# Patient Record
Sex: FEMALE | Race: WHITE
[De-identification: names, ages, dates, MRNs, and addresses within clinical notes are randomized per-mention and may not be internally consistent; named-entity substitution may affect disease eponyms.]

---

## 2017-06-07 ENCOUNTER — OTHER (OUTPATIENT)
Age: 67
End: 2017-06-07

## 2017-06-07 DIAGNOSIS — M25.569 PAIN IN UNSPECIFIED KNEE: ICD-10-CM

## 2017-06-15 ENCOUNTER — APPOINTMENT (OUTPATIENT)
Dept: ORTHOPEDIC SURGERY | Facility: CLINIC | Age: 67
End: 2017-06-15

## 2018-09-21 ENCOUNTER — APPOINTMENT (OUTPATIENT)
Dept: ORTHOPEDIC SURGERY | Facility: CLINIC | Age: 68
End: 2018-09-21
Payer: COMMERCIAL

## 2018-09-21 VITALS
HEIGHT: 65 IN | BODY MASS INDEX: 35.99 KG/M2 | HEART RATE: 80 BPM | DIASTOLIC BLOOD PRESSURE: 97 MMHG | SYSTOLIC BLOOD PRESSURE: 237 MMHG | WEIGHT: 216 LBS

## 2018-09-21 DIAGNOSIS — Z82.61 FAMILY HISTORY OF ARTHRITIS: ICD-10-CM

## 2018-09-21 DIAGNOSIS — Z87.39 PERSONAL HISTORY OF OTHER DISEASES OF THE MUSCULOSKELETAL SYSTEM AND CONNECTIVE TISSUE: ICD-10-CM

## 2018-09-21 DIAGNOSIS — Z86.39 PERSONAL HISTORY OF OTHER ENDOCRINE, NUTRITIONAL AND METABOLIC DISEASE: ICD-10-CM

## 2018-09-21 DIAGNOSIS — Z82.62 FAMILY HISTORY OF OSTEOPOROSIS: ICD-10-CM

## 2018-09-21 DIAGNOSIS — Z78.9 OTHER SPECIFIED HEALTH STATUS: ICD-10-CM

## 2018-09-21 DIAGNOSIS — Z85.038 PERSONAL HISTORY OF OTHER MALIGNANT NEOPLASM OF LARGE INTESTINE: ICD-10-CM

## 2018-09-21 DIAGNOSIS — Z80.41 FAMILY HISTORY OF MALIGNANT NEOPLASM OF OVARY: ICD-10-CM

## 2018-09-21 PROCEDURE — 99204 OFFICE O/P NEW MOD 45 MIN: CPT

## 2018-09-21 RX ORDER — MELOXICAM 15 MG/1
15 TABLET ORAL
Qty: 21 | Refills: 0 | Status: ACTIVE | COMMUNITY
Start: 2018-09-21 | End: 1900-01-01

## 2018-09-21 RX ORDER — GLIMEPIRIDE 2 MG/1
2 TABLET ORAL
Refills: 0 | Status: ACTIVE | COMMUNITY

## 2018-09-21 RX ORDER — FUROSEMIDE 40 MG/1
40 TABLET ORAL
Refills: 0 | Status: ACTIVE | COMMUNITY

## 2018-09-21 RX ORDER — ATORVASTATIN CALCIUM 20 MG/1
20 TABLET, FILM COATED ORAL
Refills: 0 | Status: ACTIVE | COMMUNITY

## 2018-09-21 RX ORDER — INSULIN GLARGINE 100 [IU]/ML
100 INJECTION, SOLUTION SUBCUTANEOUS
Refills: 0 | Status: ACTIVE | COMMUNITY

## 2018-10-19 ENCOUNTER — APPOINTMENT (OUTPATIENT)
Dept: ORTHOPEDIC SURGERY | Facility: CLINIC | Age: 68
End: 2018-10-19
Payer: COMMERCIAL

## 2018-10-19 VITALS
HEIGHT: 65 IN | WEIGHT: 216 LBS | DIASTOLIC BLOOD PRESSURE: 87 MMHG | HEART RATE: 78 BPM | SYSTOLIC BLOOD PRESSURE: 191 MMHG | BODY MASS INDEX: 35.99 KG/M2

## 2018-10-19 DIAGNOSIS — M54.32 SCIATICA, LEFT SIDE: ICD-10-CM

## 2018-10-19 DIAGNOSIS — M48.061 SPINAL STENOSIS, LUMBAR REGION WITHOUT NEUROGENIC CLAUDICATION: ICD-10-CM

## 2018-10-19 DIAGNOSIS — M70.62 TROCHANTERIC BURSITIS, LEFT HIP: ICD-10-CM

## 2018-10-19 PROCEDURE — 99213 OFFICE O/P EST LOW 20 MIN: CPT

## 2019-04-04 ENCOUNTER — EMERGENCY (EMERGENCY)
Facility: HOSPITAL | Age: 69
LOS: 1 days | Discharge: HOME | End: 2019-04-04
Attending: STUDENT IN AN ORGANIZED HEALTH CARE EDUCATION/TRAINING PROGRAM
Payer: COMMERCIAL

## 2019-04-04 VITALS
HEIGHT: 65 IN | TEMPERATURE: 98 F | OXYGEN SATURATION: 100 % | HEART RATE: 71 BPM | WEIGHT: 195.11 LBS | RESPIRATION RATE: 18 BRPM

## 2019-04-04 LAB
ALBUMIN SERPL ELPH-MCNC: 4.3 G/DL — SIGNIFICANT CHANGE UP (ref 3.3–5.2)
ALP SERPL-CCNC: 87 U/L — SIGNIFICANT CHANGE UP (ref 40–120)
ALT FLD-CCNC: 20 U/L — SIGNIFICANT CHANGE UP
ANION GAP SERPL CALC-SCNC: 15 MMOL/L — SIGNIFICANT CHANGE UP (ref 5–17)
APTT BLD: 30.3 SEC — SIGNIFICANT CHANGE UP (ref 27.5–36.3)
AST SERPL-CCNC: 23 U/L — SIGNIFICANT CHANGE UP
BASOPHILS # BLD AUTO: 0 K/UL — SIGNIFICANT CHANGE UP (ref 0–0.2)
BASOPHILS NFR BLD AUTO: 0.3 % — SIGNIFICANT CHANGE UP (ref 0–2)
BILIRUB SERPL-MCNC: 0.2 MG/DL — LOW (ref 0.4–2)
BLD GP AB SCN SERPL QL: SIGNIFICANT CHANGE UP
BUN SERPL-MCNC: 60 MG/DL — HIGH (ref 8–20)
CALCIUM SERPL-MCNC: 9.9 MG/DL — SIGNIFICANT CHANGE UP (ref 8.6–10.2)
CHLORIDE SERPL-SCNC: 106 MMOL/L — SIGNIFICANT CHANGE UP (ref 98–107)
CO2 SERPL-SCNC: 21 MMOL/L — LOW (ref 22–29)
CREAT SERPL-MCNC: 1.6 MG/DL — HIGH (ref 0.5–1.3)
EOSINOPHIL # BLD AUTO: 0.2 K/UL — SIGNIFICANT CHANGE UP (ref 0–0.5)
EOSINOPHIL NFR BLD AUTO: 1.4 % — SIGNIFICANT CHANGE UP (ref 0–6)
GLUCOSE SERPL-MCNC: 52 MG/DL — LOW (ref 70–115)
HCT VFR BLD CALC: 34.6 % — LOW (ref 37–47)
HGB BLD-MCNC: 11.3 G/DL — LOW (ref 12–16)
INR BLD: 0.95 RATIO — SIGNIFICANT CHANGE UP (ref 0.88–1.16)
LYMPHOCYTES # BLD AUTO: 3.4 K/UL — SIGNIFICANT CHANGE UP (ref 1–4.8)
LYMPHOCYTES # BLD AUTO: 30.2 % — SIGNIFICANT CHANGE UP (ref 20–55)
MCHC RBC-ENTMCNC: 27 PG — SIGNIFICANT CHANGE UP (ref 27–31)
MCHC RBC-ENTMCNC: 32.7 G/DL — SIGNIFICANT CHANGE UP (ref 32–36)
MCV RBC AUTO: 82.6 FL — SIGNIFICANT CHANGE UP (ref 81–99)
MONOCYTES # BLD AUTO: 0.8 K/UL — SIGNIFICANT CHANGE UP (ref 0–0.8)
MONOCYTES NFR BLD AUTO: 7.6 % — SIGNIFICANT CHANGE UP (ref 3–10)
NEUTROPHILS # BLD AUTO: 6.8 K/UL — SIGNIFICANT CHANGE UP (ref 1.8–8)
NEUTROPHILS NFR BLD AUTO: 60.2 % — SIGNIFICANT CHANGE UP (ref 37–73)
PLATELET # BLD AUTO: 289 K/UL — SIGNIFICANT CHANGE UP (ref 150–400)
POTASSIUM SERPL-MCNC: 4.2 MMOL/L — SIGNIFICANT CHANGE UP (ref 3.5–5.3)
POTASSIUM SERPL-SCNC: 4.2 MMOL/L — SIGNIFICANT CHANGE UP (ref 3.5–5.3)
PROT SERPL-MCNC: 7.5 G/DL — SIGNIFICANT CHANGE UP (ref 6.6–8.7)
PROTHROM AB SERPL-ACNC: 10.9 SEC — SIGNIFICANT CHANGE UP (ref 10–12.9)
RBC # BLD: 4.19 M/UL — LOW (ref 4.4–5.2)
RBC # FLD: 14.8 % — SIGNIFICANT CHANGE UP (ref 11–15.6)
SODIUM SERPL-SCNC: 142 MMOL/L — SIGNIFICANT CHANGE UP (ref 135–145)
TYPE + AB SCN PNL BLD: SIGNIFICANT CHANGE UP
WBC # BLD: 11.2 K/UL — HIGH (ref 4.8–10.8)
WBC # FLD AUTO: 11.2 K/UL — HIGH (ref 4.8–10.8)

## 2019-04-04 PROCEDURE — 93010 ELECTROCARDIOGRAM REPORT: CPT

## 2019-04-04 NOTE — ED ADULT NURSE NOTE - PMH
<<-----Click on this checkbox to enter Past Medical History Diabetes    High cholesterol    HTN (hypertension)

## 2019-04-04 NOTE — ED PROVIDER NOTE - PHYSICAL EXAMINATION
SPINE: no midline tenderness pt vertebral or step offs. No palpable masses or areas of fluctuance. + left paraspinal muscle tenderness. + left hip ttp. no palpable muscular deformity or bony deformity.   DTR: bilateral patellar and plantar reflexes present. + PAIN TO PALPATION of bilateral anterior shins. NO CALF TENDERNESS bilaterally. 4/5 plantar flexion of left foot 5/5 plantar flexion right foot. moving all toes.

## 2019-04-04 NOTE — ED PROVIDER NOTE - PROGRESS NOTE DETAILS
pt ambulatory.   mri not to be completed until AM   agrees to be placed in observation for MRI in morning

## 2019-04-04 NOTE — ED STATDOCS - PROGRESS NOTE DETAILS
Addended by: NIKI BLACKMON on: 6/26/2018 06:24 PM     Modules accepted: Orders     68 y/o F pt with hx of colon CA (2009) presents to ED c/o progressive numbness to LEs b/l s/p epidural injection to L4 and L5 yesterday. Indicates she felt fine s/p injection initially and drove home without difficulty. At 19:00 yesterday developed numbness in toes b/l ,but went to bed. Today woke up with numbness in her ankles b/l and the numbness in her LLE goes up to her hips. Describes the tingling as " if her leg is asleep" Had epidural injections in the past without this numbness. Not on ASA or blood thinners. No urinary incontinence. Has not had a BM today. no SOB.  Pain: Shalmi  Exam: Const: NAD; Card: RRR, no murmurs; Resp: Lungs CTA; Skin: not pale, no diaphoresis. CN II-XII grossly in tact. Patient has slightly reduced sensation to LEs b/l. no weakness in LEs b/l, no saddle anesthesia.  Patient will be sent to the main for further evaluation and work up. Initial orders placed.

## 2019-04-04 NOTE — ED ADULT TRIAGE NOTE - CHIEF COMPLAINT QUOTE
patient [id number] having shots to L4 and L5 yesterday and having numbness to bilat lower extermities was [id number] to come to er for evaluation ob blood clot

## 2019-04-04 NOTE — ED PROVIDER NOTE - CLINICAL SUMMARY MEDICAL DECISION MAKING FREE TEXT BOX
69 year old female with epidural injections. pending MRI lumbar spine   placed in observation for MRI in morning

## 2019-04-04 NOTE — ED ADULT NURSE NOTE - OBJECTIVE STATEMENT
70yo female c/o gradual BLE numbness x 1 day. pt states she had "shots" on the right and left side of L4 and L5 yesterday afternoon, with no side effects. Pt states last night her left toes and ankle began to feel numb, when she awoke her entire left leg to hip felt "pins and needles" and then began to effect her right leg. pt reports she still has pins in needs in BLE. pt ambulates with cane without difficulty. sensation intact and equal. denies numbness. color appropriate, skin warm and dry, + distal pulses, toes warm and mobile, cap refil < 3. no edema. pt denies any headaches, recent falls, n/v, change in vision, incontinence, fevers, chills.

## 2019-04-04 NOTE — ED PROVIDER NOTE - ATTENDING CONTRIBUTION TO CARE
I personally saw the patient with the PA, and completed the key components of the history and physical exam. I then discussed the management plan with the PA.  mildly less strength at the LLE, pt able to ambulate, r/o complication of epidural, MRI

## 2019-04-04 NOTE — ED ADULT NURSE NOTE - CAS EDN DISCHARGE ASSESSMENT
Patient baseline mental status/Dressing clean and dry/Awake/Alert and oriented to person, place and time/Symptoms improved Dressing clean and dry/Patient baseline mental status/Symptoms improved/Alert and oriented to person, place and time/Pt A&Ox4. VSS. Spouse at bedside. Pt ambulates with cane. Pt verbalizes understanding of DC instructions; has no further questions./Awake

## 2019-04-04 NOTE — ED ADULT NURSE NOTE - CHIEF COMPLAINT QUOTE
patient [id number] having shots to L4 and L5 yesterday and having numbness to bilat lower extermities was [id number] to come to er for evaluation ob blood clot

## 2019-04-04 NOTE — ED PROVIDER NOTE - OBJECTIVE STATEMENT
69 year old female with chronic back pain/left hip pain  that recieves epidural injections from Dr Abhijit Dubois. states that she received injections yesterday to the back and started to develop numbness/"like my legs are sleeping" to bilateral lower extremities. denies bladder or bowel incontinence changes to BMs, abdominal pains, fever or chills nausea or vomiting, headache changes in vision, numbness or tingling to the upper extremities. states that this is the first time she has had such a reaction after an injection and called the emergency contact number at the pain management office and voiced concern about potential bleeding or infection and was told to come to ER for further evaluation.  denies accidents or injury. states that she has difficulty when walking like her legs are going to give out. baseline walks with cane

## 2019-04-05 VITALS
TEMPERATURE: 97 F | DIASTOLIC BLOOD PRESSURE: 62 MMHG | RESPIRATION RATE: 18 BRPM | SYSTOLIC BLOOD PRESSURE: 140 MMHG | OXYGEN SATURATION: 97 % | HEART RATE: 71 BPM

## 2019-04-05 LAB — GLUCOSE BLDC GLUCOMTR-MCNC: 135 MG/DL — HIGH (ref 70–99)

## 2019-04-05 PROCEDURE — 93005 ELECTROCARDIOGRAM TRACING: CPT

## 2019-04-05 PROCEDURE — 85027 COMPLETE CBC AUTOMATED: CPT

## 2019-04-05 PROCEDURE — 72148 MRI LUMBAR SPINE W/O DYE: CPT | Mod: 26

## 2019-04-05 PROCEDURE — 86850 RBC ANTIBODY SCREEN: CPT

## 2019-04-05 PROCEDURE — 86901 BLOOD TYPING SEROLOGIC RH(D): CPT

## 2019-04-05 PROCEDURE — 85610 PROTHROMBIN TIME: CPT

## 2019-04-05 PROCEDURE — 99234 HOSP IP/OBS SM DT SF/LOW 45: CPT

## 2019-04-05 PROCEDURE — 85730 THROMBOPLASTIN TIME PARTIAL: CPT

## 2019-04-05 PROCEDURE — 36415 COLL VENOUS BLD VENIPUNCTURE: CPT

## 2019-04-05 PROCEDURE — 80053 COMPREHEN METABOLIC PANEL: CPT

## 2019-04-05 PROCEDURE — 72148 MRI LUMBAR SPINE W/O DYE: CPT

## 2019-04-05 PROCEDURE — 82962 GLUCOSE BLOOD TEST: CPT

## 2019-04-05 PROCEDURE — G0378: CPT

## 2019-04-05 PROCEDURE — 99284 EMERGENCY DEPT VISIT MOD MDM: CPT | Mod: 25

## 2019-04-05 PROCEDURE — 86900 BLOOD TYPING SEROLOGIC ABO: CPT

## 2019-04-05 RX ORDER — ATORVASTATIN CALCIUM 80 MG/1
20 TABLET, FILM COATED ORAL DAILY
Qty: 0 | Refills: 0 | Status: DISCONTINUED | OUTPATIENT
Start: 2019-04-05 | End: 2019-04-09

## 2019-04-05 RX ORDER — ACETAMINOPHEN 500 MG
975 TABLET ORAL ONCE
Qty: 0 | Refills: 0 | Status: COMPLETED | OUTPATIENT
Start: 2019-04-05 | End: 2019-04-05

## 2019-04-05 RX ORDER — GLIMEPIRIDE 1 MG
4 TABLET ORAL DAILY
Qty: 0 | Refills: 0 | Status: DISCONTINUED | OUTPATIENT
Start: 2019-04-05 | End: 2019-04-09

## 2019-04-05 RX ADMIN — Medication 975 MILLIGRAM(S): at 03:59

## 2019-04-05 RX ADMIN — Medication 2.5 MILLIGRAM(S): at 05:46

## 2019-04-05 RX ADMIN — Medication 975 MILLIGRAM(S): at 02:31

## 2019-04-05 NOTE — ED CDU PROVIDER INITIAL DAY NOTE - MEDICAL DECISION MAKING DETAILS
68yo female with recent epidural injections presents with LLE decreased sensation and motor, afebrile, VSS, placed in obs for MRI to evaluate for possible epidural complication

## 2019-04-05 NOTE — ED CDU PROVIDER DISPOSITION NOTE - CARE PROVIDER_API CALL
Jet Quesada (DO)  Orthopaedic Surgery  [address]  [place] [address]  Phone: [phone]  Fax: [phone]  Follow Up Time:

## 2019-04-05 NOTE — ED CDU PROVIDER DISPOSITION NOTE - ATTENDING CONTRIBUTION TO CARE
I performed a face to face history and physical exam of the patient and discussed their management with the resident/ACP. I reviewed the resident/ACP's note and agree with the documented findings and plan of care.    Pt with recent lumbar injection here for numbness in B/L anterior lower legs.  MRI shows no abscess/hematoma.  Pt with chronic lumbar disease. Pt informed of adrenal nodule and states that she knows about this and knows to have this compared to previous studies to see if it has changed.

## 2019-04-05 NOTE — ED ADULT NURSE REASSESSMENT NOTE - NS ED NURSE REASSESS COMMENT FT1
Verbal report received from night shift CDU RN Mar Corral. Pt s/p epidural injection lumbar 4/3. Pt c/o of intermittent lower back pain and paresthesia to LEs since epidural on 4/3. Pt currently not c/o numbness or tingling to LEs. Pt has full ROM in extremities. Pt scheduled for MRI testing. Pt verbalized understanding of plan of care. Will continue to monitor.

## 2019-04-05 NOTE — ED ADULT NURSE REASSESSMENT NOTE - NS ED NURSE REASSESS COMMENT FT1
assumed care of pt @ 1930, report received from alyssa JOHNSON, charting as noted. Pt AOx4 in NAD, Vital Signs Stable. Pt c/o tingling/numbness in bilateral extremities s/p epidural. No difference in sensation or strength in legs bilaterally. no neuro deficits noted. pedal pulses palpable bilaterally. Normal color and warmth to b/l LEs. blood glucose checked upon pt request, 57. Apple juice and cereal given to pt.     HR is regular , lung sounds are clear b/l, abd is soft and nontender with positive bowel sounds in all four quadrants, skin is warm, dry and appropriate for age and race. pt educated on plan of care and observation stay. Plan of care taught back to RN. Proficiency determined from successful pt teach back. Pt oriented to unit, staff, and room. Pt reeducated on call bell use. Bed locked in lowest position, call bell within reach. All questions and concerns addressed.     Awaiting MRI in AM. assumed care of pt @ 1930, report received from alyssa JOHNSON, charting as noted. Pt AOx4 in NAD, Vital Signs Stable. Pt c/o tingling/numbness in bilateral extremities s/p epidural. No difference in sensation or strength in legs bilaterally. no neuro deficits noted. pedal pulses palpable bilaterally. Normal color and warmth to b/l LEs. blood glucose checked upon pt request, fs resulted 57, PA aware. Apple juice and cereal given to pt.     HR is regular , lung sounds are clear b/l, abd is soft and nontender with positive bowel sounds in all four quadrants, skin is warm, dry and appropriate for age and race. pt educated on plan of care and observation stay. Plan of care taught back to RN. Proficiency determined from successful pt teach back. Pt oriented to unit, staff, and room. Pt reeducated on call bell use. Bed locked in lowest position, call bell within reach. All questions and concerns addressed.     Awaiting MRI in AM.

## 2019-04-05 NOTE — ED CDU PROVIDER DISPOSITION NOTE - CLINICAL COURSE
This is a 69 year old female with chronic back pain with numbness to b/l legs, s/p epidural injection to L2, f/u with pain management MD Dubois.  Had MRI showing: Multilevel degenerative changes resulting in multilevel spinal canal  stenosis and neural foraminal narrowing. Abnormal signal at the L3-4 left   posterior spinal canal and right L4-5 posterior spinal canal as described above which may represent synovial cysts resulting in mass effect on the adjacent posterior nerve roots as described above. Severe spinal canal stenosis at L4-5.  Indeterminate 3.2 cm left adrenal nodule.  Patient made aware of results, given copies to f/u with PCP.

## 2021-01-22 ENCOUNTER — EMERGENCY (EMERGENCY)
Facility: HOSPITAL | Age: 71
LOS: 1 days | Discharge: ANOTHER HOSPITAL | End: 2021-01-22
Attending: STUDENT IN AN ORGANIZED HEALTH CARE EDUCATION/TRAINING PROGRAM | Admitting: STUDENT IN AN ORGANIZED HEALTH CARE EDUCATION/TRAINING PROGRAM
Payer: MEDICARE

## 2021-01-22 VITALS
TEMPERATURE: 95 F | WEIGHT: 175.93 LBS | RESPIRATION RATE: 20 BRPM | HEIGHT: 65 IN | DIASTOLIC BLOOD PRESSURE: 62 MMHG | HEART RATE: 64 BPM | SYSTOLIC BLOOD PRESSURE: 146 MMHG | OXYGEN SATURATION: 98 %

## 2021-01-22 PROCEDURE — 93010 ELECTROCARDIOGRAM REPORT: CPT

## 2021-01-22 PROCEDURE — 99285 EMERGENCY DEPT VISIT HI MDM: CPT

## 2021-01-22 PROCEDURE — 99053 MED SERV 10PM-8AM 24 HR FAC: CPT

## 2021-01-22 RX ORDER — SODIUM CHLORIDE 9 MG/ML
1000 INJECTION, SOLUTION INTRAVENOUS
Refills: 0 | Status: DISCONTINUED | OUTPATIENT
Start: 2021-01-22 | End: 2021-01-26

## 2021-01-23 ENCOUNTER — INPATIENT (INPATIENT)
Facility: HOSPITAL | Age: 71
LOS: 4 days | Discharge: NURSING, REHAB OR LONG-TERM CARE | DRG: 177 | End: 2021-01-28
Attending: HOSPITALIST | Admitting: STUDENT IN AN ORGANIZED HEALTH CARE EDUCATION/TRAINING PROGRAM
Payer: MEDICARE

## 2021-01-23 VITALS
HEART RATE: 71 BPM | SYSTOLIC BLOOD PRESSURE: 133 MMHG | TEMPERATURE: 96 F | RESPIRATION RATE: 18 BRPM | DIASTOLIC BLOOD PRESSURE: 61 MMHG | OXYGEN SATURATION: 98 %

## 2021-01-23 VITALS
RESPIRATION RATE: 16 BRPM | HEART RATE: 70 BPM | SYSTOLIC BLOOD PRESSURE: 109 MMHG | DIASTOLIC BLOOD PRESSURE: 59 MMHG | WEIGHT: 175.93 LBS | OXYGEN SATURATION: 97 % | HEIGHT: 65 IN

## 2021-01-23 DIAGNOSIS — Z29.9 ENCOUNTER FOR PROPHYLACTIC MEASURES, UNSPECIFIED: ICD-10-CM

## 2021-01-23 DIAGNOSIS — Z98.890 OTHER SPECIFIED POSTPROCEDURAL STATES: Chronic | ICD-10-CM

## 2021-01-23 DIAGNOSIS — D64.9 ANEMIA, UNSPECIFIED: ICD-10-CM

## 2021-01-23 DIAGNOSIS — E16.2 HYPOGLYCEMIA, UNSPECIFIED: ICD-10-CM

## 2021-01-23 DIAGNOSIS — E87.1 HYPO-OSMOLALITY AND HYPONATREMIA: ICD-10-CM

## 2021-01-23 DIAGNOSIS — I10 ESSENTIAL (PRIMARY) HYPERTENSION: ICD-10-CM

## 2021-01-23 DIAGNOSIS — F32.9 MAJOR DEPRESSIVE DISORDER, SINGLE EPISODE, UNSPECIFIED: ICD-10-CM

## 2021-01-23 DIAGNOSIS — N39.0 URINARY TRACT INFECTION, SITE NOT SPECIFIED: ICD-10-CM

## 2021-01-23 DIAGNOSIS — E78.5 HYPERLIPIDEMIA, UNSPECIFIED: ICD-10-CM

## 2021-01-23 DIAGNOSIS — E03.9 HYPOTHYROIDISM, UNSPECIFIED: ICD-10-CM

## 2021-01-23 DIAGNOSIS — B97.21 SARS-ASSOCIATED CORONAVIRUS AS THE CAUSE OF DISEASES CLASSIFIED ELSEWHERE: ICD-10-CM

## 2021-01-23 DIAGNOSIS — J18.9 PNEUMONIA, UNSPECIFIED ORGANISM: ICD-10-CM

## 2021-01-23 DIAGNOSIS — U07.1 COVID-19: ICD-10-CM

## 2021-01-23 DIAGNOSIS — E11.9 TYPE 2 DIABETES MELLITUS WITHOUT COMPLICATIONS: ICD-10-CM

## 2021-01-23 DIAGNOSIS — R41.82 ALTERED MENTAL STATUS, UNSPECIFIED: ICD-10-CM

## 2021-01-23 DIAGNOSIS — G93.41 METABOLIC ENCEPHALOPATHY: ICD-10-CM

## 2021-01-23 LAB
A1C WITH ESTIMATED AVERAGE GLUCOSE RESULT: 6.9 % — HIGH (ref 4–5.6)
ALBUMIN SERPL ELPH-MCNC: 1.8 G/DL — LOW (ref 3.3–5)
ALP SERPL-CCNC: 156 U/L — HIGH (ref 30–120)
ALT FLD-CCNC: 18 U/L DA — SIGNIFICANT CHANGE UP (ref 10–60)
ANION GAP SERPL CALC-SCNC: 11 MMOL/L — SIGNIFICANT CHANGE UP (ref 5–17)
APPEARANCE UR: ABNORMAL
APTT BLD: 29 SEC — SIGNIFICANT CHANGE UP (ref 27.5–35.5)
APTT BLD: 29.7 SEC — SIGNIFICANT CHANGE UP (ref 27.5–35.5)
AST SERPL-CCNC: 23 U/L — SIGNIFICANT CHANGE UP (ref 10–40)
BACTERIA # UR AUTO: ABNORMAL
BASOPHILS # BLD AUTO: 0.03 K/UL — SIGNIFICANT CHANGE UP (ref 0–0.2)
BASOPHILS # BLD AUTO: 0.09 K/UL — SIGNIFICANT CHANGE UP (ref 0–0.2)
BASOPHILS NFR BLD AUTO: 0.3 % — SIGNIFICANT CHANGE UP (ref 0–2)
BASOPHILS NFR BLD AUTO: 1 % — SIGNIFICANT CHANGE UP (ref 0–2)
BILIRUB SERPL-MCNC: 0.2 MG/DL — SIGNIFICANT CHANGE UP (ref 0.2–1.2)
BILIRUB UR-MCNC: NEGATIVE — SIGNIFICANT CHANGE UP
BUN SERPL-MCNC: 27 MG/DL — HIGH (ref 7–23)
CALCIUM SERPL-MCNC: 8.4 MG/DL — SIGNIFICANT CHANGE UP (ref 8.4–10.5)
CHLORIDE SERPL-SCNC: 99 MMOL/L — SIGNIFICANT CHANGE UP (ref 96–108)
CO2 SERPL-SCNC: 20 MMOL/L — LOW (ref 22–31)
COLOR SPEC: YELLOW — SIGNIFICANT CHANGE UP
CREAT SERPL-MCNC: 1.19 MG/DL — SIGNIFICANT CHANGE UP (ref 0.5–1.3)
DIFF PNL FLD: NEGATIVE — SIGNIFICANT CHANGE UP
EOSINOPHIL # BLD AUTO: 0.03 K/UL — SIGNIFICANT CHANGE UP (ref 0–0.5)
EOSINOPHIL # BLD AUTO: 0.17 K/UL — SIGNIFICANT CHANGE UP (ref 0–0.5)
EOSINOPHIL NFR BLD AUTO: 0.3 % — SIGNIFICANT CHANGE UP (ref 0–6)
EOSINOPHIL NFR BLD AUTO: 2 % — SIGNIFICANT CHANGE UP (ref 0–6)
EPI CELLS # UR: SIGNIFICANT CHANGE UP
ESTIMATED AVERAGE GLUCOSE: 151 MG/DL — HIGH (ref 68–114)
FERRITIN SERPL-MCNC: 231 NG/ML — HIGH (ref 15–150)
GLUCOSE BLDC GLUCOMTR-MCNC: 121 MG/DL — HIGH (ref 70–99)
GLUCOSE BLDC GLUCOMTR-MCNC: 145 MG/DL — HIGH (ref 70–99)
GLUCOSE BLDC GLUCOMTR-MCNC: 174 MG/DL — HIGH (ref 70–99)
GLUCOSE SERPL-MCNC: 126 MG/DL — HIGH (ref 70–99)
GLUCOSE UR QL: NEGATIVE MG/DL — SIGNIFICANT CHANGE UP
HCT VFR BLD CALC: 26 % — LOW (ref 34.5–45)
HCT VFR BLD CALC: 28.8 % — LOW (ref 34.5–45)
HGB BLD-MCNC: 8.5 G/DL — LOW (ref 11.5–15.5)
HGB BLD-MCNC: 9.2 G/DL — LOW (ref 11.5–15.5)
HYALINE CASTS # UR AUTO: ABNORMAL /LPF
IMM GRANULOCYTES NFR BLD AUTO: 2.6 % — HIGH (ref 0–1.5)
INR BLD: 1.05 RATIO — SIGNIFICANT CHANGE UP (ref 0.88–1.16)
INR BLD: 1.1 RATIO — SIGNIFICANT CHANGE UP (ref 0.88–1.16)
KETONES UR-MCNC: NEGATIVE — SIGNIFICANT CHANGE UP
LACTATE SERPL-SCNC: 1.2 MMOL/L — SIGNIFICANT CHANGE UP (ref 0.7–2)
LDH SERPL L TO P-CCNC: 358 U/L — HIGH (ref 50–242)
LEGIONELLA AG UR QL: NEGATIVE — SIGNIFICANT CHANGE UP
LEUKOCYTE ESTERASE UR-ACNC: ABNORMAL
LYMPHOCYTES # BLD AUTO: 1.03 K/UL — SIGNIFICANT CHANGE UP (ref 1–3.3)
LYMPHOCYTES # BLD AUTO: 1.33 K/UL — SIGNIFICANT CHANGE UP (ref 1–3.3)
LYMPHOCYTES # BLD AUTO: 11.3 % — LOW (ref 13–44)
LYMPHOCYTES # BLD AUTO: 12 % — LOW (ref 13–44)
MAGNESIUM SERPL-MCNC: 1.5 MG/DL — LOW (ref 1.6–2.6)
MCHC RBC-ENTMCNC: 25 PG — LOW (ref 27–34)
MCHC RBC-ENTMCNC: 25 PG — LOW (ref 27–34)
MCHC RBC-ENTMCNC: 31.9 GM/DL — LOW (ref 32–36)
MCHC RBC-ENTMCNC: 32.7 GM/DL — SIGNIFICANT CHANGE UP (ref 32–36)
MCV RBC AUTO: 76.5 FL — LOW (ref 80–100)
MCV RBC AUTO: 78.3 FL — LOW (ref 80–100)
MONOCYTES # BLD AUTO: 0.95 K/UL — HIGH (ref 0–0.9)
MONOCYTES # BLD AUTO: 1.02 K/UL — HIGH (ref 0–0.9)
MONOCYTES NFR BLD AUTO: 11 % — SIGNIFICANT CHANGE UP (ref 2–14)
MONOCYTES NFR BLD AUTO: 8.7 % — SIGNIFICANT CHANGE UP (ref 2–14)
NEUTROPHILS # BLD AUTO: 6.11 K/UL — SIGNIFICANT CHANGE UP (ref 1.8–7.4)
NEUTROPHILS # BLD AUTO: 9.07 K/UL — HIGH (ref 1.8–7.4)
NEUTROPHILS NFR BLD AUTO: 69 % — SIGNIFICANT CHANGE UP (ref 43–77)
NEUTROPHILS NFR BLD AUTO: 76.8 % — SIGNIFICANT CHANGE UP (ref 43–77)
NITRITE UR-MCNC: POSITIVE
NRBC # BLD: 0 /100 WBCS — SIGNIFICANT CHANGE UP (ref 0–0)
NRBC # BLD: SIGNIFICANT CHANGE UP /100 WBCS (ref 0–0)
OSMOLALITY SERPL: 276 MOSMOL/KG — LOW (ref 280–301)
OSMOLALITY UR: 385 MOSM/KG — SIGNIFICANT CHANGE UP (ref 50–1200)
PH UR: 6 — SIGNIFICANT CHANGE UP (ref 5–8)
PHOSPHATE SERPL-MCNC: 2.3 MG/DL — LOW (ref 2.5–4.5)
PLATELET # BLD AUTO: 364 K/UL — SIGNIFICANT CHANGE UP (ref 150–400)
PLATELET # BLD AUTO: 380 K/UL — SIGNIFICANT CHANGE UP (ref 150–400)
POTASSIUM SERPL-MCNC: 3.7 MMOL/L — SIGNIFICANT CHANGE UP (ref 3.5–5.3)
POTASSIUM SERPL-SCNC: 3.7 MMOL/L — SIGNIFICANT CHANGE UP (ref 3.5–5.3)
PROT SERPL-MCNC: 6.3 G/DL — SIGNIFICANT CHANGE UP (ref 6–8.3)
PROT UR-MCNC: 30 MG/DL
PROTHROM AB SERPL-ACNC: 12.7 SEC — SIGNIFICANT CHANGE UP (ref 10.6–13.6)
PROTHROM AB SERPL-ACNC: 12.8 SEC — SIGNIFICANT CHANGE UP (ref 10.6–13.6)
RBC # BLD: 3.4 M/UL — LOW (ref 3.8–5.2)
RBC # BLD: 3.68 M/UL — LOW (ref 3.8–5.2)
RBC # FLD: 17.6 % — HIGH (ref 10.3–14.5)
RBC # FLD: 17.7 % — HIGH (ref 10.3–14.5)
RBC CASTS # UR COMP ASSIST: ABNORMAL /HPF (ref 0–4)
SARS-COV-2 RNA SPEC QL NAA+PROBE: DETECTED
SODIUM SERPL-SCNC: 130 MMOL/L — LOW (ref 135–145)
SODIUM UR-SCNC: 65 MMOL/L — SIGNIFICANT CHANGE UP
SP GR SPEC: 1.01 — SIGNIFICANT CHANGE UP (ref 1.01–1.02)
URATE SERPL-MCNC: 4.8 MG/DL — SIGNIFICANT CHANGE UP (ref 2.5–7)
UROBILINOGEN FLD QL: NEGATIVE MG/DL — SIGNIFICANT CHANGE UP
WBC # BLD: 11.79 K/UL — HIGH (ref 3.8–10.5)
WBC # BLD: 8.61 K/UL — SIGNIFICANT CHANGE UP (ref 3.8–10.5)
WBC # FLD AUTO: 11.79 K/UL — HIGH (ref 3.8–10.5)
WBC # FLD AUTO: 8.61 K/UL — SIGNIFICANT CHANGE UP (ref 3.8–10.5)
WBC UR QL: ABNORMAL

## 2021-01-23 PROCEDURE — 87086 URINE CULTURE/COLONY COUNT: CPT

## 2021-01-23 PROCEDURE — 80053 COMPREHEN METABOLIC PANEL: CPT

## 2021-01-23 PROCEDURE — 96365 THER/PROPH/DIAG IV INF INIT: CPT

## 2021-01-23 PROCEDURE — 87186 SC STD MICRODIL/AGAR DIL: CPT

## 2021-01-23 PROCEDURE — 85610 PROTHROMBIN TIME: CPT

## 2021-01-23 PROCEDURE — 36415 COLL VENOUS BLD VENIPUNCTURE: CPT

## 2021-01-23 PROCEDURE — 96375 TX/PRO/DX INJ NEW DRUG ADDON: CPT

## 2021-01-23 PROCEDURE — 70450 CT HEAD/BRAIN W/O DYE: CPT

## 2021-01-23 PROCEDURE — 71045 X-RAY EXAM CHEST 1 VIEW: CPT

## 2021-01-23 PROCEDURE — 82962 GLUCOSE BLOOD TEST: CPT

## 2021-01-23 PROCEDURE — 99285 EMERGENCY DEPT VISIT HI MDM: CPT | Mod: 25

## 2021-01-23 PROCEDURE — 99223 1ST HOSP IP/OBS HIGH 75: CPT | Mod: GC,AI

## 2021-01-23 PROCEDURE — 93005 ELECTROCARDIOGRAM TRACING: CPT

## 2021-01-23 PROCEDURE — U0005: CPT

## 2021-01-23 PROCEDURE — 71045 X-RAY EXAM CHEST 1 VIEW: CPT | Mod: 26

## 2021-01-23 PROCEDURE — 87040 BLOOD CULTURE FOR BACTERIA: CPT

## 2021-01-23 PROCEDURE — 70450 CT HEAD/BRAIN W/O DYE: CPT | Mod: 26

## 2021-01-23 PROCEDURE — 85025 COMPLETE CBC W/AUTO DIFF WBC: CPT

## 2021-01-23 PROCEDURE — 85730 THROMBOPLASTIN TIME PARTIAL: CPT

## 2021-01-23 PROCEDURE — 99053 MED SERV 10PM-8AM 24 HR FAC: CPT

## 2021-01-23 PROCEDURE — 81001 URINALYSIS AUTO W/SCOPE: CPT

## 2021-01-23 PROCEDURE — U0003: CPT

## 2021-01-23 PROCEDURE — 99222 1ST HOSP IP/OBS MODERATE 55: CPT

## 2021-01-23 PROCEDURE — 99283 EMERGENCY DEPT VISIT LOW MDM: CPT

## 2021-01-23 PROCEDURE — 83605 ASSAY OF LACTIC ACID: CPT

## 2021-01-23 RX ORDER — DEXTROSE 50 % IN WATER 50 %
12.5 SYRINGE (ML) INTRAVENOUS ONCE
Refills: 0 | Status: DISCONTINUED | OUTPATIENT
Start: 2021-01-23 | End: 2021-01-28

## 2021-01-23 RX ORDER — INSULIN LISPRO 100/ML
VIAL (ML) SUBCUTANEOUS AT BEDTIME
Refills: 0 | Status: DISCONTINUED | OUTPATIENT
Start: 2021-01-23 | End: 2021-01-24

## 2021-01-23 RX ORDER — ASCORBIC ACID 60 MG
500 TABLET,CHEWABLE ORAL DAILY
Refills: 0 | Status: DISCONTINUED | OUTPATIENT
Start: 2021-01-23 | End: 2021-01-28

## 2021-01-23 RX ORDER — HYDRALAZINE HCL 50 MG
50 TABLET ORAL
Refills: 0 | Status: DISCONTINUED | OUTPATIENT
Start: 2021-01-23 | End: 2021-01-28

## 2021-01-23 RX ORDER — VANCOMYCIN HCL 1 G
1000 VIAL (EA) INTRAVENOUS ONCE
Refills: 0 | Status: COMPLETED | OUTPATIENT
Start: 2021-01-23 | End: 2021-01-23

## 2021-01-23 RX ORDER — TAMSULOSIN HYDROCHLORIDE 0.4 MG/1
0.4 CAPSULE ORAL AT BEDTIME
Refills: 0 | Status: DISCONTINUED | OUTPATIENT
Start: 2021-01-23 | End: 2021-01-28

## 2021-01-23 RX ORDER — FERROUS SULFATE 325(65) MG
325 TABLET ORAL DAILY
Refills: 0 | Status: DISCONTINUED | OUTPATIENT
Start: 2021-01-23 | End: 2021-01-28

## 2021-01-23 RX ORDER — REMDESIVIR 5 MG/ML
INJECTION INTRAVENOUS
Refills: 0 | Status: COMPLETED | OUTPATIENT
Start: 2021-01-23 | End: 2021-01-27

## 2021-01-23 RX ORDER — SODIUM CHLORIDE 9 MG/ML
1000 INJECTION, SOLUTION INTRAVENOUS
Refills: 0 | Status: DISCONTINUED | OUTPATIENT
Start: 2021-01-23 | End: 2021-01-28

## 2021-01-23 RX ORDER — ATORVASTATIN CALCIUM 80 MG/1
20 TABLET, FILM COATED ORAL AT BEDTIME
Refills: 0 | Status: DISCONTINUED | OUTPATIENT
Start: 2021-01-23 | End: 2021-01-28

## 2021-01-23 RX ORDER — AMLODIPINE BESYLATE 2.5 MG/1
10 TABLET ORAL DAILY
Refills: 0 | Status: DISCONTINUED | OUTPATIENT
Start: 2021-01-23 | End: 2021-01-28

## 2021-01-23 RX ORDER — REMDESIVIR 5 MG/ML
200 INJECTION INTRAVENOUS EVERY 24 HOURS
Refills: 0 | Status: COMPLETED | OUTPATIENT
Start: 2021-01-23 | End: 2021-01-23

## 2021-01-23 RX ORDER — SERTRALINE 25 MG/1
25 TABLET, FILM COATED ORAL DAILY
Refills: 0 | Status: DISCONTINUED | OUTPATIENT
Start: 2021-01-23 | End: 2021-01-26

## 2021-01-23 RX ORDER — REMDESIVIR 5 MG/ML
100 INJECTION INTRAVENOUS EVERY 24 HOURS
Refills: 0 | Status: COMPLETED | OUTPATIENT
Start: 2021-01-24 | End: 2021-01-27

## 2021-01-23 RX ORDER — HYDROCORTISONE 1 %
1 OINTMENT (GRAM) TOPICAL
Refills: 0 | Status: DISCONTINUED | OUTPATIENT
Start: 2021-01-23 | End: 2021-01-23

## 2021-01-23 RX ORDER — PIPERACILLIN AND TAZOBACTAM 4; .5 G/20ML; G/20ML
3.38 INJECTION, POWDER, LYOPHILIZED, FOR SOLUTION INTRAVENOUS EVERY 8 HOURS
Refills: 0 | Status: DISCONTINUED | OUTPATIENT
Start: 2021-01-23 | End: 2021-01-23

## 2021-01-23 RX ORDER — GLUCAGON INJECTION, SOLUTION 0.5 MG/.1ML
1 INJECTION, SOLUTION SUBCUTANEOUS ONCE
Refills: 0 | Status: DISCONTINUED | OUTPATIENT
Start: 2021-01-23 | End: 2021-01-28

## 2021-01-23 RX ORDER — MULTIVIT-MIN/FERROUS GLUCONATE 9 MG/15 ML
1 LIQUID (ML) ORAL DAILY
Refills: 0 | Status: DISCONTINUED | OUTPATIENT
Start: 2021-01-23 | End: 2021-01-28

## 2021-01-23 RX ORDER — SODIUM CHLORIDE 9 MG/ML
1000 INJECTION, SOLUTION INTRAVENOUS
Refills: 0 | Status: DISCONTINUED | OUTPATIENT
Start: 2021-01-23 | End: 2021-01-24

## 2021-01-23 RX ORDER — HYDROCORTISONE 1 %
1 OINTMENT (GRAM) TOPICAL
Refills: 0 | Status: DISCONTINUED | OUTPATIENT
Start: 2021-01-23 | End: 2021-01-28

## 2021-01-23 RX ORDER — PANTOPRAZOLE SODIUM 20 MG/1
40 TABLET, DELAYED RELEASE ORAL
Refills: 0 | Status: DISCONTINUED | OUTPATIENT
Start: 2021-01-23 | End: 2021-01-28

## 2021-01-23 RX ORDER — DEXTROSE 50 % IN WATER 50 %
25 SYRINGE (ML) INTRAVENOUS ONCE
Refills: 0 | Status: DISCONTINUED | OUTPATIENT
Start: 2021-01-23 | End: 2021-01-28

## 2021-01-23 RX ORDER — INSULIN LISPRO 100/ML
VIAL (ML) SUBCUTANEOUS
Refills: 0 | Status: DISCONTINUED | OUTPATIENT
Start: 2021-01-23 | End: 2021-01-24

## 2021-01-23 RX ORDER — CEFTRIAXONE 500 MG/1
1000 INJECTION, POWDER, FOR SOLUTION INTRAMUSCULAR; INTRAVENOUS EVERY 24 HOURS
Refills: 0 | Status: DISCONTINUED | OUTPATIENT
Start: 2021-01-23 | End: 2021-01-25

## 2021-01-23 RX ORDER — DEXTROSE 50 % IN WATER 50 %
15 SYRINGE (ML) INTRAVENOUS ONCE
Refills: 0 | Status: DISCONTINUED | OUTPATIENT
Start: 2021-01-23 | End: 2021-01-28

## 2021-01-23 RX ORDER — ACETAMINOPHEN 500 MG
650 TABLET ORAL EVERY 4 HOURS
Refills: 0 | Status: DISCONTINUED | OUTPATIENT
Start: 2021-01-23 | End: 2021-01-28

## 2021-01-23 RX ORDER — PIPERACILLIN AND TAZOBACTAM 4; .5 G/20ML; G/20ML
3.38 INJECTION, POWDER, LYOPHILIZED, FOR SOLUTION INTRAVENOUS ONCE
Refills: 0 | Status: COMPLETED | OUTPATIENT
Start: 2021-01-23 | End: 2021-01-23

## 2021-01-23 RX ORDER — ENOXAPARIN SODIUM 100 MG/ML
40 INJECTION SUBCUTANEOUS DAILY
Refills: 0 | Status: DISCONTINUED | OUTPATIENT
Start: 2021-01-23 | End: 2021-01-28

## 2021-01-23 RX ORDER — LEVOTHYROXINE SODIUM 125 MCG
25 TABLET ORAL DAILY
Refills: 0 | Status: DISCONTINUED | OUTPATIENT
Start: 2021-01-23 | End: 2021-01-28

## 2021-01-23 RX ORDER — SIMETHICONE 80 MG/1
80 TABLET, CHEWABLE ORAL
Refills: 0 | Status: DISCONTINUED | OUTPATIENT
Start: 2021-01-23 | End: 2021-01-28

## 2021-01-23 RX ORDER — LACTOBACILLUS ACIDOPHILUS 100MM CELL
1 CAPSULE ORAL
Refills: 0 | Status: DISCONTINUED | OUTPATIENT
Start: 2021-01-23 | End: 2021-01-28

## 2021-01-23 RX ADMIN — PIPERACILLIN AND TAZOBACTAM 200 GRAM(S): 4; .5 INJECTION, POWDER, LYOPHILIZED, FOR SOLUTION INTRAVENOUS at 01:15

## 2021-01-23 RX ADMIN — ATORVASTATIN CALCIUM 20 MILLIGRAM(S): 80 TABLET, FILM COATED ORAL at 20:33

## 2021-01-23 RX ADMIN — ENOXAPARIN SODIUM 40 MILLIGRAM(S): 100 INJECTION SUBCUTANEOUS at 12:00

## 2021-01-23 RX ADMIN — Medication 250 MILLIGRAM(S): at 01:45

## 2021-01-23 RX ADMIN — Medication 1 TABLET(S): at 13:30

## 2021-01-23 RX ADMIN — SODIUM CHLORIDE 100 MILLILITER(S): 9 INJECTION, SOLUTION INTRAVENOUS at 02:16

## 2021-01-23 RX ADMIN — REMDESIVIR 500 MILLIGRAM(S): 5 INJECTION INTRAVENOUS at 14:16

## 2021-01-23 RX ADMIN — TAMSULOSIN HYDROCHLORIDE 0.4 MILLIGRAM(S): 0.4 CAPSULE ORAL at 20:33

## 2021-01-23 RX ADMIN — SERTRALINE 25 MILLIGRAM(S): 25 TABLET, FILM COATED ORAL at 13:30

## 2021-01-23 RX ADMIN — Medication 1 TABLET(S): at 19:07

## 2021-01-23 RX ADMIN — CEFTRIAXONE 100 MILLIGRAM(S): 500 INJECTION, POWDER, FOR SOLUTION INTRAMUSCULAR; INTRAVENOUS at 12:31

## 2021-01-23 RX ADMIN — Medication 50 MILLIGRAM(S): at 19:36

## 2021-01-23 RX ADMIN — Medication 325 MILLIGRAM(S): at 13:31

## 2021-01-23 RX ADMIN — Medication 2.5 MILLIGRAM(S): at 19:36

## 2021-01-23 RX ADMIN — PIPERACILLIN AND TAZOBACTAM 3.38 GRAM(S): 4; .5 INJECTION, POWDER, LYOPHILIZED, FOR SOLUTION INTRAVENOUS at 01:45

## 2021-01-23 RX ADMIN — Medication 500 MILLIGRAM(S): at 13:31

## 2021-01-23 RX ADMIN — SODIUM CHLORIDE 50 MILLILITER(S): 9 INJECTION, SOLUTION INTRAVENOUS at 19:36

## 2021-01-23 NOTE — H&P ADULT - PROBLEM SELECTOR PLAN 10
10. HLD: Continue home medication atorvastatin 20 QD  11. Hypothyroidism: Continue home synthroid    12. DVT ppx: Lovenox 10. HLD: Continue home medication atorvastatin 20 QD  11. Hypothyroidism: Continue home synthroid    12. DVT ppx: Lovenox  13. Goals of care discussed pt is DNR/DNI as per son and pt - palliative consulted for JOHANA - VTE ppx: lovenox subQ    11. HLD: Continue home medication atorvastatin 20 QD  12. Hypothyroidism: Continue home synthroid, check tsh  13. Goals of care discussed pt is DNR/DNI as per son and pt - palliative consulted for JOHANA

## 2021-01-23 NOTE — H&P ADULT - NSHPPHYSICALEXAM_GEN_ALL_CORE
T(C): 35.8 (01-23-21 @ 05:25), Max: 35.8 (01-23-21 @ 05:25)  HR: 72 (01-23-21 @ 05:25) (70 - 72)  BP: 140/65 (01-23-21 @ 05:25) (109/59 - 140/65)  RR: 16 (01-23-21 @ 05:25) (16 - 16)  SpO2: 99% (01-23-21 @ 05:25) (97% - 99%)    General: No apparent distress  Head: normocephalic, atraumatic  Eyes: EOMI, anicteric  ENT: moist mucous membranes, no pharyngeal exudates  Heart: RRR, S1, S2, no murmurs  Chest: CTA b/l, no rales, rhonchi, or wheezes  Abd: soft, non distended, generalized tenderness   Back: no CVA tenderness  Extr: no edema or cyanosis  Neuro: AA&Ox3, moving all extremities LLE weaker than R   Psych: normal affect  Skin: sacral ulcer stage 3 buttocks

## 2021-01-23 NOTE — ED PROVIDER NOTE - CLINICAL SUMMARY MEDICAL DECISION MAKING FREE TEXT BOX
71 year old female recently admitted to [facility] for urosepsis p/w AMS and hypoglycemia.  BIBA fro rehab.  Currently AO x 3 in no distress.  Rectal temp 94.7.  Sepsis work up, CXR, check urine, abx as needed and consider admission

## 2021-01-23 NOTE — H&P ADULT - PROBLEM SELECTOR PLAN 1
-currently AAOX3 but a poor historian   -acute metabolic encephalopathy multifactorial in the setting of hypoglycemia, UTI/PNA, sepsis, and hyponatremia   -CT head negative for acute pathology  - -currently AAOX3 but a poor historian   -acute metabolic encephalopathy multifactorial in the setting of hypoglycemia, UTI/PNA, sepsis, and hyponatremia   -CT head negative for acute pathology  -Will treat for UTI/PNA with Zosyn, f/u cultures -currently AAOX3 but a poor historian   -acute metabolic encephalopathy multifactorial in the setting of hypoglycemia, UTI/PNA, sepsis, and hyponatremia   -CT head negative for acute pathology  - s/p vancomycin and zosyn in the ED  - will check MRSA PCR given recent hospitalization  - will treat for UTI/PNA with Zosyn for now, f/u cultures  - supportive treatment for COVID-19 PNA - no hypoxemia

## 2021-01-23 NOTE — ED ADULT NURSE REASSESSMENT NOTE - NS ED NURSE REASSESS COMMENT FT1
dr. bonds unable to reach pt's son; called twice. pt transferred to [facility]. pt gave verbal consent for transport. telephone report given to ELIZABETH Li

## 2021-01-23 NOTE — ED ADULT NURSE NOTE - OBJECTIVE STATEMENT
as  per EMT, pt to ED for low blood sugar; fingerstick 45 on scene. pt was given 250 ml of D10%. fingerstick on arrival to ED = 119. pt unable to give any information of what happened to her prior to arrival. awake and alert. follows commands. oriented x3. + several sacral and gluteal decubiti noted on arrival. rectal temp low. pt placed on warming blanket

## 2021-01-23 NOTE — PROGRESS NOTE ADULT - SUBJECTIVE AND OBJECTIVE BOX
CC/F/U for:     HPI:  71 year old female PMHx of DM type 2, HTN, HLD, MDD, Hypothyroidism, presented to the [place] [facility] ED with AMS and hypoglycemia.  History obtained from chart review and son. Patient is AAOx3 but she is a poor historian. As per son, pt was independent/driving car/living in her house alone until Nov 24th. A month prior to this she lost her  and became more depressed. On Nov 24th, she was found on the floor of her living room and she could not remember how she got there, she was brought to [facility], where she spent 8 days and was diagnosed with a UTI/bacteremia for which was she dc home with IV abx and a Davila. She was more bed bound at home after this hospitalization, her blood sugar kept dropping and she was readmitted to [facility], this time treated for dehydration/DANIEL/ and diarrhea. Her diarrhea was attributed to her abx use and she tested negative for Cdiff multiple times. She also received a unit of PRBC for a Hb of 8.2. On Dec 22 she was D/c to [facility] nursing home and because of nausea and vomiting (vomit had some blood) she was brought back to the hospital where she stayed  a few days then was discharged to [facility]. The night prior to admission, pt was sent to [facility] from [facility] because she had an episode of AMS where she was nonverbal/incoherant w/ a blood sugar of 45. History of episode was limited as nursing staff that witnessed episode were not at [facility] at time of admission.  As per chart, staff reported that patient had poor PO intake yesterday so they held her insulin.  At night, she was noted to be altered and confused.  EMS arrived and noted FS of 45.  She was given D10 which resulted in prompt improvement in mental status.  Her blood sugar was 160 en route.  She ambulates with a walker but admits that she is not very active secondary to neuropathy.   Today patient states that she still has diarrhea about every 2 hours and she has also had increased urinary frequency for about a month. Her Davila was discontinued about the end of Dec as per son and she has had increased urinary frequency since then.    Vitals at [facility]: T: 94.9, HR: 64, BP: 146/62, RR: 20 O2: 98 on RA   Labs significant for: WBC: 11.79, Hb/Hct: 9.2/28.0, Na: 130, BUN/Cr: 27/1.19, Alk phso: 156, +COVID   UA: positive for nitrate, moderate leukocyte esterase, bacteria,WBC   CXR: ?b/l patchy infiltrates, consolidation in the right lung (pending official read)  CT head: No CT evidence of acute intracranial abnormality.  In the [place] [facility] ED patient received: Vanco, Zosyn and was started on d5 + IVF (23 Jan 2021 05:21)        INTERVAL HPI/OVERNIGHT EVENTS:  Pt seen and examined at bedside.     Allergies/Intolerance: Hytrin (Unknown)      MEDICATIONS  (STANDING):  amLODIPine   Tablet 10 milliGRAM(s) Oral daily  ascorbic acid 500 milliGRAM(s) Oral daily  atorvastatin 20 milliGRAM(s) Oral at bedtime  dextrose 40% Gel 15 Gram(s) Oral once  dextrose 5%. 1000 milliLiter(s) (50 mL/Hr) IV Continuous <Continuous>  dextrose 5%. 1000 milliLiter(s) (100 mL/Hr) IV Continuous <Continuous>  dextrose 50% Injectable 25 Gram(s) IV Push once  dextrose 50% Injectable 12.5 Gram(s) IV Push once  dextrose 50% Injectable 25 Gram(s) IV Push once  enalapril 2.5 milliGRAM(s) Oral two times a day  enoxaparin Injectable 40 milliGRAM(s) SubCutaneous daily  ferrous    sulfate 325 milliGRAM(s) Oral daily  glucagon  Injectable 1 milliGRAM(s) IntraMuscular once  hydrALAZINE 50 milliGRAM(s) Oral two times a day  insulin lispro (ADMELOG) corrective regimen sliding scale   SubCutaneous three times a day before meals  insulin lispro (ADMELOG) corrective regimen sliding scale   SubCutaneous at bedtime  lactobacillus acidophilus 1 Tablet(s) Oral two times a day with meals  levothyroxine 25 MICROGram(s) Oral daily  multivitamin/minerals 1 Tablet(s) Oral daily  pantoprazole    Tablet 40 milliGRAM(s) Oral before breakfast  piperacillin/tazobactam IVPB.. 3.375 Gram(s) IV Intermittent every 8 hours  sertraline 25 milliGRAM(s) Oral daily  tamsulosin 0.4 milliGRAM(s) Oral at bedtime    MEDICATIONS  (PRN):  acetaminophen   Tablet .. 650 milliGRAM(s) Oral every 4 hours PRN Temp greater or equal to 38.5C (101.3F)  simethicone 80 milliGRAM(s) Chew four times a day PRN Gas        ROS: as above; all other systems reviewed and wnl      PHYSICAL EXAMINATION:  Vital Signs Last 24 Hrs  T(C): 35.8 (23 Jan 2021 05:25), Max: 35.8 (23 Jan 2021 05:25)  T(F): 96.5 (23 Jan 2021 05:25), Max: 96.5 (23 Jan 2021 05:25)  HR: 72 (23 Jan 2021 05:25) (70 - 72)  BP: 140/65 (23 Jan 2021 05:25) (109/59 - 140/65)  BP(mean): --  RR: 16 (23 Jan 2021 05:25) (16 - 16)  SpO2: 99% (23 Jan 2021 05:25) (97% - 99%)  CAPILLARY BLOOD GLUCOSE      POCT Blood Glucose.: 79 mg/dL (23 Jan 2021 08:46)  POCT Blood Glucose.: 142 mg/dL (23 Jan 2021 05:27)      GENERAL: NAD  HEENT: mucous membranes dry  RESP: respirations unlabored  HEART: HR s  ABDOMEN: soft, ND, NT   EXTREMITIES:  no edema b/l LEs, no calf tenderness  NEURO: awake, alert, interactive; moves all extremities      LABS:                        8.5    8.61  )-----------( 364      ( 23 Jan 2021 07:06 )             26.0     01-23    137  |  109<H>  |  25<H>  ----------------------------<  112<H>  4.0   |  20<L>  |  1.00    Ca    7.7<L>      23 Jan 2021 07:06  Phos  2.3     01-23  Mg     1.5     01-23    TPro  5.7<L>  /  Alb  1.7<L>  /  TBili  0.3  /  DBili  x   /  AST  16  /  ALT  15  /  AlkPhos  146<H>  01-23    PT/INR - ( 23 Jan 2021 07:06 )   PT: 12.8 sec;   INR: 1.10 ratio         PTT - ( 23 Jan 2021 07:06 )  PTT:29.0 sec        RADIOLOGY & ADDITIONAL TESTS:      ASSESSMENT AND PLAN:  71y Female CC/F/U for: covid 19 infection, hypoglycemia,, UTI    HPI:  71 year old female PMHx of DM type 2, HTN, HLD, MDD, Hypothyroidism, presented to the [facility] ED with AMS and hypoglycemia.  History obtained from chart review and son. Patient is AAOx3 but she is a poor historian. As per son, pt was independent/driving car/living in her house alone until Nov 24th. A month prior to this she lost her  and became more depressed. On Nov 24th, she was found on the floor of her living room and she could not remember how she got there, she was brought to [facility], where she spent 8 days and was diagnosed with a UTI/bacteremia for which was she dc home with IV abx and a Davila. She was more bed bound at home after this hospitalization, her blood sugar kept dropping and she was readmitted to [facility], this time treated for dehydration/DANIEL/ and diarrhea. Her diarrhea was attributed to her abx use and she tested negative for Cdiff multiple times. She also received a unit of PRBC for a Hb of 8.2. On Dec 22 she was D/c to [facility] nursing home and because of nausea and vomiting (vomit had some blood) she was brought back to the hospital where she stayed  a few days then was discharged to [facility]. The night prior to admission, pt was sent to [facility] from [facility] because she had an episode of AMS where she was nonverbal/incoherant w/ a blood sugar of 45. History of episode was limited as nursing staff that witnessed episode were not at [facility] at time of admission.  As per chart, staff reported that patient had poor PO intake yesterday so they held her insulin.  At night, she was noted to be altered and confused.  EMS arrived and noted FS of 45.  She was given D10 which resulted in prompt improvement in mental status.  Her blood sugar was 160 en route.  She ambulates with a walker but admits that she is not very active secondary to neuropathy.   Today patient states that she still has diarrhea about every 2 hours and she has also had increased urinary frequency for about a month. Her Davila was discontinued about the end of Dec as per son and she has had increased urinary frequency since then.    Vitals at [facility]: T: 94.9, HR: 64, BP: 146/62, RR: 20 O2: 98 on RA   Labs significant for: WBC: 11.79, Hb/Hct: 9.2/28.0, Na: 130, BUN/Cr: 27/1.19, Alk phso: 156, +COVID   UA: positive for nitrate, moderate leukocyte esterase, bacteria,WBC   CXR: ?b/l patchy infiltrates, consolidation in the right lung (pending official read)  CT head: No CT evidence of acute intracranial abnormality.  In the [facility] ED patient received: Vanco, Zosyn and was started on d5 + IVF (23 Jan 2021 05:21)        INTERVAL HPI/OVERNIGHT EVENTS:  Pt seen and examined at bedside - reports feels tired; remains on RA; continues on D5 infusion.     Allergies/Intolerance: Hytrin (Unknown)      MEDICATIONS  (STANDING):  amLODIPine   Tablet 10 milliGRAM(s) Oral daily  ascorbic acid 500 milliGRAM(s) Oral daily  atorvastatin 20 milliGRAM(s) Oral at bedtime  dextrose 40% Gel 15 Gram(s) Oral once  dextrose 5%. 1000 milliLiter(s) (50 mL/Hr) IV Continuous <Continuous>  dextrose 5%. 1000 milliLiter(s) (100 mL/Hr) IV Continuous <Continuous>  dextrose 50% Injectable 25 Gram(s) IV Push once  dextrose 50% Injectable 12.5 Gram(s) IV Push once  dextrose 50% Injectable 25 Gram(s) IV Push once  enalapril 2.5 milliGRAM(s) Oral two times a day  enoxaparin Injectable 40 milliGRAM(s) SubCutaneous daily  ferrous    sulfate 325 milliGRAM(s) Oral daily  glucagon  Injectable 1 milliGRAM(s) IntraMuscular once  hydrALAZINE 50 milliGRAM(s) Oral two times a day  insulin lispro (ADMELOG) corrective regimen sliding scale   SubCutaneous three times a day before meals  insulin lispro (ADMELOG) corrective regimen sliding scale   SubCutaneous at bedtime  lactobacillus acidophilus 1 Tablet(s) Oral two times a day with meals  levothyroxine 25 MICROGram(s) Oral daily  multivitamin/minerals 1 Tablet(s) Oral daily  pantoprazole    Tablet 40 milliGRAM(s) Oral before breakfast  piperacillin/tazobactam IVPB.. 3.375 Gram(s) IV Intermittent every 8 hours  sertraline 25 milliGRAM(s) Oral daily  tamsulosin 0.4 milliGRAM(s) Oral at bedtime    MEDICATIONS  (PRN):  acetaminophen   Tablet .. 650 milliGRAM(s) Oral every 4 hours PRN Temp greater or equal to 38.5C (101.3F)  simethicone 80 milliGRAM(s) Chew four times a day PRN Gas      ROS: as above; all other systems reviewed and wnl      PHYSICAL EXAMINATION:  Vital Signs Last 24 Hrs  T(C): 35.8 (23 Jan 2021 05:25), Max: 35.8 (23 Jan 2021 05:25)  T(F): 96.5 (23 Jan 2021 05:25), Max: 96.5 (23 Jan 2021 05:25)  HR: 72 (23 Jan 2021 05:25) (70 - 72)  BP: 140/65 (23 Jan 2021 05:25) (109/59 - 140/65)  RR: 16 (23 Jan 2021 05:25) (16 - 16)  SpO2: 99% (23 Jan 2021 05:25) (97% - 99%)  CAPILLARY BLOOD GLUCOSE    POCT Blood Glucose.: 79 mg/dL (23 Jan 2021 08:46)  POCT Blood Glucose.: 142 mg/dL (23 Jan 2021 05:27)      GENERAL: frail, elderly female, NAD  HEENT: mucous membranes dry  RESP: respirations unlabored  HEART: HR 70s  ABDOMEN: soft, ND, NT   EXTREMITIES:  no edema b/l LEs, no calf tenderness  NEURO: awake, alert, interactive; moves all extremities      LABS:                        8.5    8.61  )-----------( 364      ( 23 Jan 2021 07:06 )             26.0     01-23    137  |  109<H>  |  25<H>  ----------------------------<  112<H>  4.0   |  20<L>  |  1.00    Ca    7.7<L>      23 Jan 2021 07:06  Phos  2.3     01-23  Mg     1.5     01-23    TPro  5.7<L>  /  Alb  1.7<L>  /  TBili  0.3  /  DBili  x   /  AST  16  /  ALT  15  /  AlkPhos  146<H>  01-23    PT/INR - ( 23 Jan 2021 07:06 )   PT: 12.8 sec;   INR: 1.10 ratio         PTT - ( 23 Jan 2021 07:06 )  PTT:29.0 sec

## 2021-01-23 NOTE — H&P ADULT - ASSESSMENT
70 y/o F with PMHx of DM type 2, HTN, HLD, MARRY, MDD, Hypothyroidism presented to  ED with hypoglycemia and altered mental status, admitted for UTI, COVID-19 PNA and metabolic encephalopathy. 70 y/o F with PMHx of DM type 2, HTN, HLD, MDD, Hypothyroidism presented to  ED with hypoglycemia and altered mental status, admitted for UTI, COVID-19 PNA and metabolic encephalopathy.

## 2021-01-23 NOTE — H&P ADULT - NSHPREVIEWOFSYSTEMS_GEN_ALL_CORE
General: no fever, chills  Eyes: no vision changes  ENT: no hearing changes, nasal congestion, or sore throat  CV: no chest pain or palpitations  Pulm: no SOB, wheezing, cough, or hemoptysis  Abd/GI: no nausea, vomiting, admits to diarrhea and abd pain  : denies dysuria, hematuria, admits to urinary frequency  MSK: no joint pain or myalgias  Neuro: no syncope, dizziness, focal weakness  Endo: no heat or cold intolerance

## 2021-01-23 NOTE — ED PROVIDER NOTE - OBJECTIVE STATEMENT
71 year old female with a history of DM type 2, HTN. HLD, MARRY, MDD presents with AMS and hypoglycemia.  History of provided by EMS.  Patient was recently admitted to [facility] for urosepsis.  She was discharged and sent to [facility] yesterday.  Staff reported that patient had poor PO intake yesterday so they held her insulin.  At night, she was noted to be altered and confused.  EMS arrived and noted FS of 45.  She was given D10 which resulted in prompt improvement in mental status.  Her blood sugar was 160 en route.  She is DNR/DNI.  Rectal temp 94.7 in ED. PMD Dr. Melara 71 year old female with a history of DM type 2, HTN. HLD, MARRY, MDD presents with AMS and hypoglycemia.  History of provided by EMS.  Patient was recently admitted to [facility] for urosepsis.  She was discharged and sent to [facility] yesterday.  Staff reported that patient had poor PO intake yesterday so they held her insulin.  At night, she was noted to be altered and confused.  EMS arrived and noted FS of 45.  She was given D10 which resulted in prompt improvement in mental status.  Her blood sugar was 160 en route.  She is DNR/DNI.  Rectal temp 94.7 in ED. PMD Dr. Ryland Melara 71 year old female with a history of DM type 2, HTN. HLD, MARRY, MDD presents with AMS and hypoglycemia.  History of provided by EMS.  Patient was recently admitted to [facility] for urosepsis.  She was discharged and sent to [facility] yesterday.  Staff reported that patient had poor PO intake yesterday so they held her insulin.  At night, she was noted to be altered and confused.  EMS arrived and noted FS of 45.  She was given D10 which resulted in prompt improvement in mental status.  Her blood sugar was 160 en route.  She ambulates with a walker but admits that she is not very active secondary to neuropathy. She is DNR/DNI.  Rectal temp 94.7 in ED. PMD Dr. Ryland Melara

## 2021-01-23 NOTE — H&P ADULT - NSHPSOCIALHISTORY_GEN_ALL_CORE
came from rehab [facility]  uses walker at [facility] prior to Nov 2020 pt was independent

## 2021-01-23 NOTE — ED PROVIDER NOTE - CARE PLAN
Principal Discharge DX:	COVID-19  Secondary Diagnosis:	UTI (urinary tract infection)  Secondary Diagnosis:	Hypothermia  Secondary Diagnosis:	Hypoglycemia

## 2021-01-23 NOTE — ED ADULT NURSE NOTE - HIV OFFER
Patient measured for tubigrip stockings. Size D applied to bilateral LE.  Instructions on use explained.   Opt out

## 2021-01-23 NOTE — ED ADULT NURSE NOTE - PMH
Depression    Diabetes mellitus  type 2  Hyperlipidemia    Hypertension    Hypothyroid    MDD (major depressive disorder)

## 2021-01-23 NOTE — H&P ADULT - PROBLEM SELECTOR PLAN 9
-   IMPROVE VTE Individual Risk Assessment          RISK                                                          Points    [  ] Previous VTE                                                3  [  ] Thrombophilia                                             2  [  ] Lower limb paralysis                                   2        (unable to hold up >15 seconds)    [  ] Current Cancer                                            2         (within 6 months)  [  ] Immobilization > 24 hrs                              1  [  ] ICU/CCU stay > 24 hours                            1  [  ] Age > 60                                                    1    IMPROVE VTE Score _________ -Continue home medication atorvastatin 20 QD -Continue home medication sertraline 25 QD   -As per son pt was suppose to be transitioned to sertraline 50 QD as per her psychiatrist, will continue 25 QD for now

## 2021-01-23 NOTE — H&P ADULT - NSHPLABSRESULTS_GEN_ALL_CORE
9.2    11.79 )-----------( 380      ( 2021 23:53 )             28.8     2021 23:53    130    |  99     |  27     ----------------------------<  126    3.7     |  20     |  1.19     Ca    8.4        2021 23:53    TPro  6.3    /  Alb  1.8    /  TBili  0.2    /  DBili  x      /  AST  23     /  ALT  18     /  AlkPhos  156    2021 23:53    LIVER FUNCTIONS - ( 2021 23:53 )  Alb: 1.8 g/dL / Pro: 6.3 g/dL / ALK PHOS: 156 U/L / ALT: 18 U/L DA / AST: 23 U/L / GGT: x           PT/INR - ( 2021 23:53 )   PT: 12.7 sec;   INR: 1.05 ratio         PTT - ( 2021 23:53 )  PTT:29.7 sec  CAPILLARY BLOOD GLUCOSE      POCT Blood Glucose.: 174 mg/dL (2021 03:31)  POCT Blood Glucose.: 145 mg/dL (2021 01:49)  POCT Blood Glucose.: 121 mg/dL (2021 00:13)        Urinalysis Basic - ( 2021 01:21 )    Color: Yellow / Appearance: Slightly Turbid / S.010 / pH: x  Gluc: x / Ketone: Negative  / Bili: Negative / Urobili: Negative mg/dL   Blood: x / Protein: 30 mg/dL / Nitrite: Positive   Leuk Esterase: Moderate / RBC: 3-5 /HPF / WBC 26-50   Sq Epi: x / Non Sq Epi: Few / Bacteria: Many    COVID-19 PCR positive    Radiology:  CXR: patchy bilateral infiltrates (prelim read, pending official report)    CT Head: The ventricles and sulci are prominent in size, compatible with mild generalized parenchymal volume loss. No acute intracranial hemorrhage is identified.  No extra-axial fluid collection is identified.  No mass effect or midline shift is seen.  There is no evidence of acute territorial infarct.  There are periventricular and subcortical white matter hypodensities, which are nonspecific, but likely reflect mild microvascular ischemic disease.  The visualized paranasal sinuses are clear. The mastoid air cells are well aerated.  No acute osseous abnormality is seen.   Impression of CT Brain: No CT evidence of acute intracranial abnormality. (radiology report, personally reviewed)      Cardiology:  12 Lead EKG: NSR at 71 bpm 9.2    11.79 )-----------( 380      ( 2021 23:53 )             28.8     2021 23:53    130    |  99     |  27     ----------------------------<  126    3.7     |  20     |  1.19     Ca    8.4        2021 23:53    TPro  6.3    /  Alb  1.8    /  TBili  0.2    /  DBili  x      /  AST  23     /  ALT  18     /  AlkPhos  156    2021 23:53    LIVER FUNCTIONS - ( 2021 23:53 )  Alb: 1.8 g/dL / Pro: 6.3 g/dL / ALK PHOS: 156 U/L / ALT: 18 U/L DA / AST: 23 U/L / GGT: x           PT/INR - ( 2021 23:53 )   PT: 12.7 sec;   INR: 1.05 ratio         PTT - ( 2021 23:53 )  PTT:29.7 sec  CAPILLARY BLOOD GLUCOSE      POCT Blood Glucose.: 174 mg/dL (2021 03:31)  POCT Blood Glucose.: 145 mg/dL (2021 01:49)  POCT Blood Glucose.: 121 mg/dL (2021 00:13)        Urinalysis Basic - ( 2021 01:21 )    Color: Yellow / Appearance: Slightly Turbid / S.010 / pH: x  Gluc: x / Ketone: Negative  / Bili: Negative / Urobili: Negative mg/dL   Blood: x / Protein: 30 mg/dL / Nitrite: Positive   Leuk Esterase: Moderate / RBC: 3-5 /HPF / WBC 26-50   Sq Epi: x / Non Sq Epi: Few / Bacteria: Many    COVID-19 PCR positive    Radiology:  CXR: patchy bilateral infiltrates, right lung consolidation (prelim read)    CT Head:   Findings: The ventricles and sulci are prominent in size, compatible with mild generalized parenchymal volume loss. No acute intracranial hemorrhage is identified.  No extra-axial fluid collection is identified.  No mass effect or midline shift is seen.  There is no evidence of acute territorial infarct.  There are periventricular and subcortical white matter hypodensities, which are nonspecific, but likely reflect mild microvascular ischemic disease.  The visualized paranasal sinuses are clear. The mastoid air cells are well aerated.  No acute osseous abnormality is seen.     Impression: No CT evidence of acute intracranial abnormality. (radiology report, personally reviewed)      Cardiology:  12 Lead EKG: NSR at 71 bpm From  ED:  Labs:                9.2    11.79 )-----------( 380      ( 2021 23:53 )             28.8     2021 23:53    130    |  99     |  27     ----------------------------<  126    3.7     |  20     |  1.19     Ca    8.4        2021 23:53    TPro  6.3    /  Alb  1.8    /  TBili  0.2    /  DBili  x      /  AST  23     /  ALT  18     /  AlkPhos  156    2021 23:53    LIVER FUNCTIONS - ( 2021 23:53 )  Alb: 1.8 g/dL / Pro: 6.3 g/dL / ALK PHOS: 156 U/L / ALT: 18 U/L DA / AST: 23 U/L / GGT: x           PT/INR - ( 2021 23:53 )   PT: 12.7 sec;   INR: 1.05 ratio         PTT - ( 2021 23:53 )  PTT:29.7 sec  CAPILLARY BLOOD GLUCOSE      POCT Blood Glucose.: 174 mg/dL (2021 03:31)  POCT Blood Glucose.: 145 mg/dL (2021 01:49)  POCT Blood Glucose.: 121 mg/dL (2021 00:13)        Urinalysis Basic - ( 2021 01:21 )    Color: Yellow / Appearance: Slightly Turbid / S.010 / pH: x  Gluc: x / Ketone: Negative  / Bili: Negative / Urobili: Negative mg/dL   Blood: x / Protein: 30 mg/dL / Nitrite: Positive   Leuk Esterase: Moderate / RBC: 3-5 /HPF / WBC 26-50   Sq Epi: x / Non Sq Epi: Few / Bacteria: Many    COVID-19 PCR positive    Radiology:  CXR: patchy bilateral infiltrates, right lung consolidation (prelim read)    CT Head:   Findings: The ventricles and sulci are prominent in size, compatible with mild generalized parenchymal volume loss. No acute intracranial hemorrhage is identified.  No extra-axial fluid collection is identified.  No mass effect or midline shift is seen.  There is no evidence of acute territorial infarct.  There are periventricular and subcortical white matter hypodensities, which are nonspecific, but likely reflect mild microvascular ischemic disease.  The visualized paranasal sinuses are clear. The mastoid air cells are well aerated.  No acute osseous abnormality is seen.     Impression: No CT evidence of acute intracranial abnormality. (radiology report, personally reviewed)      Cardiology:  12 Lead EKG: NSR at 71 bpm

## 2021-01-23 NOTE — ED PROVIDER NOTE - SKIN, MLM
Skin normal color for race, warm, dry and intact. No evidence of rash. Skin normal color for race, warm, dry/ No evidence of rash.  4 sacral decubiti ulcers- 3 small and superficial (stage 1).  1 large and deep (stage 3)

## 2021-01-23 NOTE — ED ADULT NURSE NOTE - OBJECTIVE STATEMENT
Patient with history of HTN, DM, hypothyroid, MDD transferred from [facility] for admission here for hypoglycemia and hypothermia.  Patient arrives awake, A&O x3 with D5 + NS 0.9% in progress at 100cc/ hr; 20G to LH and 22G to R wrist.  Patient received IV zosyn (0115) and IV vancomycin (0145) PTA.  Pressure injury noted to sacral area; dressing dry and intact.  Warm blankets applied to patient, patient in position of comfort off of pressure injury area. Patient with history of HTN, DM, hypothyroid, MDD transferred from [facility] for admission here for hypoglycemia and hypothermia.  Patient arrives awake, A&O x3 with D5 + NS 0.9% in progress at 100cc/ hr; 20G to LH and 22G to R wrist.  Patient received IV zosyn (0115) and IV vancomycin (0145) PTA.  Pressure injury noted to sacral area; dressing dry and intact.  Warm blankets applied to patient, patient in position of comfort off of pressure injury area.  Call bell is within reach.

## 2021-01-23 NOTE — H&P ADULT - PROBLEM SELECTOR PLAN 8
-Continue home medication atorvastatin 20 QD -Continue home medication sertraline -Microcytic anemia on admission w/ Hb of 9.2  -Continue home ferrous sulfate

## 2021-01-23 NOTE — H&P ADULT - PROBLEM SELECTOR PLAN 3
-Continue home medications amlodipine 10 QD, hydralazine 50 BID, enalapril 2.5 QD -CXR w/ -CXR w/ right sided infiltrate   -S/p Vanco and Zosyn in the ED, continue zosyn -CXR w/ right sided infiltrate, ?patchy infiltrates  -S/p Vanco and Zosyn in the ED, continue zosyn  -check MRSA PCR, legionella and strep urinary ag  -COVID-19 positive - hold off on remdesivir/dexamethasone -CXR w/ right sided PNA, ?patchy infiltrates bilaterally  -S/p Vanco and Zosyn in the ED, continue zosyn  -check MRSA PCR, legionella and strep urinary ag  -COVID-19 positive - hold off on remdesivir/dexamethasone

## 2021-01-23 NOTE — H&P ADULT - HISTORY OF PRESENT ILLNESS
71 year old female PMHx of DM type 2, HTN, HLD, MARRY, MDD, Hypothyroidism, presented to the [place] ED with AMS and hypoglycemia.  History obtained from chart review.  provided by EMS.  Patient was recently admitted to [facility] for urosepsis.  She was discharged and sent to [facility] yesterday.  Staff reported that patient had poor PO intake yesterday so they held her insulin.  At night, she was noted to be altered and confused.  EMS arrived and noted FS of 45.  She was given D10 which resulted in prompt improvement in mental status.  Her blood sugar was 160 en route.  She ambulates with a walker but admits that she is not very active secondary to neuropathy. She is DNR/DNI.  Rectal temp 94.7 in ED. PMD Dr. Ryland Melara  Vitals at [place]: T: 94.9, HR: 64, BP: 146/62, RR: 20 O2: 98 on RA   Labs significant for: WBC: 11.79, Hb/Hct: 9.2/28.0, Na: 130, BUN/Cr: 27/1.19, Alk phso: 156, +COVID   UA: positive for nitrate, moderate leukocyte esterase, bacteria,WBC   CT head: No CT evidence of acute intracranial abnormality.  In the [facility] ED patient received: Vanco, Zosyn and was started on IVF    71 year old female PMHx of DM type 2, HTN, HLD, MARRY, MDD, Hypothyroidism, presented to the [place] ED with AMS and hypoglycemia.  History obtained from chart review.  provided by EMS.  Patient was recently admitted to [facility] for urosepsis.  She was discharged and sent to [facility] yesterday.  Staff reported that patient had poor PO intake yesterday so they held her insulin.  At night, she was noted to be altered and confused.  EMS arrived and noted FS of 45.  She was given D10 which resulted in prompt improvement in mental status.  Her blood sugar was 160 en route.  She ambulates with a walker but admits that she is not very active secondary to neuropathy. She is DNR/DNI.  Rectal temp 94.7 in ED. PMD Dr. Ryland Melara  Vitals at [place]: T: 94.9, HR: 64, BP: 146/62, RR: 20 O2: 98 on RA   Labs significant for: WBC: 11.79, Hb/Hct: 9.2/28.0, Na: 130, BUN/Cr: 27/1.19, Alk phso: 156, +COVID   UA: positive for nitrate, moderate leukocyte esterase, bacteria,WBC   CT head: No CT evidence of acute intracranial abnormality.  In the [place] ED patient received: Vanco, Zosyn and was started on IVF    71 year old female PMHx of DM type 2, HTN, HLD, MDD, Hypothyroidism, presented to the [place] ED with AMS and hypoglycemia.  History obtained from chart review and son.    As per son, pt was independent/driving car/living in her house alone until Nov 24th. A month prior to this she lost her  and became more depressed. On that day she was found on the floor of her living room and she could not remember how she got there, she was brought to [facility], where she spent 8 days and was diagnosed with a UTI/bacteremia for which was she dc home with IV abx and a Davila. She was more bed bound at home after this hopsitalization, her blood sugar kept dropping and she was readmitted to [facility], this time treated for dehydration/DANIEL/and diarrhea. Her diarrhea was attributed to her abx use and she tested negative for Cdiff multiple times. She also recieved a unit of PRBC for a Hb of 8.2. On Dec 22 she was D/c to [facility] nursing home and because of nausea and vomiting (vomit had some blood) she was brought back to the hospital where she stayed  a few days then was discharged to [facility].   Last night pt was sent to [facility] from [facility] because she had an episode of AMS where she nonveral/incoherant w/ a blood sugar of 45. History of episode was limited as nursing staff that witnessed episode were not at [facility] at time of admission.  As per chart, staff reported that patient had poor PO intake yesterday so they held her insulin.  At night, she was noted to be altered and confused.  EMS arrived and noted FS of 45.  She was given D10 which resulted in prompt improvement in mental status.  Her blood sugar was 160 en route.  She ambulates with a walker but admits that she is not very active secondary to neuropathy.    Vitals at [place]: T: 94.9, HR: 64, BP: 146/62, RR: 20 O2: 98 on RA   Labs significant for: WBC: 11.79, Hb/Hct: 9.2/28.0, Na: 130, BUN/Cr: 27/1.19, Alk phso: 156, +COVID   UA: positive for nitrate, moderate leukocyte esterase, bacteria,WBC   CT head: No CT evidence of acute intracranial abnormality.  In the [place] ED patient received: Vanco, Zosyn and was started on IVF    71 year old female PMHx of DM type 2, HTN, HLD, MDD, Hypothyroidism, presented to the [place] ED with AMS and hypoglycemia.  History obtained from chart review and son.    As per son, pt was independent/driving car/living in her house alone until Nov 24th. A month prior to this she lost her  and became more depressed. On Nov 24th, she was found on the floor of her living room and she could not remember how she got there, she was brought to [facility], where she spent 8 days and was diagnosed with a UTI/bacteremia for which was she dc home with IV abx and a Davila. She was more bed bound at home after this hospitalization, her blood sugar kept dropping and she was readmitted to [facility], this time treated for dehydration/DANIEL/ and diarrhea. Her diarrhea was attributed to her abx use and she tested negative for Cdiff multiple times. She also received a unit of PRBC for a Hb of 8.2. On Dec 22 she was D/c to [facility] nursing home and because of nausea and vomiting (vomit had some blood) she was brought back to the hospital where she stayed  a few days then was discharged to [facility].   Last night pt was sent to [facility] from [facility] because she had an episode of AMS where she nonverbal/incoherant w/ a blood sugar of 45. History of episode was limited as nursing staff that witnessed episode were not at [facility] at time of admission.  As per chart, staff reported that patient had poor PO intake yesterday so they held her insulin.  At night, she was noted to be altered and confused.  EMS arrived and noted FS of 45.  She was given D10 which resulted in prompt improvement in mental status.  Her blood sugar was 160 en route.  She ambulates with a walker but admits that she is not very active secondary to neuropathy.    Today patient states that she still has diarrhea about every 2 hours and she has also had increased urinary frequency for about a month. Her Dvaila was discontinued about the end of Dec as per son and she has had increased urinary frequency since then.    Vitals at [place]: T: 94.9, HR: 64, BP: 146/62, RR: 20 O2: 98 on RA   Labs significant for: WBC: 11.79, Hb/Hct: 9.2/28.0, Na: 130, BUN/Cr: 27/1.19, Alk phso: 156, +COVID   UA: positive for nitrate, moderate leukocyte esterase, bacteria,WBC   CT head: No CT evidence of acute intracranial abnormality.  In the [place] ED patient received: Vanco, Zosyn and was started on IVF    71 year old female PMHx of DM type 2, HTN, HLD, MDD, Hypothyroidism, presented to the [place] ED with AMS and hypoglycemia.  History obtained from chart review and son.    As per son, pt was independent/driving car/living in her house alone until Nov 24th. A month prior to this she lost her  and became more depressed. On Nov 24th, she was found on the floor of her living room and she could not remember how she got there, she was brought to [facility], where she spent 8 days and was diagnosed with a UTI/bacteremia for which was she dc home with IV abx and a Davila. She was more bed bound at home after this hospitalization, her blood sugar kept dropping and she was readmitted to [facility], this time treated for dehydration/DANIEL/ and diarrhea. Her diarrhea was attributed to her abx use and she tested negative for Cdiff multiple times. She also received a unit of PRBC for a Hb of 8.2. On Dec 22 she was D/c to [facility] nursing home and because of nausea and vomiting (vomit had some blood) she was brought back to the hospital where she stayed  a few days then was discharged to [facility]   Last night pt was sent to [facility] from [facility] because she had an episode of AMS where she was nonverbal/incoherant w/ a blood sugar of 45. History of episode was limited as nursing staff that witnessed episode were not at [facility] at time of admission.  As per chart, staff reported that patient had poor PO intake yesterday so they held her insulin.  At night, she was noted to be altered and confused.  EMS arrived and noted FS of 45.  She was given D10 which resulted in prompt improvement in mental status.  Her blood sugar was 160 en route.  She ambulates with a walker but admits that she is not very active secondary to neuropathy.    Today patient states that she still has diarrhea about every 2 hours and she has also had increased urinary frequency for about a month. Her Davila was discontinued about the end of Dec as per son and she has had increased urinary frequency since then.    Vitals at [place]: T: 94.9, HR: 64, BP: 146/62, RR: 20 O2: 98 on RA   Labs significant for: WBC: 11.79, Hb/Hct: 9.2/28.0, Na: 130, BUN/Cr: 27/1.19, Alk phso: 156, +COVID   UA: positive for nitrate, moderate leukocyte esterase, bacteria,WBC   CT head: No CT evidence of acute intracranial abnormality.  In the [place] ED patient received: Vanco, Zosyn and was started on IVF    71 year old female PMHx of DM type 2, HTN, HLD, MDD, Hypothyroidism, presented to the [place] ED with AMS and hypoglycemia.  History obtained from chart review and son. Patient is AAOx3 but she is a poor historian. As per son, pt was independent/driving car/living in her house alone until Nov 24th. A month prior to this she lost her  and became more depressed. On Nov 24th, she was found on the floor of her living room and she could not remember how she got there, she was brought to [facility], where she spent 8 days and was diagnosed with a UTI/bacteremia for which was she dc home with IV abx and a Davila. She was more bed bound at home after this hospitalization, her blood sugar kept dropping and she was readmitted to [facility], this time treated for dehydration/DANIEL/ and diarrhea. Her diarrhea was attributed to her abx use and she tested negative for Cdiff multiple times. She also received a unit of PRBC for a Hb of 8.2. On Dec 22 she was D/c to [facility] nursing home and because of nausea and vomiting (vomit had some blood) she was brought back to the hospital where she stayed  a few days then was discharged to [facility]. The night prior to admission, pt was sent to [facility] from [facility] because she had an episode of AMS where she was nonverbal/incoherant w/ a blood sugar of 45. History of episode was limited as nursing staff that witnessed episode were not at [facility] at time of admission.  As per chart, staff reported that patient had poor PO intake yesterday so they held her insulin.  At night, she was noted to be altered and confused.  EMS arrived and noted FS of 45.  She was given D10 which resulted in prompt improvement in mental status.  Her blood sugar was 160 en route.  She ambulates with a walker but admits that she is not very active secondary to neuropathy.   Today patient states that she still has diarrhea about every 2 hours and she has also had increased urinary frequency for about a month. Her Davila was discontinued about the end of Dec as per son and she has had increased urinary frequency since then.    Vitals at [place]: T: 94.9, HR: 64, BP: 146/62, RR: 20 O2: 98 on RA   Labs significant for: WBC: 11.79, Hb/Hct: 9.2/28.0, Na: 130, BUN/Cr: 27/1.19, Alk phso: 156, +COVID   UA: positive for nitrate, moderate leukocyte esterase, bacteria,WBC   CXR: ?b/l patchy infiltrates, consolidation in the right lung  CT head: No CT evidence of acute intracranial abnormality.  In the [place] ED patient received: Vanco, Zosyn and was started on d5 + IVF 71 year old female PMHx of DM type 2, HTN, HLD, MDD, Hypothyroidism, presented to the [place] ED with AMS and hypoglycemia.  History obtained from chart review and son. Patient is AAOx3 but she is a poor historian. As per son, pt was independent/driving car/living in her house alone until Nov 24th. A month prior to this she lost her  and became more depressed. On Nov 24th, she was found on the floor of her living room and she could not remember how she got there, she was brought to [facility], where she spent 8 days and was diagnosed with a UTI/bacteremia for which was she dc home with IV abx and a Davila. She was more bed bound at home after this hospitalization, her blood sugar kept dropping and she was readmitted to [facility], this time treated for dehydration/DANIEL/ and diarrhea. Her diarrhea was attributed to her abx use and she tested negative for Cdiff multiple times. She also received a unit of PRBC for a Hb of 8.2. On Dec 22 she was D/c to [facility] nursing home and because of nausea and vomiting (vomit had some blood) she was brought back to the hospital where she stayed  a few days then was discharged to [facility]. The night prior to admission, pt was sent to [facility] from [facility] because she had an episode of AMS where she was nonverbal/incoherant w/ a blood sugar of 45. History of episode was limited as nursing staff that witnessed episode were not at [facility] at time of admission.  As per chart, staff reported that patient had poor PO intake yesterday so they held her insulin.  At night, she was noted to be altered and confused.  EMS arrived and noted FS of 45.  She was given D10 which resulted in prompt improvement in mental status.  Her blood sugar was 160 en route.  She ambulates with a walker but admits that she is not very active secondary to neuropathy.   Today patient states that she still has diarrhea about every 2 hours and she has also had increased urinary frequency for about a month. Her Davila was discontinued about the end of Dec as per son and she has had increased urinary frequency since then.    Vitals at [place]: T: 94.9, HR: 64, BP: 146/62, RR: 20 O2: 98 on RA   Labs significant for: WBC: 11.79, Hb/Hct: 9.2/28.0, Na: 130, BUN/Cr: 27/1.19, Alk phso: 156, +COVID   UA: positive for nitrate, moderate leukocyte esterase, bacteria,WBC   CXR: ?b/l patchy infiltrates, consolidation in the right lung (pending official read)  CT head: No CT evidence of acute intracranial abnormality.  In the [place] ED patient received: Vanco, Zosyn and was started on d5 + IVF

## 2021-01-23 NOTE — PATIENT PROFILE ADULT - STATED REASON FOR ADMISSION
from [facility] swab +covid , sent to [place], then transferd to [place]

## 2021-01-23 NOTE — H&P ADULT - PROBLEM SELECTOR PLAN 4
-Hold home oral medications glimepiride and metformin   -Humalin __________________  -Low dose ISS and hypoglycemic protocol while inpatient patient with COVID-19 Pneumonia without hypoxemia  - continue with O2 support PRN; may use NC, Venturi Mask, NRB, HFNC  - hold remdesivir/dexamethasone in the absence of hypoxemia  - avoid aerosolizing procedures i.e. nebulizations when possible  - prone PRN, tolerate lower O2 saturations as possible; avoid intubation with such techniques  - provide prophylactic VTE prophylaxis given high rate of thrombotic events  - trend COVID biomarkers to evaluate for role of full dose AC  - follow up culture data; monitor for fevers, treat PRN  - maintain isolation precautions - airborne and contact patient with COVID-19 Pneumonia without hypoxemia  - continue with O2 support PRN; may use NC, Venturi Mask, NRB, HFNC  - hold remdesivir/dexamethasone in the absence of hypoxemia  - avoid aerosolizing procedures i.e. nebulizations when possible  - prone PRN, tolerate lower O2 saturations as possible; avoid intubation with such techniques  - provide prophylactic VTE prophylaxis given high rate of thrombotic events  - trend COVID biomarkers to evaluate for role of full dose AC  - follow up culture data; monitor for fevers, treat PRN  - maintain isolation precautions - airborne and contact  - ID consult Dr. Sotomayor

## 2021-01-23 NOTE — H&P ADULT - NSICDXPASTMEDICALHX_GEN_ALL_CORE_FT
PAST MEDICAL HISTORY:  Depression     Diabetes mellitus type 2    Hyperlipidemia     Hypertension     Hypothyroid     MDD (major depressive disorder)      PAST MEDICAL HISTORY:  MARRY (acute respiratory infection)     Depression     Diabetes mellitus type 2    Hyperlipidemia     Hypertension     Hypothyroid     MDD (major depressive disorder)

## 2021-01-23 NOTE — H&P ADULT - PROBLEM SELECTOR PLAN 7
-Continue home medication sertraline -Microcytic anemia on admission w/ Hb of 9.2  -Continue home ferrous sulfate -Hold home oral medications glimepiride and metformin   -Low dose ISS and hypoglycemic protocol while inpatient DM2 without long term use of insulin w/ hypoglycemia likely in setting of sulfonylurea use - would d/c glimepiride entirely  -Hold home oral medications glimepiride and metformin   -Low dose ISS and hypoglycemic protocol while inpatient - check A1C - reg diet  -monitor blood glucose closely off d5 + NS IVF given at [place], check q4 for now DM2 with long term use of insulin w/ hypoglycemia likely in setting of sulfonylurea use - would d/c glimepiride entirely  -Hold home oral medications glimepiride and metformin   -Low dose ISS and hypoglycemic protocol while inpatient - check A1C - reg diet  -monitor blood glucose closely off d5 + NS IVF given at [place], check q4 for now

## 2021-01-23 NOTE — H&P ADULT - NSICDXFAMILYHX_GEN_ALL_CORE_FT
FAMILY HISTORY:  FH: type 2 diabetes     FAMILY HISTORY:  Family history of CVA, in brother  FH: type 2 diabetes

## 2021-01-23 NOTE — H&P ADULT - PROBLEM SELECTOR PLAN 2
patient with COVID-19 Pneumonia without hypoxemia  - continue with O2 support PRN; may use NC, Venturi Mask, NRB, HFNC  - hold remdesivir/dexamethasone in the absence of hypoxemia  - avoid aerosolizing procedures i.e. nebulizations when possible  - prone PRN, tolerate lower O2 saturations as possible; avoid intubation with such techniques  - provide prophylactic VTE prophylaxis given high rate of thrombotic events  - trend COVID biomarkers to evaluate for role of full dose AC  - follow up culture data; monitor for fevers, treat PRN  - maintain isolation precautions - airborne and contact -currently complains of urinary frequency for about 1 month since Davila discontinuation in late December   -History of UTI/bacteremia in late Nov -s/p treatment at [facility]   -Continue Zosyn -currently complains of urinary frequency for about 1 month since Davila discontinuation in late December   -History of UTI/bacteremia in late Nov -s/p treatment at [facility]   -Continue Zosyn  -F/u culture -currently complains of urinary frequency for about 1 month since Davila discontinuation in late December   -History of UTI/bacteremia in late Nov -s/p treatment at [facility]   -Continue Zosyn  -F/u culture data

## 2021-01-23 NOTE — ED ADULT NURSE NOTE - NS TRANSFER PATIENT BELONGINGS
- Patient with c/o symptomatic anemia (ADKINS, fatigue/malaise) and some BRBPR (pt states that she does have a history of hemorrhoids also) and dark stools, FOBT here positive  - Concern for slow, ongoing GI bleed at present, suspect upper GI locale most likely but cannot r/o lower GI locale also given c/o BRB (though this might be 2/2 hemorrhoids)   - c/w PPI 40mg BID IVP  - GI eval appreciated. 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
- Patient with c/o symptomatic anemia (ADKINS, fatigue/malaise) and some BRBPR (pt states that she does have a history of hemorrhoids also) and dark stools, FOBT here positive  - Concern for slow, ongoing GI bleed at present, suspect upper GI locale most likely but cannot r/o lower GI locale also given c/o BRB (though this might be 2/2 hemorrhoids)   - c/w PPI 40mg BID IVP  - GI eval appreciated. Plan for EGD/colonoscopy.   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   monitor hgb.
- suspect in setting of Xarelto; pt with h/o gastric ulcers on egd 2017  - s/p EGD (5/14) with gastritis; will defer colonoscopy and monitor patient off a/c  - trend cbc closely  - Protonix 40mg PO QD  - ansuol suppository qhs for hemorrhoids  - cont to monitor stools for further episodes of gi bleeding   - continue to hold a/c; pt has follow up appointment with pulmonologist, Dr. Raffy Mcgee on 5/20  - no gi objection to dc planning per primary team
- suspect in setting of Xarelto; pt with h/o gastric ulcers on egd 2017; r/o UGIB  - trend cbc closely  - Protonix 40mg PO QD  - ansuol suppository qhs for hemorrhoids  - cont to monitor stools for further episodes of gi bleeding   - s/p EGD with gastritis; will defer colonoscopy and monitor patient off a/c  - continue to hold a/c; pt has follow up appointment with pulmonologist, Dr. Raffy Mcgee on 5/20
Clothing

## 2021-01-23 NOTE — H&P ADULT - PROBLEM SELECTOR PLAN 5
-Continue home medication Synthroid -Continue home medications amlodipine 10 QD, hydralazine 50 BID, enalapril 2.5 QD -Na 130 in [place] ED   -Possibly in setting of decreased PO intake   -f/u urine lytes -Na 130 in [place] ED   -Possibly in setting of decreased PO intake   -regular diet for now, will reassess for fluid restriction with repeat BMP  -f/u urine lytes -Na 130 in [place] ED   -Possibly in setting of decreased PO intake   -f/u urine lytes, monitor BMP

## 2021-01-23 NOTE — ED ADULT NURSE NOTE - NSIMPLEMENTINTERV_GEN_ALL_ED
Implemented All Fall with Harm Risk Interventions:  [place] to call system. Call bell, personal items and telephone within reach. Instruct patient to call for assistance. Room bathroom lighting operational. Non-slip footwear when patient is off stretcher. Physically safe environment: no spills, clutter or unnecessary equipment. Stretcher in lowest position, wheels locked, appropriate side rails in place. Provide visual cue, wrist band, yellow gown, etc. Monitor gait and stability. Monitor for mental status changes and reorient to person, place, and time. Review medications for side effects contributing to fall risk. Reinforce activity limits and safety measures with patient and family. Provide visual clues: red socks.

## 2021-01-23 NOTE — ED PROVIDER NOTE - CARE PLAN
Principal Discharge DX:	Pneumonia  Secondary Diagnosis:	Acute cystitis without hematuria  Secondary Diagnosis:	COVID-19   Principal Discharge DX:	Pneumonia  Secondary Diagnosis:	Acute cystitis without hematuria  Secondary Diagnosis:	COVID-19  Secondary Diagnosis:	Hypothermia, initial encounter

## 2021-01-23 NOTE — ED ADULT NURSE NOTE - PMH
Acute kidney failure, unspecified    Ascorbic acid deficiency    Changes in skin texture    Disorder of thyroid, unspecified    Essential (primary) hypertension    Flatulence    Hyperlipidemia, unspecified    Intraoperative hemorrhage and hematoma of a genitourinary system organ or structure complicating other procedure    Major depressive disorder, recurrent    Pain, unspecified    Syncope and collapse    Type 2 diabetes mellitus without complication

## 2021-01-23 NOTE — H&P ADULT - PROBLEM SELECTOR PLAN 6
-Microcytic anemia on admission w/ Hb of 9.2  -Continue home ferrous sulfate -Hold home oral medications glimepiride and metformin   -Humalin __________________  -Low dose ISS and hypoglycemic protocol while inpatient -Continue home medications amlodipine 10 QD, hydralazine 50 BID, enalapril 2.5 QD -Continue home medications amlodipine 10 QD, hydralazine 50 BID, enalapril 2.5 QD and monitor hemodynamics -Continue home medications amlodipine 10 QD, hydralazine 50 BID, enalapril 2.5 BID w/ hold parameters and monitor hemodynamics

## 2021-01-24 DIAGNOSIS — N39.0 URINARY TRACT INFECTION, SITE NOT SPECIFIED: ICD-10-CM

## 2021-01-24 DIAGNOSIS — E44.0 MODERATE PROTEIN-CALORIE MALNUTRITION: ICD-10-CM

## 2021-01-24 LAB
ALBUMIN SERPL ELPH-MCNC: 1.6 G/DL — LOW (ref 3.3–5)
ALBUMIN SERPL ELPH-MCNC: 1.6 G/DL — LOW (ref 3.3–5)
ALP SERPL-CCNC: 158 U/L — HIGH (ref 40–120)
ALP SERPL-CCNC: 163 U/L — HIGH (ref 40–120)
ALT FLD-CCNC: 14 U/L — SIGNIFICANT CHANGE UP (ref 12–78)
ALT FLD-CCNC: 15 U/L — SIGNIFICANT CHANGE UP (ref 12–78)
ANION GAP SERPL CALC-SCNC: 7 MMOL/L — SIGNIFICANT CHANGE UP (ref 5–17)
AST SERPL-CCNC: 17 U/L — SIGNIFICANT CHANGE UP (ref 15–37)
AST SERPL-CCNC: 18 U/L — SIGNIFICANT CHANGE UP (ref 15–37)
BASOPHILS # BLD AUTO: 0.03 K/UL — SIGNIFICANT CHANGE UP (ref 0–0.2)
BASOPHILS NFR BLD AUTO: 0.3 % — SIGNIFICANT CHANGE UP (ref 0–2)
BILIRUB DIRECT SERPL-MCNC: <.1 MG/DL — SIGNIFICANT CHANGE UP (ref 0.05–0.2)
BILIRUB INDIRECT FLD-MCNC: >0.1 MG/DL — LOW (ref 0.2–1)
BILIRUB SERPL-MCNC: 0.2 MG/DL — SIGNIFICANT CHANGE UP (ref 0.2–1.2)
BILIRUB SERPL-MCNC: 0.2 MG/DL — SIGNIFICANT CHANGE UP (ref 0.2–1.2)
BUN SERPL-MCNC: 16 MG/DL — SIGNIFICANT CHANGE UP (ref 7–23)
CALCIUM SERPL-MCNC: 7.5 MG/DL — LOW (ref 8.5–10.1)
CHLORIDE SERPL-SCNC: 106 MMOL/L — SIGNIFICANT CHANGE UP (ref 96–108)
CO2 SERPL-SCNC: 22 MMOL/L — SIGNIFICANT CHANGE UP (ref 22–31)
CREAT SERPL-MCNC: 0.9 MG/DL — SIGNIFICANT CHANGE UP (ref 0.5–1.3)
EOSINOPHIL # BLD AUTO: 0.1 K/UL — SIGNIFICANT CHANGE UP (ref 0–0.5)
EOSINOPHIL NFR BLD AUTO: 1.1 % — SIGNIFICANT CHANGE UP (ref 0–6)
GLUCOSE SERPL-MCNC: 186 MG/DL — HIGH (ref 70–99)
HCT VFR BLD CALC: 28 % — LOW (ref 34.5–45)
HCV AB S/CO SERPL IA: 0.14 S/CO — SIGNIFICANT CHANGE UP (ref 0–0.99)
HCV AB SERPL-IMP: SIGNIFICANT CHANGE UP
HGB BLD-MCNC: 8.9 G/DL — LOW (ref 11.5–15.5)
IMM GRANULOCYTES NFR BLD AUTO: 4 % — HIGH (ref 0–1.5)
LYMPHOCYTES # BLD AUTO: 1.71 K/UL — SIGNIFICANT CHANGE UP (ref 1–3.3)
LYMPHOCYTES # BLD AUTO: 18.6 % — SIGNIFICANT CHANGE UP (ref 13–44)
MCHC RBC-ENTMCNC: 24.6 PG — LOW (ref 27–34)
MCHC RBC-ENTMCNC: 31.8 GM/DL — LOW (ref 32–36)
MCV RBC AUTO: 77.3 FL — LOW (ref 80–100)
MONOCYTES # BLD AUTO: 0.68 K/UL — SIGNIFICANT CHANGE UP (ref 0–0.9)
MONOCYTES NFR BLD AUTO: 7.4 % — SIGNIFICANT CHANGE UP (ref 2–14)
MRSA PCR RESULT.: SIGNIFICANT CHANGE UP
NEUTROPHILS # BLD AUTO: 6.29 K/UL — SIGNIFICANT CHANGE UP (ref 1.8–7.4)
NEUTROPHILS NFR BLD AUTO: 68.6 % — SIGNIFICANT CHANGE UP (ref 43–77)
NRBC # BLD: 0 /100 WBCS — SIGNIFICANT CHANGE UP (ref 0–0)
PLATELET # BLD AUTO: 407 K/UL — HIGH (ref 150–400)
POTASSIUM SERPL-MCNC: 3.8 MMOL/L — SIGNIFICANT CHANGE UP (ref 3.5–5.3)
POTASSIUM SERPL-SCNC: 3.8 MMOL/L — SIGNIFICANT CHANGE UP (ref 3.5–5.3)
PROT SERPL-MCNC: 5.7 G/DL — LOW (ref 6–8.3)
PROT SERPL-MCNC: 5.7 G/DL — LOW (ref 6–8.3)
RBC # BLD: 3.62 M/UL — LOW (ref 3.8–5.2)
RBC # FLD: 17.7 % — HIGH (ref 10.3–14.5)
S AUREUS DNA NOSE QL NAA+PROBE: SIGNIFICANT CHANGE UP
SODIUM SERPL-SCNC: 135 MMOL/L — SIGNIFICANT CHANGE UP (ref 135–145)
TSH SERPL-MCNC: 5.24 UIU/ML — HIGH (ref 0.36–3.74)
WBC # BLD: 9.18 K/UL — SIGNIFICANT CHANGE UP (ref 3.8–10.5)
WBC # FLD AUTO: 9.18 K/UL — SIGNIFICANT CHANGE UP (ref 3.8–10.5)

## 2021-01-24 PROCEDURE — 99232 SBSQ HOSP IP/OBS MODERATE 35: CPT

## 2021-01-24 PROCEDURE — 99233 SBSQ HOSP IP/OBS HIGH 50: CPT

## 2021-01-24 RX ORDER — ASCORBIC ACID 60 MG
1 TABLET,CHEWABLE ORAL
Qty: 0 | Refills: 0 | DISCHARGE

## 2021-01-24 RX ORDER — INSULIN LISPRO 100/ML
VIAL (ML) SUBCUTANEOUS
Refills: 0 | Status: DISCONTINUED | OUTPATIENT
Start: 2021-01-24 | End: 2021-01-25

## 2021-01-24 RX ORDER — FERROUS SULFATE 325(65) MG
0 TABLET ORAL
Qty: 0 | Refills: 0 | DISCHARGE

## 2021-01-24 RX ORDER — DEXTROSE 10 % IN WATER 10 %
500 INTRAVENOUS SOLUTION INTRAVENOUS
Refills: 0 | Status: DISCONTINUED | OUTPATIENT
Start: 2021-01-24 | End: 2021-01-25

## 2021-01-24 RX ORDER — SIMETHICONE 80 MG/1
0 TABLET, CHEWABLE ORAL
Qty: 0 | Refills: 0 | DISCHARGE

## 2021-01-24 RX ORDER — DEXAMETHASONE 0.5 MG/5ML
6 ELIXIR ORAL DAILY
Refills: 0 | Status: DISCONTINUED | OUTPATIENT
Start: 2021-01-24 | End: 2021-01-28

## 2021-01-24 RX ORDER — LEVOTHYROXINE SODIUM 125 MCG
1 TABLET ORAL
Qty: 0 | Refills: 0 | DISCHARGE

## 2021-01-24 RX ORDER — LACTOBACILLUS ACIDOPHILUS 100MM CELL
1 CAPSULE ORAL
Qty: 0 | Refills: 0 | DISCHARGE

## 2021-01-24 RX ORDER — MULTIVIT-MIN/FERROUS GLUCONATE 9 MG/15 ML
1 LIQUID (ML) ORAL
Qty: 0 | Refills: 0 | DISCHARGE

## 2021-01-24 RX ORDER — TAMSULOSIN HYDROCHLORIDE 0.4 MG/1
1 CAPSULE ORAL
Qty: 0 | Refills: 0 | DISCHARGE

## 2021-01-24 RX ORDER — OMEPRAZOLE 10 MG/1
1 CAPSULE, DELAYED RELEASE ORAL
Qty: 0 | Refills: 0 | DISCHARGE

## 2021-01-24 RX ORDER — HYDRALAZINE HCL 50 MG
1 TABLET ORAL
Qty: 0 | Refills: 0 | DISCHARGE

## 2021-01-24 RX ORDER — DEXTROSE 10 % IN WATER 10 %
1000 INTRAVENOUS SOLUTION INTRAVENOUS
Refills: 0 | Status: DISCONTINUED | OUTPATIENT
Start: 2021-01-24 | End: 2021-01-24

## 2021-01-24 RX ORDER — DEXTROSE 50 % IN WATER 50 %
25 SYRINGE (ML) INTRAVENOUS ONCE
Refills: 0 | Status: COMPLETED | OUTPATIENT
Start: 2021-01-24 | End: 2021-01-24

## 2021-01-24 RX ORDER — COLLAGENASE CLOSTRIDIUM HIST. 250 UNIT/G
1 OINTMENT (GRAM) TOPICAL
Qty: 0 | Refills: 0 | DISCHARGE

## 2021-01-24 RX ORDER — AMLODIPINE BESYLATE 2.5 MG/1
1 TABLET ORAL
Qty: 0 | Refills: 0 | DISCHARGE

## 2021-01-24 RX ORDER — ATORVASTATIN CALCIUM 80 MG/1
1 TABLET, FILM COATED ORAL
Qty: 0 | Refills: 0 | DISCHARGE

## 2021-01-24 RX ORDER — INSULIN LISPRO 100/ML
10 VIAL (ML) SUBCUTANEOUS ONCE
Refills: 0 | Status: COMPLETED | OUTPATIENT
Start: 2021-01-24 | End: 2021-01-24

## 2021-01-24 RX ORDER — ACETAMINOPHEN 500 MG
2 TABLET ORAL
Qty: 0 | Refills: 0 | DISCHARGE

## 2021-01-24 RX ORDER — METFORMIN HYDROCHLORIDE 850 MG/1
1 TABLET ORAL
Qty: 0 | Refills: 0 | DISCHARGE

## 2021-01-24 RX ORDER — INSULIN HUMAN 100 [IU]/ML
0 INJECTION, SOLUTION SUBCUTANEOUS
Qty: 0 | Refills: 0 | DISCHARGE

## 2021-01-24 RX ORDER — GLIMEPIRIDE 1 MG
1 TABLET ORAL
Qty: 0 | Refills: 0 | DISCHARGE

## 2021-01-24 RX ORDER — LACTOBACILLUS ACIDOPHILUS 100MM CELL
2 CAPSULE ORAL
Qty: 0 | Refills: 0 | DISCHARGE

## 2021-01-24 RX ADMIN — ENOXAPARIN SODIUM 40 MILLIGRAM(S): 100 INJECTION SUBCUTANEOUS at 11:17

## 2021-01-24 RX ADMIN — Medication 6 MILLIGRAM(S): at 06:20

## 2021-01-24 RX ADMIN — PANTOPRAZOLE SODIUM 40 MILLIGRAM(S): 20 TABLET, DELAYED RELEASE ORAL at 05:33

## 2021-01-24 RX ADMIN — Medication 75 MILLILITER(S): at 06:21

## 2021-01-24 RX ADMIN — SERTRALINE 25 MILLIGRAM(S): 25 TABLET, FILM COATED ORAL at 11:17

## 2021-01-24 RX ADMIN — Medication 50 MILLIGRAM(S): at 06:00

## 2021-01-24 RX ADMIN — Medication 1 APPLICATION(S): at 06:01

## 2021-01-24 RX ADMIN — Medication 25 MILLILITER(S): at 13:06

## 2021-01-24 RX ADMIN — Medication 10 UNIT(S): at 18:55

## 2021-01-24 RX ADMIN — Medication 25 MICROGRAM(S): at 05:33

## 2021-01-24 RX ADMIN — Medication 50 MILLIGRAM(S): at 17:21

## 2021-01-24 RX ADMIN — Medication 1 APPLICATION(S): at 17:20

## 2021-01-24 RX ADMIN — Medication 500 MILLIGRAM(S): at 11:17

## 2021-01-24 RX ADMIN — CEFTRIAXONE 100 MILLIGRAM(S): 500 INJECTION, POWDER, FOR SOLUTION INTRAMUSCULAR; INTRAVENOUS at 11:20

## 2021-01-24 RX ADMIN — Medication 325 MILLIGRAM(S): at 11:20

## 2021-01-24 RX ADMIN — Medication 2.5 MILLIGRAM(S): at 06:00

## 2021-01-24 RX ADMIN — ATORVASTATIN CALCIUM 20 MILLIGRAM(S): 80 TABLET, FILM COATED ORAL at 22:06

## 2021-01-24 RX ADMIN — Medication 1 TABLET(S): at 11:17

## 2021-01-24 RX ADMIN — AMLODIPINE BESYLATE 10 MILLIGRAM(S): 2.5 TABLET ORAL at 06:00

## 2021-01-24 RX ADMIN — Medication 2.5 MILLIGRAM(S): at 17:21

## 2021-01-24 RX ADMIN — REMDESIVIR 500 MILLIGRAM(S): 5 INJECTION INTRAVENOUS at 12:42

## 2021-01-24 RX ADMIN — Medication 25 GRAM(S): at 05:58

## 2021-01-24 RX ADMIN — Medication 1 TABLET(S): at 08:50

## 2021-01-24 RX ADMIN — Medication 5: at 22:55

## 2021-01-24 RX ADMIN — TAMSULOSIN HYDROCHLORIDE 0.4 MILLIGRAM(S): 0.4 CAPSULE ORAL at 22:06

## 2021-01-24 RX ADMIN — Medication 1 TABLET(S): at 17:21

## 2021-01-24 NOTE — DIETITIAN INITIAL EVALUATION ADULT. - PROBLEM SELECTOR PLAN 3
-CXR w/ right sided PNA, ?patchy infiltrates bilaterally  -S/p Vanco and Zosyn in the ED, continue zosyn  -check MRSA PCR, legionella and strep urinary ag  -COVID-19 positive - hold off on remdesivir/dexamethasone

## 2021-01-24 NOTE — DIETITIAN INITIAL EVALUATION ADULT. - PROBLEM SELECTOR PLAN 1
-currently AAOX3 but a poor historian   -acute metabolic encephalopathy multifactorial in the setting of hypoglycemia, UTI/PNA, sepsis, and hyponatremia   -CT head negative for acute pathology  - s/p vancomycin and zosyn in the ED  - will check MRSA PCR given recent hospitalization  - will treat for UTI/PNA with Zosyn for now, f/u cultures  - supportive treatment for COVID-19 PNA - no hypoxemia

## 2021-01-24 NOTE — DIETITIAN INITIAL EVALUATION ADULT. - PROBLEM SELECTOR PLAN 10
- VTE ppx: lovenox subQ    11. HLD: Continue home medication atorvastatin 20 QD  12. Hypothyroidism: Continue home synthroid, check tsh  13. Goals of care discussed pt is DNR/DNI as per son and pt - palliative consulted for JOHANA

## 2021-01-24 NOTE — DIETITIAN INITIAL EVALUATION ADULT. - PROBLEM SELECTOR PLAN 2
-currently complains of urinary frequency for about 1 month since Davila discontinuation in late December   -History of UTI/bacteremia in late Nov -s/p treatment at [facility]   -Continue Zosyn  -F/u culture data

## 2021-01-24 NOTE — DIETITIAN INITIAL EVALUATION ADULT. - PROBLEM SELECTOR PLAN 6
-Continue home medications amlodipine 10 QD, hydralazine 50 BID, enalapril 2.5 BID w/ hold parameters and monitor hemodynamics

## 2021-01-24 NOTE — DIETITIAN INITIAL EVALUATION ADULT. - PROBLEM SELECTOR PLAN 7
DM2 with long term use of insulin w/ hypoglycemia likely in setting of sulfonylurea use - would d/c glimepiride entirely  -Hold home oral medications glimepiride and metformin   -Low dose ISS and hypoglycemic protocol while inpatient - check A1C - reg diet  -monitor blood glucose closely off d5 + NS IVF given at [place], check q4 for now

## 2021-01-24 NOTE — PROGRESS NOTE ADULT - SUBJECTIVE AND OBJECTIVE BOX
[facility]  INFECTIOUS DISEASES   =======================================================    [id number]  MICHAEL DUFFY     Follow up: COVID 19    On O2 through [place] 4[place]. Comfortable, no new complaint or overnight event.     PAST MEDICAL & SURGICAL HISTORY:  MARRY (acute respiratory infection)  Hypothyroid  MDD (major depressive disorder)  Diabetes mellitus type 2  Hypertension  Hyperlipidemia  Depression  History of colon surgery  H/O shoulder surgery  H/O lumpectomy    Social Hx: no smoking, ETOH or drugs     FAMILY HISTORY:  Family history of CVA  in brother    FH: type 2 diabetes    Allergies  Hytrin (Unknown)    Antibiotics:  cefTRIAXone   IVPB 1000 milliGRAM(s) IV Intermittent every 24 hours     REVIEW OF SYSTEMS:  CONSTITUTIONAL:  No Fever or chills  HEENT:  No diplopia or blurred vision.  No sore throat or runny nose.  CARDIOVASCULAR:  No chest pain or SOB.  RESPIRATORY:  no cough, shortness of breath  GASTROINTESTINAL:  No nausea, vomiting or diarrhea.  GENITOURINARY:  No dysuria, frequency or urgency. No Blood in urine  MUSCULOSKELETAL:  no joint aches, no muscle pain  SKIN:  No change in skin, hair or nails.  NEUROLOGIC:  No paresthesias, fasciculations, seizures or weakness.  PSYCHIATRIC:  No disorder of thought or mood.  ENDOCRINE:  No heat or cold intolerance, polyuria or polydipsia.  HEMATOLOGICAL:  No easy bruising or bleeding.     Physical Exam:  Vital Signs Last 24 Hrs  T(C): 36.2 (24 Jan 2021 05:36), Max: 37.2 (23 Jan 2021 12:16)  T(F): 97.2 (24 Jan 2021 05:36), Max: 98.9 (23 Jan 2021 12:16)  HR: 84 (24 Jan 2021 05:36) (78 - 84)  BP: 137/66 (24 Jan 2021 05:36) (107/45 - 137/66)  BP(mean): --  RR: 16 (24 Jan 2021 05:47) (14 - 18)  SpO2: 96% (24 Jan 2021 05:47) (87% - 96%)  GEN: NAD  HEENT: normocephalic and atraumatic. EOMI. PERRL.    NECK: Supple.  No lymphadenopathy   LUNGS: Clear to auscultation.  HEART: Regular rate and rhythm   ABDOMEN: Soft, nontender, and nondistended.  Positive bowel sounds.    EXTREMITIES: Without edema.  NEUROLOGIC: grossly intact.  PSYCHIATRIC: Appropriate affect .  SKIN: No rash, reportedly has Decubitus but I couldn't exam her today due to her refusal       Labs:                        8.9    9.18  )-----------( 407      ( 24 Jan 2021 06:43 )             28.0      01-24    135  |  106  |  16  ----------------------------<  186<H>  3.8   |  22  |  0.90    Ca    7.5<L>      24 Jan 2021 06:43  Phos  2.3     01-23  Mg     1.5     01-23    TPro  5.7<L>  /  Alb  1.6<L>  /  TBili  0.2  /  DBili  <.10  /  AST  18  /  ALT  14  /  AlkPhos  158<H>  01-24    WBC Count: 9.18 K/uL (01-24-21 @ 06:43)  WBC Count: 8.61 K/uL (01-23-21 @ 07:06)    Creatinine, Serum: 0.90 mg/dL (01-24-21 @ 06:43)  Creatinine, Serum: 1.00 mg/dL (01-23-21 @ 07:06)    C-Reactive Protein, Serum: 6.90 mg/dL (01-23-21 @ 12:02)    Ferritin, Serum: 231 ng/mL (01-23-21 @ 12:02)    Procalcitonin, Serum: 0.08 ng/mL (01-23-21 @ 07:06)     COVID-19 IgG Antibody Index: 0.20 Index (01-23-21 @ 12:02)  COVID-19 IgG Antibody Interpretation: Negative (01-23-21 @ 12:02)    All imaging and other data have been reviewed.    Assessment and Plan:   70 yo woman with PMH of HTN, DM2, MDD, and Hypothyroidism, was admitted on 1/23 with AMS and hypoglycemia. She was tested positive COVID in [place] so transferred to [facility].     Treatment usually is different case by case, data suggest that these might work:   Remdisivir:  5 day course,  eGFR > 30 mL/min , ALT < 5X ULN  Steroid: For hypoxic patients on supplemental O2 of intubated. dexamethasone 6mg PO or IV Q-day x 10 days (equivalent to solumedrol 32mg IV or Prednisone 40mg)  Anticoagulation:  with prophylactic dosing, full dose to be considered in patients with increased risk for thromboembolic complications. Bleeding can happen but acceptable in high risk patients due to hypercoagulable state.  LMWH is good, for high risk patients consider discharge on oral anticoagulation with rivaroxaban (Xarelto) 10mg PO QD or Eliquis (apixaban) 2.5-5mg PO BID  x 4 weeks.  Currently data and recommendations for COVID-19 treatment are rapidly changing, so this treatment plan is based on my clinical judgement with available information.     COVID 19, UTI  - UA showed WBC=26-50 and positive nitrate   - Agree with ceftriaxone 1gm daily until UC is back. Can be switched to po when ready for discharge   - BMP, CBC w diff, NLR. Procalcitonin, Ferritin, CRP, LDH and D dimer for the start and periodically can be repeated.   - CXR with bilateral opacities more consistent with viral pneumonia   - Continue Remdesivir to complete total 5days or until when ready for discharge whichever comes first.   - Now on supplemental O2 so can start on Dexamethasone 6mg daily for 10days  - Follow Creat and LFTs daily while on Remdesivir.    - Watch O2 sat closely and taper as tolerated.   - No antibiotics at this time.  - Legionella U ag negative   - Prophylactic anticoagulation due to hypercoagulable state  - Monitor BM, due to frequent diarrhea, could be related to COVID, will send GI PCR    Will follow PRN.     Dr. Arron Sotomayor   Infectious Diseases   Please call [phone] between 8am and 6pm, call [phone] after 6pm or weekends.      [facility]  INFECTIOUS DISEASES   =======================================================    [id number]  MICHAEL DUFFY     Follow up: COVID 19    On O2 through [place] 4L. Comfortable, no new complaint or overnight event.   Awake and alert.     PAST MEDICAL & SURGICAL HISTORY:  MARRY (acute respiratory infection)  Hypothyroid  MDD (major depressive disorder)  Diabetes mellitus type 2  Hypertension  Hyperlipidemia  Depression  History of colon surgery  H/O shoulder surgery  H/O lumpectomy    Social Hx: no smoking, ETOH or drugs     FAMILY HISTORY:  Family history of CVA  in brother    FH: type 2 diabetes    Allergies  Hytrin (Unknown)    Antibiotics:  cefTRIAXone   IVPB 1000 milliGRAM(s) IV Intermittent every 24 hours     REVIEW OF SYSTEMS:  CONSTITUTIONAL:  No Fever or chills  HEENT:  No diplopia or blurred vision.  No sore throat or runny nose.  CARDIOVASCULAR:  No chest pain or SOB.  RESPIRATORY:  no cough, shortness of breath  GASTROINTESTINAL:  No nausea, vomiting or diarrhea.  GENITOURINARY:  No dysuria, frequency or urgency. No Blood in urine  MUSCULOSKELETAL:  no joint aches, no muscle pain  SKIN:  No change in skin, hair or nails.  NEUROLOGIC:  No paresthesias, fasciculations, seizures or weakness.  PSYCHIATRIC:  No disorder of thought or mood.  ENDOCRINE:  No heat or cold intolerance, polyuria or polydipsia.  HEMATOLOGICAL:  No easy bruising or bleeding.     Physical Exam:  Vital Signs Last 24 Hrs  T(C): 36.2 (24 Jan 2021 05:36), Max: 37.2 (23 Jan 2021 12:16)  T(F): 97.2 (24 Jan 2021 05:36), Max: 98.9 (23 Jan 2021 12:16)  HR: 84 (24 Jan 2021 05:36) (78 - 84)  BP: 137/66 (24 Jan 2021 05:36) (107/45 - 137/66)  BP(mean): --  RR: 16 (24 Jan 2021 05:47) (14 - 18)  SpO2: 96% (24 Jan 2021 05:47) (87% - 96%)  GEN: NAD  HEENT: normocephalic and atraumatic. EOMI. PERRL.    NECK: Supple.  No lymphadenopathy   LUNGS: Clear to auscultation.  HEART: Regular rate and rhythm   ABDOMEN: Soft, nontender, and nondistended.  Positive bowel sounds.    EXTREMITIES: Without edema.  NEUROLOGIC: grossly intact.  PSYCHIATRIC: Appropriate affect .  SKIN: No rash, reportedly has Decubitus but I couldn't exam her today due to her refusal       Labs:                        8.9    9.18  )-----------( 407      ( 24 Jan 2021 06:43 )             28.0      01-24    135  |  106  |  16  ----------------------------<  186<H>  3.8   |  22  |  0.90    Ca    7.5<L>      24 Jan 2021 06:43  Phos  2.3     01-23  Mg     1.5     01-23    TPro  5.7<L>  /  Alb  1.6<L>  /  TBili  0.2  /  DBili  <.10  /  AST  18  /  ALT  14  /  AlkPhos  158<H>  01-24    WBC Count: 9.18 K/uL (01-24-21 @ 06:43)  WBC Count: 8.61 K/uL (01-23-21 @ 07:06)    Creatinine, Serum: 0.90 mg/dL (01-24-21 @ 06:43)  Creatinine, Serum: 1.00 mg/dL (01-23-21 @ 07:06)    C-Reactive Protein, Serum: 6.90 mg/dL (01-23-21 @ 12:02)    Ferritin, Serum: 231 ng/mL (01-23-21 @ 12:02)    Procalcitonin, Serum: 0.08 ng/mL (01-23-21 @ 07:06)     COVID-19 IgG Antibody Index: 0.20 Index (01-23-21 @ 12:02)  COVID-19 IgG Antibody Interpretation: Negative (01-23-21 @ 12:02)    All imaging and other data have been reviewed.    Assessment and Plan:   72 yo woman with PMH of HTN, DM2, MDD, and Hypothyroidism, was admitted on 1/23 with AMS and hypoglycemia. She was tested positive COVID in [place] so transferred to [facility].     Treatment usually is different case by case, data suggest that these might work:   Remdisivir:  5 day course,  eGFR > 30 mL/min , ALT < 5X ULN  Steroid: For hypoxic patients on supplemental O2 of intubated. dexamethasone 6mg PO or IV Q-day x 10 days (equivalent to solumedrol 32mg IV or Prednisone 40mg)  Anticoagulation:  with prophylactic dosing, full dose to be considered in patients with increased risk for thromboembolic complications. Bleeding can happen but acceptable in high risk patients due to hypercoagulable state.  LMWH is good, for high risk patients consider discharge on oral anticoagulation with rivaroxaban (Xarelto) 10mg PO QD or Eliquis (apixaban) 2.5-5mg PO BID  x 4 weeks.  Currently data and recommendations for COVID-19 treatment are rapidly changing, so this treatment plan is based on my clinical judgement with available information.     COVID 19, UTI  - UA showed WBC=26-50 and positive nitrate   - Agree with ceftriaxone 1gm daily until UC is back. No MDRO in the past. Can be switched to po when ready for discharge   - BMP, CBC w diff, NLR. Procalcitonin, Ferritin, CRP, LDH and D dimer for the start and periodically can be repeated.   - CXR with bilateral opacities more consistent with viral pneumonia   - Continue Remdesivir to complete total 5days or until when ready for discharge whichever comes first. Today is day 2/5.   - On supplemental O2 so can start on Dexamethasone 6mg daily for 10days  - Follow Creat and LFTs daily while on Remdesivir.    - Watch O2 sat closely and taper as tolerated.   - Legionella U ag negative   - Prophylactic anticoagulation due to hypercoagulable state  - Monitor BM, due to frequent diarrhea, could be related to COVID, will send GI PCR    Will sign off, Please call with any question.  Dr. Bridges will follow tomorrow.     Dr. Arron Stoomayor   Infectious Diseases   Please call [phone] between 8am and 6pm, call [phone] after 6pm or weekends.

## 2021-01-24 NOTE — PROGRESS NOTE ADULT - SUBJECTIVE AND OBJECTIVE BOX
CC/F/U for:     HPI:  71 year old female PMHx of DM type 2, HTN, HLD, MDD, Hypothyroidism, presented to the [facility] with AMS and hypoglycemia.  History obtained from chart review and son. Patient is AAOx3 but she is a poor historian. As per son, pt was independent/driving car/living in her house alone until Nov 24th. A month prior to this she lost her  and became more depressed. On Nov 24th, she was found on the floor of her living room and she could not remember how she got there, she was brought to [facility], where she spent 8 days and was diagnosed with a UTI/bacteremia for which was she dc home with IV abx and a Davila. She was more bed bound at home after this hospitalization, her blood sugar kept dropping and she was readmitted to [facility], this time treated for dehydration/DANIEL/ and diarrhea. Her diarrhea was attributed to her abx use and she tested negative for Cdiff multiple times. She also received a unit of PRBC for a Hb of 8.2. On Dec 22 she was D/c to [facility] nursing home and because of nausea and vomiting (vomit had some blood) she was brought back to the hospital where she stayed  a few days then was discharged to [facility]. The night prior to admission, pt was sent to [facility] from [facility] because she had an episode of AMS where she was nonverbal/incoherant w/ a blood sugar of 45. History of episode was limited as nursing staff that witnessed episode were not at [facility] at time of admission.  As per chart, staff reported that patient had poor PO intake yesterday so they held her insulin.  At night, she was noted to be altered and confused.  EMS arrived and noted FS of 45.  She was given D10 which resulted in prompt improvement in mental status.  Her blood sugar was 160 en route.  She ambulates with a walker but admits that she is not very active secondary to neuropathy.   Today patient states that she still has diarrhea about every 2 hours and she has also had increased urinary frequency for about a month. Her Davila was discontinued about the end of Dec as per son and she has had increased urinary frequency since then.    Vitals at [facility]: T: 94.9, HR: 64, BP: 146/62, RR: 20 O2: 98 on RA   Labs significant for: WBC: 11.79, Hb/Hct: 9.2/28.0, Na: 130, BUN/Cr: 27/1.19, Alk phso: 156, +COVID   UA: positive for nitrate, moderate leukocyte esterase, bacteria,WBC   CXR: ?b/l patchy infiltrates, consolidation in the right lung (pending official read)  CT head: No CT evidence of acute intracranial abnormality.  In the [facility] ED patient received: Vanco, Zosyn and was started on d5 + IVF (23 Jan 2021 05:21)        INTERVAL HPI/OVERNIGHT EVENTS:  Pt seen and examined at bedside.     Allergies/Intolerance: Hytrin (Unknown)      MEDICATIONS  (STANDING):  amLODIPine   Tablet 10 milliGRAM(s) Oral daily  ascorbic acid 500 milliGRAM(s) Oral daily  atorvastatin 20 milliGRAM(s) Oral at bedtime  cefTRIAXone   IVPB 1000 milliGRAM(s) IV Intermittent every 24 hours  clotrimazole 1% Cream 1 Application(s) Topical two times a day  dexAMETHasone  Injectable 6 milliGRAM(s) IV Push daily  dextrose 10%. 1000 milliLiter(s) (75 mL/Hr) IV Continuous <Continuous>  dextrose 40% Gel 15 Gram(s) Oral once  dextrose 5%. 1000 milliLiter(s) (50 mL/Hr) IV Continuous <Continuous>  dextrose 5%. 1000 milliLiter(s) (100 mL/Hr) IV Continuous <Continuous>  dextrose 50% Injectable 25 Gram(s) IV Push once  dextrose 50% Injectable 12.5 Gram(s) IV Push once  dextrose 50% Injectable 25 Gram(s) IV Push once  enalapril 2.5 milliGRAM(s) Oral two times a day  enoxaparin Injectable 40 milliGRAM(s) SubCutaneous daily  ferrous    sulfate 325 milliGRAM(s) Oral daily  glucagon  Injectable 1 milliGRAM(s) IntraMuscular once  hydrALAZINE 50 milliGRAM(s) Oral two times a day  hydrocortisone 2.5% Ointment 1 Application(s) Topical two times a day  lactobacillus acidophilus 1 Tablet(s) Oral two times a day with meals  levothyroxine 25 MICROGram(s) Oral daily  multivitamin/minerals 1 Tablet(s) Oral daily  pantoprazole    Tablet 40 milliGRAM(s) Oral before breakfast  remdesivir  IVPB   IV Intermittent   remdesivir  IVPB 100 milliGRAM(s) IV Intermittent every 24 hours  sertraline 25 milliGRAM(s) Oral daily  tamsulosin 0.4 milliGRAM(s) Oral at bedtime    MEDICATIONS  (PRN):  acetaminophen   Tablet .. 650 milliGRAM(s) Oral every 4 hours PRN Temp greater or equal to 38.5C (101.3F)  simethicone 80 milliGRAM(s) Chew four times a day PRN Gas        ROS: as above; all other systems reviewed and wnl      PHYSICAL EXAMINATION:  Vital Signs Last 24 Hrs  T(C): 36.2 (24 Jan 2021 05:36), Max: 37.2 (23 Jan 2021 12:16)  T(F): 97.2 (24 Jan 2021 05:36), Max: 98.9 (23 Jan 2021 12:16)  HR: 84 (24 Jan 2021 05:36) (78 - 84)  BP: 137/66 (24 Jan 2021 05:36) (107/45 - 137/66)  BP(mean): --  RR: 19 (24 Jan 2021 12:02) (14 - 22)  SpO2: 91% (24 Jan 2021 12:02) (86% - 96%)  CAPILLARY BLOOD GLUCOSE      POCT Blood Glucose.: 160 mg/dL (24 Jan 2021 08:11)  POCT Blood Glucose.: 135 mg/dL (24 Jan 2021 07:13)  POCT Blood Glucose.: 154 mg/dL (24 Jan 2021 06:04)  POCT Blood Glucose.: 52 mg/dL (24 Jan 2021 05:39)  POCT Blood Glucose.: 52 mg/dL (24 Jan 2021 05:37)  POCT Blood Glucose.: 79 mg/dL (24 Jan 2021 00:53)  POCT Blood Glucose.: 111 mg/dL (23 Jan 2021 21:14)  POCT Blood Glucose.: 100 mg/dL (23 Jan 2021 18:59)  POCT Blood Glucose.: 74 mg/dL (23 Jan 2021 18:09)  POCT Blood Glucose.: 56 mg/dL (23 Jan 2021 17:35)  POCT Blood Glucose.: 43 mg/dL (23 Jan 2021 17:07)  POCT Blood Glucose.: 43 mg/dL (23 Jan 2021 16:58)  POCT Blood Glucose.: 44 mg/dL (23 Jan 2021 16:56)      GENERAL: NAD  HEENT: mucous membranes dry  RESP: respirations unlabored  HEART: HR s  ABDOMEN: soft, ND, NT   EXTREMITIES:  no edema b/l LEs, no calf tenderness  NEURO: awake, alert, interactive; moves all extremities      LABS:                        8.9    9.18  )-----------( 407      ( 24 Jan 2021 06:43 )             28.0     01-24    135  |  106  |  16  ----------------------------<  186<H>  3.8   |  22  |  0.90    Ca    7.5<L>      24 Jan 2021 06:43  Phos  2.3     01-23  Mg     1.5     01-23    TPro  5.7<L>  /  Alb  1.6<L>  /  TBili  0.2  /  DBili  <.10  /  AST  18  /  ALT  14  /  AlkPhos  158<H>  01-24    PT/INR - ( 23 Jan 2021 07:06 )   PT: 12.8 sec;   INR: 1.10 ratio         PTT - ( 23 Jan 2021 07:06 )  PTT:29.0 sec        RADIOLOGY & ADDITIONAL TESTS:      ASSESSMENT AND PLAN:  71y Female CC/F/U for: covid 19 infection, hypoglycemia,, UTI    HPI:  71 year old female PMHx of DM type 2, HTN, HLD, MDD, Hypothyroidism, presented to the [place] ED with AMS and hypoglycemia.  History obtained from chart review and son. Patient is AAOx3 but she is a poor historian. As per son, pt was independent/driving car/living in her house alone until Nov 24th. A month prior to this she lost her  and became more depressed. On Nov 24th, she was found on the floor of her living room and she could not remember how she got there, she was brought to [facility], where she spent 8 days and was diagnosed with a UTI/bacteremia for which was she dc home with IV abx and a Davila. She was more bed bound at home after this hospitalization, her blood sugar kept dropping and she was readmitted to [facility], this time treated for dehydration/DANIEL/ and diarrhea. Her diarrhea was attributed to her abx use and she tested negative for Cdiff multiple times. She also received a unit of PRBC for a Hb of 8.2. On Dec 22 she was D/c to [facility] nursing home and because of nausea and vomiting (vomit had some blood) she was brought back to the hospital where she stayed  a few days then was discharged to [facility]. The night prior to admission, pt was sent to [facility] from [facility] because she had an episode of AMS where she was nonverbal/incoherant w/ a blood sugar of 45. History of episode was limited as nursing staff that witnessed episode were not at [facility] at time of admission.  As per chart, staff reported that patient had poor PO intake yesterday so they held her insulin.  At night, she was noted to be altered and confused.  EMS arrived and noted FS of 45.  She was given D10 which resulted in prompt improvement in mental status.  Her blood sugar was 160 en route.  She ambulates with a walker but admits that she is not very active secondary to neuropathy.   Today patient states that she still has diarrhea about every 2 hours and she has also had increased urinary frequency for about a month. Her Davila was discontinued about the end of Dec as per son and she has had increased urinary frequency since then.    Vitals at [facility]: T: 94.9, HR: 64, BP: 146/62, RR: 20 O2: 98 on RA   Labs significant for: WBC: 11.79, Hb/Hct: 9.2/28.0, Na: 130, BUN/Cr: 27/1.19, Alk phso: 156, +COVID   UA: positive for nitrate, moderate leukocyte esterase, bacteria,WBC   CXR: ?b/l patchy infiltrates, consolidation in the right lung (pending official read)  CT head: No CT evidence of acute intracranial abnormality.  In the [facility] ED patient received: Vanco, Zosyn and was started on d5 + IVF (23 Jan 2021 05:21)        INTERVAL HPI/OVERNIGHT EVENTS:  Pt seen and examined at bedside - overnight, hypoglycemic to 52, hypoxic to 81 on RA, and obtunded on exam - s/p 1amp D50, then D5 gtt ->D10gtt; mental status subsequently improved; pt continues on 4L NC.     Allergies/Intolerance: Hytrin (Unknown)      MEDICATIONS  (STANDING):  amLODIPine   Tablet 10 milliGRAM(s) Oral daily  ascorbic acid 500 milliGRAM(s) Oral daily  atorvastatin 20 milliGRAM(s) Oral at bedtime  cefTRIAXone   IVPB 1000 milliGRAM(s) IV Intermittent every 24 hours  clotrimazole 1% Cream 1 Application(s) Topical two times a day  dexAMETHasone  Injectable 6 milliGRAM(s) IV Push daily  dextrose 10%. 1000 milliLiter(s) (75 mL/Hr) IV Continuous <Continuous>  dextrose 40% Gel 15 Gram(s) Oral once  dextrose 5%. 1000 milliLiter(s) (50 mL/Hr) IV Continuous <Continuous>  dextrose 5%. 1000 milliLiter(s) (100 mL/Hr) IV Continuous <Continuous>  dextrose 50% Injectable 25 Gram(s) IV Push once  dextrose 50% Injectable 12.5 Gram(s) IV Push once  dextrose 50% Injectable 25 Gram(s) IV Push once  enalapril 2.5 milliGRAM(s) Oral two times a day  enoxaparin Injectable 40 milliGRAM(s) SubCutaneous daily  ferrous    sulfate 325 milliGRAM(s) Oral daily  glucagon  Injectable 1 milliGRAM(s) IntraMuscular once  hydrALAZINE 50 milliGRAM(s) Oral two times a day  hydrocortisone 2.5% Ointment 1 Application(s) Topical two times a day  lactobacillus acidophilus 1 Tablet(s) Oral two times a day with meals  levothyroxine 25 MICROGram(s) Oral daily  multivitamin/minerals 1 Tablet(s) Oral daily  pantoprazole    Tablet 40 milliGRAM(s) Oral before breakfast  remdesivir  IVPB   IV Intermittent   remdesivir  IVPB 100 milliGRAM(s) IV Intermittent every 24 hours  sertraline 25 milliGRAM(s) Oral daily  tamsulosin 0.4 milliGRAM(s) Oral at bedtime    MEDICATIONS  (PRN):  acetaminophen   Tablet .. 650 milliGRAM(s) Oral every 4 hours PRN Temp greater or equal to 38.5C (101.3F)  simethicone 80 milliGRAM(s) Chew four times a day PRN Gas        ROS: as above; all other systems reviewed and wnl      PHYSICAL EXAMINATION:  Vital Signs Last 24 Hrs  T(C): 36.2 (24 Jan 2021 05:36), Max: 37.2 (23 Jan 2021 12:16)  T(F): 97.2 (24 Jan 2021 05:36), Max: 98.9 (23 Jan 2021 12:16)  HR: 84 (24 Jan 2021 05:36) (78 - 84)  BP: 137/66 (24 Jan 2021 05:36) (107/45 - 137/66)  RR: 19 (24 Jan 2021 12:02) (14 - 22)  SpO2: 91% (24 Jan 2021 12:02) (86% - 96%)  CAPILLARY BLOOD GLUCOSE      POCT Blood Glucose.: 160 mg/dL (24 Jan 2021 08:11)  POCT Blood Glucose.: 135 mg/dL (24 Jan 2021 07:13)  POCT Blood Glucose.: 154 mg/dL (24 Jan 2021 06:04)  POCT Blood Glucose.: 52 mg/dL (24 Jan 2021 05:39)  POCT Blood Glucose.: 52 mg/dL (24 Jan 2021 05:37)  POCT Blood Glucose.: 79 mg/dL (24 Jan 2021 00:53)  POCT Blood Glucose.: 111 mg/dL (23 Jan 2021 21:14)  POCT Blood Glucose.: 100 mg/dL (23 Jan 2021 18:59)  POCT Blood Glucose.: 74 mg/dL (23 Jan 2021 18:09)  POCT Blood Glucose.: 56 mg/dL (23 Jan 2021 17:35)  POCT Blood Glucose.: 43 mg/dL (23 Jan 2021 17:07)  POCT Blood Glucose.: 43 mg/dL (23 Jan 2021 16:58)  POCT Blood Glucose.: 44 mg/dL (23 Jan 2021 16:56)    GENERAL: frail, elderly female, NAD  HEENT: mucous membranes dry  RESP: respirations unlabored  HEART: HR 80s  ABDOMEN: soft, ND, NT   EXTREMITIES:  no edema b/l LEs, no calf tenderness  NEURO: awake, alert, interactive; moves all extremities      LABS:                        8.9    9.18  )-----------( 407      ( 24 Jan 2021 06:43 )             28.0     01-24    135  |  106  |  16  ----------------------------<  186<H>  3.8   |  22  |  0.90    Ca    7.5<L>      24 Jan 2021 06:43  Phos  2.3     01-23  Mg     1.5     01-23    TPro  5.7<L>  /  Alb  1.6<L>  /  TBili  0.2  /  DBili  <.10  /  AST  18  /  ALT  14  /  AlkPhos  158<H>  01-24    PT/INR - ( 23 Jan 2021 07:06 )   PT: 12.8 sec;   INR: 1.10 ratio         PTT - ( 23 Jan 2021 07:06 )  PTT:29.0 sec     CC/F/U for: covid 19 infection, hypoglycemia,, UTI    HPI:  71 year old female PMHx of DM type 2, HTN, HLD, MDD, Hypothyroidism, presented to the [place] ED with AMS and hypoglycemia.  History obtained from chart review and son. Patient is AAOx3 but she is a poor historian. As per son, pt was independent/driving car/living in her house alone until Nov 24th. A month prior to this she lost her  and became more depressed. On Nov 24th, she was found on the floor of her living room and she could not remember how she got there, she was brought to [facility], where she spent 8 days and was diagnosed with a UTI/bacteremia for which was she dc home with IV abx and a Davila. She was more bed bound at home after this hospitalization, her blood sugar kept dropping and she was readmitted to [facility], this time treated for dehydration/DANIEL/ and diarrhea. Her diarrhea was attributed to her abx use and she tested negative for Cdiff multiple times. She also received a unit of PRBC for a Hb of 8.2. On Dec 22 she was D/c to [facility] nursing home and because of nausea and vomiting (vomit had some blood) she was brought back to the hospital where she stayed  a few days then was discharged to [facility]. The night prior to admission, pt was sent to [facility] from [facility] because she had an episode of AMS where she was nonverbal/incoherant w/ a blood sugar of 45. History of episode was limited as nursing staff that witnessed episode were not at [facility] at time of admission.  As per chart, staff reported that patient had poor PO intake yesterday so they held her insulin.  At night, she was noted to be altered and confused.  EMS arrived and noted FS of 45.  She was given D10 which resulted in prompt improvement in mental status.  Her blood sugar was 160 en route.  She ambulates with a walker but admits that she is not very active secondary to neuropathy.   Today patient states that she still has diarrhea about every 2 hours and she has also had increased urinary frequency for about a month. Her Davila was discontinued about the end of Dec as per son and she has had increased urinary frequency since then.    Vitals at [place]: T: 94.9, HR: 64, BP: 146/62, RR: 20 O2: 98 on RA   Labs significant for: WBC: 11.79, Hb/Hct: 9.2/28.0, Na: 130, BUN/Cr: 27/1.19, Alk phso: 156, +COVID   UA: positive for nitrate, moderate leukocyte esterase, bacteria,WBC   CXR: ?b/l patchy infiltrates, consolidation in the right lung (pending official read)  CT head: No CT evidence of acute intracranial abnormality.  In the [place] ED patient received: Vanco, Zosyn and was started on d5 + IVF (23 Jan 2021 05:21)        INTERVAL HPI/OVERNIGHT EVENTS:  Pt seen and examined at bedside - overnight, hypoglycemic to 52, hypoxic to 81 on RA, and obtunded on exam - s/p 1amp D50, then D5 gtt ->D10gtt; mental status subsequently improved; now on 4L NC.     Allergies/Intolerance: Hytrin (Unknown)      MEDICATIONS  (STANDING):  amLODIPine   Tablet 10 milliGRAM(s) Oral daily  ascorbic acid 500 milliGRAM(s) Oral daily  atorvastatin 20 milliGRAM(s) Oral at bedtime  cefTRIAXone   IVPB 1000 milliGRAM(s) IV Intermittent every 24 hours  clotrimazole 1% Cream 1 Application(s) Topical two times a day  dexAMETHasone  Injectable 6 milliGRAM(s) IV Push daily  dextrose 10%. 1000 milliLiter(s) (75 mL/Hr) IV Continuous <Continuous>  dextrose 40% Gel 15 Gram(s) Oral once  dextrose 5%. 1000 milliLiter(s) (50 mL/Hr) IV Continuous <Continuous>  dextrose 5%. 1000 milliLiter(s) (100 mL/Hr) IV Continuous <Continuous>  dextrose 50% Injectable 25 Gram(s) IV Push once  dextrose 50% Injectable 12.5 Gram(s) IV Push once  dextrose 50% Injectable 25 Gram(s) IV Push once  enalapril 2.5 milliGRAM(s) Oral two times a day  enoxaparin Injectable 40 milliGRAM(s) SubCutaneous daily  ferrous    sulfate 325 milliGRAM(s) Oral daily  glucagon  Injectable 1 milliGRAM(s) IntraMuscular once  hydrALAZINE 50 milliGRAM(s) Oral two times a day  hydrocortisone 2.5% Ointment 1 Application(s) Topical two times a day  lactobacillus acidophilus 1 Tablet(s) Oral two times a day with meals  levothyroxine 25 MICROGram(s) Oral daily  multivitamin/minerals 1 Tablet(s) Oral daily  pantoprazole    Tablet 40 milliGRAM(s) Oral before breakfast  remdesivir  IVPB   IV Intermittent   remdesivir  IVPB 100 milliGRAM(s) IV Intermittent every 24 hours  sertraline 25 milliGRAM(s) Oral daily  tamsulosin 0.4 milliGRAM(s) Oral at bedtime    MEDICATIONS  (PRN):  acetaminophen   Tablet .. 650 milliGRAM(s) Oral every 4 hours PRN Temp greater or equal to 38.5C (101.3F)  simethicone 80 milliGRAM(s) Chew four times a day PRN Gas        ROS: as above; all other systems reviewed and wnl      PHYSICAL EXAMINATION:  Vital Signs Last 24 Hrs  T(C): 36.2 (24 Jan 2021 05:36), Max: 37.2 (23 Jan 2021 12:16)  T(F): 97.2 (24 Jan 2021 05:36), Max: 98.9 (23 Jan 2021 12:16)  HR: 84 (24 Jan 2021 05:36) (78 - 84)  BP: 137/66 (24 Jan 2021 05:36) (107/45 - 137/66)  RR: 19 (24 Jan 2021 12:02) (14 - 22)  SpO2: 91% (24 Jan 2021 12:02) (86% - 96%)  CAPILLARY BLOOD GLUCOSE      POCT Blood Glucose.: 160 mg/dL (24 Jan 2021 08:11)  POCT Blood Glucose.: 135 mg/dL (24 Jan 2021 07:13)  POCT Blood Glucose.: 154 mg/dL (24 Jan 2021 06:04)  POCT Blood Glucose.: 52 mg/dL (24 Jan 2021 05:39)  POCT Blood Glucose.: 52 mg/dL (24 Jan 2021 05:37)  POCT Blood Glucose.: 79 mg/dL (24 Jan 2021 00:53)  POCT Blood Glucose.: 111 mg/dL (23 Jan 2021 21:14)  POCT Blood Glucose.: 100 mg/dL (23 Jan 2021 18:59)  POCT Blood Glucose.: 74 mg/dL (23 Jan 2021 18:09)  POCT Blood Glucose.: 56 mg/dL (23 Jan 2021 17:35)  POCT Blood Glucose.: 43 mg/dL (23 Jan 2021 17:07)  POCT Blood Glucose.: 43 mg/dL (23 Jan 2021 16:58)  POCT Blood Glucose.: 44 mg/dL (23 Jan 2021 16:56)    GENERAL: frail, elderly female, NAD  HEENT: mucous membranes dry  RESP: respirations unlabored  HEART: HR 80s  ABDOMEN: soft, ND, NT   EXTREMITIES:  no edema b/l LEs, no calf tenderness  NEURO: awake, alert, interactive; moves all extremities      LABS:                        8.9    9.18  )-----------( 407      ( 24 Jan 2021 06:43 )             28.0     01-24    135  |  106  |  16  ----------------------------<  186<H>  3.8   |  22  |  0.90    Ca    7.5<L>      24 Jan 2021 06:43  Phos  2.3     01-23  Mg     1.5     01-23    TPro  5.7<L>  /  Alb  1.6<L>  /  TBili  0.2  /  DBili  <.10  /  AST  18  /  ALT  14  /  AlkPhos  158<H>  01-24    PT/INR - ( 23 Jan 2021 07:06 )   PT: 12.8 sec;   INR: 1.10 ratio         PTT - ( 23 Jan 2021 07:06 )  PTT:29.0 sec

## 2021-01-24 NOTE — DIETITIAN INITIAL EVALUATION ADULT. - PROBLEM SELECTOR PLAN 5
-Na 130 in [place] ED   -Possibly in setting of decreased PO intake   -f/u urine lytes, monitor BMP

## 2021-01-24 NOTE — DIETITIAN INITIAL EVALUATION ADULT. - PERTINENT LABORATORY DATA
(1/24) Hgb 8.9, Hct 28.0, MCV 77.3, gluc 186, Alb 1.6, Alk phos 158 (1/23)HgbA1c 6.9%, phos 2.3, CRP 6.90  Accuchecks(1/24) 79,52,52, 154, 135, 160

## 2021-01-24 NOTE — DIETITIAN INITIAL EVALUATION ADULT. - ADD RECOMMEND
Glucerna 8oz po BID, prosource 30cc BID. Continue regular diet - will follow blood sugar closely with consistent carbohydrate dietary restriction as needed.

## 2021-01-24 NOTE — CHART NOTE - NSCHARTNOTEFT_GEN_A_CORE
Paged overhead for ICU and Respiratory.    Patient was saturation at 76%. Glucometer demonstrated glucose 52, patient obtunded.     Patient given 1 amp D50, mental status improved    Vital Signs Last 24 Hrs  T(C): 36.2 (24 Jan 2021 05:36), Max: 37.2 (23 Jan 2021 12:16)  T(F): 97.2 (24 Jan 2021 05:36), Max: 98.9 (23 Jan 2021 12:16)  HR: 84 (24 Jan 2021 05:36) (74 - 84)  BP: 137/66 (24 Jan 2021 05:36) (107/45 - 137/66)  RR: 14 (24 Jan 2021 05:36) (14 - 18)  SpO2: 87% (24 Jan 2021 05:36) (87% - 96%).    GEN: NAD  HEENT: NCAT  Neuro: Awake, alert, moving all extremities      A/P:  71 F admitted for COVID PNA and hypoglycemia now requiring O2 and continuously dropping blood sugar  -Patient requires 4L NC, will start decadron. On remdesevir   -Change D5 @ 50 cc to D10 @ 75 cc/hr. S/p 1 amp D50. Episode of hypoglycemia likely 2/2 to glimiperide use  -Accu check Q1H x2 Paged overhead for ICU and Respiratory.    Patient was saturation at 76%. Glucometer demonstrated glucose 52, patient obtunded.     Patient given 1 amp D50, mental status improved    Vital Signs Last 24 Hrs  T(C): 36.2 (24 Jan 2021 05:36), Max: 37.2 (23 Jan 2021 12:16)  T(F): 97.2 (24 Jan 2021 05:36), Max: 98.9 (23 Jan 2021 12:16)  HR: 84 (24 Jan 2021 05:36) (74 - 84)  BP: 137/66 (24 Jan 2021 05:36) (107/45 - 137/66)  RR: 14 (24 Jan 2021 05:36) (14 - 18)  SpO2: 87% (24 Jan 2021 05:36) (87% - 96%).    GEN: NAD  HEENT: NCAT  Neuro: Awake, alert, moving all extremities      A/P:  71 F admitted for COVID PNA and hypoglycemia now requiring O2 and continuously dropping blood sugar  -Patient requires 4L NC, will start decadron. On remdesevir   -Change D5 @ 50 cc to D10 @ 75 cc/hr. S/p 1 amp D50. Episode of hypoglycemia likely 2/2 to glimiperide use  -Patient now awake, alert, breathing without apparent signs of respiratory distress  -Accu check Q1H x2 Paged overhead for ICU and Respiratory.    Patient was saturation at 81%. Glucometer demonstrated glucose 52, patient obtunded.     Patient given 1 amp D50, with repeat 154 with recheck 15 minutes later.     Vital Signs Last 24 Hrs  T(C): 36.2 (24 Jan 2021 05:36), Max: 37.2 (23 Jan 2021 12:16)  T(F): 97.2 (24 Jan 2021 05:36), Max: 98.9 (23 Jan 2021 12:16)  HR: 84 (24 Jan 2021 05:36) (74 - 84)  BP: 137/66 (24 Jan 2021 05:36) (107/45 - 137/66)  RR: 14 (24 Jan 2021 05:36) (14 - 18)  SpO2: 87% (24 Jan 2021 05:36) (87% - 96%).    GEN: NAD  HEENT: NCAT  Neuro: Awake, alert, moving all extremities      A/P:  71 F admitted for COVID PNA and hypoglycemia now requiring O2 and continuously dropping blood sugar  -Patient requires 4L NC, will start decadron. On remdesevir   -Change D5 @ 50 cc to D10 @ 75 cc/hr. S/p 1 amp D50 with significant improvement in mental status  - Episode of hypoglycemia likely 2/2 to glimiperide use  -Patient now awake, alert, breathing without apparent signs of respiratory distress  -Accu check Q1H x2

## 2021-01-24 NOTE — DIETITIAN INITIAL EVALUATION ADULT. - SIGNS/SYMPTOMS
as evidenced by acute illness with ongoing hypoglycemia, on D10 IVF, poor po intake. as evidenced by dx COVID-19, unstageable pressure ulcer to left heel, buttocks, coccyx. as evidenced by 14lb(7.3%) weight loss x 2 mos, consuming < 75% estimated energy needs > 7 days

## 2021-01-24 NOTE — DIETITIAN INITIAL EVALUATION ADULT. - OTHER INFO
71 year old female PMHx of DM type 2, HTN, HLD, MDD, Hypothyroidism admitted secondary to AMS and hypoglycemia. Pt poor historian per records. Dx +COVID-19, UTI. Multiple recent hospital admission in November and December, pt sent to hospital from [facility]. Hypoglycemic ongoing- on D10@ 75cc/hr. Per RN/RN aide pt tolerated breakfast well this morning consuming ~75% meals. No report difficulty chewing/swallowing. GI wdl, moderate soft BM 1/23. Pta diarrhea however no BM today thus far. Per records poor po intake pta. Multiple unstageable pressure ulcers to left out heel, right buttocks and coccyx. MVI & VIt C rx. 71 year old female PMHx of DM type 2, HTN, HLD, MDD, Hypothyroidism admitted secondary to AMS and hypoglycemia. Pt poor historian per records. Information obtained through pts son Jose David (emergency contact), EMR and RN/RN aide. Dx +COVID-19, UTI. Multiple recent hospital admission in November and December, pt sent to hospital from [facility] (brief stay at [facility]). Per son pt with decreased appetite/po intake for past 1-2 months pta with associated 10-14lb weight loss. Persistent nausea/diarrhea pta. Drinks glucerna supplements pta. Food preferences/meal alternatives obtained through son to maximize po intake. No report difficulty chewing/swallowing. Moderate soft BM 1/23, no BM today thus far. Hypoglycemia ongoing- on D10@ 75cc/hr. Per RN/RN aide pt tolerated breakfast well this morning 1/24 consuming ~75% meal. Multiple unstageable pressure ulcers to left out heel, right buttocks and coccyx. MVI & VIt C rx.

## 2021-01-24 NOTE — DIETITIAN INITIAL EVALUATION ADULT. - ETIOLOGY
related to increased needs in setting of viral illness and for wound healing related to endocrine dysfunction related to inadequate protein energy intake in setting of acute illness

## 2021-01-24 NOTE — DIETITIAN INITIAL EVALUATION ADULT. - PERTINENT MEDS FT
IVF-D10@75cc/hr, remdesivir, rocephin, decadron, Vit C, lipitor, FeSo4, lactobacillus, synthroid, MVI with minerals, protonix, mylicon

## 2021-01-24 NOTE — DIETITIAN INITIAL EVALUATION ADULT. - PROBLEM SELECTOR PLAN 9
-Continue home medication sertraline 25 QD   -As per son pt was suppose to be transitioned to sertraline 50 QD as per her psychiatrist, will continue 25 QD for now

## 2021-01-24 NOTE — DIETITIAN INITIAL EVALUATION ADULT. - PROBLEM SELECTOR PLAN 4
patient with COVID-19 Pneumonia without hypoxemia  - continue with O2 support PRN; may use NC, Venturi Mask, NRB, HFNC  - hold remdesivir/dexamethasone in the absence of hypoxemia  - avoid aerosolizing procedures i.e. nebulizations when possible  - prone PRN, tolerate lower O2 saturations as possible; avoid intubation with such techniques  - provide prophylactic VTE prophylaxis given high rate of thrombotic events  - trend COVID biomarkers to evaluate for role of full dose AC  - follow up culture data; monitor for fevers, treat PRN  - maintain isolation precautions - airborne and contact  - ID consult Dr. Sotomayor

## 2021-01-25 DIAGNOSIS — U07.1 COVID-19: ICD-10-CM

## 2021-01-25 LAB
-  AMIKACIN: SIGNIFICANT CHANGE UP
-  AMOXICILLIN/CLAVULANIC ACID: SIGNIFICANT CHANGE UP
-  AMPICILLIN/SULBACTAM: SIGNIFICANT CHANGE UP
-  AMPICILLIN: SIGNIFICANT CHANGE UP
-  AZTREONAM: SIGNIFICANT CHANGE UP
-  CEFAZOLIN: SIGNIFICANT CHANGE UP
-  CEFEPIME: SIGNIFICANT CHANGE UP
-  CEFOXITIN: SIGNIFICANT CHANGE UP
-  CEFTRIAXONE: SIGNIFICANT CHANGE UP
-  CIPROFLOXACIN: SIGNIFICANT CHANGE UP
-  ERTAPENEM: SIGNIFICANT CHANGE UP
-  GENTAMICIN: SIGNIFICANT CHANGE UP
-  IMIPENEM: SIGNIFICANT CHANGE UP
-  LEVOFLOXACIN: SIGNIFICANT CHANGE UP
-  MEROPENEM: SIGNIFICANT CHANGE UP
-  NITROFURANTOIN: SIGNIFICANT CHANGE UP
-  PIPERACILLIN/TAZOBACTAM: SIGNIFICANT CHANGE UP
-  TIGECYCLINE: SIGNIFICANT CHANGE UP
-  TOBRAMYCIN: SIGNIFICANT CHANGE UP
-  TRIMETHOPRIM/SULFAMETHOXAZOLE: SIGNIFICANT CHANGE UP
CULTURE RESULTS: SIGNIFICANT CHANGE UP
GLUCOSE BLDC GLUCOMTR-MCNC: 119 MG/DL — HIGH (ref 70–99)
METHOD TYPE: SIGNIFICANT CHANGE UP
ORGANISM # SPEC MICROSCOPIC CNT: SIGNIFICANT CHANGE UP
ORGANISM # SPEC MICROSCOPIC CNT: SIGNIFICANT CHANGE UP
SARS-COV-2 RNA SPEC QL NAA+PROBE: SIGNIFICANT CHANGE UP
SPECIMEN SOURCE: SIGNIFICANT CHANGE UP

## 2021-01-25 PROCEDURE — 99233 SBSQ HOSP IP/OBS HIGH 50: CPT

## 2021-01-25 RX ORDER — ASCORBIC ACID 60 MG
1 TABLET,CHEWABLE ORAL
Qty: 0 | Refills: 0 | DISCHARGE

## 2021-01-25 RX ORDER — INSULIN LISPRO 100/ML
3 VIAL (ML) SUBCUTANEOUS
Refills: 0 | Status: DISCONTINUED | OUTPATIENT
Start: 2021-01-25 | End: 2021-01-26

## 2021-01-25 RX ORDER — GLIMEPIRIDE 1 MG
1 TABLET ORAL
Qty: 0 | Refills: 0 | DISCHARGE

## 2021-01-25 RX ORDER — HYDRALAZINE HCL 50 MG
0 TABLET ORAL
Qty: 0 | Refills: 0 | DISCHARGE

## 2021-01-25 RX ORDER — TAMSULOSIN HYDROCHLORIDE 0.4 MG/1
1 CAPSULE ORAL
Qty: 0 | Refills: 0 | DISCHARGE

## 2021-01-25 RX ORDER — OMEPRAZOLE 10 MG/1
1 CAPSULE, DELAYED RELEASE ORAL
Qty: 0 | Refills: 0 | DISCHARGE

## 2021-01-25 RX ORDER — INSULIN LISPRO 100/ML
2 VIAL (ML) SUBCUTANEOUS ONCE
Refills: 0 | Status: COMPLETED | OUTPATIENT
Start: 2021-01-25 | End: 2021-01-25

## 2021-01-25 RX ORDER — COLLAGENASE CLOSTRIDIUM HIST. 250 UNIT/G
1 OINTMENT (GRAM) TOPICAL
Qty: 0 | Refills: 0 | DISCHARGE

## 2021-01-25 RX ORDER — METFORMIN HYDROCHLORIDE 850 MG/1
1 TABLET ORAL
Qty: 0 | Refills: 0 | DISCHARGE

## 2021-01-25 RX ORDER — ACETAMINOPHEN 500 MG
2 TABLET ORAL
Qty: 0 | Refills: 0 | DISCHARGE

## 2021-01-25 RX ORDER — FERROUS SULFATE 325(65) MG
0 TABLET ORAL
Qty: 0 | Refills: 0 | DISCHARGE

## 2021-01-25 RX ORDER — AMLODIPINE BESYLATE 2.5 MG/1
1 TABLET ORAL
Qty: 0 | Refills: 0 | DISCHARGE

## 2021-01-25 RX ORDER — LEVOTHYROXINE SODIUM 125 MCG
1 TABLET ORAL
Qty: 0 | Refills: 0 | DISCHARGE

## 2021-01-25 RX ORDER — ATORVASTATIN CALCIUM 80 MG/1
1 TABLET, FILM COATED ORAL
Qty: 0 | Refills: 0 | DISCHARGE

## 2021-01-25 RX ORDER — SERTRALINE 25 MG/1
1 TABLET, FILM COATED ORAL
Qty: 0 | Refills: 0 | DISCHARGE

## 2021-01-25 RX ORDER — INSULIN LISPRO 100/ML
VIAL (ML) SUBCUTANEOUS
Refills: 0 | Status: DISCONTINUED | OUTPATIENT
Start: 2021-01-25 | End: 2021-01-28

## 2021-01-25 RX ORDER — INSULIN HUMAN 100 [IU]/ML
0 INJECTION, SOLUTION SUBCUTANEOUS
Qty: 0 | Refills: 0 | DISCHARGE

## 2021-01-25 RX ORDER — SIMETHICONE 80 MG/1
1 TABLET, CHEWABLE ORAL
Qty: 0 | Refills: 0 | DISCHARGE

## 2021-01-25 RX ORDER — MULTIVIT-MIN/FERROUS GLUCONATE 9 MG/15 ML
1 LIQUID (ML) ORAL
Qty: 0 | Refills: 0 | DISCHARGE

## 2021-01-25 RX ORDER — LACTOBACILLUS ACIDOPHILUS 100MM CELL
1 CAPSULE ORAL
Qty: 0 | Refills: 0 | DISCHARGE

## 2021-01-25 RX ADMIN — Medication 3: at 08:28

## 2021-01-25 RX ADMIN — Medication 1 APPLICATION(S): at 17:12

## 2021-01-25 RX ADMIN — Medication 1 TABLET(S): at 11:48

## 2021-01-25 RX ADMIN — PANTOPRAZOLE SODIUM 40 MILLIGRAM(S): 20 TABLET, DELAYED RELEASE ORAL at 05:27

## 2021-01-25 RX ADMIN — Medication 2.5 MILLIGRAM(S): at 05:28

## 2021-01-25 RX ADMIN — Medication 1 TABLET(S): at 17:12

## 2021-01-25 RX ADMIN — Medication 1 TABLET(S): at 08:16

## 2021-01-25 RX ADMIN — ENOXAPARIN SODIUM 40 MILLIGRAM(S): 100 INJECTION SUBCUTANEOUS at 11:48

## 2021-01-25 RX ADMIN — AMLODIPINE BESYLATE 10 MILLIGRAM(S): 2.5 TABLET ORAL at 05:28

## 2021-01-25 RX ADMIN — Medication 50 MILLIGRAM(S): at 05:28

## 2021-01-25 RX ADMIN — Medication 1 APPLICATION(S): at 05:29

## 2021-01-25 RX ADMIN — Medication 8: at 17:34

## 2021-01-25 RX ADMIN — Medication 2 UNIT(S): at 21:30

## 2021-01-25 RX ADMIN — Medication 3 UNIT(S): at 17:34

## 2021-01-25 RX ADMIN — Medication 2.5 MILLIGRAM(S): at 17:12

## 2021-01-25 RX ADMIN — Medication 25 MICROGRAM(S): at 05:27

## 2021-01-25 RX ADMIN — CEFTRIAXONE 100 MILLIGRAM(S): 500 INJECTION, POWDER, FOR SOLUTION INTRAMUSCULAR; INTRAVENOUS at 12:14

## 2021-01-25 RX ADMIN — Medication 50 MILLIGRAM(S): at 17:12

## 2021-01-25 RX ADMIN — Medication 500 MILLIGRAM(S): at 11:48

## 2021-01-25 RX ADMIN — TAMSULOSIN HYDROCHLORIDE 0.4 MILLIGRAM(S): 0.4 CAPSULE ORAL at 21:00

## 2021-01-25 RX ADMIN — Medication 325 MILLIGRAM(S): at 11:48

## 2021-01-25 RX ADMIN — Medication 3 UNIT(S): at 12:39

## 2021-01-25 RX ADMIN — ATORVASTATIN CALCIUM 20 MILLIGRAM(S): 80 TABLET, FILM COATED ORAL at 21:00

## 2021-01-25 RX ADMIN — SERTRALINE 25 MILLIGRAM(S): 25 TABLET, FILM COATED ORAL at 11:48

## 2021-01-25 RX ADMIN — REMDESIVIR 500 MILLIGRAM(S): 5 INJECTION INTRAVENOUS at 13:22

## 2021-01-25 RX ADMIN — Medication 12: at 12:38

## 2021-01-25 RX ADMIN — Medication 6 MILLIGRAM(S): at 05:28

## 2021-01-25 NOTE — PROGRESS NOTE ADULT - PROBLEM SELECTOR PLAN 3
-pt denied dysuria, urinary frequency, flank/suprapubic tenderness -- does admit to incontinence that she states had been present for "months"  -reported history of ?UTI/bacteremia in late Nov s/p treatment at [facility] -- obtain collateral  - pt received rocephin empirically for past 3 days due to a positive UA -- per discussion with ID (Cyrus) suspicion for active UTI was low with pt endorsing no symptoms and thus Abx discontinued today (pt's UCx revealed E coli resistant to cephalosporins this afternoon)   -admission procalcitonin and lactate quite low at 0.08 and 1.2 respectively.  -4/4 BCx bottles NGTD.  -monitor closely off Abx

## 2021-01-25 NOTE — CONSULT NOTE ADULT - ASSESSMENT
70 yo woman with PMH of HTN, DM2, MDD, and Hypothyroidism, was admitted on 1/23 with AMS and hypoglycemia. She was tested positive COVID in [facility] so transferred to [facility]. initially seen by Dr Sotomayor but request by physician son for us to take over care    RECOMMENDATIONS  1/23 NLR 6.11/1.03=6 REMDESIVIR started w plan for 1/27 as last day, LMWH daily  1/24 NLR 6.29/1.7=3.7, NC-4L  STEROIDS started  1/25-RA, comfortable and alert, no urinary symptoms so not clear that urine isolate is significant so may dc abx after today's dose    COVID-19-high risk for decompensation EARLY INFLAMMATORY PHASE (7-14 days post symptom onset),    REMDESIVIR-5 day course consider within 10 days of symptom onset, sats<94% on RA and prior to need for high flow oxygen or mech ventilation, Criteria for use include:  • ALT and AST < 10X ULN   STEROIDs recommended AFTER 1st week of symptom onset for any patients that are hypoxic  and  with dexamethasone 6mg  Q-day x 10 days (equivalent to solumedrol 32mg IV or Prednisone 40mg)-higher doses to be considered in more severe disease,   ANTICOAGULATION- prophylactic dosing recommended LMWH 40mg SQ Qday and then full dose to be considered in patients with increased risk for thromboembolic complications if bleeding risks are considered acceptable -heparin for hemodialysis patients,  for high risk patients consider discharge on oral anticoagulation with rivaroxaban (Xarelto) 10mg PO QD or Eliquis (apixaban) 2.5mg PO BID  x 4-6 weeks, ASA in some contexts.   TOCILIZUMAB role still under investigation (limited benefit without pretreatment with steroids) but may be considered for patients progressing after start of steroids (criteria per use at some [facility] with medical director approval: Ferritin>700, D-dimer >1,000, CRP increase by >30%) w some evidence to suggest reduced progression to mechanical ventilation/death and shortening of hospital stay(EMPACTA/Remap-Cap-trials), caution with AST/ALT >1.5 ULN, would avoid without steroids  (not avail at certain institutions such as [facility])  -daily, BMP, CBC w diff to follow NLR and in some contexts daily or periodic D-dimer levels, -other labs to risk stratify or with any decompensation  Procalcitonin,  Ferritin,  CRP, ESR, PT/PTT but limit excessive testing -antibiotics only if there is concern for a bacterial process (noted to be an issue in only a minority on presentation, rec full eval with ferritin procalcitonin ratio (FPR) <1000 suggesting bacterial process  (consider proning and tolerating lower oxygen saturation to avoid intubation).

## 2021-01-25 NOTE — PROGRESS NOTE ADULT - SUBJECTIVE AND OBJECTIVE BOX
Date/Time Patient Seen:  		  Referring MD:   Data Reviewed	       Patient is a 71y old  Female who presents with a chief complaint of COVID-19, hypoglycemia, UTI (24 Jan 2021 12:13)      Subjective/HPI     PAST MEDICAL & SURGICAL HISTORY:  MARRY (acute respiratory infection)    Hypothyroid    MDD (major depressive disorder)    Diabetes mellitus  type 2    Hypertension    Hyperlipidemia    Depression    Changes in skin texture    Flatulence    Major depressive disorder, recurrent    Disorder of thyroid, unspecified    Ascorbic acid deficiency    Pain, unspecified    Hyperlipidemia, unspecified    Acute kidney failure, unspecified    Syncope and collapse    Essential (primary) hypertension    Type 2 diabetes mellitus without complication    Intraoperative hemorrhage and hematoma of a genitourinary system organ or structure complicating other procedure    High cholesterol    Diabetes    HTN (hypertension)    History of colon surgery    H/O shoulder surgery    H/O lumpectomy    No significant past surgical history    No significant past surgical history          Medication list         MEDICATIONS  (STANDING):  amLODIPine   Tablet 10 milliGRAM(s) Oral daily  ascorbic acid 500 milliGRAM(s) Oral daily  atorvastatin 20 milliGRAM(s) Oral at bedtime  cefTRIAXone   IVPB 1000 milliGRAM(s) IV Intermittent every 24 hours  clotrimazole 1% Cream 1 Application(s) Topical two times a day  dexAMETHasone  Injectable 6 milliGRAM(s) IV Push daily  dextrose 40% Gel 15 Gram(s) Oral once  dextrose 5%. 1000 milliLiter(s) (50 mL/Hr) IV Continuous <Continuous>  dextrose 5%. 1000 milliLiter(s) (100 mL/Hr) IV Continuous <Continuous>  dextrose 50% Injectable 25 Gram(s) IV Push once  dextrose 50% Injectable 12.5 Gram(s) IV Push once  dextrose 50% Injectable 25 Gram(s) IV Push once  enalapril 2.5 milliGRAM(s) Oral two times a day  enoxaparin Injectable 40 milliGRAM(s) SubCutaneous daily  ferrous    sulfate 325 milliGRAM(s) Oral daily  glucagon  Injectable 1 milliGRAM(s) IntraMuscular once  hydrALAZINE 50 milliGRAM(s) Oral two times a day  hydrocortisone 2.5% Ointment 1 Application(s) Topical two times a day  insulin lispro (ADMELOG) corrective regimen sliding scale   SubCutaneous three times a day before meals  lactobacillus acidophilus 1 Tablet(s) Oral two times a day with meals  levothyroxine 25 MICROGram(s) Oral daily  multivitamin/minerals 1 Tablet(s) Oral daily  pantoprazole    Tablet 40 milliGRAM(s) Oral before breakfast  remdesivir  IVPB   IV Intermittent   remdesivir  IVPB 100 milliGRAM(s) IV Intermittent every 24 hours  sertraline 25 milliGRAM(s) Oral daily  tamsulosin 0.4 milliGRAM(s) Oral at bedtime    MEDICATIONS  (PRN):  acetaminophen   Tablet .. 650 milliGRAM(s) Oral every 4 hours PRN Temp greater or equal to 38.5C (101.3F)  simethicone 80 milliGRAM(s) Chew four times a day PRN Gas         Vitals log        ICU Vital Signs Last 24 Hrs  T(C): 36.5 (24 Jan 2021 20:49), Max: 36.7 (24 Jan 2021 13:16)  T(F): 97.7 (24 Jan 2021 20:49), Max: 98 (24 Jan 2021 13:16)  HR: 79 (25 Jan 2021 05:24) (79 - 83)  BP: 157/66 (25 Jan 2021 05:24) (16/73 - 161/70)  BP(mean): --  ABP: --  ABP(mean): --  RR: 18 (25 Jan 2021 05:24) (18 - 20)  SpO2: 96% (25 Jan 2021 05:24) (96% - 100%)           Input and Output:  I&O's Detail    23 Jan 2021 07:01  -  24 Jan 2021 07:00  --------------------------------------------------------  IN:    dextrose 5%: 525 mL    IV PiggyBack: 50 mL    IV PiggyBack: 250 mL  Total IN: 825 mL    OUT:  Total OUT: 0 mL    Total NET: 825 mL      24 Jan 2021 07:01  -  25 Jan 2021 06:50  --------------------------------------------------------  IN:  Total IN: 0 mL    OUT:    Voided (mL): 400 mL  Total OUT: 400 mL    Total NET: -400 mL          Lab Data                        8.9    9.18  )-----------( 407      ( 24 Jan 2021 06:43 )             28.0     01-24    135  |  106  |  16  ----------------------------<  186<H>  3.8   |  22  |  0.90    Ca    7.5<L>      24 Jan 2021 06:43  Phos  2.3     01-23  Mg     1.5     01-23    TPro  5.7<L>  /  Alb  1.6<L>  /  TBili  0.2  /  DBili  <.10  /  AST  18  /  ALT  14  /  AlkPhos  158<H>  01-24            Review of Systems	      Objective     Physical Examination    heart s1s2  lung dec BS      Pertinent Lab findings & Imaging      Lola:  NO   Adequate UO     I&O's Detail    23 Jan 2021 07:01  -  24 Jan 2021 07:00  --------------------------------------------------------  IN:    dextrose 5%: 525 mL    IV PiggyBack: 50 mL    IV PiggyBack: 250 mL  Total IN: 825 mL    OUT:  Total OUT: 0 mL    Total NET: 825 mL      24 Jan 2021 07:01  -  25 Jan 2021 06:50  --------------------------------------------------------  IN:  Total IN: 0 mL    OUT:    Voided (mL): 400 mL  Total OUT: 400 mL    Total NET: -400 mL               Discussed with:     Cultures:	        Radiology

## 2021-01-25 NOTE — PROGRESS NOTE ADULT - SUBJECTIVE AND OBJECTIVE BOX
Patient is a 71y old  Female who presents with a chief complaint of altered mental status.       INTERVAL HPI/OVERNIGHT EVENTS: Pt states she feels "alright." Pt denies fever, chills, SOB, CP, abd pain, flank pain, dysuria, urinary urgency. Pt does admit to having difficulty with incontinence for "months" per pt.     MEDICATIONS  (STANDING):  amLODIPine   Tablet 10 milliGRAM(s) Oral daily  ascorbic acid 500 milliGRAM(s) Oral daily  atorvastatin 20 milliGRAM(s) Oral at bedtime  clotrimazole 1% Cream 1 Application(s) Topical two times a day  dexAMETHasone  Injectable 6 milliGRAM(s) IV Push daily  dextrose 40% Gel 15 Gram(s) Oral once  dextrose 5%. 1000 milliLiter(s) (50 mL/Hr) IV Continuous <Continuous>  dextrose 5%. 1000 milliLiter(s) (100 mL/Hr) IV Continuous <Continuous>  dextrose 50% Injectable 25 Gram(s) IV Push once  dextrose 50% Injectable 12.5 Gram(s) IV Push once  dextrose 50% Injectable 25 Gram(s) IV Push once  enalapril 2.5 milliGRAM(s) Oral two times a day  enoxaparin Injectable 40 milliGRAM(s) SubCutaneous daily  ferrous    sulfate 325 milliGRAM(s) Oral daily  glucagon  Injectable 1 milliGRAM(s) IntraMuscular once  hydrALAZINE 50 milliGRAM(s) Oral two times a day  hydrocortisone 2.5% Ointment 1 Application(s) Topical two times a day  insulin lispro (ADMELOG) corrective regimen sliding scale   SubCutaneous three times a day before meals  insulin lispro Injectable (ADMELOG) 3 Unit(s) SubCutaneous three times a day before meals  lactobacillus acidophilus 1 Tablet(s) Oral two times a day with meals  levothyroxine 25 MICROGram(s) Oral daily  multivitamin/minerals 1 Tablet(s) Oral daily  pantoprazole    Tablet 40 milliGRAM(s) Oral before breakfast  remdesivir  IVPB   IV Intermittent   remdesivir  IVPB 100 milliGRAM(s) IV Intermittent every 24 hours  sertraline 25 milliGRAM(s) Oral daily  tamsulosin 0.4 milliGRAM(s) Oral at bedtime    MEDICATIONS  (PRN):  acetaminophen   Tablet .. 650 milliGRAM(s) Oral every 4 hours PRN Temp greater or equal to 38.5C (101.3F)  simethicone 80 milliGRAM(s) Chew four times a day PRN Gas      Allergies    Hytrin (Unknown)    Intolerances        REVIEW OF SYSTEMS:  CONSTITUTIONAL: No fever or chills  HEENT:  No headache, no sore throat  RESPIRATORY: No cough, wheezing, or shortness of breath  CARDIOVASCULAR: No chest pain, palpitations  GASTROINTESTINAL: No abd pain, nausea, vomiting, or diarrhea  GENITOURINARY: No dysuria, frequency, or hematuria ; +incontinence  NEUROLOGICAL: no focal weakness or dizziness  MUSCULOSKELETAL: no myalgias     Vital Signs Last 24 Hrs  T(C): 36.6 (25 Jan 2021 19:54), Max: 36.6 (25 Jan 2021 19:54)  T(F): 97.8 (25 Jan 2021 19:54), Max: 97.8 (25 Jan 2021 19:54)  HR: 77 (25 Jan 2021 19:54) (77 - 87)  BP: 145/79 (25 Jan 2021 19:54) (132/51 - 157/66)  BP(mean): --  RR: 17 (25 Jan 2021 19:54) (17 - 18)  SpO2: 94% (25 Jan 2021 19:54) (93% - 96%)    PHYSICAL EXAM:  GENERAL: NAD at rest on RA  HEENT:  anicteric, moist mucous membranes  CHEST/LUNG:  grossly CTA b/l  HEART:  RRR, S1, S2  ABDOMEN:  BS+, soft, nontender, nondistended  : no flank or suprapubic tenderness  EXTREMITIES: no edema, cyanosis, or calf tenderness  NERVOUS SYSTEM: answers questions and follows commands appropriately    LABS:                          8.9    9.18  )-----------( 407      ( 24 Jan 2021 06:43 )             28.0     CBC Full  -  ( 24 Jan 2021 06:43 )  WBC Count : 9.18 K/uL  Hemoglobin : 8.9 g/dL  Hematocrit : 28.0 %  Platelet Count - Automated : 407 K/uL  Mean Cell Volume : 77.3 fl  Mean Cell Hemoglobin : 24.6 pg  Mean Cell Hemoglobin Concentration : 31.8 gm/dL  Auto Neutrophil # : 6.29 K/uL  Auto Lymphocyte # : 1.71 K/uL  Auto Monocyte # : 0.68 K/uL  Auto Eosinophil # : 0.10 K/uL  Auto Basophil # : 0.03 K/uL  Auto Neutrophil % : 68.6 %  Auto Lymphocyte % : 18.6 %  Auto Monocyte % : 7.4 %  Auto Eosinophil % : 1.1 %  Auto Basophil % : 0.3 %    01-24    135  |  106  |  16  ----------------------------<  186<H>  3.8   |  22  |  0.90    Ca    7.5<L>      24 Jan 2021 06:43    TPro  5.7<L>  /  Alb  1.6<L>  /  TBili  0.2  /  DBili  <.10  /  AST  18  /  ALT  14  /  AlkPhos  158<H>  01-24            CAPILLARY BLOOD GLUCOSE      POCT Blood Glucose.: 314 mg/dL (25 Jan 2021 16:46)  POCT Blood Glucose.: 460 mg/dL (25 Jan 2021 12:04)  POCT Blood Glucose.: 423 mg/dL (25 Jan 2021 12:03)  POCT Blood Glucose.: 283 mg/dL (25 Jan 2021 08:14)  POCT Blood Glucose.: 270 mg/dL (25 Jan 2021 05:40)  POCT Blood Glucose.: 264 mg/dL (25 Jan 2021 01:31)        Culture - Blood (collected 01-23-21 @ 11:58)  Source: .Blood Blood-Venous  Preliminary Report (01-24-21 @ 12:01):    No growth to date.    Culture - Blood (collected 01-23-21 @ 11:58)  Source: .Blood Blood-Venous  Preliminary Report (01-24-21 @ 12:01):    No growth to date.    Culture - Urine (collected 01-23-21 @ 11:58)  Source: .Urine Catheterized  Final Report (01-25-21 @ 17:49):    >100,000 CFU/ml Escherichia coli ESBL  Organism: Escherichia coli ESBL (01-25-21 @ 17:49)  Organism: Escherichia coli ESBL (01-25-21 @ 17:49)      -  Amikacin: S <=16      -  Amoxicillin/Clavulanic Acid: S <=8/4      -  Ampicillin: R >16 These ampicillin results predict results for amoxicillin      -  Ampicillin/Sulbactam: S <=4/2 Enterobacter, Citrobacter, and Serratia may develop resistance during prolonged therapy (3-4 days)      -  Aztreonam: R >16      -  Cefazolin: R >16 ([email or web address]) For uncomplicated UTI with K. pneumoniae, E. coli, or P. mirablis: LEONILA <=16 is sensitive and LEONILA >=32 is resistant. This also predicts results for oral agents cefaclor, cefdinir, cefpodoxime, cefprozil, cefuroxime axetil, cephalexin and locarbef for uncomplicated UTI. Note that some isolates may be susceptible to these agents while testing resistant to cefazolin.      -  Cefepime: R >16      -  Cefoxitin: S <=8      -  Ceftriaxone: R >32 Enterobacter, Citrobacter, and Serratia may develop resistance during prolonged therapy      -  Ciprofloxacin: S <=0.25      -  Ertapenem: S <=0.5      -  Gentamicin: S <=2      -  Imipenem: S <=1      -  Levofloxacin: S <=0.5      -  Meropenem: S <=1      -  Nitrofurantoin: S <=32 Should not be used to treat pyelonephritis      -  Piperacillin/Tazobactam: S <=8      -  Tigecycline: S <=2      -  Tobramycin: S <=2      -  Trimethoprim/Sulfamethoxazole: R >2/38      Method Type: LEONILA        RADIOLOGY & ADDITIONAL TESTS:    Personally reviewed.     Consultant(s) Notes Reviewed:  [x] YES  [ ] NO     Patient is a 71y old  Female who presents with a chief complaint of altered mental status.     INTERVAL HPI/OVERNIGHT EVENTS: Pt states she feels "alright." Pt denies fever, chills, SOB, CP, abd pain, flank pain, dysuria, urinary urgency. Pt does admit to having difficulty with incontinence for "months" per pt.     MEDICATIONS  (STANDING):  amLODIPine   Tablet 10 milliGRAM(s) Oral daily  ascorbic acid 500 milliGRAM(s) Oral daily  atorvastatin 20 milliGRAM(s) Oral at bedtime  clotrimazole 1% Cream 1 Application(s) Topical two times a day  dexAMETHasone  Injectable 6 milliGRAM(s) IV Push daily  dextrose 40% Gel 15 Gram(s) Oral once  dextrose 5%. 1000 milliLiter(s) (50 mL/Hr) IV Continuous <Continuous>  dextrose 5%. 1000 milliLiter(s) (100 mL/Hr) IV Continuous <Continuous>  dextrose 50% Injectable 25 Gram(s) IV Push once  dextrose 50% Injectable 12.5 Gram(s) IV Push once  dextrose 50% Injectable 25 Gram(s) IV Push once  enalapril 2.5 milliGRAM(s) Oral two times a day  enoxaparin Injectable 40 milliGRAM(s) SubCutaneous daily  ferrous    sulfate 325 milliGRAM(s) Oral daily  glucagon  Injectable 1 milliGRAM(s) IntraMuscular once  hydrALAZINE 50 milliGRAM(s) Oral two times a day  hydrocortisone 2.5% Ointment 1 Application(s) Topical two times a day  insulin lispro (ADMELOG) corrective regimen sliding scale   SubCutaneous three times a day before meals  insulin lispro Injectable (ADMELOG) 3 Unit(s) SubCutaneous three times a day before meals  lactobacillus acidophilus 1 Tablet(s) Oral two times a day with meals  levothyroxine 25 MICROGram(s) Oral daily  multivitamin/minerals 1 Tablet(s) Oral daily  pantoprazole    Tablet 40 milliGRAM(s) Oral before breakfast  remdesivir  IVPB   IV Intermittent   remdesivir  IVPB 100 milliGRAM(s) IV Intermittent every 24 hours  sertraline 25 milliGRAM(s) Oral daily  tamsulosin 0.4 milliGRAM(s) Oral at bedtime    MEDICATIONS  (PRN):  acetaminophen   Tablet .. 650 milliGRAM(s) Oral every 4 hours PRN Temp greater or equal to 38.5C (101.3F)  simethicone 80 milliGRAM(s) Chew four times a day PRN Gas      Allergies    Hytrin (Unknown)    Intolerances        REVIEW OF SYSTEMS:  CONSTITUTIONAL: No fever or chills  HEENT:  No headache, no sore throat  RESPIRATORY: No cough, wheezing, or shortness of breath  CARDIOVASCULAR: No chest pain, palpitations  GASTROINTESTINAL: No abd pain, nausea, vomiting, or diarrhea  GENITOURINARY: No dysuria, frequency, or hematuria ; +incontinence  NEUROLOGICAL: no focal weakness or dizziness  MUSCULOSKELETAL: no myalgias     Vital Signs Last 24 Hrs  T(C): 36.6 (25 Jan 2021 19:54), Max: 36.6 (25 Jan 2021 19:54)  T(F): 97.8 (25 Jan 2021 19:54), Max: 97.8 (25 Jan 2021 19:54)  HR: 77 (25 Jan 2021 19:54) (77 - 87)  BP: 145/79 (25 Jan 2021 19:54) (132/51 - 157/66)  BP(mean): --  RR: 17 (25 Jan 2021 19:54) (17 - 18)  SpO2: 94% (25 Jan 2021 19:54) (93% - 96%)    PHYSICAL EXAM:  GENERAL: NAD at rest on RA  HEENT:  anicteric, moist mucous membranes  CHEST/LUNG:  grossly CTA b/l  HEART:  RRR, S1, S2  ABDOMEN:  BS+, soft, nontender, nondistended  : no flank or suprapubic tenderness  EXTREMITIES: no edema, cyanosis, or calf tenderness  NERVOUS SYSTEM: answers questions and follows commands appropriately    LABS:                          8.9    9.18  )-----------( 407      ( 24 Jan 2021 06:43 )             28.0     CBC Full  -  ( 24 Jan 2021 06:43 )  WBC Count : 9.18 K/uL  Hemoglobin : 8.9 g/dL  Hematocrit : 28.0 %  Platelet Count - Automated : 407 K/uL  Mean Cell Volume : 77.3 fl  Mean Cell Hemoglobin : 24.6 pg  Mean Cell Hemoglobin Concentration : 31.8 gm/dL  Auto Neutrophil # : 6.29 K/uL  Auto Lymphocyte # : 1.71 K/uL  Auto Monocyte # : 0.68 K/uL  Auto Eosinophil # : 0.10 K/uL  Auto Basophil # : 0.03 K/uL  Auto Neutrophil % : 68.6 %  Auto Lymphocyte % : 18.6 %  Auto Monocyte % : 7.4 %  Auto Eosinophil % : 1.1 %  Auto Basophil % : 0.3 %    01-24    135  |  106  |  16  ----------------------------<  186<H>  3.8   |  22  |  0.90    Ca    7.5<L>      24 Jan 2021 06:43    TPro  5.7<L>  /  Alb  1.6<L>  /  TBili  0.2  /  DBili  <.10  /  AST  18  /  ALT  14  /  AlkPhos  158<H>  01-24            CAPILLARY BLOOD GLUCOSE      POCT Blood Glucose.: 314 mg/dL (25 Jan 2021 16:46)  POCT Blood Glucose.: 460 mg/dL (25 Jan 2021 12:04)  POCT Blood Glucose.: 423 mg/dL (25 Jan 2021 12:03)  POCT Blood Glucose.: 283 mg/dL (25 Jan 2021 08:14)  POCT Blood Glucose.: 270 mg/dL (25 Jan 2021 05:40)  POCT Blood Glucose.: 264 mg/dL (25 Jan 2021 01:31)        Culture - Blood (collected 01-23-21 @ 11:58)  Source: .Blood Blood-Venous  Preliminary Report (01-24-21 @ 12:01):    No growth to date.    Culture - Blood (collected 01-23-21 @ 11:58)  Source: .Blood Blood-Venous  Preliminary Report (01-24-21 @ 12:01):    No growth to date.    Culture - Urine (collected 01-23-21 @ 11:58)  Source: .Urine Catheterized  Final Report (01-25-21 @ 17:49):    >100,000 CFU/ml Escherichia coli ESBL  Organism: Escherichia coli ESBL (01-25-21 @ 17:49)  Organism: Escherichia coli ESBL (01-25-21 @ 17:49)      -  Amikacin: S <=16      -  Amoxicillin/Clavulanic Acid: S <=8/4      -  Ampicillin: R >16 These ampicillin results predict results for amoxicillin      -  Ampicillin/Sulbactam: S <=4/2 Enterobacter, Citrobacter, and Serratia may develop resistance during prolonged therapy (3-4 days)      -  Aztreonam: R >16      -  Cefazolin: R >16 ([email or web address]) For uncomplicated UTI with K. pneumoniae, E. coli, or P. mirablis: LEONILA <=16 is sensitive and LEONILA >=32 is resistant. This also predicts results for oral agents cefaclor, cefdinir, cefpodoxime, cefprozil, cefuroxime axetil, cephalexin and locarbef for uncomplicated UTI. Note that some isolates may be susceptible to these agents while testing resistant to cefazolin.      -  Cefepime: R >16      -  Cefoxitin: S <=8      -  Ceftriaxone: R >32 Enterobacter, Citrobacter, and Serratia may develop resistance during prolonged therapy      -  Ciprofloxacin: S <=0.25      -  Ertapenem: S <=0.5      -  Gentamicin: S <=2      -  Imipenem: S <=1      -  Levofloxacin: S <=0.5      -  Meropenem: S <=1      -  Nitrofurantoin: S <=32 Should not be used to treat pyelonephritis      -  Piperacillin/Tazobactam: S <=8      -  Tigecycline: S <=2      -  Tobramycin: S <=2      -  Trimethoprim/Sulfamethoxazole: R >2/38      Method Type: LEONILA        RADIOLOGY & ADDITIONAL TESTS:    Personally reviewed.     Consultant(s) Notes Reviewed:  [x] YES  [ ] NO

## 2021-01-26 DIAGNOSIS — U07.1 COVID-19: ICD-10-CM

## 2021-01-26 DIAGNOSIS — E11.9 TYPE 2 DIABETES MELLITUS WITHOUT COMPLICATIONS: ICD-10-CM

## 2021-01-26 DIAGNOSIS — F43.21 ADJUSTMENT DISORDER WITH DEPRESSED MOOD: ICD-10-CM

## 2021-01-26 LAB
ALBUMIN SERPL ELPH-MCNC: 1.7 G/DL — LOW (ref 3.3–5)
ALBUMIN SERPL ELPH-MCNC: 1.7 G/DL — LOW (ref 3.3–5)
ALP SERPL-CCNC: 177 U/L — HIGH (ref 40–120)
ALP SERPL-CCNC: 179 U/L — HIGH (ref 40–120)
ALT FLD-CCNC: 23 U/L — SIGNIFICANT CHANGE UP (ref 12–78)
ALT FLD-CCNC: 24 U/L — SIGNIFICANT CHANGE UP (ref 12–78)
ANION GAP SERPL CALC-SCNC: 8 MMOL/L — SIGNIFICANT CHANGE UP (ref 5–17)
AST SERPL-CCNC: 29 U/L — SIGNIFICANT CHANGE UP (ref 15–37)
AST SERPL-CCNC: 30 U/L — SIGNIFICANT CHANGE UP (ref 15–37)
BASOPHILS # BLD AUTO: 0.02 K/UL — SIGNIFICANT CHANGE UP (ref 0–0.2)
BASOPHILS NFR BLD AUTO: 0.2 % — SIGNIFICANT CHANGE UP (ref 0–2)
BILIRUB DIRECT SERPL-MCNC: <.1 MG/DL — SIGNIFICANT CHANGE UP (ref 0.05–0.2)
BILIRUB INDIRECT FLD-MCNC: >0.1 MG/DL — LOW (ref 0.2–1)
BILIRUB SERPL-MCNC: 0.2 MG/DL — SIGNIFICANT CHANGE UP (ref 0.2–1.2)
BILIRUB SERPL-MCNC: 0.2 MG/DL — SIGNIFICANT CHANGE UP (ref 0.2–1.2)
BUN SERPL-MCNC: 16 MG/DL — SIGNIFICANT CHANGE UP (ref 7–23)
CALCIUM SERPL-MCNC: 8 MG/DL — LOW (ref 8.5–10.1)
CHLORIDE SERPL-SCNC: 106 MMOL/L — SIGNIFICANT CHANGE UP (ref 96–108)
CO2 SERPL-SCNC: 22 MMOL/L — SIGNIFICANT CHANGE UP (ref 22–31)
CREAT SERPL-MCNC: 0.97 MG/DL — SIGNIFICANT CHANGE UP (ref 0.5–1.3)
D DIMER BLD IA.RAPID-MCNC: 476 NG/ML DDU — HIGH
EOSINOPHIL # BLD AUTO: 0.03 K/UL — SIGNIFICANT CHANGE UP (ref 0–0.5)
EOSINOPHIL NFR BLD AUTO: 0.3 % — SIGNIFICANT CHANGE UP (ref 0–6)
FERRITIN SERPL-MCNC: 243 NG/ML — HIGH (ref 15–150)
GLUCOSE SERPL-MCNC: 281 MG/DL — HIGH (ref 70–99)
HCT VFR BLD CALC: 27.8 % — LOW (ref 34.5–45)
HGB BLD-MCNC: 9.1 G/DL — LOW (ref 11.5–15.5)
IMM GRANULOCYTES NFR BLD AUTO: 3.3 % — HIGH (ref 0–1.5)
LYMPHOCYTES # BLD AUTO: 1.93 K/UL — SIGNIFICANT CHANGE UP (ref 1–3.3)
LYMPHOCYTES # BLD AUTO: 19.8 % — SIGNIFICANT CHANGE UP (ref 13–44)
MAGNESIUM SERPL-MCNC: 1.6 MG/DL — SIGNIFICANT CHANGE UP (ref 1.6–2.6)
MCHC RBC-ENTMCNC: 25.4 PG — LOW (ref 27–34)
MCHC RBC-ENTMCNC: 32.7 GM/DL — SIGNIFICANT CHANGE UP (ref 32–36)
MCV RBC AUTO: 77.7 FL — LOW (ref 80–100)
MONOCYTES # BLD AUTO: 1.05 K/UL — HIGH (ref 0–0.9)
MONOCYTES NFR BLD AUTO: 10.8 % — SIGNIFICANT CHANGE UP (ref 2–14)
NEUTROPHILS # BLD AUTO: 6.38 K/UL — SIGNIFICANT CHANGE UP (ref 1.8–7.4)
NEUTROPHILS NFR BLD AUTO: 65.6 % — SIGNIFICANT CHANGE UP (ref 43–77)
NRBC # BLD: 0 /100 WBCS — SIGNIFICANT CHANGE UP (ref 0–0)
PHOSPHATE SERPL-MCNC: 1.8 MG/DL — LOW (ref 2.5–4.5)
PLATELET # BLD AUTO: 489 K/UL — HIGH (ref 150–400)
POTASSIUM SERPL-MCNC: 4.4 MMOL/L — SIGNIFICANT CHANGE UP (ref 3.5–5.3)
POTASSIUM SERPL-SCNC: 4.4 MMOL/L — SIGNIFICANT CHANGE UP (ref 3.5–5.3)
PROCALCITONIN SERPL-MCNC: 0.06 NG/ML — HIGH (ref 0–0.04)
PROT SERPL-MCNC: 5.9 G/DL — LOW (ref 6–8.3)
PROT SERPL-MCNC: 6 G/DL — SIGNIFICANT CHANGE UP (ref 6–8.3)
RBC # BLD: 3.58 M/UL — LOW (ref 3.8–5.2)
RBC # FLD: 17.7 % — HIGH (ref 10.3–14.5)
SARS-COV-2 RNA SPEC QL NAA+PROBE: SIGNIFICANT CHANGE UP
SODIUM SERPL-SCNC: 136 MMOL/L — SIGNIFICANT CHANGE UP (ref 135–145)
WBC # BLD: 9.73 K/UL — SIGNIFICANT CHANGE UP (ref 3.8–10.5)
WBC # FLD AUTO: 9.73 K/UL — SIGNIFICANT CHANGE UP (ref 3.8–10.5)

## 2021-01-26 PROCEDURE — 99221 1ST HOSP IP/OBS SF/LOW 40: CPT

## 2021-01-26 PROCEDURE — 99239 HOSP IP/OBS DSCHRG MGMT >30: CPT

## 2021-01-26 RX ORDER — INSULIN LISPRO 100/ML
6 VIAL (ML) SUBCUTANEOUS
Refills: 0 | Status: DISCONTINUED | OUTPATIENT
Start: 2021-01-26 | End: 2021-01-28

## 2021-01-26 RX ORDER — INSULIN GLARGINE 100 [IU]/ML
10 INJECTION, SOLUTION SUBCUTANEOUS ONCE
Refills: 0 | Status: COMPLETED | OUTPATIENT
Start: 2021-01-26 | End: 2021-01-26

## 2021-01-26 RX ORDER — SODIUM,POTASSIUM PHOSPHATES 278-250MG
1 POWDER IN PACKET (EA) ORAL
Refills: 0 | Status: COMPLETED | OUTPATIENT
Start: 2021-01-26 | End: 2021-01-26

## 2021-01-26 RX ORDER — INSULIN GLARGINE 100 [IU]/ML
10 INJECTION, SOLUTION SUBCUTANEOUS EVERY MORNING
Refills: 0 | Status: DISCONTINUED | OUTPATIENT
Start: 2021-01-27 | End: 2021-01-28

## 2021-01-26 RX ORDER — MAGNESIUM SULFATE 500 MG/ML
2 VIAL (ML) INJECTION ONCE
Refills: 0 | Status: COMPLETED | OUTPATIENT
Start: 2021-01-26 | End: 2021-01-26

## 2021-01-26 RX ORDER — SERTRALINE 25 MG/1
50 TABLET, FILM COATED ORAL DAILY
Refills: 0 | Status: DISCONTINUED | OUTPATIENT
Start: 2021-01-26 | End: 2021-01-28

## 2021-01-26 RX ORDER — ERTAPENEM SODIUM 1 G/1
1000 INJECTION, POWDER, LYOPHILIZED, FOR SOLUTION INTRAMUSCULAR; INTRAVENOUS EVERY 24 HOURS
Refills: 0 | Status: COMPLETED | OUTPATIENT
Start: 2021-01-26 | End: 2021-01-28

## 2021-01-26 RX ADMIN — Medication 1 PACKET(S): at 17:11

## 2021-01-26 RX ADMIN — AMLODIPINE BESYLATE 10 MILLIGRAM(S): 2.5 TABLET ORAL at 05:24

## 2021-01-26 RX ADMIN — Medication 50 MILLIGRAM(S): at 17:08

## 2021-01-26 RX ADMIN — TAMSULOSIN HYDROCHLORIDE 0.4 MILLIGRAM(S): 0.4 CAPSULE ORAL at 21:53

## 2021-01-26 RX ADMIN — Medication 1 TABLET(S): at 11:20

## 2021-01-26 RX ADMIN — Medication 6 MILLIGRAM(S): at 05:25

## 2021-01-26 RX ADMIN — Medication 1 APPLICATION(S): at 17:08

## 2021-01-26 RX ADMIN — Medication 8: at 08:30

## 2021-01-26 RX ADMIN — Medication 25 MICROGRAM(S): at 05:25

## 2021-01-26 RX ADMIN — Medication 1 PACKET(S): at 12:15

## 2021-01-26 RX ADMIN — PANTOPRAZOLE SODIUM 40 MILLIGRAM(S): 20 TABLET, DELAYED RELEASE ORAL at 05:25

## 2021-01-26 RX ADMIN — Medication 6 UNIT(S): at 12:44

## 2021-01-26 RX ADMIN — Medication 500 MILLIGRAM(S): at 11:20

## 2021-01-26 RX ADMIN — INSULIN GLARGINE 10 UNIT(S): 100 INJECTION, SOLUTION SUBCUTANEOUS at 09:18

## 2021-01-26 RX ADMIN — SERTRALINE 50 MILLIGRAM(S): 25 TABLET, FILM COATED ORAL at 11:24

## 2021-01-26 RX ADMIN — Medication 50 GRAM(S): at 10:06

## 2021-01-26 RX ADMIN — Medication 3 UNIT(S): at 08:31

## 2021-01-26 RX ADMIN — ATORVASTATIN CALCIUM 20 MILLIGRAM(S): 80 TABLET, FILM COATED ORAL at 21:53

## 2021-01-26 RX ADMIN — Medication 1 TABLET(S): at 08:58

## 2021-01-26 RX ADMIN — Medication 2.5 MILLIGRAM(S): at 05:24

## 2021-01-26 RX ADMIN — ERTAPENEM SODIUM 120 MILLIGRAM(S): 1 INJECTION, POWDER, LYOPHILIZED, FOR SOLUTION INTRAMUSCULAR; INTRAVENOUS at 12:41

## 2021-01-26 RX ADMIN — Medication 325 MILLIGRAM(S): at 11:20

## 2021-01-26 RX ADMIN — Medication 1 APPLICATION(S): at 05:26

## 2021-01-26 RX ADMIN — Medication 6: at 12:44

## 2021-01-26 RX ADMIN — Medication 6 UNIT(S): at 17:39

## 2021-01-26 RX ADMIN — REMDESIVIR 500 MILLIGRAM(S): 5 INJECTION INTRAVENOUS at 14:27

## 2021-01-26 RX ADMIN — Medication 1 TABLET(S): at 17:08

## 2021-01-26 RX ADMIN — Medication 50 MILLIGRAM(S): at 05:25

## 2021-01-26 RX ADMIN — Medication 2.5 MILLIGRAM(S): at 17:08

## 2021-01-26 RX ADMIN — Medication 4: at 17:39

## 2021-01-26 RX ADMIN — ENOXAPARIN SODIUM 40 MILLIGRAM(S): 100 INJECTION SUBCUTANEOUS at 11:20

## 2021-01-26 NOTE — BEHAVIORAL HEALTH ASSESSMENT NOTE - NSBHCHARTREVIEWVS_PSY_A_CORE FT
8/14/2018        TO WHOM IT MAY CONCERN:        Alex Loredo Sr.  was in my office for an appointment today and had a left hip injection. Please excuse him from work today. He may return to work on Wednesday 8/15/18.            Sincerely,          Simon Her M.D.    [facility]  [phone]       Vital Signs Last 24 Hrs  T(C): 36.9 (26 Jan 2021 05:31), Max: 36.9 (26 Jan 2021 05:31)  T(F): 98.4 (26 Jan 2021 05:31), Max: 98.4 (26 Jan 2021 05:31)  HR: 984 (26 Jan 2021 05:31) (77 - 984)  BP: 166/51 (26 Jan 2021 05:31) (132/51 - 166/61)  BP(mean): --  RR: 17 (26 Jan 2021 05:31) (16 - 18)  SpO2: 98% (26 Jan 2021 05:31) (93% - 98%)

## 2021-01-26 NOTE — PROGRESS NOTE ADULT - SUBJECTIVE AND OBJECTIVE BOX
Patient is a 71y old  Female who presents with a chief complaint of altered mental status.     INTERVAL HPI/OVERNIGHT EVENTS: Pt states she feels "good." Pt denies fever, chills, SOB, CP, abd pain, flank pain, dysuria, urinary urgency. Pt does admit to having difficulty with incontinence for "months" per pt. Bladder scan (random scan, as opposed to PVR, since pt is incontinent) showed ~240cc of urine in the bladder.     MEDICATIONS  (STANDING):  amLODIPine   Tablet 10 milliGRAM(s) Oral daily  ascorbic acid 500 milliGRAM(s) Oral daily  atorvastatin 20 milliGRAM(s) Oral at bedtime  clotrimazole 1% Cream 1 Application(s) Topical two times a day  dexAMETHasone  Injectable 6 milliGRAM(s) IV Push daily  dextrose 40% Gel 15 Gram(s) Oral once  dextrose 5%. 1000 milliLiter(s) (50 mL/Hr) IV Continuous <Continuous>  dextrose 5%. 1000 milliLiter(s) (100 mL/Hr) IV Continuous <Continuous>  dextrose 50% Injectable 25 Gram(s) IV Push once  dextrose 50% Injectable 12.5 Gram(s) IV Push once  dextrose 50% Injectable 25 Gram(s) IV Push once  enalapril 2.5 milliGRAM(s) Oral two times a day  enoxaparin Injectable 40 milliGRAM(s) SubCutaneous daily  ertapenem  IVPB 1000 milliGRAM(s) IV Intermittent every 24 hours  ferrous    sulfate 325 milliGRAM(s) Oral daily  glucagon  Injectable 1 milliGRAM(s) IntraMuscular once  hydrALAZINE 50 milliGRAM(s) Oral two times a day  hydrocortisone 2.5% Ointment 1 Application(s) Topical two times a day  insulin lispro (ADMELOG) corrective regimen sliding scale   SubCutaneous three times a day before meals  insulin lispro Injectable (ADMELOG) 6 Unit(s) SubCutaneous three times a day before meals  lactobacillus acidophilus 1 Tablet(s) Oral two times a day with meals  levothyroxine 25 MICROGram(s) Oral daily  multivitamin/minerals 1 Tablet(s) Oral daily  pantoprazole    Tablet 40 milliGRAM(s) Oral before breakfast  remdesivir  IVPB   IV Intermittent   remdesivir  IVPB 100 milliGRAM(s) IV Intermittent every 24 hours  sertraline 50 milliGRAM(s) Oral daily  tamsulosin 0.4 milliGRAM(s) Oral at bedtime    MEDICATIONS  (PRN):  acetaminophen   Tablet .. 650 milliGRAM(s) Oral every 4 hours PRN Temp greater or equal to 38.5C (101.3F)  simethicone 80 milliGRAM(s) Chew four times a day PRN Gas      Allergies    Hytrin (Unknown)    Intolerances        REVIEW OF SYSTEMS:  CONSTITUTIONAL: No fever or chills  HEENT:  No headache, no sore throat  RESPIRATORY: No cough, wheezing, or shortness of breath  CARDIOVASCULAR: No chest pain, palpitations  GASTROINTESTINAL: No abd pain, nausea, vomiting, or diarrhea  GENITOURINARY: No dysuria, frequency, or hematuria ; +incontinence  NEUROLOGICAL: no focal weakness or dizziness  MUSCULOSKELETAL: no myalgias     Vital Signs Last 24 Hrs  T(C): 37 (26 Jan 2021 12:08), Max: 37 (26 Jan 2021 12:08)  T(F): 98.6 (26 Jan 2021 12:08), Max: 98.6 (26 Jan 2021 12:08)  HR: 79 (26 Jan 2021 17:05) (78 - 984)  BP: 145/72 (26 Jan 2021 17:05) (126/51 - 166/61)  BP(mean): --  RR: 18 (26 Jan 2021 12:08) (16 - 18)  SpO2: 93% (26 Jan 2021 12:08) (93% - 98%)    PHYSICAL EXAM:  GENERAL: NAD at rest on RA  HEENT:  anicteric, moist mucous membranes  CHEST/LUNG:  grossly CTA b/l  HEART:  RRR, S1, S2  ABDOMEN:  BS+, soft, nontender, nondistended  : no flank or suprapubic tenderness  EXTREMITIES: no edema, cyanosis, or calf tenderness  NERVOUS SYSTEM: answers questions and follows commands appropriately    LABS:                        9.1    9.73  )-----------( 489      ( 26 Jan 2021 07:14 )             27.8     CBC Full  -  ( 26 Jan 2021 07:14 )  WBC Count : 9.73 K/uL  Hemoglobin : 9.1 g/dL  Hematocrit : 27.8 %  Platelet Count - Automated : 489 K/uL  Mean Cell Volume : 77.7 fl  Mean Cell Hemoglobin : 25.4 pg  Mean Cell Hemoglobin Concentration : 32.7 gm/dL  Auto Neutrophil # : 6.38 K/uL  Auto Lymphocyte # : 1.93 K/uL  Auto Monocyte # : 1.05 K/uL  Auto Eosinophil # : 0.03 K/uL  Auto Basophil # : 0.02 K/uL  Auto Neutrophil % : 65.6 %  Auto Lymphocyte % : 19.8 %  Auto Monocyte % : 10.8 %  Auto Eosinophil % : 0.3 %  Auto Basophil % : 0.2 %    26 Jan 2021 07:14    136    |  106    |  16     ----------------------------<  281    4.4     |  22     |  0.97     Ca    8.0        26 Jan 2021 07:14  Phos  1.8       26 Jan 2021 07:14  Mg     1.6       26 Jan 2021 07:14    TPro  5.9    /  Alb  1.7    /  TBili  0.2    /  DBili  <.10   /  AST  29     /  ALT  23     /  AlkPhos  179    26 Jan 2021 07:14        CAPILLARY BLOOD GLUCOSE      POCT Blood Glucose.: 208 mg/dL (26 Jan 2021 16:49)  POCT Blood Glucose.: 287 mg/dL (26 Jan 2021 11:58)  POCT Blood Glucose.: 341 mg/dL (26 Jan 2021 08:22)  POCT Blood Glucose.: 324 mg/dL (25 Jan 2021 21:08)        Culture - Blood (collected 01-23-21 @ 11:58)  Source: .Blood Blood-Venous  Preliminary Report (01-24-21 @ 12:01):    No growth to date.    Culture - Blood (collected 01-23-21 @ 11:58)  Source: .Blood Blood-Venous  Preliminary Report (01-24-21 @ 12:01):    No growth to date.    Culture - Urine (collected 01-23-21 @ 11:58)  Source: .Urine Catheterized  Final Report (01-25-21 @ 17:49):    >100,000 CFU/ml Escherichia coli ESBL  Organism: Escherichia coli ESBL (01-25-21 @ 17:49)  Organism: Escherichia coli ESBL (01-25-21 @ 17:49)      -  Amikacin: S <=16      -  Amoxicillin/Clavulanic Acid: S <=8/4      -  Ampicillin: R >16 These ampicillin results predict results for amoxicillin      -  Ampicillin/Sulbactam: S <=4/2 Enterobacter, Citrobacter, and Serratia may develop resistance during prolonged therapy (3-4 days)      -  Aztreonam: R >16      -  Cefazolin: R >16 (MIC_CL_COM_ENTERIC_CEFAZU) For uncomplicated UTI with K. pneumoniae, E. coli, or P. mirablis: LEONILA <=16 is sensitive and LEONILA >=32 is resistant. This also predicts results for oral agents cefaclor, cefdinir, cefpodoxime, cefprozil, cefuroxime axetil, cephalexin and locarbef for uncomplicated UTI. Note that some isolates may be susceptible to these agents while testing resistant to cefazolin.      -  Cefepime: R >16      -  Cefoxitin: S <=8      -  Ceftriaxone: R >32 Enterobacter, Citrobacter, and Serratia may develop resistance during prolonged therapy      -  Ciprofloxacin: S <=0.25      -  Ertapenem: S <=0.5      -  Gentamicin: S <=2      -  Imipenem: S <=1      -  Levofloxacin: S <=0.5      -  Meropenem: S <=1      -  Nitrofurantoin: S <=32 Should not be used to treat pyelonephritis      -  Piperacillin/Tazobactam: S <=8      -  Tigecycline: S <=2      -  Tobramycin: S <=2      -  Trimethoprim/Sulfamethoxazole: R >2/38      Method Type: LEONILA        RADIOLOGY & ADDITIONAL TESTS:    Personally reviewed.     Consultant(s) Notes Reviewed:  [x] YES  [ ] NO     Patient is a 71y old  Female who presents with a chief complaint of altered mental status.      INTERVAL HPI/OVERNIGHT EVENTS: Pt states she feels "good." Pt denies fever, chills, SOB, CP, abd pain, flank pain, dysuria, urinary urgency. Pt does admit to having difficulty with incontinence for "months" per pt. Bladder scan (random scan, as opposed to PVR, since pt is incontinent) showed ~240cc of urine in the bladder.     MEDICATIONS  (STANDING):  amLODIPine   Tablet 10 milliGRAM(s) Oral daily  ascorbic acid 500 milliGRAM(s) Oral daily  atorvastatin 20 milliGRAM(s) Oral at bedtime  clotrimazole 1% Cream 1 Application(s) Topical two times a day  dexAMETHasone  Injectable 6 milliGRAM(s) IV Push daily  dextrose 40% Gel 15 Gram(s) Oral once  dextrose 5%. 1000 milliLiter(s) (50 mL/Hr) IV Continuous <Continuous>  dextrose 5%. 1000 milliLiter(s) (100 mL/Hr) IV Continuous <Continuous>  dextrose 50% Injectable 25 Gram(s) IV Push once  dextrose 50% Injectable 12.5 Gram(s) IV Push once  dextrose 50% Injectable 25 Gram(s) IV Push once  enalapril 2.5 milliGRAM(s) Oral two times a day  enoxaparin Injectable 40 milliGRAM(s) SubCutaneous daily  ertapenem  IVPB 1000 milliGRAM(s) IV Intermittent every 24 hours  ferrous    sulfate 325 milliGRAM(s) Oral daily  glucagon  Injectable 1 milliGRAM(s) IntraMuscular once  hydrALAZINE 50 milliGRAM(s) Oral two times a day  hydrocortisone 2.5% Ointment 1 Application(s) Topical two times a day  insulin lispro (ADMELOG) corrective regimen sliding scale   SubCutaneous three times a day before meals  insulin lispro Injectable (ADMELOG) 6 Unit(s) SubCutaneous three times a day before meals  lactobacillus acidophilus 1 Tablet(s) Oral two times a day with meals  levothyroxine 25 MICROGram(s) Oral daily  multivitamin/minerals 1 Tablet(s) Oral daily  pantoprazole    Tablet 40 milliGRAM(s) Oral before breakfast  remdesivir  IVPB   IV Intermittent   remdesivir  IVPB 100 milliGRAM(s) IV Intermittent every 24 hours  sertraline 50 milliGRAM(s) Oral daily  tamsulosin 0.4 milliGRAM(s) Oral at bedtime    MEDICATIONS  (PRN):  acetaminophen   Tablet .. 650 milliGRAM(s) Oral every 4 hours PRN Temp greater or equal to 38.5C (101.3F)  simethicone 80 milliGRAM(s) Chew four times a day PRN Gas      Allergies    Hytrin (Unknown)    Intolerances        REVIEW OF SYSTEMS:  CONSTITUTIONAL: No fever or chills  HEENT:  No headache, no sore throat  RESPIRATORY: No cough, wheezing, or shortness of breath  CARDIOVASCULAR: No chest pain, palpitations  GASTROINTESTINAL: No abd pain, nausea, vomiting, or diarrhea  GENITOURINARY: No dysuria, frequency, or hematuria ; +incontinence  NEUROLOGICAL: no focal weakness or dizziness  MUSCULOSKELETAL: no myalgias     Vital Signs Last 24 Hrs  T(C): 37 (26 Jan 2021 12:08), Max: 37 (26 Jan 2021 12:08)  T(F): 98.6 (26 Jan 2021 12:08), Max: 98.6 (26 Jan 2021 12:08)  HR: 79 (26 Jan 2021 17:05) (78 - 984)  BP: 145/72 (26 Jan 2021 17:05) (126/51 - 166/61)  BP(mean): --  RR: 18 (26 Jan 2021 12:08) (16 - 18)  SpO2: 93% (26 Jan 2021 12:08) (93% - 98%)    PHYSICAL EXAM:  GENERAL: NAD at rest on RA  HEENT:  anicteric, moist mucous membranes  CHEST/LUNG:  grossly CTA b/l  HEART:  RRR, S1, S2  ABDOMEN:  BS+, soft, nontender, nondistended  : no flank or suprapubic tenderness  EXTREMITIES: no edema, cyanosis, or calf tenderness  NERVOUS SYSTEM: answers questions and follows commands appropriately    LABS:                        9.1    9.73  )-----------( 489      ( 26 Jan 2021 07:14 )             27.8     CBC Full  -  ( 26 Jan 2021 07:14 )  WBC Count : 9.73 K/uL  Hemoglobin : 9.1 g/dL  Hematocrit : 27.8 %  Platelet Count - Automated : 489 K/uL  Mean Cell Volume : 77.7 fl  Mean Cell Hemoglobin : 25.4 pg  Mean Cell Hemoglobin Concentration : 32.7 gm/dL  Auto Neutrophil # : 6.38 K/uL  Auto Lymphocyte # : 1.93 K/uL  Auto Monocyte # : 1.05 K/uL  Auto Eosinophil # : 0.03 K/uL  Auto Basophil # : 0.02 K/uL  Auto Neutrophil % : 65.6 %  Auto Lymphocyte % : 19.8 %  Auto Monocyte % : 10.8 %  Auto Eosinophil % : 0.3 %  Auto Basophil % : 0.2 %    26 Jan 2021 07:14    136    |  106    |  16     ----------------------------<  281    4.4     |  22     |  0.97     Ca    8.0        26 Jan 2021 07:14  Phos  1.8       26 Jan 2021 07:14  Mg     1.6       26 Jan 2021 07:14    TPro  5.9    /  Alb  1.7    /  TBili  0.2    /  DBili  <.10   /  AST  29     /  ALT  23     /  AlkPhos  179    26 Jan 2021 07:14        CAPILLARY BLOOD GLUCOSE      POCT Blood Glucose.: 208 mg/dL (26 Jan 2021 16:49)  POCT Blood Glucose.: 287 mg/dL (26 Jan 2021 11:58)  POCT Blood Glucose.: 341 mg/dL (26 Jan 2021 08:22)  POCT Blood Glucose.: 324 mg/dL (25 Jan 2021 21:08)        Culture - Blood (collected 01-23-21 @ 11:58)  Source: .Blood Blood-Venous  Preliminary Report (01-24-21 @ 12:01):    No growth to date.    Culture - Blood (collected 01-23-21 @ 11:58)  Source: .Blood Blood-Venous  Preliminary Report (01-24-21 @ 12:01):    No growth to date.    Culture - Urine (collected 01-23-21 @ 11:58)  Source: .Urine Catheterized  Final Report (01-25-21 @ 17:49):    >100,000 CFU/ml Escherichia coli ESBL  Organism: Escherichia coli ESBL (01-25-21 @ 17:49)  Organism: Escherichia coli ESBL (01-25-21 @ 17:49)      -  Amikacin: S <=16      -  Amoxicillin/Clavulanic Acid: S <=8/4      -  Ampicillin: R >16 These ampicillin results predict results for amoxicillin      -  Ampicillin/Sulbactam: S <=4/2 Enterobacter, Citrobacter, and Serratia may develop resistance during prolonged therapy (3-4 days)      -  Aztreonam: R >16      -  Cefazolin: R >16 (MIC_CL_COM_ENTERIC_CEFAZU) For uncomplicated UTI with K. pneumoniae, E. coli, or P. mirablis: LEONILA <=16 is sensitive and LEONILA >=32 is resistant. This also predicts results for oral agents cefaclor, cefdinir, cefpodoxime, cefprozil, cefuroxime axetil, cephalexin and locarbef for uncomplicated UTI. Note that some isolates may be susceptible to these agents while testing resistant to cefazolin.      -  Cefepime: R >16      -  Cefoxitin: S <=8      -  Ceftriaxone: R >32 Enterobacter, Citrobacter, and Serratia may develop resistance during prolonged therapy      -  Ciprofloxacin: S <=0.25      -  Ertapenem: S <=0.5      -  Gentamicin: S <=2      -  Imipenem: S <=1      -  Levofloxacin: S <=0.5      -  Meropenem: S <=1      -  Nitrofurantoin: S <=32 Should not be used to treat pyelonephritis      -  Piperacillin/Tazobactam: S <=8      -  Tigecycline: S <=2      -  Tobramycin: S <=2      -  Trimethoprim/Sulfamethoxazole: R >2/38      Method Type: LEONILA        RADIOLOGY & ADDITIONAL TESTS:    Personally reviewed.     Consultant(s) Notes Reviewed:  [x] YES  [ ] NO

## 2021-01-26 NOTE — PROGRESS NOTE ADULT - PROBLEM SELECTOR PLAN 9
- VTE ppx: lovenox     11. HLD: Continue home medication atorvastatin  12. Hypothyroidism: Continue home synthroid  13. Goals of care discussed pt is DNR/DNI as per son and pt - palliative consulted for JOHANA
VTE ppx:   -c/w lovenox     11. HLD: Continue home atorvastatin  12. Hypothyroidism: Continue home synthroid  13. Goals of care discussed pt is DNR/DNI as per son and pt - palliative consulted for JOHANA

## 2021-01-26 NOTE — PROGRESS NOTE ADULT - PROBLEM SELECTOR PLAN 7
-Microcytic anemia on admission w/ Hgb of 9.2 likely MERLY  -Continue home ferrous sulfate
-Microcytic anemia on admission w/ Hb of 9.2  -Continue home ferrous sulfate

## 2021-01-26 NOTE — PROGRESS NOTE ADULT - ATTENDING COMMENTS
Note written by attending, see above.  Time spent: 45min. More than 50% of the visit was spent counseling the patient on medical condition - COVID-19 infection, hypoglycemia followed by hyperglycemia, insulin use.
71F, SNF, DM2, HTN, HL, hypothy, sacral decub; multiple hosp admit 11/2020 for AMS/UTI/bacteremia, d/c'd home w/Davila/abxs, then readm w/recurrent hypoglycemia/diarrhea/DANIEL/volume depletion/anemia requiring transfusion; then readm for n/v w/subsequent d/c to SNF; now adm w/AMS/hypoglycemia to 45, and +UA - in ED at [facility], pt tx'd w/D5 gtt, zosyn/vanco; also found to be +COVID - then trans [place]    AMS - likely RADHA in setting of acute infection, metabolic imbalance w/hypoglycemia  -CTH neg  -mental status currently improved w/pt awake, alert, interactive on exam this AM 1/23 - continue monitoring    hypoglcyemia - in setting of hx DM/insulin tx, acute infection w/UTI and covid19  -improved measures on D5 gtt  -hold insulin coverage for now    acute UTI - no niki e/o septic decompensation  -IV abxs: zosyn -> change to ceftriaxone for now  -f/u cxs    COVID19 PNA - no assoc hypoxia - on RA  -no dexamethasone as pt w/o hypoxia  -per ID, to start remdesivir  -airborne/contact precautions  -trending inflammatory markers  -ID following    sacral decub - noted present on admit - continue local wound care    HTN, HL - continue statin, multdrug antiHTNve regimen of norvasc, hydralazine, enalapril - monitoring hemodynamics in setting of acute infection    hypothy - continue synthroid    psych - continue sertraline    dvt prophy
Note written by attending, see above.  Time spent: 40min. More than 50% of the visit was spent counseling the patient on medical condition - COVID-19 infection, hypoglycemia followed by hyperglycemia, insulin use.
71F, SNF, DM2, HTN, HL, hypothy, sacral decub; multiple recent hosp admits: 11/2020 for AMS/UTI/bacteremia, d/c'd home w/Davila/abxs, then readm w/recurrent hypoglycemia/diarrhea/DANIEL/volume depletion/anemia requiring transfusion; then readm for n/v w/subsequent d/c to SNF; now adm w/AMS/hypoglycemia to 45, and +UA - in ED at [facility], pt tx'd w/D5 gtt, zosyn/vanco; also found to be +COVID - then trans [place]    AMS - likely RADHA in setting of acute infection, metabolic imbalance w/hypoglycemia  -CTH neg  -mental status improved 1/23, then decompensation overnight 1/23 w/obtundation in setting of recurrence hypoglycemia - now improved w/glycemic supplementation    hypoglcyemia - recurrent in setting of DM, hx glimepiride tx, and acute infection   -remains off glimepiride; holding insulin coverage for now  -improved glucose measures in setting of steroids and IV glycemic supplementation   -holding dextrose infusion for now - was on D10 overnight - if FSBG falls again, will resume supplementation w/D5 gtt    acute UTI - no niki e/o septic decompensation  -IV abxs: zosyn -> ceftriaxone  -f/u cxs    COVID19 PNA - initially on RA ->overnight hypoxic decompensation - now on 4L NC  -initiated on dexamethasone as now hypoxic - plan for 10d course  -per ID, to start remdesivir  -airborne/contact precautions  -trending inflammatory markers  -ID following - family requesting Dr Bridges to follow as has previous relationship w/MD - d/w Dr Sotomayor and Dr Bridges - Dr Bridges to take over care tomorrow 1/25    sacral decubs - noted present on admit - continue local wound care; wound care consulted - f/u recomms    HTN, HL - continue statin, multdrug antiHTNve regimen of norvasc, hydralazine, enalapril - monitoring hemodynamics in setting of acute infection    hypothy - continue synthroid    psych - continue sertraline    dvt prophy

## 2021-01-26 NOTE — PROGRESS NOTE ADULT - PROBLEM SELECTOR PLAN 4
-Na 130 in [facility] ED   -likely in setting of decreased PO intake   -resolved with rehydration   -monitor BMP
-Na 130 in [place] ED   -likely in setting of decreased PO intake   -resolved with rehydration   -monitor BMP

## 2021-01-26 NOTE — PROGRESS NOTE ADULT - PROBLEM SELECTOR PLAN 2
- pt's saturation was 87% on RA on 1/24 and pt was started on dexamethasone 6mg daily   -c/w Remdesivir (day 3/5)   -saturation now improved and pt is saturating well on RA at rest  - provide VTE prophylaxis given high rate of thrombotic events  - maintain isolation precautions - airborne and contact  - ID consult (initially [facility]; now switched to [facility]), recs appreciated  - pulm ([facility]), recs appreciated
- pt's saturation was 87% on RA on 1/24 and pt was started on dexamethasone 6mg daily (day 3/10)  -c/w Remdesivir (day 4/5)   -saturation now improved and pt is saturating well on RA at rest  - provide VTE prophylaxis given high rate of thrombotic events  - maintain isolation precautions - airborne and contact  - ID consult (initially [facility]; now switched to [facility]), recs appreciated  - pulm ([facility]), recs appreciated

## 2021-01-26 NOTE — BEHAVIORAL HEALTH ASSESSMENT NOTE - HPI (INCLUDE ILLNESS QUALITY, SEVERITY, DURATION, TIMING, CONTEXT, MODIFYING FACTORS, ASSOCIATED SIGNS AND SYMPTOMS)
Patient seen, evaluated and chart reviewed. 71 year old female PMHx of DM type 2, HTN, HLD, MDD, Hypothyroidism, presented to the [place] ED with AMS and hypoglycemia. Patient reportedly was independent/driving car/living in her house alone until Nov 24th. A month prior to this she lost her  and became more depressed. On Nov 24th, she was found on the floor of her living room and she could not remember how she got there, she was brought to [facility], where she spent 8 days and was diagnosed with a UTI/bacteremia for which was she dc home with IV abx and a Davila. She was more bed bound at home after this hospitalization, her blood sugar kept dropping and she was readmitted to [facility], this time treated for dehydration/DANIEL/ and diarrhea. Her diarrhea was attributed to her abx use and she tested negative for Cdiff multiple times. She also received a unit of PRBC for a Hb of 8.2. On Dec 22 she was D/c to [facility] nursing home and because of nausea and vomiting (vomit had some blood) she was brought back to the hospital where she stayed  a few days then was discharged to [facility]. The night prior to admission, pt was sent to [facility] from [facility] because she had an episode of AMS where she was nonverbal/incoherant w/ a blood sugar of 45. History of episode was limited as nursing staff that witnessed episode were not at [facility] at time of admission.  As per chart, staff reported that patient had poor PO intake yesterday so they held her insulin.  At night, she was noted to be altered and confused.  EMS arrived and noted FS of 45.  She was given D10 which resulted in prompt improvement in mental status.  Her blood sugar was 160 en route.  She ambulates with a walker but admits that she is not very active secondary to neuropathy. The issue of depression was raised and psychiatric consult was requested. At this time patient appears to be lucid and engages freely, although appears to be engaging in confabulations at times. She reports symptoms of depression in context of recent death of her , but does not want to be on medications, and during the evaluation presents with somewhat inappropriate and incongruent affect. No evidence of psychosis, daniella or anxiety.

## 2021-01-26 NOTE — BEHAVIORAL HEALTH ASSESSMENT NOTE - SUMMARY
71 year old female PMHx of DM type 2, HTN, HLD, MDD, Hypothyroidism, presented to the [place] ED with AMS and hypoglycemia. She ambulates with a walker but admits that she is not very active secondary to neuropathy. Today patient states that she still has diarrhea about every 2 hours and she has also had increased urinary frequency for about a month. Her Davila was discontinued about the end of Dec as per son and she has had increased urinary frequency since then.      IMP: Adjustment disorder with depression

## 2021-01-26 NOTE — CONSULT NOTE ADULT - SUBJECTIVE AND OBJECTIVE BOX
[facility] DIVISION of  INFECTIOUS DISEASE  Jet Bridges MD PhD, April Cha MD, Geeta Chau MD, Tomás Erickson MD  and providing coverage with Mavis Bethea MD and Peewee Andrade MD  Providing Infectious Disease Consultations at [facility]    Office# [phone] to schedule follow up appointments  Answering Service for urgent calls or New Consults [phone]  Cell# to text for urgent issues Jet Cyrus [phone]     HPI:  71 year old female PMHx of DM type 2, HTN, HLD, MDD, Hypothyroidism, presented to the [place] ED with AMS and hypoglycemia. Pt was independent/driving car/living in her house alone until Nov 24th. A month prior to this she lost her  and became more depressed. On Nov 24th, she was found on the floor of her living room and she could not remember how she got there, she was brought to [facility], where she spent 8 days and was diagnosed with a UTI/bacteremia for which was she dc home with IV abx and a Davila. She was more bed bound at home after this hospitalization, her blood sugar kept dropping and she was readmitted to [facility], this time treated for dehydration/DANIEL/ and diarrhea. Her diarrhea was attributed to her abx use and she tested negative for Cdiff multiple times. She also received a unit of PRBC for a Hb of 8.2. On Dec 22 she was D/c to [facility] nursing home and because of nausea and vomiting (vomit had some blood) she was brought back to the hospital where she stayed  a few days then was discharged to [facility]. The night prior to admission, pt was sent to [facility] from [facility] because she had an episode of AMS where she was nonverbal/incoherant w/ a blood sugar of 45. History of episode was limited as nursing staff that witnessed episode were not at [facility] at time of admission.  As per chart, staff reported that patient had poor PO intake yesterday so they held her insulin.  At night, she was noted to be altered and confused.  EMS arrived and noted FS of 45.  She was given D10 which resulted in prompt improvement in mental status.  Her blood sugar was 160 en route.  She ambulates with a walker but admits that she is not very active secondary to neuropathy.   Today patient states that she still has diarrhea about every 2 hours and she has also had increased urinary frequency for about a month. Her Davila was discontinued about the end of Dec as per son and she has had increased urinary frequency since then.    Vitals at [place]: T: 94.9, HR: 64, BP: 146/62, RR: 20 O2: 98 on RA   Labs significant for: WBC: 11.79, Hb/Hct: 9.2/28.0, Na: 130, BUN/Cr: 27/1.19, Alk phso: 156, +COVID   UA: positive for nitrate, moderate leukocyte esterase, bacteria,WBC   CXR: ?b/l patchy infiltrates, consolidation in the right lung (pending official read)  CT head: No CT evidence of acute intracranial abnormality.  In the [place] ED patient received: Vanco, Zosyn and was started on d5 + IVF (23 Jan 2021 05:21)      PAST MEDICAL & SURGICAL HISTORY:  MARRY (acute respiratory infection)  Hypothyroid  MDD (major depressive disorder)  Diabetes mellitus type 2  Hypertension  Hyperlipidemia  Depression  Changes in skin texture  Flatulence  Major depressive disorder, recurrent  Disorder of thyroid, unspecified  Ascorbic acid deficiency  Pain, unspecified  Hyperlipidemia, unspecified  Acute kidney failure, unspecified  Syncope and collapse    Essential (primary) hypertension    Type 2 diabetes mellitus without complication    Intraoperative hemorrhage and hematoma of a genitourinary system organ or structure complicating other procedure    High cholesterol    Diabetes    HTN (hypertension)    History of colon surgery    H/O shoulder surgery    H/O lumpectomy    No significant past surgical history    No significant past surgical history        Antimicrobials  cefTRIAXone   IVPB 1000 milliGRAM(s) IV Intermittent every 24 hours  remdesivir  IVPB   IV Intermittent   remdesivir  IVPB 100 milliGRAM(s) IV Intermittent every 24 hours      Immunological      Other  acetaminophen   Tablet .. 650 milliGRAM(s) Oral every 4 hours PRN  amLODIPine   Tablet 10 milliGRAM(s) Oral daily  ascorbic acid 500 milliGRAM(s) Oral daily  atorvastatin 20 milliGRAM(s) Oral at bedtime  clotrimazole 1% Cream 1 Application(s) Topical two times a day  dexAMETHasone  Injectable 6 milliGRAM(s) IV Push daily  dextrose 40% Gel 15 Gram(s) Oral once  dextrose 5%. 1000 milliLiter(s) IV Continuous <Continuous>  dextrose 5%. 1000 milliLiter(s) IV Continuous <Continuous>  dextrose 50% Injectable 25 Gram(s) IV Push once  dextrose 50% Injectable 12.5 Gram(s) IV Push once  dextrose 50% Injectable 25 Gram(s) IV Push once  enalapril 2.5 milliGRAM(s) Oral two times a day  enoxaparin Injectable 40 milliGRAM(s) SubCutaneous daily  ferrous    sulfate 325 milliGRAM(s) Oral daily  glucagon  Injectable 1 milliGRAM(s) IntraMuscular once  hydrALAZINE 50 milliGRAM(s) Oral two times a day  hydrocortisone 2.5% Ointment 1 Application(s) Topical two times a day  insulin lispro (ADMELOG) corrective regimen sliding scale   SubCutaneous three times a day before meals  insulin lispro Injectable (ADMELOG) 3 Unit(s) SubCutaneous three times a day before meals  lactobacillus acidophilus 1 Tablet(s) Oral two times a day with meals  levothyroxine 25 MICROGram(s) Oral daily  multivitamin/minerals 1 Tablet(s) Oral daily  pantoprazole    Tablet 40 milliGRAM(s) Oral before breakfast  sertraline 25 milliGRAM(s) Oral daily  simethicone 80 milliGRAM(s) Chew four times a day PRN  tamsulosin 0.4 milliGRAM(s) Oral at bedtime      Allergies    Hytrin (Unknown)    Intolerances        SOCIAL HISTORY:  came from rehab [facility]  uses walker at [facility] prior to Nov 2020 pt was independent (23 Jan 2021 05:21)      FAMILY HISTORY:  Family history of CVA  in brother    FH: type 2 diabetes        ROS:    limited    Vital Signs Last 24 Hrs  T(C): 36.5 (25 Jan 2021 12:24), Max: 36.7 (24 Jan 2021 13:16)  T(F): 97.7 (25 Jan 2021 12:24), Max: 98 (24 Jan 2021 13:16)  HR: 87 (25 Jan 2021 12:24) (79 - 87)  BP: 150/67 (25 Jan 2021 12:24) (16/73 - 161/70)  BP(mean): --  RR: 18 (25 Jan 2021 12:24) (18 - 20)  SpO2: 93% (25 Jan 2021 12:24) (93% - 100%)    PE:    HEENT:  NC, atraumatic  Lungs:  No accessory muscle use, breathing comfortably  Cor:  distant  Abd:  Symmetric, appears normal,   Extrem:  No cyanosis        LABS:                        8.9    9.18  )-----------( 407      ( 24 Jan 2021 06:43 )             28.0       WBC Count: 9.18 K/uL (01-24-21 @ 06:43)  WBC Count: 8.61 K/uL (01-23-21 @ 07:06)  WBC Count: 11.79 K/uL (01-22-21 @ 23:53)      01-24    135  |  106  |  16  ----------------------------<  186<H>  3.8   |  22  |  0.90    Ca    7.5<L>      24 Jan 2021 06:43    TPro  5.7<L>  /  Alb  1.6<L>  /  TBili  0.2  /  DBili  <.10  /  AST  18  /  ALT  14  /  AlkPhos  158<H>  01-24      Creatinine, Serum: 0.90 mg/dL (01-24-21 @ 06:43)  Creatinine, Serum: 1.00 mg/dL (01-23-21 @ 07:06)  Creatinine, Serum: 1.19 mg/dL (01-22-21 @ 23:53)              COVID RISK SCORE:  Auto Neutrophil #: 6.29 K/uL (01-24-21 @ 06:43)  Auto Lymphocyte #: 1.71 K/uL (01-24-21 @ 06:43)  Auto Neutrophil #: 6.11 K/uL (01-23-21 @ 07:06)  Auto Lymphocyte #: 1.03 K/uL (01-23-21 @ 07:06)  Auto Lymphocyte #: 1.33 K/uL (01-22-21 @ 23:53)  Auto Neutrophil #: 9.07 K/uL (01-22-21 @ 23:53)    Lactate, Blood: 1.2 mmol/L (01-22-21 @ 23:53)    Auto Eosinophil #: 0.10 K/uL (01-24-21 @ 06:43)  Auto Eosinophil #: 0.17 K/uL (01-23-21 @ 07:06)  Auto Eosinophil #: 0.03 K/uL (01-22-21 @ 23:53)    Lactate Dehydrogenase, Serum: 358 U/L (01-23-21 @ 12:02)      Procalcitonin, Serum: 0.08 ng/mL (01-23-21 @ 07:06)            Ferritin, Serum: 231 ng/mL (01-23-21 @ 12:02)        Activated Partial Thromboplastin Time: 29.0 sec (01-23-21 @ 07:06)  INR: 1.10 ratio (01-23-21 @ 07:06)  Activated Partial Thromboplastin Time: 29.7 sec (01-22-21 @ 23:53)  INR: 1.05 ratio (01-22-21 @ 23:53)    D-Dimer Assay, Quantitative: 471 ng/mL DDU (01-23-21 @ 07:06)        MICROBIOLOGY:    Culture - Urine (01.23.21 @ 11:58)    Specimen Source: .Urine Catheterized    Culture Results:   >100,000 CFU/ml Escherichia coli        RADIOLOGY & ADDITIONAL STUDIES:    --
Date/Time Patient Seen:  		  Referring MD:   Data Reviewed	       Patient is a 71y old  Female who presents with a chief complaint of COVID-19, hypoglycemia, UTI (23 Jan 2021 11:00)      Subjective/HPI  am events noted  h and p reviewed  labs reviewed  er provider note reviewed  RRT notes reviewed  vs noted  imaging noted     History of Present Illness:  Reason for Admission: COVID-19, hypoglycemia, UTI  History of Present Illness:   71 year old female PMHx of DM type 2, HTN, HLD, MDD, Hypothyroidism, presented to the [place] ED with AMS and hypoglycemia.  History obtained from chart review and son. Patient is AAOx3 but she is a poor historian. As per son, pt was independent/driving car/living in her house alone until Nov 24th. A month prior to this she lost her  and became more depressed. On Nov 24th, she was found on the floor of her living room and she could not remember how she got there, she was brought to [facility], where she spent 8 days and was diagnosed with a UTI/bacteremia for which was she dc home with IV abx and a Davila. She was more bed bound at home after this hospitalization, her blood sugar kept dropping and she was readmitted to [facility], this time treated for dehydration/DANIEL/ and diarrhea. Her diarrhea was attributed to her abx use and she tested negative for Cdiff multiple times. She also received a unit of PRBC for a Hb of 8.2. On Dec 22 she was D/c to [facility] nursing home and because of nausea and vomiting (vomit had some blood) she was brought back to the hospital where she stayed  a few days then was discharged to [facility]. The night prior to admission, pt was sent to [facility] from [facility] because she had an episode of AMS where she was nonverbal/incoherant w/ a blood sugar of 45. History of episode was limited as nursing staff that witnessed episode were not at [facility] at time of admission.  As per chart, staff reported that patient had poor PO intake yesterday so they held her insulin.  At night, she was noted to be altered and confused.  EMS arrived and noted FS of 45.  She was given D10 which resulted in prompt improvement in mental status.  Her blood sugar was 160 en route.  She ambulates with a walker but admits that she is not very active secondary to neuropathy.   Today patient states that she still has diarrhea about every 2 hours and she has also had increased urinary frequency for about a month. Her Davila was discontinued about the end of Dec as per son and she has had increased urinary frequency since then.       PAST SURGICAL HISTORY:  H/O lumpectomy     H/O shoulder surgery     History of colon surgery.     FAMILY HISTORY:  Family history of CVA, in brother  FH: type 2 diabetes.     Social History:  Social History (marital status, living situation, occupation, tobacco use, alcohol and drug use, and sexual history): came from rehab [facility]  uses walker at [facility] prior to Nov 2020 pt was independent     Tobacco Screening:  · Core Measure Site	Yes  · Has the patient used tobacco in the past 30 days?	No    Risk Assessment:    Present on Admission:  Deep Venous Thrombosis	no  Pulmonary Embolus	no     Heart Failure:  Does this patient have a history of or has been diagnosed with heart failure? unknown.  PAST MEDICAL & SURGICAL HISTORY:  MARRY (acute respiratory infection)    Hypothyroid    MDD (major depressive disorder)    Diabetes mellitus  type 2    Hypertension    Hyperlipidemia    Depression    History of colon surgery    H/O shoulder surgery    H/O lumpectomy          Medication list         MEDICATIONS  (STANDING):  amLODIPine   Tablet 10 milliGRAM(s) Oral daily  ascorbic acid 500 milliGRAM(s) Oral daily  atorvastatin 20 milliGRAM(s) Oral at bedtime  cefTRIAXone   IVPB 1000 milliGRAM(s) IV Intermittent every 24 hours  clotrimazole 1% Cream 1 Application(s) Topical two times a day  dexAMETHasone  Injectable 6 milliGRAM(s) IV Push daily  dextrose 10%. 1000 milliLiter(s) (75 mL/Hr) IV Continuous <Continuous>  dextrose 40% Gel 15 Gram(s) Oral once  dextrose 5%. 1000 milliLiter(s) (50 mL/Hr) IV Continuous <Continuous>  dextrose 5%. 1000 milliLiter(s) (100 mL/Hr) IV Continuous <Continuous>  dextrose 50% Injectable 25 Gram(s) IV Push once  dextrose 50% Injectable 12.5 Gram(s) IV Push once  dextrose 50% Injectable 25 Gram(s) IV Push once  enalapril 2.5 milliGRAM(s) Oral two times a day  enoxaparin Injectable 40 milliGRAM(s) SubCutaneous daily  ferrous    sulfate 325 milliGRAM(s) Oral daily  glucagon  Injectable 1 milliGRAM(s) IntraMuscular once  hydrALAZINE 50 milliGRAM(s) Oral two times a day  hydrocortisone 2.5% Ointment 1 Application(s) Topical two times a day  insulin lispro (ADMELOG) corrective regimen sliding scale   SubCutaneous three times a day before meals  insulin lispro (ADMELOG) corrective regimen sliding scale   SubCutaneous at bedtime  lactobacillus acidophilus 1 Tablet(s) Oral two times a day with meals  levothyroxine 25 MICROGram(s) Oral daily  multivitamin/minerals 1 Tablet(s) Oral daily  pantoprazole    Tablet 40 milliGRAM(s) Oral before breakfast  remdesivir  IVPB   IV Intermittent   remdesivir  IVPB 100 milliGRAM(s) IV Intermittent every 24 hours  sertraline 25 milliGRAM(s) Oral daily  tamsulosin 0.4 milliGRAM(s) Oral at bedtime    MEDICATIONS  (PRN):  acetaminophen   Tablet .. 650 milliGRAM(s) Oral every 4 hours PRN Temp greater or equal to 38.5C (101.3F)  simethicone 80 milliGRAM(s) Chew four times a day PRN Gas         Vitals log        ICU Vital Signs Last 24 Hrs  T(C): 36.2 (24 Jan 2021 05:36), Max: 37.2 (23 Jan 2021 12:16)  T(F): 97.2 (24 Jan 2021 05:36), Max: 98.9 (23 Jan 2021 12:16)  HR: 84 (24 Jan 2021 05:36) (74 - 84)  BP: 137/66 (24 Jan 2021 05:36) (107/45 - 137/66)  BP(mean): --  ABP: --  ABP(mean): --  RR: 14 (24 Jan 2021 05:36) (14 - 18)  SpO2: 87% (24 Jan 2021 05:36) (87% - 96%)           Input and Output:  I&O's Detail    23 Jan 2021 07:01  -  24 Jan 2021 06:28  --------------------------------------------------------  IN:    dextrose 5%: 525 mL    IV PiggyBack: 50 mL    IV PiggyBack: 250 mL  Total IN: 825 mL    OUT:  Total OUT: 0 mL    Total NET: 825 mL          Lab Data                        8.5    8.61  )-----------( 364      ( 23 Jan 2021 07:06 )             26.0     01-23    137  |  109<H>  |  25<H>  ----------------------------<  112<H>  4.0   |  20<L>  |  1.00    Ca    7.7<L>      23 Jan 2021 07:06  Phos  2.3     01-23  Mg     1.5     01-23    TPro  5.7<L>  /  Alb  1.7<L>  /  TBili  0.3  /  DBili  x   /  AST  16  /  ALT  15  /  AlkPhos  146<H>  01-23            Review of Systems	  weakness  ams      Objective     Physical Examination    heart s1s2  lung dec BS  abd soft      Pertinent Lab findings & Imaging      Lola:  NO   Adequate UO     I&O's Detail    23 Jan 2021 07:01  -  24 Jan 2021 06:28  --------------------------------------------------------  IN:    dextrose 5%: 525 mL    IV PiggyBack: 50 mL    IV PiggyBack: 250 mL  Total IN: 825 mL    OUT:  Total OUT: 0 mL    Total NET: 825 mL               Discussed with:     Cultures:	        Radiology                            
[facility]  INFECTIOUS DISEASES   =======================================================    [facility]-[id number]  MICHAEL DUFFY     CC: Patient is a 71y old  Female who presents with a chief complaint of COVID-19, hypoglycemia, UTI (23 Jan 2021 10:22)    HPI:  72 yo woman with PMH of HTN, DM2, MDD, and Hypothyroidism, was admitted on 1/23 with AMS and hypoglycemia.   History obtained from chart since she is a poor historian. "As per son, pt was independent/driving car/living in her house alone until Nov 24th. A month prior to this she lost her  and became more depressed. On Nov 24th, she was found on the floor of her living room and she could not remember how she got there, she was brought to [facility], where she spent 8 days and was diagnosed with a UTI/bacteremia for which was she dc home with IV abx and a Davila. She was more bed bound at home after this hospitalization, her blood sugar kept dropping and she was readmitted to [facility], this time treated for dehydration/DANIEL/ and diarrhea. Her diarrhea was attributed to her abx use and she tested negative for Cdiff multiple times. She also received a unit of PRBC for a Hb of 8.2. On Dec 22 she was D/c to [facility] nursing home and because of nausea and vomiting (vomit had some blood) she was brought back to the hospital where she stayed  a few days then was discharged to [facility]. The night prior to admission, pt was sent to [facility] from [facility] because she had an episode of AMS where she was nonverbal/incoherant w/ a blood sugar of 45. History of episode was limited as nursing staff that witnessed episode were not at [facility] at time of admission.  As per chart, staff reported that patient had poor PO intake yesterday so they held her insulin.  At night, she was noted to be altered and confused.  EMS arrived and noted FS of 45.  She was given D10 which resulted in prompt improvement in mental status.  Her blood sugar was 160 en route.  She ambulates with a walker but admits that she is not very active secondary to neuropathy.  patient states that she still has diarrhea about every 2 hours and she has also had increased urinary frequency for about a month. Her Davila was discontinued about the end of Dec as per son and she has had increased urinary frequency since then."  She was tested positive COVID in [place] so transferred to [facility].     PAST MEDICAL & SURGICAL HISTORY:  MARRY (acute respiratory infection)  Hypothyroid  MDD (major depressive disorder)  Diabetes mellitus type 2  Hypertension  Hyperlipidemia  Depression  History of colon surgery  H/O shoulder surgery  H/O lumpectomy    Social Hx: no smoking, ETOH or drugs     FAMILY HISTORY:  Family history of CVA  in brother    FH: type 2 diabetes    Allergies  Hytrin (Unknown)    Antibiotics:  cefTRIAXone   IVPB 1000 milliGRAM(s) IV Intermittent every 24 hours     REVIEW OF SYSTEMS:  CONSTITUTIONAL:  No Fever or chills  HEENT:  No diplopia or blurred vision.  No sore throat or runny nose.  CARDIOVASCULAR:  No chest pain or SOB.  RESPIRATORY:  no cough, shortness of breath  GASTROINTESTINAL:  No nausea, vomiting or diarrhea.  GENITOURINARY:  No dysuria, frequency or urgency. No Blood in urine  MUSCULOSKELETAL:  no joint aches, no muscle pain  SKIN:  No change in skin, hair or nails.  NEUROLOGIC:  No paresthesias, fasciculations, seizures or weakness.  PSYCHIATRIC:  No disorder of thought or mood.  ENDOCRINE:  No heat or cold intolerance, polyuria or polydipsia.  HEMATOLOGICAL:  No easy bruising or bleeding.     Physical Exam:  Vital Signs Last 24 Hrs  T(C): 36.5 (23 Jan 2021 08:00), Max: 36.5 (23 Jan 2021 08:00)  T(F): 97.7 (23 Jan 2021 08:00), Max: 97.7 (23 Jan 2021 08:00)  HR: 74 (23 Jan 2021 08:00) (70 - 74)  BP: 126/64 (23 Jan 2021 08:00) (109/59 - 140/65)  RR: 18 (23 Jan 2021 08:00) (16 - 18)  SpO2: 96% (23 Jan 2021 08:00) (96% - 99%)  Height (cm): 165.1 (01-23 @ 05:31)  Weight (kg): 79.8 (01-23 @ 05:31)  BMI (kg/m2): 29.3 (01-23 @ 05:31)  BSA (m2): 1.87 (01-23 @ 05:31)  GEN: NAD  HEENT: normocephalic and atraumatic. EOMI. PERRL.    NECK: Supple.  No lymphadenopathy   LUNGS: Clear to auscultation.  HEART: Regular rate and rhythm   ABDOMEN: Soft, nontender, and nondistended.  Positive bowel sounds.    EXTREMITIES: Without edema.  NEUROLOGIC: grossly intact.  PSYCHIATRIC: Appropriate affect .  SKIN: No rash, reportedly has Decubitus but I couldn't exam her today due to her refusal     Labs:  01-23    137  |  109<H>  |  25<H>  ----------------------------<  112<H>  4.0   |  20<L>  |  1.00    Ca    7.7<L>      23 Jan 2021 07:06  Phos  2.3     01-23  Mg     1.5     01-23    TPro  5.7<L>  /  Alb  1.7<L>  /  TBili  0.3  /  DBili  x   /  AST  16  /  ALT  15  /  AlkPhos  146<H>  01-23                        8.5    8.61  )-----------( 364      ( 23 Jan 2021 07:06 )             26.0     PT/INR - ( 23 Jan 2021 07:06 )   PT: 12.8 sec;   INR: 1.10 ratio    PTT - ( 23 Jan 2021 07:06 )  PTT:29.0 sec    LIVER FUNCTIONS - ( 23 Jan 2021 07:06 )  Alb: 1.7 g/dL / Pro: 5.7 g/dL / ALK PHOS: 146 U/L / ALT: 15 U/L / AST: 16 U/L / GGT: x           All imaging and other data have been reviewed.    Assessment and Plan:   72 yo woman with PMH of HTN, DM2, MDD, and Hypothyroidism, was admitted on 1/23 with AMS and hypoglycemia. She was tested positive COVID in [place] so transferred to [facility].     Treatment usually is different case by case, data suggest that these might work:   Remdisivir:  5 day course,  eGFR > 30 mL/min , ALT < 5X ULN  Steroid: For hypoxic patients on supplemental O2 of intubated. dexamethasone 6mg PO or IV Q-day x 10 days (equivalent to solumedrol 32mg IV or Prednisone 40mg)  Anticoagulation:  with prophylactic dosing, full dose to be considered in patients with increased risk for thromboembolic complications. Bleeding can happen but acceptable in high risk patients due to hypercoagulable state.  LMWH is good, for high risk patients consider discharge on oral anticoagulation with rivaroxaban (Xarelto) 10mg PO QD or Eliquis (apixaban) 2.5-5mg PO BID  x 4 weeks.  Currently data and recommendations for COVID-19 treatment are rapidly changing, so this treatment plan is based on my clinical judgement with available information.     COVID 19, UTI  - UA showed high WBC  - Agree with ceftriaxone 1gm daily until UC is back.   - BMP, CBC w diff, NLR. Procalcitonin, Ferritin, CRP, LDH and D dimer for the start and periodically can be repeated.   - CXR with bilateral opacities more consistent with viral pneumonia   - Can start Remdisivir 200mg stat and 100mg daily for 4 more days (total 5days) or until when ready for discharge whichever comes first.   - Will hold on Dexamethasone since not on supplemental o2  - Follow Creat and LFTs daily while on Remdisivir.    - Watch O2 sat closely and taper as tolerated.   - No antibiotics at this time.  - Prophylactic anticoagulation due to hypercoagulable state  - Monitor BM, due to frequent diarrhea, could be related to COVID, will send GI PCR    Thank you for courtesy of this consult.     Will follow.     Dr. Arron Sotomayor   Infectious Diseases   Please call [phone] between 8am and 6pm, call [phone] after 6pm or weekends. 
  Patient is a 71y old  Female who presents with a chief complaint of COVID-19, hypoglycemia, UTI (26 Jan 2021 06:54)      Reason For Consult: dm2 uncontrolled, hypoglycemia    HPI:  71 year old female PMHx of DM type 2, HTN, HLD, MDD, Hypothyroidism, presented to the [place] ED with AMS and hypoglycemia.  History obtained from chart review and son. Patient is AAOx3 but she is a poor historian. As per son, pt was independent/driving car/living in her house alone until Nov 24th. A month prior to this she lost her  and became more depressed. On Nov 24th, she was found on the floor of her living room and she could not remember how she got there, she was brought to [facility], where she spent 8 days and was diagnosed with a UTI/bacteremia for which was she dc home with IV abx and a Davila. She was more bed bound at home after this hospitalization, her blood sugar kept dropping and she was readmitted to [facility], this time treated for dehydration/DANIEL/ and diarrhea. Her diarrhea was attributed to her abx use and she tested negative for Cdiff multiple times. She also received a unit of PRBC for a Hb of 8.2. On Dec 22 she was D/c to [facility] nursing home and because of nausea and vomiting (vomit had some blood) she was brought back to the hospital where she stayed  a few days then was discharged to [facility]. The night prior to admission, pt was sent to [facility] from [facility] because she had an episode of AMS where she was nonverbal/incoherant w/ a blood sugar of 45. History of episode was limited as nursing staff that witnessed episode were not at [facility] at time of admission.  As per chart, staff reported that patient had poor PO intake yesterday so they held her insulin.  At night, she was noted to be altered and confused.  EMS arrived and noted FS of 45.  She was given D10 which resulted in prompt improvement in mental status.  Her blood sugar was 160 en route.  She ambulates with a walker but admits that she is not very active secondary to neuropathy.   Today patient states that she still has diarrhea about every 2 hours and she has also had increased urinary frequency for about a month. Her Davila was discontinued about the end of Dec as per son and she has had increased urinary frequency since then.    Vitals at [place]: T: 94.9, HR: 64, BP: 146/62, RR: 20 O2: 98 on RA   Labs significant for: WBC: 11.79, Hb/Hct: 9.2/28.0, Na: 130, BUN/Cr: 27/1.19, Alk phso: 156, +COVID   UA: positive for nitrate, moderate leukocyte esterase, bacteria,WBC   CXR: ?b/l patchy infiltrates, consolidation in the right lung (pending official read)  CT head: No CT evidence of acute intracranial abnormality.  In the [place] ED patient received: Vanco, Zosyn and was started on d5 + IVF (23 Jan 2021 05:21)      PAST MEDICAL & SURGICAL HISTORY:  MARRY (acute respiratory infection)    Hypothyroid    MDD (major depressive disorder)    Diabetes mellitus  type 2    Hypertension    Hyperlipidemia    Depression    Changes in skin texture    Flatulence    Major depressive disorder, recurrent    Disorder of thyroid, unspecified    Ascorbic acid deficiency    Pain, unspecified    Hyperlipidemia, unspecified    Acute kidney failure, unspecified    Syncope and collapse    Essential (primary) hypertension    Type 2 diabetes mellitus without complication    Intraoperative hemorrhage and hematoma of a genitourinary system organ or structure complicating other procedure    High cholesterol    Diabetes    HTN (hypertension)    History of colon surgery    H/O shoulder surgery    H/O lumpectomy    No significant past surgical history    No significant past surgical history        FAMILY HISTORY:  Family history of CVA  in brother    FH: type 2 diabetes          Social History:    MEDICATIONS  (STANDING):  amLODIPine   Tablet 10 milliGRAM(s) Oral daily  ascorbic acid 500 milliGRAM(s) Oral daily  atorvastatin 20 milliGRAM(s) Oral at bedtime  clotrimazole 1% Cream 1 Application(s) Topical two times a day  dexAMETHasone  Injectable 6 milliGRAM(s) IV Push daily  dextrose 40% Gel 15 Gram(s) Oral once  dextrose 5%. 1000 milliLiter(s) (50 mL/Hr) IV Continuous <Continuous>  dextrose 5%. 1000 milliLiter(s) (100 mL/Hr) IV Continuous <Continuous>  dextrose 50% Injectable 25 Gram(s) IV Push once  dextrose 50% Injectable 12.5 Gram(s) IV Push once  dextrose 50% Injectable 25 Gram(s) IV Push once  enalapril 2.5 milliGRAM(s) Oral two times a day  enoxaparin Injectable 40 milliGRAM(s) SubCutaneous daily  ferrous    sulfate 325 milliGRAM(s) Oral daily  glucagon  Injectable 1 milliGRAM(s) IntraMuscular once  hydrALAZINE 50 milliGRAM(s) Oral two times a day  hydrocortisone 2.5% Ointment 1 Application(s) Topical two times a day  insulin lispro (ADMELOG) corrective regimen sliding scale   SubCutaneous three times a day before meals  insulin lispro Injectable (ADMELOG) 3 Unit(s) SubCutaneous three times a day before meals  lactobacillus acidophilus 1 Tablet(s) Oral two times a day with meals  levothyroxine 25 MICROGram(s) Oral daily  multivitamin/minerals 1 Tablet(s) Oral daily  pantoprazole    Tablet 40 milliGRAM(s) Oral before breakfast  remdesivir  IVPB   IV Intermittent   remdesivir  IVPB 100 milliGRAM(s) IV Intermittent every 24 hours  sertraline 25 milliGRAM(s) Oral daily  tamsulosin 0.4 milliGRAM(s) Oral at bedtime    MEDICATIONS  (PRN):  acetaminophen   Tablet .. 650 milliGRAM(s) Oral every 4 hours PRN Temp greater or equal to 38.5C (101.3F)  simethicone 80 milliGRAM(s) Chew four times a day PRN Gas        T(C): 36.9 (01-26-21 @ 05:31), Max: 36.9 (01-26-21 @ 05:31)  HR: 984 (01-26-21 @ 05:31) (77 - 984)  BP: 166/51 (01-26-21 @ 05:31) (132/51 - 166/61)  RR: 17 (01-26-21 @ 05:31) (16 - 18)  SpO2: 98% (01-26-21 @ 05:31) (93% - 98%)  Wt(kg): --        CAPILLARY BLOOD GLUCOSE      POCT Blood Glucose.: 341 mg/dL (26 Jan 2021 08:22)  POCT Blood Glucose.: 324 mg/dL (25 Jan 2021 21:08)  POCT Blood Glucose.: 314 mg/dL (25 Jan 2021 16:46)  POCT Blood Glucose.: 460 mg/dL (25 Jan 2021 12:04)  POCT Blood Glucose.: 423 mg/dL (25 Jan 2021 12:03)                            9.1    9.73  )-----------( 489      ( 26 Jan 2021 07:14 )             27.8       CMP:  01-26 @ 07:14  SGPT 24  Albumin 1.7   Alk Phos 177   Anion Gap 8   SGOT 30   Total Bili 0.2   BUN 16   Calcium Total 8.0   CO2 22   Chloride 106   Creatinine 0.97   eGFR if AA 67   eGFR if non AA 58   Glucose 281   Potassium 4.4   Protein 6.0   Sodium 136      Thyroid Function Tests:      Diabetes Tests:       Radiology:

## 2021-01-26 NOTE — PROGRESS NOTE ADULT - SUBJECTIVE AND OBJECTIVE BOX
[facility] DIVISION of INFECTIOUS DISEASE  Jet Bridges MD PhD, April Cha MD, Geeta Chau MD, Tomás Erickson MD  and providing coverage with Mavis Bethea MD and Peewee Andrade MD  Providing Infectious Disease Consultations at [facility], [facility]      Office# [phone] to schedule follow up appointments  Answering Service for urgent calls or New Consults [phone]  Cell# to text for urgent issues Jet Bridges [phone]     Infectious diseases progress note:    MICHAEL DUFFY is a 71y y. o. Female patient    COVID Patient    Allergies    Hytrin (Unknown)    Intolerances        ANTIBIOTICS/RELEVANT:  antimicrobials  remdesivir  IVPB   IV Intermittent   remdesivir  IVPB 100 milliGRAM(s) IV Intermittent every 24 hours    immunologic:    OTHER:  acetaminophen   Tablet .. 650 milliGRAM(s) Oral every 4 hours PRN  amLODIPine   Tablet 10 milliGRAM(s) Oral daily  ascorbic acid 500 milliGRAM(s) Oral daily  atorvastatin 20 milliGRAM(s) Oral at bedtime  clotrimazole 1% Cream 1 Application(s) Topical two times a day  dexAMETHasone  Injectable 6 milliGRAM(s) IV Push daily  dextrose 40% Gel 15 Gram(s) Oral once  dextrose 5%. 1000 milliLiter(s) IV Continuous <Continuous>  dextrose 5%. 1000 milliLiter(s) IV Continuous <Continuous>  dextrose 50% Injectable 25 Gram(s) IV Push once  dextrose 50% Injectable 12.5 Gram(s) IV Push once  dextrose 50% Injectable 25 Gram(s) IV Push once  enalapril 2.5 milliGRAM(s) Oral two times a day  enoxaparin Injectable 40 milliGRAM(s) SubCutaneous daily  ferrous    sulfate 325 milliGRAM(s) Oral daily  glucagon  Injectable 1 milliGRAM(s) IntraMuscular once  hydrALAZINE 50 milliGRAM(s) Oral two times a day  hydrocortisone 2.5% Ointment 1 Application(s) Topical two times a day  insulin lispro (ADMELOG) corrective regimen sliding scale   SubCutaneous three times a day before meals  insulin lispro Injectable (ADMELOG) 6 Unit(s) SubCutaneous three times a day before meals  lactobacillus acidophilus 1 Tablet(s) Oral two times a day with meals  levothyroxine 25 MICROGram(s) Oral daily  magnesium sulfate  IVPB 2 Gram(s) IV Intermittent once  multivitamin/minerals 1 Tablet(s) Oral daily  pantoprazole    Tablet 40 milliGRAM(s) Oral before breakfast  sertraline 25 milliGRAM(s) Oral daily  simethicone 80 milliGRAM(s) Chew four times a day PRN  tamsulosin 0.4 milliGRAM(s) Oral at bedtime      Objective:  Vital Signs Last 24 Hrs  T(C): 36.9 (26 Jan 2021 05:31), Max: 36.9 (26 Jan 2021 05:31)  T(F): 98.4 (26 Jan 2021 05:31), Max: 98.4 (26 Jan 2021 05:31)  HR: 984 (26 Jan 2021 05:31) (77 - 984)  BP: 166/51 (26 Jan 2021 05:31) (132/51 - 166/61)  BP(mean): --  RR: 17 (26 Jan 2021 05:31) (16 - 18)  SpO2: 98% (26 Jan 2021 05:31) (93% - 98%)    T(C): 36.9 (01-26-21 @ 05:31), Max: 36.9 (01-26-21 @ 05:31)  T(C): 36.9 (01-26-21 @ 05:31), Max: 37.2 (01-23-21 @ 12:16)  T(C): 36.9 (01-26-21 @ 05:31), Max: 37.2 (01-23-21 @ 12:16)    PHYSICAL EXAM:  HEENT: NC atraumatic  Neck: supple  Respiratory: no accessory muscle use, breathing comfortably  Cardiovascular: distant  Gastrointestinal: normal appearing, nondistended  Extremities: no clubbing, no cyanosis,        LABS:                          9.1    9.73  )-----------( 489      ( 26 Jan 2021 07:14 )             27.8       9.73 01-26 @ 07:14  9.18 01-24 @ 06:43  8.61 01-23 @ 07:06  11.79 01-22 @ 23:53      01-26    136  |  106  |  16  ----------------------------<  281<H>  4.4   |  22  |  0.97    Ca    8.0<L>      26 Jan 2021 07:14  Phos  1.8     01-26  Mg     1.6     01-26    TPro  5.9<L>  /  Alb  1.7<L>  /  TBili  0.2  /  DBili  <.10  /  AST  29  /  ALT  23  /  AlkPhos  179<H>  01-26      Creatinine, Serum: 0.97 mg/dL (01-26-21 @ 07:14)  Creatinine, Serum: 0.90 mg/dL (01-24-21 @ 06:43)  Creatinine, Serum: 1.00 mg/dL (01-23-21 @ 07:06)  Creatinine, Serum: 1.19 mg/dL (01-22-21 @ 23:53)                COVID RISK SCORE  Auto Neutrophil #: 6.38 K/uL (01-26-21 @ 07:14)  Auto Lymphocyte #: 1.93 K/uL (01-26-21 @ 07:14)  Auto Neutrophil #: 6.29 K/uL (01-24-21 @ 06:43)  Auto Lymphocyte #: 1.71 K/uL (01-24-21 @ 06:43)  Auto Neutrophil #: 6.11 K/uL (01-23-21 @ 07:06)  Auto Lymphocyte #: 1.03 K/uL (01-23-21 @ 07:06)  Auto Lymphocyte #: 1.33 K/uL (01-22-21 @ 23:53)  Auto Neutrophil #: 9.07 K/uL (01-22-21 @ 23:53)    Lactate, Blood: 1.2 mmol/L (01-22-21 @ 23:53)    Auto Eosinophil #: 0.03 K/uL (01-26-21 @ 07:14)  Auto Eosinophil #: 0.10 K/uL (01-24-21 @ 06:43)  Auto Eosinophil #: 0.17 K/uL (01-23-21 @ 07:06)  Auto Eosinophil #: 0.03 K/uL (01-22-21 @ 23:53)    Lactate Dehydrogenase, Serum: 358 U/L (01-23-21 @ 12:02)      Procalcitonin, Serum: 0.06 ng/mL (01-26-21 @ 07:14)  Procalcitonin, Serum: 0.08 ng/mL (01-23-21 @ 07:06)            Ferritin, Serum: 231 ng/mL (01-23-21 @ 12:02)        Activated Partial Thromboplastin Time: 29.0 sec (01-23-21 @ 07:06)  INR: 1.10 ratio (01-23-21 @ 07:06)  Activated Partial Thromboplastin Time: 29.7 sec (01-22-21 @ 23:53)  INR: 1.05 ratio (01-22-21 @ 23:53)    D-Dimer Assay, Quantitative: 476 ng/mL DDU (01-26-21 @ 07:14)  D-Dimer Assay, Quantitative: 471 ng/mL DDU (01-23-21 @ 07:06)        MICROBIOLOGY:    Culture - Urine (01.23.21 @ 11:58)    -  Amikacin: S <=16    -  Amoxicillin/Clavulanic Acid: S <=8/4    -  Ampicillin: R >16 These ampicillin results predict results for amoxicillin    -  Ampicillin/Sulbactam: S <=4/2 Enterobacter, Citrobacter, and Serratia may develop resistance during prolonged therapy (3-4 days)    -  Aztreonam: R >16    -  Cefazolin: R >16 (MIC_CL_COM_ENTERIC_CEFAZU) For uncomplicated UTI with K. pneumoniae, E. coli, or P. mirablis: LEONILA <=16 is sensitive and LEONILA >=32 is resistant. This also predicts results for oral agents cefaclor, cefdinir, cefpodoxime, cefprozil, cefuroxime axetil, cephalexin and locarbef for uncomplicated UTI. Note that some isolates may be susceptible to these agents while testing resistant to cefazolin.    -  Cefepime: R >16    -  Cefoxitin: S <=8    -  Ceftriaxone: R >32 Enterobacter, Citrobacter, and Serratia may develop resistance during prolonged therapy    -  Ciprofloxacin: S <=0.25    -  Ertapenem: S <=0.5    -  Gentamicin: S <=2    -  Imipenem: S <=1    -  Levofloxacin: S <=0.5    -  Meropenem: S <=1    -  Nitrofurantoin: S <=32 Should not be used to treat pyelonephritis    -  Piperacillin/Tazobactam: S <=8    -  Tigecycline: S <=2    -  Tobramycin: S <=2    -  Trimethoprim/Sulfamethoxazole: R >2/38    Specimen Source: .Urine Catheterized    Culture Results:   >100,000 CFU/ml Escherichia coli ESBL    Organism Identification: Escherichia coli ESBL    Organism: Escherichia coli ESBL    Method Type: LEONILA          RADIOLOGY & ADDITIONAL STUDIES:

## 2021-01-26 NOTE — BEHAVIORAL HEALTH ASSESSMENT NOTE - NSBHCHARTREVIEWLAB_PSY_A_CORE FT
9.1    9.73  )-----------( 489      ( 26 Jan 2021 07:14 )             27.8   01-26    136  |  106  |  16  ----------------------------<  281<H>  4.4   |  22  |  0.97    Ca    8.0<L>      26 Jan 2021 07:14  Phos  1.8     01-26  Mg     1.6     01-26    TPro  5.9<L>  /  Alb  1.7<L>  /  TBili  0.2  /  DBili  <.10  /  AST  29  /  ALT  23  /  AlkPhos  179<H>  01-26

## 2021-01-26 NOTE — BEHAVIORAL HEALTH ASSESSMENT NOTE - NSBHCHARTREVIEWINVESTIGATE_PSY_A_CORE FT
< from: 12 Lead ECG (01.22.21 @ 23:38) >    Ventricular Rate 71 BPM    Atrial Rate 71 BPM    P-R Interval 266 ms    QRS Duration 100 ms    Q-T Interval 432 ms    QTC Calculation(Bazett) 469 ms    P Axis 64 degrees    R Axis 85 degrees    T Axis 39 degrees    Diagnosis Line Poor data quality  Sinus rhythm with marked sinus arrhythmia with 1st degree AV block  Low voltage QRS (limb leads)  Abnormal ECG  No previous ECGs available  Confirmed by KM TUCKER, YULIYA ([id number]) on 1/23/2021 12:58:05 PM    < end of copied text >

## 2021-01-26 NOTE — PROGRESS NOTE ADULT - SUBJECTIVE AND OBJECTIVE BOX
Date/Time Patient Seen:  		  Referring MD:   Data Reviewed	       Patient is a 71y old  Female who presents with a chief complaint of COVID-19 infection, hypoglycemia (25 Jan 2021 20:01)      Subjective/HPI     PAST MEDICAL & SURGICAL HISTORY:  MARRY (acute respiratory infection)    Hypothyroid    MDD (major depressive disorder)    Diabetes mellitus  type 2    Hypertension    Hyperlipidemia    Depression    Changes in skin texture    Flatulence    Major depressive disorder, recurrent    Disorder of thyroid, unspecified    Ascorbic acid deficiency    Pain, unspecified    Hyperlipidemia, unspecified    Acute kidney failure, unspecified    Syncope and collapse    Essential (primary) hypertension    Type 2 diabetes mellitus without complication    Intraoperative hemorrhage and hematoma of a genitourinary system organ or structure complicating other procedure    High cholesterol    Diabetes    HTN (hypertension)    History of colon surgery    H/O shoulder surgery    H/O lumpectomy    No significant past surgical history    No significant past surgical history          Medication list         MEDICATIONS  (STANDING):  amLODIPine   Tablet 10 milliGRAM(s) Oral daily  ascorbic acid 500 milliGRAM(s) Oral daily  atorvastatin 20 milliGRAM(s) Oral at bedtime  clotrimazole 1% Cream 1 Application(s) Topical two times a day  dexAMETHasone  Injectable 6 milliGRAM(s) IV Push daily  dextrose 40% Gel 15 Gram(s) Oral once  dextrose 5%. 1000 milliLiter(s) (50 mL/Hr) IV Continuous <Continuous>  dextrose 5%. 1000 milliLiter(s) (100 mL/Hr) IV Continuous <Continuous>  dextrose 50% Injectable 25 Gram(s) IV Push once  dextrose 50% Injectable 12.5 Gram(s) IV Push once  dextrose 50% Injectable 25 Gram(s) IV Push once  enalapril 2.5 milliGRAM(s) Oral two times a day  enoxaparin Injectable 40 milliGRAM(s) SubCutaneous daily  ferrous    sulfate 325 milliGRAM(s) Oral daily  glucagon  Injectable 1 milliGRAM(s) IntraMuscular once  hydrALAZINE 50 milliGRAM(s) Oral two times a day  hydrocortisone 2.5% Ointment 1 Application(s) Topical two times a day  insulin lispro (ADMELOG) corrective regimen sliding scale   SubCutaneous three times a day before meals  insulin lispro Injectable (ADMELOG) 3 Unit(s) SubCutaneous three times a day before meals  lactobacillus acidophilus 1 Tablet(s) Oral two times a day with meals  levothyroxine 25 MICROGram(s) Oral daily  multivitamin/minerals 1 Tablet(s) Oral daily  pantoprazole    Tablet 40 milliGRAM(s) Oral before breakfast  remdesivir  IVPB   IV Intermittent   remdesivir  IVPB 100 milliGRAM(s) IV Intermittent every 24 hours  sertraline 25 milliGRAM(s) Oral daily  tamsulosin 0.4 milliGRAM(s) Oral at bedtime    MEDICATIONS  (PRN):  acetaminophen   Tablet .. 650 milliGRAM(s) Oral every 4 hours PRN Temp greater or equal to 38.5C (101.3F)  simethicone 80 milliGRAM(s) Chew four times a day PRN Gas         Vitals log        ICU Vital Signs Last 24 Hrs  T(C): 36.9 (26 Jan 2021 05:31), Max: 36.9 (26 Jan 2021 05:31)  T(F): 98.4 (26 Jan 2021 05:31), Max: 98.4 (26 Jan 2021 05:31)  HR: 984 (26 Jan 2021 05:31) (77 - 984)  BP: 166/51 (26 Jan 2021 05:31) (132/51 - 166/61)  BP(mean): --  ABP: --  ABP(mean): --  RR: 17 (26 Jan 2021 05:31) (16 - 18)  SpO2: 98% (26 Jan 2021 05:31) (93% - 98%)           Input and Output:  I&O's Detail    24 Jan 2021 07:01  -  25 Jan 2021 07:00  --------------------------------------------------------  IN:  Total IN: 0 mL    OUT:    Voided (mL): 400 mL  Total OUT: 400 mL    Total NET: -400 mL      25 Jan 2021 07:01  -  26 Jan 2021 06:54  --------------------------------------------------------  IN:  Total IN: 0 mL    OUT:    Voided (mL): 600 mL  Total OUT: 600 mL    Total NET: -600 mL          Lab Data                  Review of Systems	      Objective     Physical Examination  heart s1s2  lung dec BS  abd soft  head nc  head at        Pertinent Lab findings & Imaging      Lola:  NO   Adequate UO     I&O's Detail    24 Jan 2021 07:01  -  25 Jan 2021 07:00  --------------------------------------------------------  IN:  Total IN: 0 mL    OUT:    Voided (mL): 400 mL  Total OUT: 400 mL    Total NET: -400 mL      25 Jan 2021 07:01  -  26 Jan 2021 06:54  --------------------------------------------------------  IN:  Total IN: 0 mL    OUT:    Voided (mL): 600 mL  Total OUT: 600 mL    Total NET: -600 mL               Discussed with:     Cultures:	        Radiology

## 2021-01-26 NOTE — PROGRESS NOTE ADULT - NSHPATTENDINGPLANDISCUSS_GEN_ALL_CORE
Primary team.
pt, RN
pt, RN; spk to doc Main by phone [phone] - all diagnoses and management plans discussed in detail; all questions answered comprehensively
pt, pt's son, consultants, nursing
pt, pt's son, consultants, nursing

## 2021-01-26 NOTE — CONSULT NOTE ADULT - REASON FOR ADMISSION
COVID-19, hypoglycemia, UTI

## 2021-01-26 NOTE — PROGRESS NOTE ADULT - PROBLEM SELECTOR PLAN 8
-psych (João) consulted, recs appreciated  -As per son, pt was supposed to be increased to sertraline 50mg po daily as per her psychiatrist -- started this dose per Dr. Moyer recs
-Continue home medication sertraline 25 QD   -As per son, pt was supposed to be transitioned to sertraline 50mg po daily as per her psychiatrist, will dose tomorrow  -consider psych input

## 2021-01-26 NOTE — PROGRESS NOTE ADULT - PROBLEM SELECTOR PLAN 5
-Continue amlodipine, hydralazine, and enalapril w/ hold parameters and monitor hemodynamics
-Continue amlodipine, hydralazine, and enalapril w/ hold parameters and monitor hemodynamics

## 2021-01-26 NOTE — PROGRESS NOTE ADULT - PROBLEM SELECTOR PLAN 3
-pt denied dysuria, urinary frequency, flank/suprapubic tenderness -- does admit to incontinence that she states had been present for "months"  -pt had UTI/bacteremia in late Nov s/p treatment at [facility]  - pt received rocephin empirically for 3 days due to a positive UA -- per discussion with ID (Cyrus) suspicion for active UTI was low with pt endorsing no symptoms and thus Abx discontinued yesterday (pt's UCx revealed E coli resistant to cephalosporins)   -admission procalcitonin and lactate quite low at 0.08 and 1.2 respectively.  -4/4 BCx bottles NGTD.  -re-discussed pt's history and recent admission in [facility] with UTI leading to bacteremia with ID (Cyrus), and given that recent event, recommended to give 3-day course of Invanz (opted against cipro due to higher risk of c diff).  -treating empirically for suspected cystitis

## 2021-01-26 NOTE — BEHAVIORAL HEALTH ASSESSMENT NOTE - NSBHCHARTREVIEWIMAGING_PSY_A_CORE FT
< from: CT Head No Cont (01.23.21 @ 00:52) >    EXAM:  CT BRAIN                                  PROCEDURE DATE:  01/23/2021          INTERPRETATION:  Clinical Indication: Altered mental status.    Comparison: None    Technique: Noncontrast axial CT images of the head were acquired. Coronal and sagittal reformats were obtained.    Findings:  The ventricles and sulci are prominent in size, compatible with mild generalized parenchymal volume loss. No acute intracranial hemorrhage is identified.  No extra-axial fluid collection is identified.  No mass effect or midline shift is seen.  There is no evidence of acute territorial infarct.  There are periventricular and subcortical white matter hypodensities, which are nonspecific, but likely reflect mild microvascular ischemic disease.  The visualized paranasal sinuses are clear. The mastoid air cells are well aerated.  No acute osseous abnormality is seen.      Impression: No CT evidence of acute intracranial abnormality.              MELISSA JORGENSEN MD; Attending Radiologist  This documenthas been electronically signed. Jan 23 2021  1:24AM    < end of copied text >

## 2021-01-26 NOTE — PROGRESS NOTE ADULT - PROBLEM SELECTOR PROBLEM 2
Hypoglycemia
Hypoglycemia
2019 novel coronavirus disease (COVID-19)
2019 novel coronavirus disease (COVID-19)

## 2021-01-26 NOTE — PROGRESS NOTE ADULT - PROBLEM SELECTOR PLAN 6
-type 2 DM  -Hold home oral medications glimepiride and metformin and also insulin pt was receiving in [facility] was initially held when pt was hypoglycemic.   -subsequently pt became hyperglycemic as she was started on decadron  -c/w moderate dose ISS and increase admelog to 6un TID pre-meal.   -added lantus 10un QAM  -endo (Perlman), recs appreciated  -Hgb A1C of 6.9%
-type 2 DM  -Hold home oral medications glimepiride and metformin and also insulin pt was receiving in [facility] was initially held when pt was hypoglycemic.   -now pt very hyperglycemic as she is on decadron  -increased to moderate dose ISS and added a very conservative standing dose of admelog 3un TIS pre-meal.   -Hgb A1C of 6.9%

## 2021-01-27 ENCOUNTER — TRANSCRIPTION ENCOUNTER (OUTPATIENT)
Age: 71
End: 2021-01-27

## 2021-01-27 LAB
ALBUMIN SERPL ELPH-MCNC: 1.7 G/DL — LOW (ref 3.3–5)
ALBUMIN SERPL ELPH-MCNC: 1.8 G/DL — LOW (ref 3.3–5)
ALP SERPL-CCNC: 171 U/L — HIGH (ref 40–120)
ALP SERPL-CCNC: 173 U/L — HIGH (ref 40–120)
ALT FLD-CCNC: 29 U/L — SIGNIFICANT CHANGE UP (ref 12–78)
ALT FLD-CCNC: 30 U/L — SIGNIFICANT CHANGE UP (ref 12–78)
ANION GAP SERPL CALC-SCNC: 5 MMOL/L — SIGNIFICANT CHANGE UP (ref 5–17)
AST SERPL-CCNC: 35 U/L — SIGNIFICANT CHANGE UP (ref 15–37)
AST SERPL-CCNC: 36 U/L — SIGNIFICANT CHANGE UP (ref 15–37)
BASOPHILS # BLD AUTO: 0 K/UL — SIGNIFICANT CHANGE UP (ref 0–0.2)
BASOPHILS NFR BLD AUTO: 0 % — SIGNIFICANT CHANGE UP (ref 0–2)
BILIRUB DIRECT SERPL-MCNC: 0.1 MG/DL — SIGNIFICANT CHANGE UP (ref 0.05–0.2)
BILIRUB INDIRECT FLD-MCNC: 0.1 MG/DL — LOW (ref 0.2–1)
BILIRUB SERPL-MCNC: 0.2 MG/DL — SIGNIFICANT CHANGE UP (ref 0.2–1.2)
BILIRUB SERPL-MCNC: 0.2 MG/DL — SIGNIFICANT CHANGE UP (ref 0.2–1.2)
BUN SERPL-MCNC: 16 MG/DL — SIGNIFICANT CHANGE UP (ref 7–23)
CALCIUM SERPL-MCNC: 8.5 MG/DL — SIGNIFICANT CHANGE UP (ref 8.5–10.1)
CHLORIDE SERPL-SCNC: 105 MMOL/L — SIGNIFICANT CHANGE UP (ref 96–108)
CO2 SERPL-SCNC: 27 MMOL/L — SIGNIFICANT CHANGE UP (ref 22–31)
CREAT SERPL-MCNC: 0.75 MG/DL — SIGNIFICANT CHANGE UP (ref 0.5–1.3)
EOSINOPHIL # BLD AUTO: 0 K/UL — SIGNIFICANT CHANGE UP (ref 0–0.5)
EOSINOPHIL NFR BLD AUTO: 0 % — SIGNIFICANT CHANGE UP (ref 0–6)
GLUCOSE SERPL-MCNC: 156 MG/DL — HIGH (ref 70–99)
HCT VFR BLD CALC: 29.5 % — LOW (ref 34.5–45)
HGB BLD-MCNC: 9.5 G/DL — LOW (ref 11.5–15.5)
LYMPHOCYTES # BLD AUTO: 2.82 K/UL — SIGNIFICANT CHANGE UP (ref 1–3.3)
LYMPHOCYTES # BLD AUTO: 25 % — SIGNIFICANT CHANGE UP (ref 13–44)
MAGNESIUM SERPL-MCNC: 2.1 MG/DL — SIGNIFICANT CHANGE UP (ref 1.6–2.6)
MCHC RBC-ENTMCNC: 24.8 PG — LOW (ref 27–34)
MCHC RBC-ENTMCNC: 32.2 GM/DL — SIGNIFICANT CHANGE UP (ref 32–36)
MCV RBC AUTO: 77 FL — LOW (ref 80–100)
MONOCYTES # BLD AUTO: 0.9 K/UL — SIGNIFICANT CHANGE UP (ref 0–0.9)
MONOCYTES NFR BLD AUTO: 8 % — SIGNIFICANT CHANGE UP (ref 2–14)
NEUTROPHILS # BLD AUTO: 7.44 K/UL — HIGH (ref 1.8–7.4)
NEUTROPHILS NFR BLD AUTO: 64 % — SIGNIFICANT CHANGE UP (ref 43–77)
NRBC # BLD: SIGNIFICANT CHANGE UP /100 WBCS (ref 0–0)
PHOSPHATE SERPL-MCNC: 2.1 MG/DL — LOW (ref 2.5–4.5)
PLATELET # BLD AUTO: 496 K/UL — HIGH (ref 150–400)
POTASSIUM SERPL-MCNC: 4.7 MMOL/L — SIGNIFICANT CHANGE UP (ref 3.5–5.3)
POTASSIUM SERPL-SCNC: 4.7 MMOL/L — SIGNIFICANT CHANGE UP (ref 3.5–5.3)
PROT SERPL-MCNC: 5.9 G/DL — LOW (ref 6–8.3)
PROT SERPL-MCNC: 5.9 G/DL — LOW (ref 6–8.3)
RBC # BLD: 3.83 M/UL — SIGNIFICANT CHANGE UP (ref 3.8–5.2)
RBC # FLD: 17.7 % — HIGH (ref 10.3–14.5)
S PNEUM AG UR QL: POSITIVE
SARS-COV-2 IGG SERPL QL IA: NEGATIVE — SIGNIFICANT CHANGE UP
SARS-COV-2 IGM SERPL IA-ACNC: <0.1 INDEX — SIGNIFICANT CHANGE UP
SODIUM SERPL-SCNC: 137 MMOL/L — SIGNIFICANT CHANGE UP (ref 135–145)
WBC # BLD: 11.27 K/UL — HIGH (ref 3.8–10.5)
WBC # FLD AUTO: 11.27 K/UL — HIGH (ref 3.8–10.5)

## 2021-01-27 PROCEDURE — 99233 SBSQ HOSP IP/OBS HIGH 50: CPT

## 2021-01-27 RX ORDER — INSULIN GLARGINE 100 [IU]/ML
10 INJECTION, SOLUTION SUBCUTANEOUS
Qty: 0 | Refills: 0 | DISCHARGE
Start: 2021-01-27

## 2021-01-27 RX ORDER — COLLAGENASE CLOSTRIDIUM HIST. 250 UNIT/G
1 OINTMENT (GRAM) TOPICAL
Qty: 0 | Refills: 0 | DISCHARGE
Start: 2021-01-27

## 2021-01-27 RX ORDER — INSULIN HUMAN 100 [IU]/ML
0 INJECTION, SOLUTION SUBCUTANEOUS
Qty: 0 | Refills: 0 | DISCHARGE

## 2021-01-27 RX ORDER — COLLAGENASE CLOSTRIDIUM HIST. 250 UNIT/G
1 OINTMENT (GRAM) TOPICAL DAILY
Refills: 0 | Status: DISCONTINUED | OUTPATIENT
Start: 2021-01-27 | End: 2021-01-28

## 2021-01-27 RX ORDER — METFORMIN HYDROCHLORIDE 850 MG/1
1 TABLET ORAL
Qty: 0 | Refills: 0 | DISCHARGE

## 2021-01-27 RX ORDER — POTASSIUM PHOSPHATE, MONOBASIC POTASSIUM PHOSPHATE, DIBASIC 236; 224 MG/ML; MG/ML
30 INJECTION, SOLUTION INTRAVENOUS ONCE
Refills: 0 | Status: DISCONTINUED | OUTPATIENT
Start: 2021-01-27 | End: 2021-01-27

## 2021-01-27 RX ORDER — COLLAGENASE CLOSTRIDIUM HIST. 250 UNIT/G
1 OINTMENT (GRAM) TOPICAL
Qty: 0 | Refills: 0 | DISCHARGE

## 2021-01-27 RX ORDER — GLIMEPIRIDE 1 MG
1 TABLET ORAL
Qty: 0 | Refills: 0 | DISCHARGE

## 2021-01-27 RX ADMIN — Medication 6 UNIT(S): at 12:44

## 2021-01-27 RX ADMIN — TAMSULOSIN HYDROCHLORIDE 0.4 MILLIGRAM(S): 0.4 CAPSULE ORAL at 20:19

## 2021-01-27 RX ADMIN — Medication 500 MILLIGRAM(S): at 11:43

## 2021-01-27 RX ADMIN — REMDESIVIR 500 MILLIGRAM(S): 5 INJECTION INTRAVENOUS at 12:46

## 2021-01-27 RX ADMIN — ATORVASTATIN CALCIUM 20 MILLIGRAM(S): 80 TABLET, FILM COATED ORAL at 20:19

## 2021-01-27 RX ADMIN — Medication 1 APPLICATION(S): at 05:47

## 2021-01-27 RX ADMIN — Medication 1 APPLICATION(S): at 18:52

## 2021-01-27 RX ADMIN — Medication 325 MILLIGRAM(S): at 11:43

## 2021-01-27 RX ADMIN — Medication 1 TABLET(S): at 11:43

## 2021-01-27 RX ADMIN — ERTAPENEM SODIUM 120 MILLIGRAM(S): 1 INJECTION, POWDER, LYOPHILIZED, FOR SOLUTION INTRAMUSCULAR; INTRAVENOUS at 11:43

## 2021-01-27 RX ADMIN — Medication 1 APPLICATION(S): at 05:46

## 2021-01-27 RX ADMIN — Medication 6 MILLIGRAM(S): at 05:45

## 2021-01-27 RX ADMIN — AMLODIPINE BESYLATE 10 MILLIGRAM(S): 2.5 TABLET ORAL at 05:46

## 2021-01-27 RX ADMIN — Medication 2.5 MILLIGRAM(S): at 18:51

## 2021-01-27 RX ADMIN — SERTRALINE 50 MILLIGRAM(S): 25 TABLET, FILM COATED ORAL at 11:43

## 2021-01-27 RX ADMIN — Medication 4: at 17:36

## 2021-01-27 RX ADMIN — Medication 8: at 12:45

## 2021-01-27 RX ADMIN — Medication 6 UNIT(S): at 17:37

## 2021-01-27 RX ADMIN — Medication 50 MILLIGRAM(S): at 05:46

## 2021-01-27 RX ADMIN — Medication 50 MILLIGRAM(S): at 18:51

## 2021-01-27 RX ADMIN — Medication 25 MICROGRAM(S): at 05:46

## 2021-01-27 RX ADMIN — Medication 2.5 MILLIGRAM(S): at 05:46

## 2021-01-27 RX ADMIN — Medication 85 MILLIMOLE(S): at 11:42

## 2021-01-27 RX ADMIN — Medication 1 TABLET(S): at 18:51

## 2021-01-27 RX ADMIN — ENOXAPARIN SODIUM 40 MILLIGRAM(S): 100 INJECTION SUBCUTANEOUS at 11:45

## 2021-01-27 RX ADMIN — PANTOPRAZOLE SODIUM 40 MILLIGRAM(S): 20 TABLET, DELAYED RELEASE ORAL at 05:46

## 2021-01-27 NOTE — ADVANCED PRACTICE NURSE CONSULT - RECOMMEDATIONS
1. Santyl to sacral region and right buttocks unstagable pressure injury   2. Off load bilateral heels with CAIR boots  Patient educated to reposition self and reason CAIR boots are in use  RN educated in the care of this patients skin injuries.   MD made aware of recommendations

## 2021-01-27 NOTE — PROGRESS NOTE ADULT - SUBJECTIVE AND OBJECTIVE BOX
[facility] DIVISION of INFECTIOUS DISEASE  Jet Bridges MD PhD, April Cha MD, Geeta Chau MD, Tomás Erickson MD  and providing coverage with Mavis Bethea MD and Peewee Andrade MD  Providing Infectious Disease Consultations at [facility], [facility], [facility]'s      Office# [phone] to schedule follow up appointments  Answering Service for urgent calls or New Consults [phone]  Cell# to text for urgent issues Jet Bridges [phone]     Infectious diseases progress note:    MICHAEL DUFFY is a 71y y. o. Female patient    COVID Patient    Allergies    Hytrin (Unknown)    Intolerances        ANTIBIOTICS/RELEVANT:  antimicrobials  ertapenem  IVPB 1000 milliGRAM(s) IV Intermittent every 24 hours  remdesivir  IVPB   IV Intermittent   remdesivir  IVPB 100 milliGRAM(s) IV Intermittent every 24 hours    immunologic:    OTHER:  acetaminophen   Tablet .. 650 milliGRAM(s) Oral every 4 hours PRN  amLODIPine   Tablet 10 milliGRAM(s) Oral daily  ascorbic acid 500 milliGRAM(s) Oral daily  atorvastatin 20 milliGRAM(s) Oral at bedtime  clotrimazole 1% Cream 1 Application(s) Topical two times a day  dexAMETHasone  Injectable 6 milliGRAM(s) IV Push daily  dextrose 40% Gel 15 Gram(s) Oral once  dextrose 5%. 1000 milliLiter(s) IV Continuous <Continuous>  dextrose 5%. 1000 milliLiter(s) IV Continuous <Continuous>  dextrose 50% Injectable 25 Gram(s) IV Push once  dextrose 50% Injectable 12.5 Gram(s) IV Push once  dextrose 50% Injectable 25 Gram(s) IV Push once  enalapril 2.5 milliGRAM(s) Oral two times a day  enoxaparin Injectable 40 milliGRAM(s) SubCutaneous daily  ferrous    sulfate 325 milliGRAM(s) Oral daily  glucagon  Injectable 1 milliGRAM(s) IntraMuscular once  hydrALAZINE 50 milliGRAM(s) Oral two times a day  hydrocortisone 2.5% Ointment 1 Application(s) Topical two times a day  insulin glargine Injectable (LANTUS) 10 Unit(s) SubCutaneous every morning  insulin lispro (ADMELOG) corrective regimen sliding scale   SubCutaneous three times a day before meals  insulin lispro Injectable (ADMELOG) 6 Unit(s) SubCutaneous three times a day before meals  lactobacillus acidophilus 1 Tablet(s) Oral two times a day with meals  levothyroxine 25 MICROGram(s) Oral daily  multivitamin/minerals 1 Tablet(s) Oral daily  pantoprazole    Tablet 40 milliGRAM(s) Oral before breakfast  sertraline 50 milliGRAM(s) Oral daily  simethicone 80 milliGRAM(s) Chew four times a day PRN  sodium phosphate IVPB 30 milliMole(s) IV Intermittent once  tamsulosin 0.4 milliGRAM(s) Oral at bedtime      Objective:  Vital Signs Last 24 Hrs  T(C): 36.5 (27 Jan 2021 04:53), Max: 37 (26 Jan 2021 12:08)  T(F): 97.7 (27 Jan 2021 04:53), Max: 98.6 (26 Jan 2021 12:08)  HR: 77 (27 Jan 2021 04:53) (77 - 79)  BP: 149/78 (27 Jan 2021 04:53) (126/51 - 168/77)  BP(mean): --  RR: 17 (27 Jan 2021 04:53) (17 - 18)  SpO2: 97% (27 Jan 2021 04:53) (93% - 97%)    T(C): 36.5 (01-27-21 @ 04:53), Max: 37 (01-26-21 @ 12:08)  T(C): 36.5 (01-27-21 @ 04:53), Max: 37 (01-26-21 @ 12:08)  T(C): 36.5 (01-27-21 @ 04:53), Max: 37.2 (01-23-21 @ 12:16)    PHYSICAL EXAM:  HEENT: NC atraumatic  Neck: supple  Respiratory: no accessory muscle use, breathing comfortably  Cardiovascular: distant  Gastrointestinal: normal appearing, nondistended  Extremities: no clubbing, no cyanosis,        LABS:                          9.5    11.27 )-----------( 496      ( 27 Jan 2021 06:58 )             29.5       11.27 01-27 @ 06:58  9.73 01-26 @ 07:14  9.18 01-24 @ 06:43  8.61 01-23 @ 07:06  11.79 01-22 @ 23:53      01-27    137  |  105  |  16  ----------------------------<  156<H>  4.7   |  27  |  0.75    Ca    8.5      27 Jan 2021 06:58  Phos  2.1     01-27  Mg     2.1     01-27    TPro  5.9<L>  /  Alb  1.8<L>  /  TBili  0.2  /  DBili  .10  /  AST  36  /  ALT  29  /  AlkPhos  171<H>  01-27      Creatinine, Serum: 0.75 mg/dL (01-27-21 @ 06:58)  Creatinine, Serum: 0.97 mg/dL (01-26-21 @ 07:14)  Creatinine, Serum: 0.90 mg/dL (01-24-21 @ 06:43)  Creatinine, Serum: 1.00 mg/dL (01-23-21 @ 07:06)  Creatinine, Serum: 1.19 mg/dL (01-22-21 @ 23:53)                COVID RISK SCORE  Auto Neutrophil #: 6.38 K/uL (01-26-21 @ 07:14)  Auto Lymphocyte #: 1.93 K/uL (01-26-21 @ 07:14)  Auto Neutrophil #: 6.29 K/uL (01-24-21 @ 06:43)  Auto Lymphocyte #: 1.71 K/uL (01-24-21 @ 06:43)  Auto Neutrophil #: 6.11 K/uL (01-23-21 @ 07:06)  Auto Lymphocyte #: 1.03 K/uL (01-23-21 @ 07:06)  Auto Lymphocyte #: 1.33 K/uL (01-22-21 @ 23:53)  Auto Neutrophil #: 9.07 K/uL (01-22-21 @ 23:53)    Lactate, Blood: 1.2 mmol/L (01-22-21 @ 23:53)    Auto Eosinophil #: 0.03 K/uL (01-26-21 @ 07:14)  Auto Eosinophil #: 0.10 K/uL (01-24-21 @ 06:43)  Auto Eosinophil #: 0.17 K/uL (01-23-21 @ 07:06)  Auto Eosinophil #: 0.03 K/uL (01-22-21 @ 23:53)    Lactate Dehydrogenase, Serum: 358 U/L (01-23-21 @ 12:02)      Procalcitonin, Serum: 0.06 ng/mL (01-26-21 @ 07:14)  Procalcitonin, Serum: 0.08 ng/mL (01-23-21 @ 07:06)            Ferritin, Serum: 243 ng/mL (01-26-21 @ 11:32)  Ferritin, Serum: 231 ng/mL (01-23-21 @ 12:02)        Activated Partial Thromboplastin Time: 29.0 sec (01-23-21 @ 07:06)  INR: 1.10 ratio (01-23-21 @ 07:06)  Activated Partial Thromboplastin Time: 29.7 sec (01-22-21 @ 23:53)  INR: 1.05 ratio (01-22-21 @ 23:53)    D-Dimer Assay, Quantitative: 476 ng/mL DDU (01-26-21 @ 07:14)  D-Dimer Assay, Quantitative: 471 ng/mL DDU (01-23-21 @ 07:06)        MICROBIOLOGY:              RADIOLOGY & ADDITIONAL STUDIES:

## 2021-01-27 NOTE — DISCHARGE NOTE PROVIDER - DETAILS OF MALNUTRITION DIAGNOSIS/DIAGNOSES
This patient has been assessed with a concern for Malnutrition and was treated during this hospitalization for the following Nutrition diagnosis/diagnoses:     -  01/24/2021: Moderate protein-calorie malnutrition   This patient has been assessed with a concern for Malnutrition and was treated during this hospitalization for the following Nutrition diagnosis/diagnoses:     -  01/24/2021: Moderate protein-calorie malnutrition    This patient has been assessed with a concern for Malnutrition and was treated during this hospitalization for the following Nutrition diagnosis/diagnoses:     -  01/24/2021: Moderate protein-calorie malnutrition   This patient has been assessed with a concern for Malnutrition and was treated during this hospitalization for the following Nutrition diagnosis/diagnoses:     -  01/24/2021: Moderate protein-calorie malnutrition    This patient has been assessed with a concern for Malnutrition and was treated during this hospitalization for the following Nutrition diagnosis/diagnoses:     -  01/24/2021: Moderate protein-calorie malnutrition    This patient has been assessed with a concern for Malnutrition and was treated during this hospitalization for the following Nutrition diagnosis/diagnoses:     -  01/24/2021: Moderate protein-calorie malnutrition

## 2021-01-27 NOTE — PROGRESS NOTE ADULT - SUBJECTIVE AND OBJECTIVE BOX
CHIEF COMPLAINT/INTERVAL HISTORY:  Pt. seen and evaluated for acute hypoxic respiratory failure 2/2 COVID 19 pneumonia and ESBL E. coli UTI.  Pt. is in no distress.  On room air with SpO2 in 90th%.  Tolerating IV antibiotic for UTI.      REVIEW OF SYSTEMS:  No fever, CP, SOB, or abdominal pain.     Vital Signs Last 24 Hrs  T(C): 36.5 (27 Jan 2021 04:53), Max: 37 (26 Jan 2021 12:08)  T(F): 97.7 (27 Jan 2021 04:53), Max: 98.6 (26 Jan 2021 12:08)  HR: 77 (27 Jan 2021 04:53) (77 - 79)  BP: 149/78 (27 Jan 2021 04:53) (126/51 - 168/77)  BP(mean): --  RR: 17 (27 Jan 2021 04:53) (17 - 18)  SpO2: 97% (27 Jan 2021 04:53) (93% - 97%)    PHYSICAL EXAM:  GENERAL: NAD  HEENT: EOMI, hearing normal, conjunctiva and sclera clear  Chest: Diminished BS at bases, no wheezing  CV: S1S2, RRR,   GI: soft, +BS, NT/ND  Musculoskeletal: no edema  Psychiatric: affect nL, mood nL  Skin: warm and dry    LABS:                        9.5    11.27 )-----------( 496      ( 27 Jan 2021 06:58 )             29.5     01-27    137  |  105  |  16  ----------------------------<  156<H>  4.7   |  27  |  0.75    Ca    8.5      27 Jan 2021 06:58  Phos  2.1     01-27  Mg     2.1     01-27    TPro  5.9<L>  /  Alb  1.8<L>  /  TBili  0.2  /  DBili  .10  /  AST  36  /  ALT  29  /  AlkPhos  171<H>  01-27          Assessment and Plan:  -Acute hypoxic respiratory failure 2/2 COVID 19 pneumonia:  Blood cx NGTD.  Continue Remdesivir and Decadron 6mg IV daily.  Continue to monitor SpO2 on room air.  ID and Pulmonary f/u  -Acute metabolic encephalopathy:  likely multifactorial with hypoglycemia, UTI, and COVID 19 infection.  CT head negative for acute pathology.    -ESBL E. coli UTI:  continue Invanz 1gm IV daily x 3 days per ID  -Hyponatremia:  resolved with hydration  -HTN:  continue Norvasc 10mg PO daily, Enalapril 2.5mg PO BID, and Hydralazine 50mg PO BID  -Type 2 DM:  continue Lantus 10 units SQ QAM and lispro 6 units SQ before meals + sliding scale.  Endocrine f/u  -Anemia:  Hbg stable.  Continue ferrous sulfate 325mg PO daily  -Depression:  continue Zoloft 50mg PO daily  -HLD:  continue statin therapy  -Hypothyroidism:  continue Synthroid 25mcg PO daily  -VTE ppx:  Lovenox 40mg SQ daily   CHIEF COMPLAINT/INTERVAL HISTORY:  Pt. seen and evaluated for acute hypoxic respiratory failure 2/2 COVID 19 pneumonia and ESBL E. coli UTI.  Pt. is in no distress.  On room air with SpO2 in 90th%.  Tolerating IV antibiotic for UTI.      REVIEW OF SYSTEMS:  No fever, CP, SOB, or abdominal pain.     Vital Signs Last 24 Hrs  T(C): 36.5 (27 Jan 2021 04:53), Max: 37 (26 Jan 2021 12:08)  T(F): 97.7 (27 Jan 2021 04:53), Max: 98.6 (26 Jan 2021 12:08)  HR: 77 (27 Jan 2021 04:53) (77 - 79)  BP: 149/78 (27 Jan 2021 04:53) (126/51 - 168/77)  BP(mean): --  RR: 17 (27 Jan 2021 04:53) (17 - 18)  SpO2: 97% (27 Jan 2021 04:53) (93% - 97%)    PHYSICAL EXAM:  GENERAL: NAD  HEENT: EOMI, hearing normal, conjunctiva and sclera clear  Chest: Diminished BS at bases, no wheezing  CV: S1S2, RRR,   GI: soft, +BS, NT/ND  Musculoskeletal: no edema  Psychiatric: affect nL, mood nL  Skin: warm and dry    LABS:                        9.5    11.27 )-----------( 496      ( 27 Jan 2021 06:58 )             29.5     01-27    137  |  105  |  16  ----------------------------<  156<H>  4.7   |  27  |  0.75    Ca    8.5      27 Jan 2021 06:58  Phos  2.1     01-27  Mg     2.1     01-27    TPro  5.9<L>  /  Alb  1.8<L>  /  TBili  0.2  /  DBili  .10  /  AST  36  /  ALT  29  /  AlkPhos  171<H>  01-27          Assessment and Plan:  -Acute hypoxic respiratory failure 2/2 COVID 19 pneumonia:  Blood cx NGTD.  Continue Remdesivir and Decadron 6mg IV daily.  Continue to monitor SpO2 on room air.  ID and Pulmonary f/u  -Acute metabolic encephalopathy:  likely multifactorial with hypoglycemia, UTI, and COVID 19 infection.  CT head negative for acute pathology.    -ESBL E. coli UTI:  continue Invanz 1gm IV daily x 3 days per ID  -Hyponatremia:  resolved with hydration  -HTN:  continue Norvasc 10mg PO daily, Enalapril 2.5mg PO BID, and Hydralazine 50mg PO BID  -Type 2 DM:  continue Lantus 10 units SQ QAM and lispro 6 units SQ before meals + sliding scale.  Endocrine f/u  -Anemia:  Hbg stable.  Continue ferrous sulfate 325mg PO daily  -Depression:  continue Zoloft 50mg PO daily  -HLD:  continue statin therapy  -Hypothyroidism:  continue Synthroid 25mcg PO daily  -VTE ppx:  Lovenox 40mg SQ daily    IMPROVE VTE Individual Risk Assessment          RISK                                                          Points  [  ] Previous VTE                                                3  [  ] Thrombophilia                                             2  [  ] Lower limb paralysis                                   2        (unable to hold up >15 seconds)    [  ] Current Cancer                                             2         (within 6 months)  [  ] Immobilization > 24 hrs                              1  [  ] ICU/CCU stay > 24 hours                             1  [ x ] Age > 60                                                         1    IMPROVE VTE Score: 1

## 2021-01-27 NOTE — ADVANCED PRACTICE NURSE CONSULT - ASSESSMENT
patient is a 72 yo bedbound female admitted with ARDs 2/2 COVID +: She has a PMHX of hypothyroid, HLD, HTN, MDD, DM, thyroid disorder: Presents with:   1 sacral region extending to right buttock Unstagable pressure injury  etiology appears to be from moisture: 6 x 4 no odor, scant serosanguinous drainage TTP, periwound blanchable erythema.   2. Left heel  deep tissue pressure injury (DTPI) 3 x 3 TTP periwound blanchable erythema    Pertinent labs: WBC 11.27, blood glucose 311, albumin 1.8, procalcitonin 0.06, CRP  6.90, Phosphorus 2.1

## 2021-01-27 NOTE — DISCHARGE NOTE PROVIDER - NSDCCPCAREPLAN_GEN_ALL_CORE_FT
PRINCIPAL DISCHARGE DIAGNOSIS  Diagnosis: COVID-19  Assessment and Plan of Treatment: You completed a course of Remdesivir during the hospitalization.  You have been able to maintain oxygen saturation in 90th% on room air.  Please complete course of Decadron.      SECONDARY DISCHARGE DIAGNOSES  Diagnosis: Hypoglycemia  Assessment and Plan of Treatment: Resolved.  Continue insulin regimen with close monitoring of blood glucose levels.  Please discontinue Lantus when patient is no longer on Decadron.    Diagnosis: UTI (urinary tract infection)  Assessment and Plan of Treatment: You were treated and completed a course of IV antibiotics.    Diagnosis: Hypothyroidism  Assessment and Plan of Treatment: Continue Synthroid.     PRINCIPAL DISCHARGE DIAGNOSIS  Diagnosis: COVID-19  Assessment and Plan of Treatment: You completed a course of Remdesivir and Decadron during the hospitalization.  You have been able to maintain oxygen saturation in 90th% on room air. You have since tested negative twice for COVID 19.      SECONDARY DISCHARGE DIAGNOSES  Diagnosis: Hypoglycemia  Assessment and Plan of Treatment: Resolved.  Continue lispro sliding scale  with close monitoring of blood glucose levels.    Diagnosis: UTI (urinary tract infection)  Assessment and Plan of Treatment: You were treated and completed a course of IV antibiotics.    Diagnosis: Hypothyroidism  Assessment and Plan of Treatment: Continue Synthroid.

## 2021-01-27 NOTE — DISCHARGE NOTE PROVIDER - NSDCMRMEDTOKEN_GEN_ALL_CORE_FT
Acidophilus oral capsule: 2 cap(s) orally 3 times a day  amLODIPine 10 mg oral tablet: 1 tab(s) orally once a day  ascorbic acid 500 mg oral tablet: 2 tab(s) orally once a day  atorvastatin 20 mg oral tablet: 1 tab(s) orally once a day  collagenase 250 units/g topical ointment: 1 application topically once a day, As needed, soiling.  Cleanse with NS, paint periwound with cavilon skin protectant, apply Santyl one donald thick, cover with adaptic, 4x4, abd, secure with tegaderm right buttock.   collagenase 250 units/g topical ointment: 1 application topically once a day, As needed, soiling.  Cleanse with NS, paint periwound with cavilon skin protectant, apply Santyl one donald thick, cover with adaptic, 4x4, abd, secure with tegaderm to sacrum  dexamethasone 6 mg oral tablet: 1 tab(s) orally once a day x 5 days.  enalapril 2.5 mg oral tablet: 1 tab(s) orally 2 times a day  ferrous sulfate 325 mg (65 mg elemental iron) oral tablet: 1 tab(s) orally every other day  Flomax 0.4 mg oral capsule: 1 cap(s) orally once a day  hydrALAZINE 50 mg oral tablet: 1 tab(s) orally 2 times a day  insulin glargine: 10 unit(s) subcutaneous once a day while on Decadron  insulin lispro 100 units/mL subcutaneous solution: 2 Unit(s) if Glucose 151 - 200  4 Unit(s) if Glucose 201 - 250  6 Unit(s) if Glucose 251 - 300  8 Unit(s) if Glucose 301 - 350  10 Unit(s) if Glucose 351 - 400  12 Unit(s) if Glucose Greater Than 400    before meals when eating  levothyroxine 25 mcg (0.025 mg) oral tablet: 1 tab(s) orally once a day  Multiple Vitamins with Minerals oral tablet: 1 tab(s) orally once a day  PriLOSEC 40 mg oral delayed release capsule: 1 cap(s) orally once a day  sertraline 25 mg oral tablet: 1 tab(s) orally once a day  simethicone 80 mg oral tablet, chewable: 1 tab(s) orally 4 times a day (after meals and at bedtime), As Needed  Tylenol 325 mg oral tablet: 2 tab(s) orally every 6 hours, As Needed   Acidophilus oral capsule: 2 cap(s) orally 3 times a day  amLODIPine 10 mg oral tablet: 1 tab(s) orally once a day  ascorbic acid 500 mg oral tablet: 2 tab(s) orally once a day  aspirin 81 mg oral tablet: 1 tab(s) orally once a day   atorvastatin 20 mg oral tablet: 1 tab(s) orally once a day  collagenase 250 units/g topical ointment: 1 application topically once a day, As needed, soiling.  Cleanse with NS, paint periwound with cavilon skin protectant, apply Santyl one donald thick, cover with adaptic, 4x4, abd, secure with tegaderm right buttock.   collagenase 250 units/g topical ointment: 1 application topically once a day, As needed, soiling.  Cleanse with NS, paint periwound with cavilon skin protectant, apply Santyl one donald thick, cover with adaptic, 4x4, abd, secure with tegaderm to sacrum  dexamethasone 6 mg oral tablet: 1 tab(s) orally once a day x 5 days.  enalapril 2.5 mg oral tablet: 1 tab(s) orally 2 times a day  ferrous sulfate 325 mg (65 mg elemental iron) oral tablet: 1 tab(s) orally every other day  Flomax 0.4 mg oral capsule: 1 cap(s) orally once a day  hydrALAZINE 50 mg oral tablet: 1 tab(s) orally 2 times a day  insulin glargine: 10 unit(s) subcutaneous once a day while on Decadron  insulin lispro 100 units/mL subcutaneous solution: 2 Unit(s) if Glucose 151 - 200  4 Unit(s) if Glucose 201 - 250  6 Unit(s) if Glucose 251 - 300  8 Unit(s) if Glucose 301 - 350  10 Unit(s) if Glucose 351 - 400  12 Unit(s) if Glucose Greater Than 400    before meals when eating  levothyroxine 25 mcg (0.025 mg) oral tablet: 1 tab(s) orally once a day  Multiple Vitamins with Minerals oral tablet: 1 tab(s) orally once a day  PriLOSEC 40 mg oral delayed release capsule: 1 cap(s) orally once a day  sertraline 25 mg oral tablet: 1 tab(s) orally once a day  simethicone 80 mg oral tablet, chewable: 1 tab(s) orally 4 times a day (after meals and at bedtime), As Needed  Tylenol 325 mg oral tablet: 2 tab(s) orally every 6 hours, As Needed   Acidophilus oral capsule: 2 cap(s) orally 3 times a day  amLODIPine 10 mg oral tablet: 1 tab(s) orally once a day  ascorbic acid 500 mg oral tablet: 2 tab(s) orally once a day  aspirin 81 mg oral tablet: 1 tab(s) orally once a day   atorvastatin 20 mg oral tablet: 1 tab(s) orally once a day  collagenase 250 units/g topical ointment: 1 application topically once a day, As needed, soiling.  Cleanse with NS, paint periwound with cavilon skin protectant, apply Santyl one donald thick, cover with adaptic, 4x4, abd, secure with tegaderm right buttock.   collagenase 250 units/g topical ointment: 1 application topically once a day, As needed, soiling.  Cleanse with NS, paint periwound with cavilon skin protectant, apply Santyl one donald thick, cover with adaptic, 4x4, abd, secure with tegaderm to sacrum  enalapril 2.5 mg oral tablet: 1 tab(s) orally 2 times a day  ferrous sulfate 325 mg (65 mg elemental iron) oral tablet: 1 tab(s) orally every other day  Flomax 0.4 mg oral capsule: 1 cap(s) orally once a day  hydrALAZINE 50 mg oral tablet: 1 tab(s) orally 2 times a day  insulin lispro 100 units/mL subcutaneous solution: 2 Unit(s) if Glucose 151 - 200  4 Unit(s) if Glucose 201 - 250  6 Unit(s) if Glucose 251 - 300  8 Unit(s) if Glucose 301 - 350  10 Unit(s) if Glucose 351 - 400  12 Unit(s) if Glucose Greater Than 400    before meals when eating  levothyroxine 25 mcg (0.025 mg) oral tablet: 1 tab(s) orally once a day  Multiple Vitamins with Minerals oral tablet: 1 tab(s) orally once a day  PriLOSEC 40 mg oral delayed release capsule: 1 cap(s) orally once a day  sertraline 25 mg oral tablet: 1 tab(s) orally once a day  simethicone 80 mg oral tablet, chewable: 1 tab(s) orally 4 times a day (after meals and at bedtime), As Needed  Tylenol 325 mg oral tablet: 2 tab(s) orally every 6 hours, As Needed

## 2021-01-27 NOTE — DISCHARGE NOTE PROVIDER - HOSPITAL COURSE
71 year old female PMHx of DM type 2, HTN, HLD, MDD, Hypothyroidism, presented to the [place] ED with AMS and hypoglycemia.  History obtained from chart review and son. Patient is AAOx3 but she is a poor historian. As per son, pt was independent/driving car/living in her house alone until Nov 24th. A month prior to this she lost her  and became more depressed. On Nov 24th, she was found on the floor of her living room and she could not remember how she got there, she was brought to [facility], where she spent 8 days and was diagnosed with a UTI/bacteremia for which was she dc home with IV abx and a Davila. She was more bed bound at home after this hospitalization, her blood sugar kept dropping and she was readmitted to [facility], this time treated for dehydration/DANIEL/ and diarrhea. Her diarrhea was attributed to her abx use and she tested negative for Cdiff multiple times. She also received a unit of PRBC for a Hb of 8.2. On Dec 22 she was D/c to [facility] nursing home and because of nausea and vomiting (vomit had some blood) she was brought back to the hospital where she stayed  a few days then was discharged to [facility]. The night prior to admission, pt was sent to [facility] from [facility] because she had an episode of AMS where she was nonverbal/incoherant w/ a blood sugar of 45. History of episode was limited as nursing staff that witnessed episode were not at [facility] at time of admission.  As per chart, staff reported that patient had poor PO intake yesterday so they held her insulin.  At night, she was noted to be altered and confused.  EMS arrived and noted FS of 45.  She was given D10 which resulted in prompt improvement in mental status.  Her blood sugar was 160 en route.  She ambulates with a walker but admits that she is not very active secondary to neuropathy.   Today patient states that she still has diarrhea about every 2 hours and she has also had increased urinary frequency for about a month. Her Davila was discontinued about the end of Dec as per son and she has had increased urinary frequency since then.    Vitals at [place]: T: 94.9, HR: 64, BP: 146/62, RR: 20 O2: 98 on RA   Labs significant for: WBC: 11.79, Hb/Hct: 9.2/28.0, Na: 130, BUN/Cr: 27/1.19, Alk phso: 156, +COVID   UA: positive for nitrate, moderate leukocyte esterase, bacteria,WBC   CXR: Lungs are clear.  No large effusions or pneumothoraces.  Cardiomediastinal silhouette is within normal limits.  Osseous structures are unremarkable for age.    CT head: No CT evidence of acute intracranial abnormality.  In the [place] ED patient received: Vanco, Zosyn and was started on d5 + IVF    Pt. was then transferred to [facility] and admitted with ID and Pulmonary consultation.  She was started on Remdesivir and Decadron. Blood cultures showed no growth and urine culture grew out ESBL E. coli.  Pt. was then started on Invanz for treatment of UTI.  Urine legionella antigen was negative.  Urine streptococcus pneumoniae Ag was positive.  Pt. has completed a course of Remdesivir and IV antibiotics.  Pt. has been able to maintain SpO2 in 90th% on room air.      On day of discharge patient is in no distress.  Afebrile and hemodynamically stable.  Able to maintain SpO2 in 90th% on room air.      Completion of discharge in 35 minutes. 71 year old female PMHx of DM type 2, HTN, HLD, MDD, Hypothyroidism, presented to the [place] ED with AMS and hypoglycemia.  History obtained from chart review and son. Patient is AAOx3 but she is a poor historian. As per son, pt was independent/driving car/living in her house alone until Nov 24th. A month prior to this she lost her  and became more depressed. On Nov 24th, she was found on the floor of her living room and she could not remember how she got there, she was brought to [facility], where she spent 8 days and was diagnosed with a UTI/bacteremia for which was she dc home with IV abx and a Davila. She was more bed bound at home after this hospitalization, her blood sugar kept dropping and she was readmitted to [facility], this time treated for dehydration/DANIEL/ and diarrhea. Her diarrhea was attributed to her abx use and she tested negative for Cdiff multiple times. She also received a unit of PRBC for a Hb of 8.2. On Dec 22 she was D/c to [facility] nursing home and because of nausea and vomiting (vomit had some blood) she was brought back to the hospital where she stayed  a few days then was discharged to [facility]. The night prior to admission, pt was sent to [facility] from [facility] because she had an episode of AMS where she was nonverbal/incoherant w/ a blood sugar of 45. History of episode was limited as nursing staff that witnessed episode were not at [facility] at time of admission.  As per chart, staff reported that patient had poor PO intake yesterday so they held her insulin.  At night, she was noted to be altered and confused.  EMS arrived and noted FS of 45.  She was given D10 which resulted in prompt improvement in mental status.  Her blood sugar was 160 en route.  She ambulates with a walker but admits that she is not very active secondary to neuropathy.   Today patient states that she still has diarrhea about every 2 hours and she has also had increased urinary frequency for about a month. Her Davila was discontinued about the end of Dec as per son and she has had increased urinary frequency since then.    Vitals at [place]: T: 94.9, HR: 64, BP: 146/62, RR: 20 O2: 98 on RA   Labs significant for: WBC: 11.79, Hb/Hct: 9.2/28.0, Na: 130, BUN/Cr: 27/1.19, Alk phso: 156, +COVID   UA: positive for nitrate, moderate leukocyte esterase, bacteria,WBC   CXR: Lungs are clear.  No large effusions or pneumothoraces.  Cardiomediastinal silhouette is within normal limits.  Osseous structures are unremarkable for age.    CT head: No CT evidence of acute intracranial abnormality.  In the [place] ED patient received: Vanco, Zosyn and was started on d5 + IVF    Pt. was then transferred to [facility] and admitted with ID and Pulmonary consultation.  She was started on Remdesivir and Decadron. Blood cultures showed no growth and urine culture grew out ESBL E. coli.  Pt. was then started on Invanz for treatment of UTI.  Urine legionella antigen was negative.  Urine streptococcus pneumoniae Ag was positive.  Pt. has completed a course of Remdesivir and IV antibiotics.  Pt. has been able to maintain SpO2 in 90th% on room air.      On day of discharge patient is in no distress.  Afebrile and hemodynamically stable.  Able to maintain SpO2 in 90th% on room air.      Completion of discharge in 40 minutes. 71 year old female PMHx of DM type 2, HTN, HLD, MDD, Hypothyroidism, presented to the [place] ED with AMS and hypoglycemia.  History obtained from chart review and son. Patient is AAOx3 but she is a poor historian. As per son, pt was independent/driving car/living in her house alone until Nov 24th. A month prior to this she lost her  and became more depressed. On Nov 24th, she was found on the floor of her living room and she could not remember how she got there, she was brought to [facility], where she spent 8 days and was diagnosed with a UTI/bacteremia for which was she dc home with IV abx and a Davila. She was more bed bound at home after this hospitalization, her blood sugar kept dropping and she was readmitted to [facility], this time treated for dehydration/DANIEL/ and diarrhea. Her diarrhea was attributed to her abx use and she tested negative for Cdiff multiple times. She also received a unit of PRBC for a Hb of 8.2. On Dec 22 she was D/c to [facility] nursing home and because of nausea and vomiting (vomit had some blood) she was brought back to the hospital where she stayed  a few days then was discharged to [facility]. The night prior to admission, pt was sent to [facility] from [facility] because she had an episode of AMS where she was nonverbal/incoherant w/ a blood sugar of 45. History of episode was limited as nursing staff that witnessed episode were not at [facility] at time of admission.  As per chart, staff reported that patient had poor PO intake yesterday so they held her insulin.  At night, she was noted to be altered and confused.  EMS arrived and noted FS of 45.  She was given D10 which resulted in prompt improvement in mental status.  Her blood sugar was 160 en route.  She ambulates with a walker but admits that she is not very active secondary to neuropathy.   Today patient states that she still has diarrhea about every 2 hours and she has also had increased urinary frequency for about a month. Her Davila was discontinued about the end of Dec as per son and she has had increased urinary frequency since then.    Vitals at [place]: T: 94.9, HR: 64, BP: 146/62, RR: 20 O2: 98 on RA   Labs significant for: WBC: 11.79, Hb/Hct: 9.2/28.0, Na: 130, BUN/Cr: 27/1.19, Alk phso: 156, +COVID   UA: positive for nitrate, moderate leukocyte esterase, bacteria,WBC   CXR: Lungs are clear.  No large effusions or pneumothoraces.  Cardiomediastinal silhouette is within normal limits.  Osseous structures are unremarkable for age.    CT head: No CT evidence of acute intracranial abnormality.  In the [place] ED patient received: Vanco, Zosyn and was started on d5 + IVF    Pt. was then transferred to [facility] and admitted with ID and Pulmonary consultation.  She was started on Remdesivir and Decadron. Blood cultures showed no growth and urine culture grew out ESBL E. coli.  Pt. was then started on Invanz for treatment of UTI.  Urine legionella antigen was negative.  Urine streptococcus pneumoniae Ag was positive.  Pt. has completed a course of Remdesivir, Decadron, and IV antibiotics.  Pt. has been able to maintain SpO2 in 90th% on room air.  Endocrine was consulted during the hospitalization for management of her diabetes.  Pt. has since had COVID 19 PCR testing and has been negative x 2.      On day of discharge patient is in no distress.  Afebrile and hemodynamically stable.  Able to maintain SpO2 in 90th% on room air.      Completion of discharge in 40 minutes.

## 2021-01-27 NOTE — PROGRESS NOTE ADULT - SUBJECTIVE AND OBJECTIVE BOX
Date/Time Patient Seen:  		  Referring MD:   Data Reviewed	       Patient is a 71y old  Female who presents with a chief complaint of COVID-19 infection, hypoglycemia (26 Jan 2021 17:48)      Subjective/HPI     PAST MEDICAL & SURGICAL HISTORY:  MARRY (acute respiratory infection)    Hypothyroid    MDD (major depressive disorder)    Diabetes mellitus  type 2    Hypertension    Hyperlipidemia    Depression    Changes in skin texture    Flatulence    Major depressive disorder, recurrent    Disorder of thyroid, unspecified    Ascorbic acid deficiency    Pain, unspecified    Hyperlipidemia, unspecified    Acute kidney failure, unspecified    Syncope and collapse    Essential (primary) hypertension    Type 2 diabetes mellitus without complication    Intraoperative hemorrhage and hematoma of a genitourinary system organ or structure complicating other procedure    High cholesterol    Diabetes    HTN (hypertension)    History of colon surgery    H/O shoulder surgery    H/O lumpectomy    No significant past surgical history    No significant past surgical history          Medication list         MEDICATIONS  (STANDING):  amLODIPine   Tablet 10 milliGRAM(s) Oral daily  ascorbic acid 500 milliGRAM(s) Oral daily  atorvastatin 20 milliGRAM(s) Oral at bedtime  clotrimazole 1% Cream 1 Application(s) Topical two times a day  dexAMETHasone  Injectable 6 milliGRAM(s) IV Push daily  dextrose 40% Gel 15 Gram(s) Oral once  dextrose 5%. 1000 milliLiter(s) (50 mL/Hr) IV Continuous <Continuous>  dextrose 5%. 1000 milliLiter(s) (100 mL/Hr) IV Continuous <Continuous>  dextrose 50% Injectable 25 Gram(s) IV Push once  dextrose 50% Injectable 12.5 Gram(s) IV Push once  dextrose 50% Injectable 25 Gram(s) IV Push once  enalapril 2.5 milliGRAM(s) Oral two times a day  enoxaparin Injectable 40 milliGRAM(s) SubCutaneous daily  ertapenem  IVPB 1000 milliGRAM(s) IV Intermittent every 24 hours  ferrous    sulfate 325 milliGRAM(s) Oral daily  glucagon  Injectable 1 milliGRAM(s) IntraMuscular once  hydrALAZINE 50 milliGRAM(s) Oral two times a day  hydrocortisone 2.5% Ointment 1 Application(s) Topical two times a day  insulin glargine Injectable (LANTUS) 10 Unit(s) SubCutaneous every morning  insulin lispro (ADMELOG) corrective regimen sliding scale   SubCutaneous three times a day before meals  insulin lispro Injectable (ADMELOG) 6 Unit(s) SubCutaneous three times a day before meals  lactobacillus acidophilus 1 Tablet(s) Oral two times a day with meals  levothyroxine 25 MICROGram(s) Oral daily  multivitamin/minerals 1 Tablet(s) Oral daily  pantoprazole    Tablet 40 milliGRAM(s) Oral before breakfast  remdesivir  IVPB   IV Intermittent   remdesivir  IVPB 100 milliGRAM(s) IV Intermittent every 24 hours  sertraline 50 milliGRAM(s) Oral daily  tamsulosin 0.4 milliGRAM(s) Oral at bedtime    MEDICATIONS  (PRN):  acetaminophen   Tablet .. 650 milliGRAM(s) Oral every 4 hours PRN Temp greater or equal to 38.5C (101.3F)  simethicone 80 milliGRAM(s) Chew four times a day PRN Gas         Vitals log        ICU Vital Signs Last 24 Hrs  T(C): 36.5 (27 Jan 2021 04:53), Max: 37 (26 Jan 2021 12:08)  T(F): 97.7 (27 Jan 2021 04:53), Max: 98.6 (26 Jan 2021 12:08)  HR: 77 (27 Jan 2021 04:53) (77 - 79)  BP: 149/78 (27 Jan 2021 04:53) (126/51 - 168/77)  BP(mean): --  ABP: --  ABP(mean): --  RR: 17 (27 Jan 2021 04:53) (17 - 18)  SpO2: 97% (27 Jan 2021 04:53) (93% - 97%)           Input and Output:  I&O's Detail    25 Jan 2021 07:01  -  26 Jan 2021 07:00  --------------------------------------------------------  IN:  Total IN: 0 mL    OUT:    Voided (mL): 600 mL  Total OUT: 600 mL    Total NET: -600 mL          Lab Data                        9.1    9.73  )-----------( 489      ( 26 Jan 2021 07:14 )             27.8     01-26    136  |  106  |  16  ----------------------------<  281<H>  4.4   |  22  |  0.97    Ca    8.0<L>      26 Jan 2021 07:14  Phos  1.8     01-26  Mg     1.6     01-26    TPro  5.9<L>  /  Alb  1.7<L>  /  TBili  0.2  /  DBili  <.10  /  AST  29  /  ALT  23  /  AlkPhos  179<H>  01-26            Review of Systems	      Objective     Physical Examination    heart s1s2  lung dec BS      Pertinent Lab findings & Imaging      Lola:  NO   Adequate UO     I&O's Detail    25 Jan 2021 07:01  -  26 Jan 2021 07:00  --------------------------------------------------------  IN:  Total IN: 0 mL    OUT:    Voided (mL): 600 mL  Total OUT: 600 mL    Total NET: -600 mL               Discussed with:     Cultures:	        Radiology

## 2021-01-28 ENCOUNTER — TRANSCRIPTION ENCOUNTER (OUTPATIENT)
Age: 71
End: 2021-01-28

## 2021-01-28 VITALS
OXYGEN SATURATION: 91 % | SYSTOLIC BLOOD PRESSURE: 119 MMHG | TEMPERATURE: 98 F | RESPIRATION RATE: 17 BRPM | HEART RATE: 69 BPM | DIASTOLIC BLOOD PRESSURE: 61 MMHG

## 2021-01-28 LAB
ALBUMIN SERPL ELPH-MCNC: 1.7 G/DL — LOW (ref 3.3–5)
ALBUMIN SERPL ELPH-MCNC: 1.7 G/DL — LOW (ref 3.3–5)
ALP SERPL-CCNC: 167 U/L — HIGH (ref 40–120)
ALP SERPL-CCNC: 167 U/L — HIGH (ref 40–120)
ALT FLD-CCNC: 28 U/L — SIGNIFICANT CHANGE UP (ref 12–78)
ALT FLD-CCNC: 29 U/L — SIGNIFICANT CHANGE UP (ref 12–78)
ANION GAP SERPL CALC-SCNC: 9 MMOL/L — SIGNIFICANT CHANGE UP (ref 5–17)
AST SERPL-CCNC: 29 U/L — SIGNIFICANT CHANGE UP (ref 15–37)
AST SERPL-CCNC: 33 U/L — SIGNIFICANT CHANGE UP (ref 15–37)
BASOPHILS # BLD AUTO: 0.02 K/UL — SIGNIFICANT CHANGE UP (ref 0–0.2)
BASOPHILS NFR BLD AUTO: 0.2 % — SIGNIFICANT CHANGE UP (ref 0–2)
BILIRUB DIRECT SERPL-MCNC: <.1 MG/DL — SIGNIFICANT CHANGE UP (ref 0.05–0.2)
BILIRUB INDIRECT FLD-MCNC: >0.3 MG/DL — SIGNIFICANT CHANGE UP (ref 0.2–1)
BILIRUB SERPL-MCNC: 0.2 MG/DL — SIGNIFICANT CHANGE UP (ref 0.2–1.2)
BILIRUB SERPL-MCNC: 0.4 MG/DL — SIGNIFICANT CHANGE UP (ref 0.2–1.2)
BUN SERPL-MCNC: 19 MG/DL — SIGNIFICANT CHANGE UP (ref 7–23)
CALCIUM SERPL-MCNC: 7.7 MG/DL — LOW (ref 8.5–10.1)
CHLORIDE SERPL-SCNC: 103 MMOL/L — SIGNIFICANT CHANGE UP (ref 96–108)
CO2 SERPL-SCNC: 23 MMOL/L — SIGNIFICANT CHANGE UP (ref 22–31)
CREAT SERPL-MCNC: 0.97 MG/DL — SIGNIFICANT CHANGE UP (ref 0.5–1.3)
CULTURE RESULTS: SIGNIFICANT CHANGE UP
CULTURE RESULTS: SIGNIFICANT CHANGE UP
EOSINOPHIL # BLD AUTO: 0.09 K/UL — SIGNIFICANT CHANGE UP (ref 0–0.5)
EOSINOPHIL NFR BLD AUTO: 0.9 % — SIGNIFICANT CHANGE UP (ref 0–6)
GLUCOSE SERPL-MCNC: 198 MG/DL — HIGH (ref 70–99)
HCT VFR BLD CALC: 28.1 % — LOW (ref 34.5–45)
HGB BLD-MCNC: 9 G/DL — LOW (ref 11.5–15.5)
IMM GRANULOCYTES NFR BLD AUTO: 2.8 % — HIGH (ref 0–1.5)
LYMPHOCYTES # BLD AUTO: 2.53 K/UL — SIGNIFICANT CHANGE UP (ref 1–3.3)
LYMPHOCYTES # BLD AUTO: 24.8 % — SIGNIFICANT CHANGE UP (ref 13–44)
MAGNESIUM SERPL-MCNC: 1.7 MG/DL — SIGNIFICANT CHANGE UP (ref 1.6–2.6)
MCHC RBC-ENTMCNC: 24.9 PG — LOW (ref 27–34)
MCHC RBC-ENTMCNC: 32 GM/DL — SIGNIFICANT CHANGE UP (ref 32–36)
MCV RBC AUTO: 77.8 FL — LOW (ref 80–100)
MONOCYTES # BLD AUTO: 0.84 K/UL — SIGNIFICANT CHANGE UP (ref 0–0.9)
MONOCYTES NFR BLD AUTO: 8.2 % — SIGNIFICANT CHANGE UP (ref 2–14)
NEUTROPHILS # BLD AUTO: 6.43 K/UL — SIGNIFICANT CHANGE UP (ref 1.8–7.4)
NEUTROPHILS NFR BLD AUTO: 63.1 % — SIGNIFICANT CHANGE UP (ref 43–77)
NRBC # BLD: 0 /100 WBCS — SIGNIFICANT CHANGE UP (ref 0–0)
PHOSPHATE SERPL-MCNC: 3 MG/DL — SIGNIFICANT CHANGE UP (ref 2.5–4.5)
PLATELET # BLD AUTO: 433 K/UL — HIGH (ref 150–400)
POTASSIUM SERPL-MCNC: 4.4 MMOL/L — SIGNIFICANT CHANGE UP (ref 3.5–5.3)
POTASSIUM SERPL-SCNC: 4.4 MMOL/L — SIGNIFICANT CHANGE UP (ref 3.5–5.3)
PROT SERPL-MCNC: 5.5 G/DL — LOW (ref 6–8.3)
PROT SERPL-MCNC: 5.5 G/DL — LOW (ref 6–8.3)
RBC # BLD: 3.61 M/UL — LOW (ref 3.8–5.2)
RBC # FLD: 17.7 % — HIGH (ref 10.3–14.5)
SODIUM SERPL-SCNC: 135 MMOL/L — SIGNIFICANT CHANGE UP (ref 135–145)
SPECIMEN SOURCE: SIGNIFICANT CHANGE UP
SPECIMEN SOURCE: SIGNIFICANT CHANGE UP
WBC # BLD: 10.2 K/UL — SIGNIFICANT CHANGE UP (ref 3.8–10.5)
WBC # FLD AUTO: 10.2 K/UL — SIGNIFICANT CHANGE UP (ref 3.8–10.5)

## 2021-01-28 PROCEDURE — 83615 LACTATE (LD) (LDH) ENZYME: CPT

## 2021-01-28 PROCEDURE — 99285 EMERGENCY DEPT VISIT HI MDM: CPT

## 2021-01-28 PROCEDURE — 36415 COLL VENOUS BLD VENIPUNCTURE: CPT

## 2021-01-28 PROCEDURE — 87449 NOS EACH ORGANISM AG IA: CPT

## 2021-01-28 PROCEDURE — 86803 HEPATITIS C AB TEST: CPT

## 2021-01-28 PROCEDURE — 85610 PROTHROMBIN TIME: CPT

## 2021-01-28 PROCEDURE — 86769 SARS-COV-2 COVID-19 ANTIBODY: CPT

## 2021-01-28 PROCEDURE — 85025 COMPLETE CBC W/AUTO DIFF WBC: CPT

## 2021-01-28 PROCEDURE — 82962 GLUCOSE BLOOD TEST: CPT

## 2021-01-28 PROCEDURE — 99239 HOSP IP/OBS DSCHRG MGMT >30: CPT

## 2021-01-28 PROCEDURE — 80076 HEPATIC FUNCTION PANEL: CPT

## 2021-01-28 PROCEDURE — 84550 ASSAY OF BLOOD/URIC ACID: CPT

## 2021-01-28 PROCEDURE — 83935 ASSAY OF URINE OSMOLALITY: CPT

## 2021-01-28 PROCEDURE — 84145 PROCALCITONIN (PCT): CPT

## 2021-01-28 PROCEDURE — U0003: CPT

## 2021-01-28 PROCEDURE — 84300 ASSAY OF URINE SODIUM: CPT

## 2021-01-28 PROCEDURE — 87899 AGENT NOS ASSAY W/OPTIC: CPT

## 2021-01-28 PROCEDURE — 86140 C-REACTIVE PROTEIN: CPT

## 2021-01-28 PROCEDURE — 85730 THROMBOPLASTIN TIME PARTIAL: CPT

## 2021-01-28 PROCEDURE — 99053 MED SERV 10PM-8AM 24 HR FAC: CPT

## 2021-01-28 PROCEDURE — 83036 HEMOGLOBIN GLYCOSYLATED A1C: CPT

## 2021-01-28 PROCEDURE — U0005: CPT

## 2021-01-28 PROCEDURE — 83735 ASSAY OF MAGNESIUM: CPT

## 2021-01-28 PROCEDURE — 84443 ASSAY THYROID STIM HORMONE: CPT

## 2021-01-28 PROCEDURE — 83930 ASSAY OF BLOOD OSMOLALITY: CPT

## 2021-01-28 PROCEDURE — 80053 COMPREHEN METABOLIC PANEL: CPT

## 2021-01-28 PROCEDURE — 84100 ASSAY OF PHOSPHORUS: CPT

## 2021-01-28 PROCEDURE — 85379 FIBRIN DEGRADATION QUANT: CPT

## 2021-01-28 PROCEDURE — 87640 STAPH A DNA AMP PROBE: CPT

## 2021-01-28 PROCEDURE — 87641 MR-STAPH DNA AMP PROBE: CPT

## 2021-01-28 PROCEDURE — 97162 PT EVAL MOD COMPLEX 30 MIN: CPT

## 2021-01-28 PROCEDURE — 82728 ASSAY OF FERRITIN: CPT

## 2021-01-28 RX ORDER — DEXAMETHASONE 0.5 MG/5ML
1 ELIXIR ORAL
Qty: 0 | Refills: 0 | DISCHARGE

## 2021-01-28 RX ADMIN — Medication 4: at 08:29

## 2021-01-28 RX ADMIN — Medication 6 UNIT(S): at 08:29

## 2021-01-28 RX ADMIN — PANTOPRAZOLE SODIUM 40 MILLIGRAM(S): 20 TABLET, DELAYED RELEASE ORAL at 04:54

## 2021-01-28 RX ADMIN — AMLODIPINE BESYLATE 10 MILLIGRAM(S): 2.5 TABLET ORAL at 04:55

## 2021-01-28 RX ADMIN — Medication 1 APPLICATION(S): at 04:53

## 2021-01-28 RX ADMIN — ENOXAPARIN SODIUM 40 MILLIGRAM(S): 100 INJECTION SUBCUTANEOUS at 10:04

## 2021-01-28 RX ADMIN — Medication 2.5 MILLIGRAM(S): at 04:55

## 2021-01-28 RX ADMIN — Medication 325 MILLIGRAM(S): at 10:04

## 2021-01-28 RX ADMIN — Medication 1 TABLET(S): at 08:30

## 2021-01-28 RX ADMIN — Medication 50 MILLIGRAM(S): at 04:55

## 2021-01-28 RX ADMIN — ERTAPENEM SODIUM 120 MILLIGRAM(S): 1 INJECTION, POWDER, LYOPHILIZED, FOR SOLUTION INTRAMUSCULAR; INTRAVENOUS at 10:04

## 2021-01-28 RX ADMIN — Medication 500 MILLIGRAM(S): at 10:04

## 2021-01-28 RX ADMIN — Medication 25 MICROGRAM(S): at 04:55

## 2021-01-28 RX ADMIN — Medication 1 TABLET(S): at 10:04

## 2021-01-28 RX ADMIN — INSULIN GLARGINE 10 UNIT(S): 100 INJECTION, SOLUTION SUBCUTANEOUS at 08:30

## 2021-01-28 RX ADMIN — SERTRALINE 50 MILLIGRAM(S): 25 TABLET, FILM COATED ORAL at 10:04

## 2021-01-28 NOTE — PROGRESS NOTE ADULT - SUBJECTIVE AND OBJECTIVE BOX
CHIEF COMPLAINT/INTERVAL HISTORY:  Pt. seen and evaluated for acute hypoxic respiratory failure 2/2 COVID 19 pneumonia.  Pt. is in no distress.  Denies feeling SOB.  On room air with SpO2 in 90th%.  Tolerating IV antibiotic.     REVIEW OF SYSTEMS:  No fever, CP, SOB, or abdominal pain.     Vital Signs Last 24 Hrs  T(C): 37 (28 Jan 2021 04:27), Max: 37.1 (27 Jan 2021 20:55)  T(F): 98.6 (28 Jan 2021 04:27), Max: 98.8 (27 Jan 2021 20:55)  HR: 70 (28 Jan 2021 04:27) (70 - 80)  BP: 134/60 (28 Jan 2021 04:27) (120/72 - 147/83)  BP(mean): --  RR: 17 (28 Jan 2021 04:27) (17 - 18)  SpO2: 95% (28 Jan 2021 04:27) (94% - 95%)    PHYSICAL EXAM:  GENERAL: NAD  HEENT: EOMI, hearing normal, conjunctiva and sclera clear  Chest: CTA bilaterally, no wheezing  CV: S1S2, RRR,   GI: soft, +BS, NT/ND  Musculoskeletal: no edema  Psychiatric: affect nL, mood nL  Skin: warm and dry    LABS:                        9.5    11.27 )-----------( 496      ( 27 Jan 2021 06:58 )             29.5     01-27    137  |  105  |  16  ----------------------------<  156<H>  4.7   |  27  |  0.75    Ca    8.5      27 Jan 2021 06:58  Phos  2.1     01-27  Mg     2.1     01-27    TPro  5.9<L>  /  Alb  1.8<L>  /  TBili  0.2  /  DBili  .10  /  AST  36  /  ALT  29  /  AlkPhos  171<H>  01-27          Assessment and Plan:  -Acute hypoxic respiratory failure 2/2 COVID 19 pneumonia:  Blood cx NGTD.  Completed course of Remdesivir and Decadron.  Continue to monitor SpO2 on room air.  ID and Pulmonary f/u  -Acute metabolic encephalopathy:  likely multifactorial with hypoglycemia, UTI, and COVID 19 infection.  CT head negative for acute pathology.    -ESBL E. coli UTI:  continue Invanz 1gm IV daily x 3 days per ID  -Hyponatremia:  resolved with hydration  -HTN:  continue Norvasc 10mg PO daily, Enalapril 2.5mg PO BID, and Hydralazine 50mg PO BID  -Type 2 DM:  continue Lantus 10 units SQ QAM and lispro 6 units SQ before meals + sliding scale.  Endocrine f/u  -Anemia:  Hbg stable.  Continue ferrous sulfate 325mg PO daily  -Depression:  continue Zoloft 50mg PO daily  -HLD:  continue statin therapy  -Hypothyroidism:  continue Synthroid 25mcg PO daily  -VTE ppx:  Lovenox 40mg SQ daily

## 2021-01-28 NOTE — PROGRESS NOTE ADULT - PROVIDER SPECIALTY LIST ADULT
Hospitalist
Infectious Disease
Infectious Disease
Hospitalist
Pulmonology
Pulmonology
Infectious Disease
Infectious Disease
Pulmonology
Endocrinology
Pulmonology
Hospitalist
Hospitalist

## 2021-01-28 NOTE — PHYSICAL THERAPY INITIAL EVALUATION ADULT - PERTINENT HX OF CURRENT PROBLEM, REHAB EVAL
Pt is a 71 y.o. female admitted from [facility] 1/23 due to hypoglycemia and UTI. Pt diagnosed with +COVID 19 , PNA Metabolic encephalopathy

## 2021-01-28 NOTE — PROGRESS NOTE ADULT - NUTRITIONAL ASSESSMENT
This patient has been assessed with a concern for Malnutrition and has been determined to have a diagnosis/diagnoses of Moderate protein-calorie malnutrition.    This patient is being managed with:   Diet Regular-  Consistent Carbohydrate {No Snacks}  No Carb Prosource (1pkg = 15gms Protein)     Qty per Day:  30cc po BID  Supplement Feeding Modality:  Oral  Glucerna Shake Cans or Servings Per Day:  1       Frequency:  Two Times a day  Entered: Jan 25 2021  3:29PM    
This patient has been assessed with a concern for Malnutrition and has been determined to have a diagnosis/diagnoses of Moderate protein-calorie malnutrition.    This patient is being managed with:   Diet Regular-  Consistent Carbohydrate {No Snacks}  No Carb Prosource (1pkg = 15gms Protein)     Qty per Day:  30cc po BID  Supplement Feeding Modality:  Oral  Glucerna Shake Cans or Servings Per Day:  1       Frequency:  Two Times a day  Entered: Jan 25 2021  3:29PM    
This patient has been assessed with a concern for Malnutrition and has been determined to have a diagnosis/diagnoses of Moderate protein-calorie malnutrition.    This patient is being managed with:   Diet Regular-  No Carb Prosource (1pkg = 15gms Protein)     Qty per Day:  30cc po BID  Supplement Feeding Modality:  Oral  Glucerna Shake Cans or Servings Per Day:  1       Frequency:  Two Times a day  Entered: Jan 24 2021 11:42AM    Diet Regular-  Entered: Jan 23 2021  7:02AM    The following pending diet order is being considered for treatment of Moderate protein-calorie malnutrition:null
This patient has been assessed with a concern for Malnutrition and has been determined to have a diagnosis/diagnoses of Moderate protein-calorie malnutrition.    This patient is being managed with:   Diet Regular-  Consistent Carbohydrate {No Snacks}  No Carb Prosource (1pkg = 15gms Protein)     Qty per Day:  30cc po BID  Supplement Feeding Modality:  Oral  Glucerna Shake Cans or Servings Per Day:  1       Frequency:  Two Times a day  Entered: Jan 25 2021  3:29PM    
This patient has been assessed with a concern for Malnutrition and has been determined to have a diagnosis/diagnoses of Moderate protein-calorie malnutrition.    This patient is being managed with:   Diet Regular-  Consistent Carbohydrate {No Snacks}  No Carb Prosource (1pkg = 15gms Protein)     Qty per Day:  30cc po BID  Supplement Feeding Modality:  Oral  Glucerna Shake Cans or Servings Per Day:  1       Frequency:  Two Times a day  Entered: Jan 25 2021  3:29PM

## 2021-01-28 NOTE — PROGRESS NOTE ADULT - PROBLEM SELECTOR PLAN 1
From an ID standpoint no further requirement for inpatient status for the management of ID issues. Fine with discharge from ID standpoint when other medical issues no longer require inpatient care and social issues allow for a safe discharge plan. To schedule an outpatient ID follow up appointment please call our office at [phone]  Thank you for consulting us and involving us in the management of this most interesting and challenging case.  Please call us at [phone] or text me directly on my cell#[phone] with any concerns or further questions.
Urine Strep Ag positive -   Covid PCR neg on Jan 26  pt is on Invanz - ID following -   ID eval noted - remdesivir and decadron for covid with hypoxemia  tylenol and robitussin PRN for sx management  HOB elev - asp prec - oral hygiene - assist with needs  serial labs - lytes - markers -   D dimer - serially - DVT p   fio2 titration and support - keep sat > 88 pct  GOC documented - pt is DNR.
as above
cont lantus 10 units daily  cont admelog 6 units 3x/day before meals  cont mod dose admelog scale coverage qac/qhs  cont cons cho diet  goal bg 100-180 in hosp setting
Urine Strep Ag positive -   Covid PCR neg on Jan 26  pt is on Invanz - ID following -   ID eval noted - remdesivir and decadron for covid with hypoxemia  tylenol and robitussin PRN for sx management  HOB elev - asp prec - oral hygiene - assist with needs  serial labs - lytes - markers -   D dimer - serially - DVT p   fio2 titration and support - keep sat > 88 pct  GOC documented - pt is DNR.
Urine and Blood cx noted - off ABX - ID following  Covid PCR neg on Jan 25  ID eval noted - remdesivir and decadron for covid with hypoxemia  tylenol and robitussin PRN for sx management  HOB elev - asp prec - oral hygiene - assist with needs  serial labs - lytes - markers -   D dimer - serially - DVT p   fio2 titration and support - keep sat > 88 pct  GOC documented - pt is DNR.
on ABX for Acute Infection - cx pending  ID liliaal noted - remdesivir and decadron for covid with hypoxemia  tylenol and robitussin PRN for sx management  HOB elev - asp prec - oral hygiene - assist with needs  serial labs - lytes - markers -   D dimer - serially - DVT p   fio2 titration and support - keep sat > 88 pct  GOC documented - pt is DNR
as above
- acute metabolic encephalopathy likely multifactorial most prominently due to hypoglycemia and perhaps due to hyponatremia and COVID-19 infection   - CT head negative for acute pathology  - MSSA/MRSA PCR nares negative  - pt received rocephin empirically for past 3 days due to a positive UA -- per discussion with ID (Cyrus) suspicion for active UTI was low with pt endorsing no symptoms and thus Abx discontinued today (pt's UCx revealed E coli resistant to cephalosporins this afternoon)   -admission procalcitonin and lactate quite low at 0.08 and 1.2 respectively.  -4/4 BCx bottles NGTD.  - c/w treatment for COVID-19 with remdesivir and decadron  -hyponatremia resolved with rehydration
- acute metabolic encephalopathy likely multifactorial most prominently due to hypoglycemia and perhaps due to hyponatremia and COVID-19 infection   - CT head negative for acute pathology  - MSSA/MRSA PCR nares negative  - pt received rocephin empirically for 3 days due to a positive UA -- per discussion with ID (Cyrus) suspicion for active UTI was low with pt endorsing no symptoms and thus Abx discontinued yesterday (pt's UCx revealed E coli resistant to cephalosporins)   -admission procalcitonin and lactate quite low at 0.08 and 1.2 respectively.  -4/4 BCx bottles NGTD.  -re-discussed pt's history and recent admission in [facility] with UTI leading to bacteremia with ID (Cyrus), and given that recent event, recommended to give 3-day course of Invanz (opted against cipro due to higher risk of c diff).   - c/w treatment for COVID-19 with remdesivir and decadron  -hyponatremia resolved with rehydration

## 2021-01-28 NOTE — DISCHARGE NOTE NURSING/CASE MANAGEMENT/SOCIAL WORK - PATIENT PORTAL LINK FT
You can access the FollowMyHealth Patient Portal offered by [facility] by registering at the following website: [email or web address]. By joining [employer]’s FollowMyHealth portal, you will also be able to view your health information using other applications (apps) compatible with our system.

## 2021-01-28 NOTE — PHYSICAL THERAPY INITIAL EVALUATION ADULT - ADDITIONAL COMMENTS
Pt is a poor historian. Pt states she has been in bed for several weeks prior to this admission.  Assessment:  · Assessment	  70 y/o F with PMHx of DM type 2, HTN, HLD, MDD, Hypothyroidism presented to  ED with hypoglycemia and altered mental status, admitted for UTI, COVID-19 PNA and metabolic encephalopathy.

## 2021-01-28 NOTE — PROGRESS NOTE ADULT - SUBJECTIVE AND OBJECTIVE BOX
Date/Time Patient Seen:  		  Referring MD:   Data Reviewed	       Patient is a 71y old  Female who presents with a chief complaint of COVID-19, hypoglycemia, UTI (27 Jan 2021 14:23)      Subjective/HPI     PAST MEDICAL & SURGICAL HISTORY:  MARRY (acute respiratory infection)    Hypothyroid    MDD (major depressive disorder)    Diabetes mellitus  type 2    Hypertension    Hyperlipidemia    Depression    Changes in skin texture    Flatulence    Major depressive disorder, recurrent    Disorder of thyroid, unspecified    Ascorbic acid deficiency    Pain, unspecified    Hyperlipidemia, unspecified    Acute kidney failure, unspecified    Syncope and collapse    Essential (primary) hypertension    Type 2 diabetes mellitus without complication    Intraoperative hemorrhage and hematoma of a genitourinary system organ or structure complicating other procedure    High cholesterol    Diabetes    HTN (hypertension)    History of colon surgery    H/O shoulder surgery    H/O lumpectomy    No significant past surgical history    No significant past surgical history          Medication list         MEDICATIONS  (STANDING):  amLODIPine   Tablet 10 milliGRAM(s) Oral daily  ascorbic acid 500 milliGRAM(s) Oral daily  atorvastatin 20 milliGRAM(s) Oral at bedtime  clotrimazole 1% Cream 1 Application(s) Topical two times a day  dexAMETHasone  Injectable 6 milliGRAM(s) IV Push daily  dextrose 40% Gel 15 Gram(s) Oral once  dextrose 5%. 1000 milliLiter(s) (50 mL/Hr) IV Continuous <Continuous>  dextrose 5%. 1000 milliLiter(s) (100 mL/Hr) IV Continuous <Continuous>  dextrose 50% Injectable 25 Gram(s) IV Push once  dextrose 50% Injectable 12.5 Gram(s) IV Push once  dextrose 50% Injectable 25 Gram(s) IV Push once  enalapril 2.5 milliGRAM(s) Oral two times a day  enoxaparin Injectable 40 milliGRAM(s) SubCutaneous daily  ertapenem  IVPB 1000 milliGRAM(s) IV Intermittent every 24 hours  ferrous    sulfate 325 milliGRAM(s) Oral daily  glucagon  Injectable 1 milliGRAM(s) IntraMuscular once  hydrALAZINE 50 milliGRAM(s) Oral two times a day  hydrocortisone 2.5% Ointment 1 Application(s) Topical two times a day  insulin glargine Injectable (LANTUS) 10 Unit(s) SubCutaneous every morning  insulin lispro (ADMELOG) corrective regimen sliding scale   SubCutaneous three times a day before meals  insulin lispro Injectable (ADMELOG) 6 Unit(s) SubCutaneous three times a day before meals  lactobacillus acidophilus 1 Tablet(s) Oral two times a day with meals  levothyroxine 25 MICROGram(s) Oral daily  multivitamin/minerals 1 Tablet(s) Oral daily  pantoprazole    Tablet 40 milliGRAM(s) Oral before breakfast  sertraline 50 milliGRAM(s) Oral daily  tamsulosin 0.4 milliGRAM(s) Oral at bedtime    MEDICATIONS  (PRN):  acetaminophen   Tablet .. 650 milliGRAM(s) Oral every 4 hours PRN Temp greater or equal to 38.5C (101.3F)  collagenase Ointment 1 Application(s) Topical daily PRN soiling  collagenase Ointment 1 Application(s) Topical daily PRN soiling  simethicone 80 milliGRAM(s) Chew four times a day PRN Gas         Vitals log        ICU Vital Signs Last 24 Hrs  T(C): 37 (28 Jan 2021 04:27), Max: 37.1 (27 Jan 2021 20:55)  T(F): 98.6 (28 Jan 2021 04:27), Max: 98.8 (27 Jan 2021 20:55)  HR: 70 (28 Jan 2021 04:27) (70 - 80)  BP: 134/60 (28 Jan 2021 04:27) (120/72 - 147/83)  BP(mean): --  ABP: --  ABP(mean): --  RR: 17 (28 Jan 2021 04:27) (17 - 18)  SpO2: 95% (28 Jan 2021 04:27) (94% - 95%)           Input and Output:  I&O's Detail    27 Jan 2021 07:01  -  28 Jan 2021 07:00  --------------------------------------------------------  IN:  Total IN: 0 mL    OUT:    Voided (mL): 150 mL  Total OUT: 150 mL    Total NET: -150 mL          Lab Data                        9.5    11.27 )-----------( 496      ( 27 Jan 2021 06:58 )             29.5     01-27    137  |  105  |  16  ----------------------------<  156<H>  4.7   |  27  |  0.75    Ca    8.5      27 Jan 2021 06:58  Phos  2.1     01-27  Mg     2.1     01-27    TPro  5.9<L>  /  Alb  1.8<L>  /  TBili  0.2  /  DBili  .10  /  AST  36  /  ALT  29  /  AlkPhos  171<H>  01-27            Review of Systems	      Objective     Physical Examination    heart s1s2  lung dec BS  abd soft  head nc      Pertinent Lab findings & Imaging      Lola:  NO   Adequate UO     I&O's Detail    27 Jan 2021 07:01  -  28 Jan 2021 07:00  --------------------------------------------------------  IN:  Total IN: 0 mL    OUT:    Voided (mL): 150 mL  Total OUT: 150 mL    Total NET: -150 mL               Discussed with:     Cultures:	        Radiology

## 2021-01-28 NOTE — PROGRESS NOTE ADULT - ASSESSMENT
70 yo woman with PMH of HTN, DM2, MDD, and Hypothyroidism, was admitted on 1/23 with AMS and hypoglycemia. She was tested positive COVID in [facility] so transferred to [facility]. initially seen by Dr Sotomayor but request by physician son for us to take over care    RECOMMENDATIONS  1/23 NLR 6.11/1.03=6 REMDESIVIR started w plan for 1/27 as last day, LMWH daily  1/24 NLR 6.29/1.7=3.7, NC-4L  STEROIDS started  1/25-RA, comfortable and alert, no urinary symptoms so not clear that urine isolate is significant so may dc abx after today's dose  1/26 on RA, stopped abx, noted ESBL so do not suspect impact of antimicrobial Rx on course, potential for dc as early as 1/27 after am dose of remdesivir (fine to give early)    Fine to discharge with oxygen (4L or less and able to keep sats>90) and even persistent fever at time of discharge is reasonable. With high risk for thromboembolic disease  consider dc on  rivaroxaban (Xarelto) 10mg PO daily or Eliquis (apixaban) 2.5mg PO BID x 4-6 weeks post discharge.  For outpt mild disease considered noninfectious at day 10 after onset of illness but for hospitalized patients day 20. If pt going to facility or to dc isolation in house then will require 2 negative PCR tests  by a minimum of 24 hours and fever free without antipyretics for >24hrs and more than 10 days from first positive test. Critical that pt has outpt follow up within 1-2 weeks of discharge as high risk for 30 day readmission (15%) and 60 day mortality(10%)  
70yo F with PMHx of DM type 2, HTN, HLD, depression, Hypothyroidism presented to [facility] ED with altered mental status and hypoglycemia, admitted with acute metabolic encephalopathy likely due to hypoglycemia, hyponatremia and COVID-19 infection with subsequent acute hypoxic respiratory failure.
70 yo woman with PMH of HTN, DM2, MDD, and Hypothyroidism, was admitted on 1/23 with AMS and hypoglycemia. She was tested positive COVID in [facility] so transferred to [facility]. initially seen by Dr Sotomayor but request by physician son for us to take over care    RECOMMENDATIONS  1/23 NLR 6.11/1.03=6 REMDESIVIR started w plan for 1/27 as last day, LMWH daily  1/24 NLR 6.29/1.7=3.7, NC-4L  STEROIDS started  1/25-RA, comfortable and alert, no urinary symptoms so not clear that urine isolate is significant so may dc abx after today's dose  1/26 on RA, stopped abx, noted ESBL discussed with Dr Osman, ERTAPENEM started  1/27 on RA, hope for short course of ertapenem- recs to follow  1/28 noted positive strept Ag, pt now completed ertapenem so dc today off abx fine from ID perspective    Fine to discharge with oxygen (4L or less and able to keep sats>90) and even persistent fever at time of discharge is reasonable. With high risk for thromboembolic disease  consider dc on  rivaroxaban (Xarelto) 10mg PO daily or Eliquis (apixaban) 2.5mg PO BID x 4-6 weeks post discharge.  For outpt mild disease considered noninfectious at day 10 after onset of illness but for hospitalized patients day 20. If pt going to facility or to dc isolation in house then will require 2 negative PCR tests  by a minimum of 24 hours and fever free without antipyretics for >24hrs and more than 10 days from first positive test. Critical that pt has outpt follow up within 1-2 weeks of discharge as high risk for 30 day readmission (15%) and 60 day mortality(10%)  
72 yo woman with PMH of HTN, DM2, MDD, and Hypothyroidism, was admitted on 1/23 with AMS and hypoglycemia. She was tested positive COVID in [facility] so transferred [facility]. initially seen by Dr Sotomayor but request by physician son for us to take over care    RECOMMENDATIONS  1/23 NLR 6.11/1.03=6 REMDESIVIR started w plan for 1/27 as last day, LMWH daily  1/24 NLR 6.29/1.7=3.7, NC-4L  STEROIDS started  1/25-RA, comfortable and alert, no urinary symptoms so not clear that urine isolate is significant so may dc abx after today's dose  1/26 on RA, stopped abx, noted ESBL discussed with Dr Osman, ERTAPENEM started  1/27 on RA, hope for short course of ertapenem- recs to follow    Fine to discharge with oxygen (4L or less and able to keep sats>90) and even persistent fever at time of discharge is reasonable. With high risk for thromboembolic disease  consider dc on  rivaroxaban (Xarelto) 10mg PO daily or Eliquis (apixaban) 2.5mg PO BID x 4-6 weeks post discharge.  For outpt mild disease considered noninfectious at day 10 after onset of illness but for hospitalized patients day 20. If pt going to facility or to dc isolation in house then will require 2 negative PCR tests  by a minimum of 24 hours and fever free without antipyretics for >24hrs and more than 10 days from first positive test. Critical that pt has outpt follow up within 1-2 weeks of discharge as high risk for 30 day readmission (15%) and 60 day mortality(10%)  
72yo F with PMHx of DM type 2, HTN, HLD, depression, Hypothyroidism presented to [facility] ED with altered mental status and hypoglycemia, admitted with acute metabolic encephalopathy likely due to hypoglycemia, hyponatremia and COVID-19 infection with subsequent acute hypoxic respiratory failure.

## 2021-01-28 NOTE — PROGRESS NOTE ADULT - SUBJECTIVE AND OBJECTIVE BOX
CAPILLARY BLOOD GLUCOSE      POCT Blood Glucose.: 210 mg/dL (28 Jan 2021 08:23)  POCT Blood Glucose.: 132 mg/dL (27 Jan 2021 21:08)  POCT Blood Glucose.: 246 mg/dL (27 Jan 2021 17:18)  POCT Blood Glucose.: 311 mg/dL (27 Jan 2021 12:37)      Vital Signs Last 24 Hrs  T(C): 37 (28 Jan 2021 04:27), Max: 37.1 (27 Jan 2021 20:55)  T(F): 98.6 (28 Jan 2021 04:27), Max: 98.8 (27 Jan 2021 20:55)  HR: 70 (28 Jan 2021 04:27) (70 - 80)  BP: 134/60 (28 Jan 2021 04:27) (120/72 - 147/83)  BP(mean): --  RR: 17 (28 Jan 2021 04:27) (17 - 18)  SpO2: 95% (28 Jan 2021 04:27) (94% - 95%)       01-27    137  |  105  |  16  ----------------------------<  156<H>  4.7   |  27  |  0.75    Ca    8.5      27 Jan 2021 06:58  Phos  2.1     01-27  Mg     2.1     01-27    TPro  5.9<L>  /  Alb  1.8<L>  /  TBili  0.2  /  DBili  .10  /  AST  36  /  ALT  29  /  AlkPhos  171<H>  01-27      atorvastatin 20 milliGRAM(s) Oral at bedtime  dexAMETHasone  Injectable 6 milliGRAM(s) IV Push daily  dextrose 40% Gel 15 Gram(s) Oral once  dextrose 50% Injectable 25 Gram(s) IV Push once  dextrose 50% Injectable 12.5 Gram(s) IV Push once  dextrose 50% Injectable 25 Gram(s) IV Push once  glucagon  Injectable 1 milliGRAM(s) IntraMuscular once  insulin glargine Injectable (LANTUS) 10 Unit(s) SubCutaneous every morning  insulin lispro (ADMELOG) corrective regimen sliding scale   SubCutaneous three times a day before meals  insulin lispro Injectable (ADMELOG) 6 Unit(s) SubCutaneous three times a day before meals  levothyroxine 25 MICROGram(s) Oral daily

## 2021-01-28 NOTE — PROGRESS NOTE ADULT - SUBJECTIVE AND OBJECTIVE BOX
[facility] DIVISION of INFECTIOUS DISEASE  Jet Bridges MD PhD, April Cha MD, Geeta Chau MD, Tomás Erickson MD  and providing coverage with Mavis Bethea MD and Peewee Andrade MD  Providing Infectious Disease Consultations at [facility], [facility]'s      Office# [phone] to schedule follow up appointments  Answering Service for urgent calls or New Consults [phone]  Cell# to text for urgent issues Jet Bridges [phone]     Infectious diseases progress note:    MICHAEL DUFFY is a 71y y. o. Female patient    COVID Patient    Allergies    Hytrin (Unknown)    Intolerances        ANTIBIOTICS/RELEVANT:  antimicrobials    immunologic:    OTHER:  acetaminophen   Tablet .. 650 milliGRAM(s) Oral every 4 hours PRN  amLODIPine   Tablet 10 milliGRAM(s) Oral daily  ascorbic acid 500 milliGRAM(s) Oral daily  atorvastatin 20 milliGRAM(s) Oral at bedtime  clotrimazole 1% Cream 1 Application(s) Topical two times a day  collagenase Ointment 1 Application(s) Topical daily PRN  collagenase Ointment 1 Application(s) Topical daily PRN  dextrose 40% Gel 15 Gram(s) Oral once  dextrose 5%. 1000 milliLiter(s) IV Continuous <Continuous>  dextrose 5%. 1000 milliLiter(s) IV Continuous <Continuous>  dextrose 50% Injectable 25 Gram(s) IV Push once  dextrose 50% Injectable 12.5 Gram(s) IV Push once  dextrose 50% Injectable 25 Gram(s) IV Push once  enalapril 2.5 milliGRAM(s) Oral two times a day  enoxaparin Injectable 40 milliGRAM(s) SubCutaneous daily  ferrous    sulfate 325 milliGRAM(s) Oral daily  glucagon  Injectable 1 milliGRAM(s) IntraMuscular once  hydrALAZINE 50 milliGRAM(s) Oral two times a day  hydrocortisone 2.5% Ointment 1 Application(s) Topical two times a day  insulin glargine Injectable (LANTUS) 10 Unit(s) SubCutaneous every morning  insulin lispro (ADMELOG) corrective regimen sliding scale   SubCutaneous three times a day before meals  insulin lispro Injectable (ADMELOG) 6 Unit(s) SubCutaneous three times a day before meals  lactobacillus acidophilus 1 Tablet(s) Oral two times a day with meals  levothyroxine 25 MICROGram(s) Oral daily  multivitamin/minerals 1 Tablet(s) Oral daily  pantoprazole    Tablet 40 milliGRAM(s) Oral before breakfast  sertraline 50 milliGRAM(s) Oral daily  simethicone 80 milliGRAM(s) Chew four times a day PRN  tamsulosin 0.4 milliGRAM(s) Oral at bedtime      Objective:  Vital Signs Last 24 Hrs  T(C): 37 (28 Jan 2021 04:27), Max: 37.1 (27 Jan 2021 20:55)  T(F): 98.6 (28 Jan 2021 04:27), Max: 98.8 (27 Jan 2021 20:55)  HR: 70 (28 Jan 2021 04:27) (70 - 80)  BP: 134/60 (28 Jan 2021 04:27) (120/72 - 147/83)  BP(mean): --  RR: 17 (28 Jan 2021 04:27) (17 - 18)  SpO2: 95% (28 Jan 2021 04:27) (94% - 95%)    T(C): 37 (01-28-21 @ 04:27), Max: 37.1 (01-27-21 @ 20:55)  T(C): 37 (01-28-21 @ 04:27), Max: 37.1 (01-27-21 @ 20:55)  T(C): 37 (01-28-21 @ 04:27), Max: 37.1 (01-27-21 @ 20:55)    PHYSICAL EXAM:  HEENT: NC atraumatic  Neck: supple  Respiratory: no accessory muscle use, breathing comfortably  Cardiovascular: distant  Gastrointestinal: normal appearing, nondistended  Extremities: no clubbing, no cyanosis,        LABS:                          9.0    10.20 )-----------( 433      ( 28 Jan 2021 09:44 )             28.1       10.20 01-28 @ 09:44  11.27 01-27 @ 06:58  9.73 01-26 @ 07:14  9.18 01-24 @ 06:43  8.61 01-23 @ 07:06  11.79 01-22 @ 23:53      01-28    x   |  x   |  19  ----------------------------<  198<H>  x    |  23  |  0.97    Ca    7.7<L>      28 Jan 2021 09:44  Phos  2.1     01-27  Mg     2.1     01-27    TPro  x   /  Alb  1.7<L>  /  TBili  x   /  DBili  x   /  AST  29  /  ALT  29  /  AlkPhos  x   01-28      Creatinine, Serum: 0.97 mg/dL (01-28-21 @ 09:44)  Creatinine, Serum: 0.75 mg/dL (01-27-21 @ 06:58)  Creatinine, Serum: 0.97 mg/dL (01-26-21 @ 07:14)  Creatinine, Serum: 0.90 mg/dL (01-24-21 @ 06:43)  Creatinine, Serum: 1.00 mg/dL (01-23-21 @ 07:06)  Creatinine, Serum: 1.19 mg/dL (01-22-21 @ 23:53)                COVID RISK SCORE  Auto Neutrophil #: 6.43 K/uL (01-28-21 @ 09:44)  Auto Lymphocyte #: 2.53 K/uL (01-28-21 @ 09:44)  Auto Neutrophil #: 7.44 K/uL (01-27-21 @ 06:58)  Auto Lymphocyte #: 2.82 K/uL (01-27-21 @ 06:58)  Auto Neutrophil #: 6.38 K/uL (01-26-21 @ 07:14)  Auto Lymphocyte #: 1.93 K/uL (01-26-21 @ 07:14)  Auto Neutrophil #: 6.29 K/uL (01-24-21 @ 06:43)  Auto Lymphocyte #: 1.71 K/uL (01-24-21 @ 06:43)  Auto Neutrophil #: 6.11 K/uL (01-23-21 @ 07:06)  Auto Lymphocyte #: 1.03 K/uL (01-23-21 @ 07:06)  Auto Lymphocyte #: 1.33 K/uL (01-22-21 @ 23:53)  Auto Neutrophil #: 9.07 K/uL (01-22-21 @ 23:53)    Lactate, Blood: 1.2 mmol/L (01-22-21 @ 23:53)    Auto Eosinophil #: 0.09 K/uL (01-28-21 @ 09:44)  Auto Eosinophil #: 0.00 K/uL (01-27-21 @ 06:58)  Auto Eosinophil #: 0.03 K/uL (01-26-21 @ 07:14)  Auto Eosinophil #: 0.10 K/uL (01-24-21 @ 06:43)  Auto Eosinophil #: 0.17 K/uL (01-23-21 @ 07:06)  Auto Eosinophil #: 0.03 K/uL (01-22-21 @ 23:53)    Lactate Dehydrogenase, Serum: 358 U/L (01-23-21 @ 12:02)      Procalcitonin, Serum: 0.06 ng/mL (01-26-21 @ 07:14)  Procalcitonin, Serum: 0.08 ng/mL (01-23-21 @ 07:06)            Ferritin, Serum: 243 ng/mL (01-26-21 @ 11:32)  Ferritin, Serum: 231 ng/mL (01-23-21 @ 12:02)        Activated Partial Thromboplastin Time: 29.0 sec (01-23-21 @ 07:06)  INR: 1.10 ratio (01-23-21 @ 07:06)  Activated Partial Thromboplastin Time: 29.7 sec (01-22-21 @ 23:53)  INR: 1.05 ratio (01-22-21 @ 23:53)    D-Dimer Assay, Quantitative: 476 ng/mL DDU (01-26-21 @ 07:14)  D-Dimer Assay, Quantitative: 471 ng/mL DDU (01-23-21 @ 07:06)        MICROBIOLOGY:              RADIOLOGY & ADDITIONAL STUDIES:

## 2021-01-28 NOTE — PROGRESS NOTE ADULT - REASON FOR ADMISSION
COVID-19 infection, hypoglycemia
COVID-19, hypoglycemia, UTI
COVID-19 infection, hypoglycemia
COVID-19, hypoglycemia, UTI

## 2021-01-28 NOTE — PROGRESS NOTE ADULT - PROBLEM SELECTOR PROBLEM 1
Pneumonia due to COVID-19 virus
Type 2 diabetes mellitus without complication
COVID-19
COVID-19
2019 novel coronavirus disease (COVID-19)
COVID-19
Pneumonia due to COVID-19 virus
Pneumonia due to COVID-19 virus
Altered mental status, unspecified altered mental status type
Acute metabolic encephalopathy
COVID-19
Acute metabolic encephalopathy

## 2021-02-17 ENCOUNTER — INPATIENT (INPATIENT)
Facility: HOSPITAL | Age: 71
LOS: 12 days | Discharge: NURSING, REHAB OR LONG-TERM CARE | DRG: 570 | End: 2021-03-02
Attending: FAMILY MEDICINE | Admitting: INTERNAL MEDICINE
Payer: MEDICARE

## 2021-02-17 VITALS
DIASTOLIC BLOOD PRESSURE: 63 MMHG | SYSTOLIC BLOOD PRESSURE: 124 MMHG | HEART RATE: 92 BPM | RESPIRATION RATE: 18 BRPM | TEMPERATURE: 99 F | HEIGHT: 65 IN | WEIGHT: 149.91 LBS

## 2021-02-17 DIAGNOSIS — E11.22 TYPE 2 DIABETES MELLITUS WITH DIABETIC CHRONIC KIDNEY DISEASE: ICD-10-CM

## 2021-02-17 DIAGNOSIS — Z98.890 OTHER SPECIFIED POSTPROCEDURAL STATES: Chronic | ICD-10-CM

## 2021-02-17 DIAGNOSIS — L89.159 PRESSURE ULCER OF SACRAL REGION, UNSPECIFIED STAGE: ICD-10-CM

## 2021-02-17 DIAGNOSIS — E78.5 HYPERLIPIDEMIA, UNSPECIFIED: ICD-10-CM

## 2021-02-17 DIAGNOSIS — E43 UNSPECIFIED SEVERE PROTEIN-CALORIE MALNUTRITION: ICD-10-CM

## 2021-02-17 DIAGNOSIS — N18.30 CHRONIC KIDNEY DISEASE, STAGE 3 UNSPECIFIED: ICD-10-CM

## 2021-02-17 DIAGNOSIS — E03.9 HYPOTHYROIDISM, UNSPECIFIED: ICD-10-CM

## 2021-02-17 DIAGNOSIS — F32.9 MAJOR DEPRESSIVE DISORDER, SINGLE EPISODE, UNSPECIFIED: ICD-10-CM

## 2021-02-17 DIAGNOSIS — R19.7 DIARRHEA, UNSPECIFIED: ICD-10-CM

## 2021-02-17 DIAGNOSIS — E83.39 OTHER DISORDERS OF PHOSPHORUS METABOLISM: ICD-10-CM

## 2021-02-17 DIAGNOSIS — I12.9 HYPERTENSIVE CHRONIC KIDNEY DISEASE WITH STAGE 1 THROUGH STAGE 4 CHRONIC KIDNEY DISEASE, OR UNSPECIFIED CHRONIC KIDNEY DISEASE: ICD-10-CM

## 2021-02-17 DIAGNOSIS — D46.9 MYELODYSPLASTIC SYNDROME, UNSPECIFIED: ICD-10-CM

## 2021-02-17 DIAGNOSIS — E87.1 HYPO-OSMOLALITY AND HYPONATREMIA: ICD-10-CM

## 2021-02-17 DIAGNOSIS — D50.9 IRON DEFICIENCY ANEMIA, UNSPECIFIED: ICD-10-CM

## 2021-02-17 DIAGNOSIS — D63.8 ANEMIA IN OTHER CHRONIC DISEASES CLASSIFIED ELSEWHERE: ICD-10-CM

## 2021-02-17 DIAGNOSIS — I96 GANGRENE, NOT ELSEWHERE CLASSIFIED: ICD-10-CM

## 2021-02-17 DIAGNOSIS — L89.90 PRESSURE ULCER OF UNSPECIFIED SITE, UNSPECIFIED STAGE: ICD-10-CM

## 2021-02-17 DIAGNOSIS — N39.0 URINARY TRACT INFECTION, SITE NOT SPECIFIED: ICD-10-CM

## 2021-02-17 DIAGNOSIS — Z66 DO NOT RESUSCITATE: ICD-10-CM

## 2021-02-17 DIAGNOSIS — E87.2 ACIDOSIS: ICD-10-CM

## 2021-02-17 DIAGNOSIS — N17.9 ACUTE KIDNEY FAILURE, UNSPECIFIED: ICD-10-CM

## 2021-02-17 PROBLEM — N99.62: Chronic | Status: ACTIVE | Noted: 2021-01-23

## 2021-02-17 PROBLEM — E54 ASCORBIC ACID DEFICIENCY: Chronic | Status: ACTIVE | Noted: 2021-01-23

## 2021-02-17 PROBLEM — I10 ESSENTIAL (PRIMARY) HYPERTENSION: Chronic | Status: ACTIVE | Noted: 2021-01-23

## 2021-02-17 PROBLEM — R55 SYNCOPE AND COLLAPSE: Chronic | Status: ACTIVE | Noted: 2021-01-23

## 2021-02-17 PROBLEM — R14.3 FLATULENCE: Chronic | Status: ACTIVE | Noted: 2021-01-23

## 2021-02-17 PROBLEM — E11.9 TYPE 2 DIABETES MELLITUS WITHOUT COMPLICATIONS: Chronic | Status: ACTIVE | Noted: 2021-01-23

## 2021-02-17 PROBLEM — R23.4 CHANGES IN SKIN TEXTURE: Chronic | Status: ACTIVE | Noted: 2021-01-23

## 2021-02-17 PROBLEM — J22 UNSPECIFIED ACUTE LOWER RESPIRATORY INFECTION: Chronic | Status: ACTIVE | Noted: 2021-01-23

## 2021-02-17 PROBLEM — R52 PAIN, UNSPECIFIED: Chronic | Status: ACTIVE | Noted: 2021-01-23

## 2021-02-17 LAB
ALBUMIN SERPL ELPH-MCNC: 1.5 G/DL — LOW (ref 3.3–5)
ALP SERPL-CCNC: 279 U/L — HIGH (ref 40–120)
ALT FLD-CCNC: 49 U/L — SIGNIFICANT CHANGE UP (ref 12–78)
ANION GAP SERPL CALC-SCNC: 10 MMOL/L — SIGNIFICANT CHANGE UP (ref 5–17)
APPEARANCE UR: ABNORMAL
APTT BLD: 27.2 SEC — LOW (ref 27.5–35.5)
AST SERPL-CCNC: 52 U/L — HIGH (ref 15–37)
BASOPHILS # BLD AUTO: 0 K/UL — SIGNIFICANT CHANGE UP (ref 0–0.2)
BASOPHILS NFR BLD AUTO: 0 % — SIGNIFICANT CHANGE UP (ref 0–2)
BILIRUB SERPL-MCNC: 0.2 MG/DL — SIGNIFICANT CHANGE UP (ref 0.2–1.2)
BILIRUB UR-MCNC: NEGATIVE — SIGNIFICANT CHANGE UP
BUN SERPL-MCNC: 24 MG/DL — HIGH (ref 7–23)
CALCIUM SERPL-MCNC: 8.6 MG/DL — SIGNIFICANT CHANGE UP (ref 8.5–10.1)
CHLORIDE SERPL-SCNC: 101 MMOL/L — SIGNIFICANT CHANGE UP (ref 96–108)
CO2 SERPL-SCNC: 21 MMOL/L — LOW (ref 22–31)
COLOR SPEC: YELLOW — SIGNIFICANT CHANGE UP
CREAT SERPL-MCNC: 1.06 MG/DL — SIGNIFICANT CHANGE UP (ref 0.5–1.3)
DIFF PNL FLD: ABNORMAL
EOSINOPHIL # BLD AUTO: 0 K/UL — SIGNIFICANT CHANGE UP (ref 0–0.5)
EOSINOPHIL NFR BLD AUTO: 0 % — SIGNIFICANT CHANGE UP (ref 0–6)
GLUCOSE SERPL-MCNC: 223 MG/DL — HIGH (ref 70–99)
GLUCOSE UR QL: NEGATIVE MG/DL — SIGNIFICANT CHANGE UP
HCT VFR BLD CALC: 24.7 % — LOW (ref 34.5–45)
HGB BLD-MCNC: 7.7 G/DL — LOW (ref 11.5–15.5)
INR BLD: 1.2 RATIO — HIGH (ref 0.88–1.16)
KETONES UR-MCNC: NEGATIVE — SIGNIFICANT CHANGE UP
LACTATE SERPL-SCNC: 1.2 MMOL/L — SIGNIFICANT CHANGE UP (ref 0.7–2)
LEUKOCYTE ESTERASE UR-ACNC: ABNORMAL
LYMPHOCYTES # BLD AUTO: 0.8 K/UL — LOW (ref 1–3.3)
LYMPHOCYTES # BLD AUTO: 4 % — LOW (ref 13–44)
MCHC RBC-ENTMCNC: 24.2 PG — LOW (ref 27–34)
MCHC RBC-ENTMCNC: 31.2 GM/DL — LOW (ref 32–36)
MCV RBC AUTO: 77.7 FL — LOW (ref 80–100)
MONOCYTES # BLD AUTO: 1.99 K/UL — HIGH (ref 0–0.9)
MONOCYTES NFR BLD AUTO: 10 % — SIGNIFICANT CHANGE UP (ref 2–14)
NEUTROPHILS # BLD AUTO: 16.35 K/UL — HIGH (ref 1.8–7.4)
NEUTROPHILS NFR BLD AUTO: 81 % — HIGH (ref 43–77)
NITRITE UR-MCNC: NEGATIVE — SIGNIFICANT CHANGE UP
NRBC # BLD: SIGNIFICANT CHANGE UP /100 WBCS (ref 0–0)
PH UR: 5 — SIGNIFICANT CHANGE UP (ref 5–8)
PLATELET # BLD AUTO: 588 K/UL — HIGH (ref 150–400)
POTASSIUM SERPL-MCNC: 4.7 MMOL/L — SIGNIFICANT CHANGE UP (ref 3.5–5.3)
POTASSIUM SERPL-SCNC: 4.7 MMOL/L — SIGNIFICANT CHANGE UP (ref 3.5–5.3)
PROT SERPL-MCNC: 6.4 GM/DL — SIGNIFICANT CHANGE UP (ref 6–8.3)
PROT UR-MCNC: 100 MG/DL
PROTHROM AB SERPL-ACNC: 13.9 SEC — HIGH (ref 10.6–13.6)
RBC # BLD: 3.18 M/UL — LOW (ref 3.8–5.2)
RBC # FLD: 17.5 % — HIGH (ref 10.3–14.5)
SARS-COV-2 RNA SPEC QL NAA+PROBE: SIGNIFICANT CHANGE UP
SODIUM SERPL-SCNC: 132 MMOL/L — LOW (ref 135–145)
SP GR SPEC: 1.01 — SIGNIFICANT CHANGE UP (ref 1.01–1.02)
UROBILINOGEN FLD QL: NEGATIVE MG/DL — SIGNIFICANT CHANGE UP
WBC # BLD: 19.94 K/UL — HIGH (ref 3.8–10.5)
WBC # FLD AUTO: 19.94 K/UL — HIGH (ref 3.8–10.5)

## 2021-02-17 PROCEDURE — 87507 IADNA-DNA/RNA PROBE TQ 12-25: CPT

## 2021-02-17 PROCEDURE — 82728 ASSAY OF FERRITIN: CPT

## 2021-02-17 PROCEDURE — 81001 URINALYSIS AUTO W/SCOPE: CPT

## 2021-02-17 PROCEDURE — 87077 CULTURE AEROBIC IDENTIFY: CPT

## 2021-02-17 PROCEDURE — 82607 VITAMIN B-12: CPT

## 2021-02-17 PROCEDURE — 85025 COMPLETE CBC W/AUTO DIFF WBC: CPT

## 2021-02-17 PROCEDURE — 93005 ELECTROCARDIOGRAM TRACING: CPT

## 2021-02-17 PROCEDURE — U0005: CPT

## 2021-02-17 PROCEDURE — 80048 BASIC METABOLIC PNL TOTAL CA: CPT

## 2021-02-17 PROCEDURE — 74177 CT ABD & PELVIS W/CONTRAST: CPT

## 2021-02-17 PROCEDURE — 82962 GLUCOSE BLOOD TEST: CPT

## 2021-02-17 PROCEDURE — 99223 1ST HOSP IP/OBS HIGH 75: CPT

## 2021-02-17 PROCEDURE — 93010 ELECTROCARDIOGRAM REPORT: CPT

## 2021-02-17 PROCEDURE — 87186 SC STD MICRODIL/AGAR DIL: CPT

## 2021-02-17 PROCEDURE — 80053 COMPREHEN METABOLIC PANEL: CPT

## 2021-02-17 PROCEDURE — 85045 AUTOMATED RETICULOCYTE COUNT: CPT

## 2021-02-17 PROCEDURE — 84443 ASSAY THYROID STIM HORMONE: CPT

## 2021-02-17 PROCEDURE — 87075 CULTR BACTERIA EXCEPT BLOOD: CPT

## 2021-02-17 PROCEDURE — 84100 ASSAY OF PHOSPHORUS: CPT

## 2021-02-17 PROCEDURE — 87493 C DIFF AMPLIFIED PROBE: CPT

## 2021-02-17 PROCEDURE — 83550 IRON BINDING TEST: CPT

## 2021-02-17 PROCEDURE — 80202 ASSAY OF VANCOMYCIN: CPT

## 2021-02-17 PROCEDURE — 97163 PT EVAL HIGH COMPLEX 45 MIN: CPT | Mod: GP

## 2021-02-17 PROCEDURE — 74177 CT ABD & PELVIS W/CONTRAST: CPT | Mod: 26

## 2021-02-17 PROCEDURE — 83540 ASSAY OF IRON: CPT

## 2021-02-17 PROCEDURE — 88304 TISSUE EXAM BY PATHOLOGIST: CPT

## 2021-02-17 PROCEDURE — 71045 X-RAY EXAM CHEST 1 VIEW: CPT

## 2021-02-17 PROCEDURE — 83735 ASSAY OF MAGNESIUM: CPT

## 2021-02-17 PROCEDURE — U0003: CPT

## 2021-02-17 PROCEDURE — 87070 CULTURE OTHR SPECIMN AEROBIC: CPT

## 2021-02-17 PROCEDURE — 71045 X-RAY EXAM CHEST 1 VIEW: CPT | Mod: 26

## 2021-02-17 PROCEDURE — 82272 OCCULT BLD FECES 1-3 TESTS: CPT

## 2021-02-17 PROCEDURE — 85027 COMPLETE CBC AUTOMATED: CPT

## 2021-02-17 PROCEDURE — 87086 URINE CULTURE/COLONY COUNT: CPT

## 2021-02-17 PROCEDURE — 36415 COLL VENOUS BLD VENIPUNCTURE: CPT

## 2021-02-17 RX ORDER — SODIUM CHLORIDE 9 MG/ML
1000 INJECTION, SOLUTION INTRAVENOUS
Refills: 0 | Status: DISCONTINUED | OUTPATIENT
Start: 2021-02-17 | End: 2021-02-18

## 2021-02-17 RX ORDER — MULTIVIT-MIN/FERROUS GLUCONATE 9 MG/15 ML
1 LIQUID (ML) ORAL DAILY
Refills: 0 | Status: DISCONTINUED | OUTPATIENT
Start: 2021-02-17 | End: 2021-03-02

## 2021-02-17 RX ORDER — SERTRALINE 25 MG/1
25 TABLET, FILM COATED ORAL DAILY
Refills: 0 | Status: DISCONTINUED | OUTPATIENT
Start: 2021-02-17 | End: 2021-02-25

## 2021-02-17 RX ORDER — OXYCODONE AND ACETAMINOPHEN 5; 325 MG/1; MG/1
1 TABLET ORAL DAILY
Refills: 0 | Status: DISCONTINUED | OUTPATIENT
Start: 2021-02-17 | End: 2021-02-19

## 2021-02-17 RX ORDER — TAMSULOSIN HYDROCHLORIDE 0.4 MG/1
1 CAPSULE ORAL
Qty: 0 | Refills: 0 | DISCHARGE

## 2021-02-17 RX ORDER — HYDRALAZINE HCL 50 MG
50 TABLET ORAL
Refills: 0 | Status: DISCONTINUED | OUTPATIENT
Start: 2021-02-17 | End: 2021-02-19

## 2021-02-17 RX ORDER — DEXTROSE 50 % IN WATER 50 %
15 SYRINGE (ML) INTRAVENOUS ONCE
Refills: 0 | Status: DISCONTINUED | OUTPATIENT
Start: 2021-02-17 | End: 2021-03-02

## 2021-02-17 RX ORDER — PIPERACILLIN AND TAZOBACTAM 4; .5 G/20ML; G/20ML
3.38 INJECTION, POWDER, LYOPHILIZED, FOR SOLUTION INTRAVENOUS EVERY 8 HOURS
Refills: 0 | Status: DISCONTINUED | OUTPATIENT
Start: 2021-02-17 | End: 2021-02-18

## 2021-02-17 RX ORDER — ASPIRIN/CALCIUM CARB/MAGNESIUM 324 MG
1 TABLET ORAL
Qty: 0 | Refills: 0 | DISCHARGE

## 2021-02-17 RX ORDER — ASCORBIC ACID 60 MG
2 TABLET,CHEWABLE ORAL
Qty: 0 | Refills: 0 | DISCHARGE

## 2021-02-17 RX ORDER — AMLODIPINE BESYLATE 2.5 MG/1
1 TABLET ORAL
Qty: 0 | Refills: 0 | DISCHARGE

## 2021-02-17 RX ORDER — INSULIN LISPRO 100/ML
VIAL (ML) SUBCUTANEOUS
Refills: 0 | Status: DISCONTINUED | OUTPATIENT
Start: 2021-02-17 | End: 2021-03-02

## 2021-02-17 RX ORDER — GLUCAGON INJECTION, SOLUTION 0.5 MG/.1ML
1 INJECTION, SOLUTION SUBCUTANEOUS ONCE
Refills: 0 | Status: DISCONTINUED | OUTPATIENT
Start: 2021-02-17 | End: 2021-03-02

## 2021-02-17 RX ORDER — ATORVASTATIN CALCIUM 80 MG/1
20 TABLET, FILM COATED ORAL AT BEDTIME
Refills: 0 | Status: DISCONTINUED | OUTPATIENT
Start: 2021-02-17 | End: 2021-03-02

## 2021-02-17 RX ORDER — LEVOTHYROXINE SODIUM 125 MCG
25 TABLET ORAL DAILY
Refills: 0 | Status: DISCONTINUED | OUTPATIENT
Start: 2021-02-17 | End: 2021-03-02

## 2021-02-17 RX ORDER — TAMSULOSIN HYDROCHLORIDE 0.4 MG/1
0.4 CAPSULE ORAL AT BEDTIME
Refills: 0 | Status: DISCONTINUED | OUTPATIENT
Start: 2021-02-17 | End: 2021-03-02

## 2021-02-17 RX ORDER — SODIUM CHLORIDE 9 MG/ML
1000 INJECTION, SOLUTION INTRAVENOUS
Refills: 0 | Status: DISCONTINUED | OUTPATIENT
Start: 2021-02-17 | End: 2021-03-02

## 2021-02-17 RX ORDER — PIPERACILLIN AND TAZOBACTAM 4; .5 G/20ML; G/20ML
3.38 INJECTION, POWDER, LYOPHILIZED, FOR SOLUTION INTRAVENOUS ONCE
Refills: 0 | Status: COMPLETED | OUTPATIENT
Start: 2021-02-17 | End: 2021-02-17

## 2021-02-17 RX ORDER — DEXTROSE 50 % IN WATER 50 %
25 SYRINGE (ML) INTRAVENOUS ONCE
Refills: 0 | Status: DISCONTINUED | OUTPATIENT
Start: 2021-02-17 | End: 2021-03-02

## 2021-02-17 RX ORDER — SIMETHICONE 80 MG/1
80 TABLET, CHEWABLE ORAL
Refills: 0 | Status: DISCONTINUED | OUTPATIENT
Start: 2021-02-17 | End: 2021-03-02

## 2021-02-17 RX ORDER — INSULIN LISPRO 100/ML
0 VIAL (ML) SUBCUTANEOUS
Qty: 0 | Refills: 0 | DISCHARGE

## 2021-02-17 RX ORDER — FERROUS SULFATE 325(65) MG
1 TABLET ORAL
Qty: 0 | Refills: 0 | DISCHARGE

## 2021-02-17 RX ORDER — VANCOMYCIN HCL 1 G
1000 VIAL (EA) INTRAVENOUS ONCE
Refills: 0 | Status: COMPLETED | OUTPATIENT
Start: 2021-02-17 | End: 2021-02-17

## 2021-02-17 RX ORDER — INSULIN LISPRO 100/ML
VIAL (ML) SUBCUTANEOUS AT BEDTIME
Refills: 0 | Status: DISCONTINUED | OUTPATIENT
Start: 2021-02-17 | End: 2021-03-02

## 2021-02-17 RX ORDER — FERROUS SULFATE 325(65) MG
325 TABLET ORAL DAILY
Refills: 0 | Status: DISCONTINUED | OUTPATIENT
Start: 2021-02-17 | End: 2021-03-02

## 2021-02-17 RX ORDER — ASCORBIC ACID 60 MG
500 TABLET,CHEWABLE ORAL DAILY
Refills: 0 | Status: DISCONTINUED | OUTPATIENT
Start: 2021-02-17 | End: 2021-03-02

## 2021-02-17 RX ORDER — PANTOPRAZOLE SODIUM 20 MG/1
40 TABLET, DELAYED RELEASE ORAL
Refills: 0 | Status: DISCONTINUED | OUTPATIENT
Start: 2021-02-17 | End: 2021-03-02

## 2021-02-17 RX ORDER — SODIUM CHLORIDE 9 MG/ML
500 INJECTION INTRAMUSCULAR; INTRAVENOUS; SUBCUTANEOUS ONCE
Refills: 0 | Status: COMPLETED | OUTPATIENT
Start: 2021-02-17 | End: 2021-02-17

## 2021-02-17 RX ORDER — ONDANSETRON 8 MG/1
4 TABLET, FILM COATED ORAL EVERY 6 HOURS
Refills: 0 | Status: DISCONTINUED | OUTPATIENT
Start: 2021-02-17 | End: 2021-03-02

## 2021-02-17 RX ORDER — ACETAMINOPHEN 500 MG
650 TABLET ORAL EVERY 6 HOURS
Refills: 0 | Status: DISCONTINUED | OUTPATIENT
Start: 2021-02-17 | End: 2021-03-02

## 2021-02-17 RX ORDER — SODIUM CHLORIDE 9 MG/ML
500 INJECTION INTRAMUSCULAR; INTRAVENOUS; SUBCUTANEOUS
Refills: 0 | Status: DISCONTINUED | OUTPATIENT
Start: 2021-02-17 | End: 2021-02-22

## 2021-02-17 RX ORDER — LACTOBACILLUS ACIDOPHILUS 100MM CELL
2 CAPSULE ORAL
Qty: 0 | Refills: 0 | DISCHARGE

## 2021-02-17 RX ORDER — DEXTROSE 50 % IN WATER 50 %
12.5 SYRINGE (ML) INTRAVENOUS ONCE
Refills: 0 | Status: DISCONTINUED | OUTPATIENT
Start: 2021-02-17 | End: 2021-03-02

## 2021-02-17 RX ADMIN — PIPERACILLIN AND TAZOBACTAM 25 GRAM(S): 4; .5 INJECTION, POWDER, LYOPHILIZED, FOR SOLUTION INTRAVENOUS at 22:27

## 2021-02-17 RX ADMIN — ATORVASTATIN CALCIUM 20 MILLIGRAM(S): 80 TABLET, FILM COATED ORAL at 22:26

## 2021-02-17 RX ADMIN — SODIUM CHLORIDE 500 MILLILITER(S): 9 INJECTION INTRAMUSCULAR; INTRAVENOUS; SUBCUTANEOUS at 12:15

## 2021-02-17 RX ADMIN — PIPERACILLIN AND TAZOBACTAM 200 GRAM(S): 4; .5 INJECTION, POWDER, LYOPHILIZED, FOR SOLUTION INTRAVENOUS at 14:49

## 2021-02-17 RX ADMIN — Medication 50 MILLIGRAM(S): at 22:26

## 2021-02-17 RX ADMIN — Medication 325 MILLIGRAM(S): at 22:26

## 2021-02-17 RX ADMIN — Medication 500 MILLIGRAM(S): at 22:26

## 2021-02-17 RX ADMIN — Medication 250 MILLIGRAM(S): at 18:03

## 2021-02-17 RX ADMIN — SODIUM CHLORIDE 500 MILLILITER(S): 9 INJECTION INTRAMUSCULAR; INTRAVENOUS; SUBCUTANEOUS at 13:42

## 2021-02-17 RX ADMIN — TAMSULOSIN HYDROCHLORIDE 0.4 MILLIGRAM(S): 0.4 CAPSULE ORAL at 22:26

## 2021-02-17 NOTE — H&P ADULT - NSHPPHYSICALEXAM_GEN_ALL_CORE
PHYSICAL EXAM:    Daily Height in cm: 165.1 (17 Feb 2021 10:39)    Daily     ICU Vital Signs Last 24 Hrs  T(C): 37.1 (17 Feb 2021 15:16), Max: 37.3 (17 Feb 2021 10:39)  T(F): 98.7 (17 Feb 2021 15:16), Max: 99.1 (17 Feb 2021 10:39)  HR: 80 (17 Feb 2021 15:16) (80 - 92)  BP: 128/60 (17 Feb 2021 15:16) (124/63 - 128/60)  BP(mean): --  ABP: --  ABP(mean): --  RR: 18 (17 Feb 2021 15:16) (18 - 18)  SpO2: 96% (17 Feb 2021 15:16) (96% - 96%)      Constitutional: Well appearing  HEENT: Atraumatic, SHADI, Normal, No congestion  Respiratory: Breath Sounds normal, no rhonchi/wheeze  Cardiovascular: N S1S2;   Gastrointestinal: Abdomen soft, non tender, Bowel Sounds present  Extremities: No edema, peripheral pulses present  Neurological: AAO x 1, no gross focal motor deficits  Skin: large sacral ulcer with purulent discharge  Lymph Nodes: No lymphadenopathy noted  Back: No CVA tenderness   Musculoskeletal: non tender  Breasts: Deferred  Genitourinary: deferred  Rectal: Deferred

## 2021-02-17 NOTE — ED ADULT NURSE REASSESSMENT NOTE - NS ED NURSE REASSESS COMMENT FT1
2019    From the office of:   Jay Hardin [facility]  [address] [place] [id number]  Phone: [phone]    In regards to Erma Gamez, :  1959    [facility]    Patient Name:  Erma Gamez  MRN# [id number]  YOB: 1959  Date of Visit:  2019  Appointment Type: Follow-up      Care Team:  Patient Care Team:  Belen Dietz MD as PCP - General (Family Practice)  Gómez Vega as Nurse Practitioner (Nurse Practitioner)  Maday Wright MD as Cardiologist (Cardiovascular Disease)  Angelica Gomez MD (Gastroenterology)    Chief Complaint:  Erma Gamez is a 56year old male who returns for ongoing genetic cancer risk assessment and discussion of management options 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 Diagnosis:   1. MSH6 associated Hoffman syndrome. 2. Paternal family history of MSH6 associated Hoffman syndrome. Cancer Diagnosis: No                        The patient has no personal history of malignancy. The family oncologic history summarized in the pedigree. Updated 19. Genetic Testing: Yes                Patient Result: Esther Randall specific site analysis of [facility] ([facility]) 6 (18):      MSH6 c.3173-1G>C: DETECTED                Family Results (as provided by the patient)    #1: Carrol-paternal first cousin-first person tested in family.  Multigene panel test:      MSH6 c.3173-1G>C: DETECTED    #2: Luis Murders:  MSH6 c.3173-1G>C: Test result unknown to Oswald Weems    #3: Gamal-brother  MSH6 c.3173-1G>C: NOT DETECTED       #4: Isaac-brother:  MSH6 c.3173-1G>C: DETECTED    #5: Gissell-daughter:  MSH6 c.3173-1G>C: NOT DETECTED      #6: Jasmin-daughter:  MSH6 c.3173-1G>C: NOT DETECTED      #7: Kailey-daughter: MSH6 c.3173-1G>C: NOT DETECTED      Medical History:   Erma Gamez is a 56year old man who was seen by a genetic counselor in 2018 because of a
handoff report taken from day RN MUSHTAQ IRENE. pt. noted resting comfortably in stretcher. No distress noted, denies pain, safety maintained. BRET
family history of MSH6 associated Hoffman syndrome. The patient underwent single site testing which was positive for the familial pathogenic variant. The patient established in the [facility] for ongoing genetic cancer risk assessment and discussion of management options. He has no personal history of malignancy. INTERVAL HISTORY:  Vincenzo Barrow is a 56year old man with MSH6 associated Hoffman syndrome who returns for annual follow-up. Since he was last seen he underwent a colonoscopy and EGD on 10/10/2019. A single colon polyp was identified which was a hyperplastic polyp. This past summer he underwent a digital rectal exam which had some subtle abnormalities not felt to require additional evaluation. His PSA on 06/06/2019 was 0.21. The patient was last seen he had a mildly abnormal urine cytology. He underwent a CT urogram which showed no evidence of malignancy. He declined urology consult/cystoscopy. His  review of systems since that time was unremarkable. The patient's interim medical history is otherwise unremarkable. A family oncologic history in genetic testing history was updated and is summarized above and in the pedigree. When the patient was seen last year to establish care his 3 daughters accompanied him in underwent single site testing. All 3 were negative for the MSH6 variant.     Past Medical History:   Diagnosis Date   â¢ Anemia    â¢ Cardiac pacemaker in situ 6/26/2013    [employer]:  Adapta L ADDRL1:  Dual Chamber Pacemaker   â¢ Cardiomyopathy (CMS/HCC) 06/04/2018 6/4/2018   â¢ Carpal tunnel syndrome    â¢ Cellulitis 9/2012    right lower leg   â¢ Chest pain    â¢ Complete heart block (CMS/HCC) 3/8/2019   â¢ Erectile dysfunction of organic origin    â¢ Essential (primary) hypertension    â¢ Gout    â¢ Influenza A 01/2018   â¢ Low testosterone    â¢ MSH6-related Hoffman syndrome (HNPCC5) 07/25/2018   â¢ Obesity    â¢ Pneumonia    â¢ Pure hypercholesterolemia    â¢ Sciatica    â¢ Sleep apnea     C-Pap
â¢ SOB (shortness of breath) on exertion    â¢ Wears reading eyeglasses        Past Surgical History:   Procedure Laterality Date   â¢ Cardiac catherization  08/1995    coronaries normal-needs aortic valve replacement   â¢ Cardiac catherization  02/04/2012    normal coronaries   â¢ Cardiac surgery  8/11/1995    Aortic valve replacement   â¢ Carpal tunnel release  01/25/2018    Right   â¢ Cdl pacemaker generator replacement  2004   â¢ Cdl pacemaker generator replacement  09/28/2012    Medtronic   â¢ Colonoscopy  10/05/2018   â¢ Colonoscopy diagnostic  02/22/2011    normal per pt   â¢ Fracture surgery      ORIF Left shoulder   â¢ Pacemaker implant  8/25/1995   â¢ Parathyroidectomy  ~2001   â¢ Upgrade to bi-v implant  06/28/2018           ALLERGIES:   Allergen Reactions   â¢ Atorvastatin MYALGIA   â¢ Lovastatin MYALGIA   â¢ Pravastatin MYALGIA   â¢ Benazepril Cough       Current Outpatient Medications   Medication Sig Dispense Refill   â¢ valsartan (DIOVAN) 160 MG tablet Take 1 tablet by mouth daily. 30 tablet 4   â¢ albuterol 108 (90 Base) MCG/ACT inhaler Inhale 2 puffs into the lungs every 4 hours as needed for Shortness of Breath or Wheezing. 18 g 1   â¢ allopurinol (ZYLOPRIM) 300 MG tablet Take 1 tablet by mouth daily. 90 tablet 0   â¢ testosterone 20.25 MG/ACT (1.62%) gel Apply 2 pumps topically daily. Apply to dry, intact skin of the upper arms and shoulders. 75 g 0   â¢ furosemide (LASIX) 20 MG tablet Take 1 tablet by mouth daily. 30 tablet 11   â¢ COUMADIN 4 MG tablet Take two and a half tablets sundays and thursdays, and two tablets daily the rest of the days. Or take as directed. 195 tablet 0   â¢ indapamide (LOZOL) 2.5 MG tablet Take 1 tablet by mouth daily. 90 tablet 0   â¢ amLODIPine (NORVASC) 5 MG tablet Take 1 tablet by mouth daily. 90 tablet 3   â¢ sildenafil (REVATIO) 20 MG tablet Take 3 tablets by mouth one hour before intercourse 30 tablet 5   â¢ aspirin (ASPIRIN CHILDRENS) 81 MG chewable tablet Chew 1 tablet by mouth daily.
â¢ simvastatin (ZOCOR) 20 MG tablet Take 1 tablet by mouth nightly. (Patient taking differently: Take 20 mg by mouth twice a week. ) 90 tablet 1   â¢ Capsicum, Cayenne, (CAYENNE PEPPER) 450 MG CAPS Take by mouth daily (at noon). â¢ Celery Seed OIL Take by mouth daily (at noon). â¢ COENZYME Q-10 PO Take by mouth daily (at noon). â¢ VITAMIN E PO Take 400 Int'l Units by mouth daily (at noon). â¢ MILK THISTLE PO Take 1 tablet by mouth daily (at noon). â¢ ibuprofen (MOTRIN) 200 MG tablet Take 600 mg by mouth every 6 hours as needed. â¢ Multiple Vitamin (MULTIVITAMINS PO) Take 1 tablet by mouth daily (at noon). â¢ omeprazole (PRILOSEC) 20 MG capsule Take one tablet by mouth 30 minutes prior to morning meal 90 capsule 4   â¢ zoster vaccine recomb adjuvanted (SHINGRIX) 50 MCG/0.5ML injection Repeat dose in 2 to 6 months (unless 1 dose already given), for a total of 2 doses. 1 each 0     No current facility-administered medications for this visit. Social History     Socioeconomic History   â¢ Marital status: /Civil Union     Spouse name: Not on file   â¢ Number of children: Not on file   â¢ Years of education: Not on file   â¢ Highest education level: Not on file   Occupational History   â¢ Occupation: maintenence     Comment: [employer] Occupation: farming     Comment: crops & beef   Social Needs   â¢ Financial resource strain: Not on file   â¢ Food insecurity:     Worry: Not on file     Inability: Not on file   â¢ Transportation needs:     Medical: Not on file     Non-medical: Not on file   Tobacco Use   â¢ Smoking status: Former Smoker     Packs/day: 0.25     Years: 20.00     Pack years: 5.00     Last attempt to quit: 12/3/2007     Years since quittin.0   â¢ Smokeless tobacco: Former User     Types: Chew   Substance and Sexual Activity   â¢ Alcohol use:  Yes     Alcohol/week: 15.0 standard drinks     Types: 1 Shots of liquor, 14 Standard drinks or equivalent per week     Comment: week   â¢
Drug use: No   â¢ Sexual activity: Yes     Partners: Female   Lifestyle   â¢ Physical activity:     Days per week: Not on file     Minutes per session: Not on file   â¢ Stress: Not on file   Relationships   â¢ Social connections:     Talks on phone: Not on file     Gets together: Not on file     Attends [employer] service: Not on file     Active member of club or organization: Not on file     Attends meetings of clubs or organizations: Not on file     Relationship status: Not on file   â¢ Intimate partner violence:     Fear of current or ex partner: Not on file     Emotionally abused: Not on file     Physically abused: Not on file     Forced sexual activity: Not on file   Other Topics Concern   â¢  Service No   â¢ Blood Transfusions Yes     Comment: 1995   â¢ Caffeine Concern Not Asked   â¢ Occupational Exposure Yes   â¢ [place] Hazards Not Asked   â¢ Sleep Concern Not Asked   â¢ Stress Concern Not Asked   â¢ Weight Concern Not Asked   â¢ Special Diet No   â¢ Back Care Yes     Comment: back & shoulder   â¢ Exercise Yes   â¢ Bike Helmet No   â¢ Seat Belt Yes   â¢ Self-Exams Yes   Social History Narrative   â¢ Not on file       Review of Systems:   A 12 point review of systems was reviewed with the patient. Pertinent positive and negative results are discussed above. The entire review of systems is detailed in the Epic electronic health record. Physical Exam:   The patient is a 56year old male who is alert, oriented times 3, in no apparent distress. VITALS:    Visit Vitals  /69   Pulse 84   Temp 98.2 Â°F (36.8 Â°C)     CONSTITUTIONAL:  Alert, cooperative, oriented. Mood and affect appropriate. Appears close to chronological age. Well nourished/overweight. Well developed. HEENT:  Normocephalic, atraumatic. Pupils are unequal, right-round, reactive to light. Left-irregular, poorly reactive to light. Extraocular movements are intact. Sclerae anicteric. Conjunctivae and corneas are clear. Sinuses are nontender.
pt turned and repositioned, linen and diaper changed. pt has BM pt cleaned/provided with perianal care. urine specimen received, pt placed on external female catheter. pt wound clean, draining purlulent drainage at this time, basic wound care provided, wound culture sent as per MD order. MD at pt bedside, reports pt will be admitted and wound will be operated on tomorrow. pt aware, pt safety maintained.
Oropharynx and oral cavity are unremarkable. NECK:  Neck is supple without thyromegaly. Trachea is midline. LYMPHATICS:  There are no palpable cervical, supraclavicular, axillary or inguinal lymph nodes. MUSCULOSKELETAL:  Back is without spine or CVA (costovertebral angle) tenderness. The rest of the musculoskeletal examination is unremarkable. SKIN:  Skin examination is without rash or lesions. CHEST:  Clear to auscultation and percussion with normal chest expansion. CARDIOVASCULAR:  Cardiac examination shows a regular rate and rhythm. There are prosthetic heart tones and a slight murmur. No rub or gallop. ABDOMEN:  Abdomen is soft, nontender, nondistended with normoactive bowel sounds. There is no hepatosplenomegaly, masses or bruits. EXTREMITIES:  Extremities are without clubbing, cyanosis or edema. NEUROLOGIC:  Alert and oriented x 3. Cranial nerves II through XII are intact. Motor shows normal tone, bulk and power in the major muscle groups of the upper and lower extremities. Deep tendon reflexes are symmetric. Gait is normal.   PSYCHIATRIC:  Alert and oriented x 3. Coherent speech. Verbalizes understanding of our discussions today.       Labs:        Lab Results   Component Value Date    PSA 0.21 06/06/2019    PSA 0.26 10/25/2018    PSA 0.29 11/21/2016    PSA 0.23 09/29/2014    PSA 0.14 06/04/2013    PSA 0.17 12/19/2012     Recent Labs   Lab 06/06/19  1013 02/11/19  0850   WBC 8.5 12.9*   RBC 5.55 5.11   HGB 14.2 13.3   HCT 44.6 41.2   MCV 80.4 80.6    309   Absolute Neutrophil 5.6 9.3*   Absolute Lymph 1.8 2.1   Absolute Mono 0.7 1.1*   Absolute Eos 0.2 0.3   Absolute Baso 0.0 0.1     Recent Labs   Lab 06/06/19  1013 02/11/19  0850   Glucose 98 113*   Sodium 143 137   Potassium 3.9 3.6   Chloride 103 100   Creatinine 0.76 0.75   CALCIUM 9.2 9.1   TOTAL PROTEIN 7.2  --    Albumin 3.7  --    AST/SGOT 35  --    ALK PHOSPHATASE 90  --    ALT/SGPT 52  --    TOTAL BILIRUBIN 0.3  --
Imaging/Screening Procedures:    Colonoscopy (10/10/19): There was a 2 mm sigmoid polyp which was removed. Pathology showed hyperplastic polyp. The study was otherwise unremarkable. EGD (10/10/2019): Mild reflux esophagitis. Gastric biopsies were taken from the antrum, body, and fundus. 3. CT urogram (12/29/2018): This was performed because above mildly abnormal urine cytology. The study showed no evidence of malignancy. A number of benign findings were identified. The patient was contacted with the results. Pathology:    1. Shave biopsy lesion right side of tip of nose (11/13/18): Sebaceous hyperplasia possibly a sebaceous adenoma (these lesions can be seen in Hoffman syndrome, the syndrome is called Valier-Aldair syndrome). 2. Transverse colon polyp (10/5/18): Fragments of a tubular adenoma. 3. Urine cytology (12/06/2018):  Rare atypical urothelial cells with degenerative changes. 4. EGD and colonoscopy (10/10/2019):  Gastric biopsy showed reactive gastropathy. Colon biopsies show  a hyperplastic polyp. 5. Urine cytology (12/19/2019):  Pending. Assessment:    Radha Michele is a 56year old male with recently diagnosed MSH6 associated Hoffman syndrome. Radha Michele has no personal history of malignancy. The patient presents for ongoing genetic cancer risk assessment and discussion of management options. I reviewed the spectrum of cancers at risk in patients with MSH6 associated Hoffman syndrome. I reviewed the mean age of onset of these cancers. I compared these data to the general population. The NCCN guidelines were reference for this discussion. Relevant NCCN guideline summaries are appended below. Colonoscopy is recommended every 1-2 years. The last colonoscopy was 10/10/2019. This procedure is both for early detection as well as prevention (polypectomy). We reviewed surgical options if colorectal cancer is discovered.  We reviewed the possible risk reducing
benefits of nonsteroidal anti-inflammatory agents. The optimal agent and dose is not yet known. At present I recommend aspirin  mg daily depending on tolerance. Because the patient is on therapeutic anticoagulation with Coumadin I do not recommended dose higher then  mg daily. Even with that he should discuss it further with his cardiologist before electing to proceed with a trial.    EGD with examination of as much of the proximal small bowel as is possible is recommended every 3-5 years. If H. pylori is identified this should be treated. The patient's last EGD was 10/10/2019. We reviewed the risks of endometrial and ovarian cancer in women with Hoffman syndrome. There is a substantial risk of developing endometrial cancer in women with Hoffman syndrome. Because endometrial cancer typically presents at an early stage there is no evidence that risk reducing surgery reduces the risk of dying of endometrial cancer but it certainly reduces the risk of developing endometrial cancer. Risk reducing surgery is a reasonable option. The mean age of developing endometrial cancer is 50-60. Until risk reducing surgery is performed it is reasonable to consider endometrial biopsy every 1-2 years. The risk of ovarian cancer is significantly less than the risk of endometrial cancer. However, there is no effective screening for ovarian cancer. In spite of the lack of demonstrated efficacy some will proceed with periodic transabdominal and transvaginal pelvic ultrasound with physical exam and  determinations until risk reducing surgery is performed. The optimal timing of risk reducing surgery is not clear. This discussion is not relevant for Vincenzo Barrow. However, he does have female relatives at risk any can help communicate these risks. There is a small increased risk of  cancers (renal, urothelial and prostate). We recommend annual urinalysis and urine cytology.  In addition in men we recommend annual
PSA with or without digital rectal exam. Renée Medina is up-to-date with prostate cancer screening by PSA. A urinalysis and urine cytology will be obtained today and the patient will be contacted with results. There is a small risk of pancreas cancer in patients who have Hoffman syndrome. We follow a modification of the CAPS guidelines for pancreas cancer screening. We only recommend pancreas cancer screening to Hoffman syndrome patients who have a family history of pancreas cancer. Pancreas cancer screening consists of an annual endoscopic ultrasound of the pancreas or an annual screening MRI-pancreas or preferably alternating the procedures on an annual basis. There is no family history of MSH6 associated pancreas cancer or pancreas cancer in general. Therefore there is no indication for pancreas cancer screening for the patient or his family members. The MSH6 variant is known to be of paternal lineage. Options for case identification within the family were discussed. We reviewed optimal timing of testing based on initiation of screening, insurance considerations,  service among other issues. We discussed constitutional mismatch repair deficiency which results from biallelic mutations in Hoffman syndrome genes. We discussed issues related to preconception counseling and options for prenatal testing and assisted reproductive options. We also discussed lifestyle issues that may reduce the risk of cancer. We discussed maintaining an ideal body mass index. We discussed in detail a heart and colon healthy diet including avoidance of smoked and cured meats, reduction in weekly intake of red meat, 5+ servings of fruits and vegetables per day, improvement in intake of complex carbohydrates and reduction of simple sugars and a movement towards more plant-based diet with respect to both proteins and fats. We reviewed the importance of abstaining from tobacco use and secondhand exposure.  We reviewed the
connection between alcohol and cancer. The highest risk behaviors are binge drinking and heavy regular use. I typically recommend at least 2-3 days per week with no alcohol and on other days 1-2 standard drinks. We reviewed the importance of exercise. I discussed both options for regular aerobic exercise as well as high intensity interval training. Carmita Loaiza is advised that this is a rapidly changing field. If he has any updates to his personal or family oncologic history he should contact us. Carmita Loaiza should contact us with any testing results of family members. I recommend that he follow-up in this clinic on an annual basis. All of patient's questions were answered. Summary of Recommendations:    1. MSH6 associated Hoffman syndrome posttest counseling updated-see text. 2. Patient will continue with periodic screening colonoscopy under the direction of Dr. Vero Vera. At this point colonoscopy every 1-2 year(s) is recommended. 3. The patient will have an EGD every 3-5 years or as clinically indicated. Attention for possible H. pylori infection is recommended and if found treatment is recommended. 4. The patient will again consider a trial of aspirin. Because he is on Coumadin full dose aspirin would not be appropriate. He could consider low-dose ( mg daily). He is encouraged to discuss this further with his cardiologist if he would like to proceed with a trial.  5. Screening for kidney cancer and urothelial cancer: Annual urinalysis and urine cytology. These studies will be performed today and the patient will be contacted with results. In addition, the patient will continue with annual PSA studies. We discussed the NIH prostate cancer screening trial which involves MRI scanning. Because of the patient's pacemaker we will not pursue this any further. Information was given to him to distribute to his family.   6. Return to clinic in 1 year with urinalysis and urine cytology on day
of appointment. .  7. The MSH6 variant is known to be of paternal lineage. We discussed case identification options within the family. We discussed issues related to biallelic disease.     Patient Care Team:  Bull Hitchcock MD as PCP - General (Family Practice)  Lionel Essex as Nurse Practitioner (Nurse Practitioner)  Amira Hayes MD as Cardiologist (Cardiovascular Disease)  Rajani Perez MD (Gastroenterology)     Note:  Updated Hoffman syndrome NCCN guidelines:    Hoffman syndrome: Spectrum of cancer risk and mean age at onset based on Hoffman syndrome gene:          Hoffman syndrome: Cancer risk management:

## 2021-02-17 NOTE — H&P ADULT - NSICDXPASTSURGICALHX_GEN_ALL_CORE_FT
PAST SURGICAL HISTORY:  H/O lumpectomy     H/O shoulder surgery     History of colon surgery     No significant past surgical history     No significant past surgical history

## 2021-02-17 NOTE — H&P ADULT - NSICDXPASTMEDICALHX_GEN_ALL_CORE_FT
PAST MEDICAL HISTORY:  Acute kidney failure, unspecified     MARRY (acute respiratory infection)     Ascorbic acid deficiency     Changes in skin texture     Depression     Diabetes     Diabetes mellitus type 2    Disorder of thyroid, unspecified     Essential (primary) hypertension     Flatulence     High cholesterol     HTN (hypertension)     Hyperlipidemia     Hyperlipidemia, unspecified     Hypertension     Hypothyroid     Intraoperative hemorrhage and hematoma of a genitourinary system organ or structure complicating other procedure     Major depressive disorder, recurrent     MDD (major depressive disorder)     Pain, unspecified     Syncope and collapse     Type 2 diabetes mellitus without complication      facial swelling/ edema noted

## 2021-02-17 NOTE — ED PROVIDER NOTE - PSH
H/O lumpectomy    H/O shoulder surgery    History of colon surgery    No significant past surgical history    No significant past surgical history

## 2021-02-17 NOTE — ED ADULT NURSE NOTE - OBJECTIVE STATEMENT
pt BIBEMS for worsening sacral wound. pt reports pain in buttock. pt currently in [facility]. pt reports multiple infections and admissions to hospital for ulcer. pt paperwork requests  in ED, pt unsure of  is part of pt care/team.

## 2021-02-17 NOTE — ED PROVIDER NOTE - CLINICAL SUMMARY MEDICAL DECISION MAKING FREE TEXT BOX
70 y/o white female multiple PMHx including chronic sacral decubitus ulcer, BIBA from NH regarding recent increase pain, discharge, outpatient blood work yesterday for elevated white count, pt sent for re evaluation and possible surgical debridement. Plan; CXR, labs including blood cultures, lactate, wound culture of sacral ulcer, rectal temp, cautious IV fluids, pt does not meet sepsis criteria upon eval, surgical consult, observe and reassess.

## 2021-02-17 NOTE — H&P ADULT - NSHPLABSRESULTS_GEN_ALL_CORE
Lab Results:  CBC  CBC Full  -  ( 2021 11:38 )  WBC Count : 19.94 K/uL  RBC Count : 3.18 M/uL  Hemoglobin : 7.7 g/dL  Hematocrit : 24.7 %  Platelet Count - Automated : 588 K/uL  Mean Cell Volume : 77.7 fl  Mean Cell Hemoglobin : 24.2 pg  Mean Cell Hemoglobin Concentration : 31.2 gm/dL  Auto Neutrophil # : 16.35 K/uL  Auto Lymphocyte # : 0.80 K/uL  Auto Monocyte # : 1.99 K/uL  Auto Eosinophil # : 0.00 K/uL  Auto Basophil # : 0.00 K/uL  Auto Neutrophil % : 81.0 %  Auto Lymphocyte % : 4.0 %  Auto Monocyte % : 10.0 %  Auto Eosinophil % : 0.0 %  Auto Basophil % : 0.0 %    .		Differential:	[] Automated		[] Manual  Chemistry                        7.7     )-----------( 588      ( 2021 11:38 )             24.7     02-    132<L>  |  101  |  24<H>  ----------------------------<  223<H>  4.7   |  21<L>  |  1.06    Ca    8.6      2021 11:38    TPro  6.4  /  Alb  1.5<L>  /  TBili  0.2  /  DBili  x   /  AST  52<H>  /  ALT  49  /  AlkPhos  279<H>  0217    LIVER FUNCTIONS - ( 2021 11:38 )  Alb: 1.5 g/dL / Pro: 6.4 gm/dL / ALK PHOS: 279 U/L / ALT: 49 U/L / AST: 52 U/L / GGT: x           PT/INR - ( 2021 11:38 )   PT: 13.9 sec;   INR: 1.20 ratio         PTT - ( 2021 11:38 )  PTT:27.2 sec  Urinalysis Basic - ( 2021 15:24 )    Color: Yellow / Appearance: very cloudy / S.015 / pH: x  Gluc: x / Ketone: Negative  / Bili: Negative / Urobili: Negative mg/dL   Blood: x / Protein: 100 mg/dL / Nitrite: Negative   Leuk Esterase: Moderate / RBC: 0-2 /HPF / WBC 11-25   Sq Epi: x / Non Sq Epi: Occasional / Bacteria: TNTC      < from: CT Abdomen and Pelvis w/ IV Cont (21 @ 14:10) >    IMPRESSION:  Sacral decubitus ulcer with air and soft tissue extending in the subcutaneous region laterally to the left buttock and slightly to the right of midline. Soft tissue extends to the adjacent lower sacrum and coccyx. No gross destruction is identified although osteomyelitis cannot be excluded. Clinical correlation is necessary.      < end of copied text >    MEDICATIONS  (STANDING):  ascorbic acid 500 milliGRAM(s) Oral daily  atorvastatin 20 milliGRAM(s) Oral at bedtime  dextrose 5% + sodium chloride 0.9%. 1000 milliLiter(s) (75 mL/Hr) IV Continuous <Continuous>  enalapril 2.5 milliGRAM(s) Oral two times a day  ferrous    sulfate 325 milliGRAM(s) Oral daily  hydrALAZINE 50 milliGRAM(s) Oral two times a day  levothyroxine 25 MICROGram(s) Oral daily  multivitamin/minerals 1 Tablet(s) Oral daily  pantoprazole    Tablet 40 milliGRAM(s) Oral before breakfast  piperacillin/tazobactam IVPB.. 3.375 Gram(s) IV Intermittent every 8 hours  sertraline 25 milliGRAM(s) Oral daily  sodium chloride 0.9%. 500 milliLiter(s) (100 mL/Hr) IV Continuous <Continuous>  tamsulosin 0.4 milliGRAM(s) Oral at bedtime  vancomycin  IVPB. 1000 milliGRAM(s) IV Intermittent once    MEDICATIONS  (PRN):  acetaminophen   Tablet .. 650 milliGRAM(s) Oral every 6 hours PRN Temp greater or equal to 38C (100.4F), Mild Pain (1 - 3)  ondansetron Injectable 4 milliGRAM(s) IV Push every 6 hours PRN Nausea  oxycodone    5 mG/acetaminophen 325 mG 1 Tablet(s) Oral daily PRN Moderate Pain (4 - 6)  simethicone 80 milliGRAM(s) Chew four times a day PRN Gas

## 2021-02-17 NOTE — ED ADULT NURSE NOTE - NSIMPLEMENTINTERV_GEN_ALL_ED
Implemented All Fall Risk Interventions:  [place] to call system. Call bell, personal items and telephone within reach. Instruct patient to call for assistance. Room bathroom lighting operational. Non-slip footwear when patient is off stretcher. Physically safe environment: no spills, clutter or unnecessary equipment. Stretcher in lowest position, wheels locked, appropriate side rails in place. Provide visual cue, wrist band, yellow gown, etc. Monitor gait and stability. Monitor for mental status changes and reorient to person, place, and time. Review medications for side effects contributing to fall risk. Reinforce activity limits and safety measures with patient and family.

## 2021-02-17 NOTE — ED PROVIDER NOTE - PMH
Acute kidney failure, unspecified    MARRY (acute respiratory infection)    Ascorbic acid deficiency    Changes in skin texture    Depression    Diabetes    Diabetes mellitus  type 2  Disorder of thyroid, unspecified    Essential (primary) hypertension    Flatulence    High cholesterol    HTN (hypertension)    Hyperlipidemia    Hyperlipidemia, unspecified    Hypertension    Hypothyroid    Intraoperative hemorrhage and hematoma of a genitourinary system organ or structure complicating other procedure    Major depressive disorder, recurrent    MDD (major depressive disorder)    Pain, unspecified    Syncope and collapse    Type 2 diabetes mellitus without complication

## 2021-02-17 NOTE — H&P ADULT - HISTORY OF PRESENT ILLNESS
71/F with PMHx of DM type 2, HTN, HLD, MDD, Hypothyroidism, Syncope, Depression, chronic diarrhea, sacral decubitus ulcer, UTIs, Anemia, Hyponatremia, presents to the ED BIBA from [facility] for re evaluation and possible debridement of sacral decubitus ulcer. Reports she has been undergoing treatment for ulcer for 1 year and has been admitted for infection in the past. Pt reports increase pain at site with local tenderness, doesn't know if there is any discharge. No fever. As per NH paperwork, requesting Dr. Nunez in the ED. Recent COVID PCR on 02/15 which was negative. Blood work from yesterday with white count of 17.8 and sodium 131. On oral Flagyl x2 days.    She was seen in ED by Sx team and she is for debridement in OR tomorrow.    NO other complaints    CT abdo pelvis done; ? sacral Osteomyelitis    Fam Hx: does not remember

## 2021-02-17 NOTE — ED PROVIDER NOTE - PROGRESS NOTE DETAILS
Kia Cuello: NH documents written request form mentioning Dr. Nunez for surgical debridement of sacral wound, paging Dr. Nunez. Kia SANCHEZ for Dr. Cuello: case discussed with Dr. Nunez who was not expecting pt, labs results discussed, advises medical admission and will consult on pt Kia Cuello: hospitalist paged, phone message left regarding admission Dr. Cuello:  Dr. Shabazz, adm hospitalist, aware of admission, requests CT A/P.

## 2021-02-17 NOTE — ED PROVIDER NOTE - OBJECTIVE STATEMENT
70 y/o female PMHx of DM type 2, HTN, HLD, MDD, Hypothyroidism, Syncope, Depression, chronic diarrhea, sacral decubitus ulcer, UTIs, Anemia, Hyponatremia, presents to the ED BIBA from [facility] for re evaluation and possible debridement of sacral decubitus ulcer. Reports she has been undergoing treatment for ulcer for 1 years and has been admitted for infection in the past. Pt reports increase pain at site with local tenderness, doesn't know if there is any discharge. No fever. As per NH paperwork, requesting Dr. Nunez in the ED. Recent COVID PCR on 02/15 which was negative. Blood work from yesterday with white count of 17.8 and sodium 131. On a baby aspirin but no apparent AC medication. On oral Flagyl x2 days. +DNR/DNI 72 y/o female PMHx of DM type 2, HTN, HLD, MDD, Hypothyroidism, Syncope, Depression, chronic diarrhea, sacral decubitus ulcer, UTIs, Anemia, Hyponatremia, presents to the ED BIBA from [facility] for re evaluation and possible debridement of sacral decubitus ulcer. Reports she has been undergoing treatment for ulcer for 1 year and has been admitted for infection in the past. Pt reports increase pain at site with local tenderness, doesn't know if there is any discharge. No fever. As per NH paperwork, requesting Dr. Nunez in the ED. Recent COVID PCR on 02/15 which was negative. Blood work from yesterday with white count of 17.8 and sodium 131. On a baby aspirin but no apparent AC medication. On oral Flagyl x2 days. +DNR/DNI

## 2021-02-17 NOTE — CONSULT NOTE ADULT - ASSESSMENT
71F with multiple comorbidities presents with necrotic sacral decubitus ulcer requiring debridement.    -medical management as per primary team  -patient will be added on for surgery tomorrow for debridement of sacral ulcer  -please preop for OR tomorrow  -pain control  -IV abx  -IVF  -monitor vitals    Plan discussed with Dr. Nunez. 71F with multiple comorbidities presents with necrotic sacral decubitus ulcer requiring debridement.    -medical management as per primary team  -gas in soft tissues noted on CT scan; there are multiple areas of communication between subcutaneous tissue in ulcer base and outside air; low suspicion for nec fasc  -patient will be added on for surgery tomorrow for debridement of sacral ulcer  -please preop for OR tomorrow  -pain control  -IV abx  -IVF  -monitor vitals    Plan discussed with Dr. Nunez.

## 2021-02-17 NOTE — CONSULT NOTE ADULT - SUBJECTIVE AND OBJECTIVE BOX
72 y/o female PMHx of DM type 2, HTN, HLD, MDD, Hypothyroidism, Syncope, Depression, chronic diarrhea, sacral decubitus ulcer, UTIs, Anemia, Hyponatremia, presents to the ED BIBA from [facility] for re evaluation and possible debridement of sacral decubitus ulcer. Reports she has been undergoing treatment for ulcer for 1 year and has been admitted for infection in the past. Pt reports increase pain at site with local tenderness, doesn't know if there is any discharge. No fever. As per NH paperwork, requesting Dr. Nunez in the ED. Recent COVID PCR on 02/15 which was negative. On a baby aspirin but no apparent AC medication. On oral Flagyl x2 days. +DNR/DNI            Vital Signs Last 24 Hrs  T(C): 37.1 (17 Feb 2021 15:16), Max: 37.3 (17 Feb 2021 10:39)  T(F): 98.7 (17 Feb 2021 15:16), Max: 99.1 (17 Feb 2021 10:39)  HR: 80 (17 Feb 2021 15:16) (80 - 92)  BP: 128/60 (17 Feb 2021 15:16) (124/63 - 128/60)  BP(mean): --  RR: 18 (17 Feb 2021 15:16) (18 - 18)  SpO2: 96% (17 Feb 2021 15:16) (96% - 96%)    Labs:                                7.7    19.94 )-----------( 588      ( 17 Feb 2021 11:38 )             24.7     CBC Full  -  ( 17 Feb 2021 11:38 )  WBC Count : 19.94 K/uL  RBC Count : 3.18 M/uL  Hemoglobin : 7.7 g/dL  Hematocrit : 24.7 %  Platelet Count - Automated : 588 K/uL  Mean Cell Volume : 77.7 fl  Mean Cell Hemoglobin : 24.2 pg  Mean Cell Hemoglobin Concentration : 31.2 gm/dL  Auto Neutrophil # : 16.35 K/uL  Auto Lymphocyte # : 0.80 K/uL  Auto Monocyte # : 1.99 K/uL  Auto Eosinophil # : 0.00 K/uL  Auto Basophil # : 0.00 K/uL  Auto Neutrophil % : 81.0 %  Auto Lymphocyte % : 4.0 %  Auto Monocyte % : 10.0 %  Auto Eosinophil % : 0.0 %  Auto Basophil % : 0.0 %    02-17    132<L>  |  101  |  24<H>  ----------------------------<  223<H>  4.7   |  21<L>  |  1.06    Ca    8.6      17 Feb 2021 11:38    TPro  6.4  /  Alb  1.5<L>  /  TBili  0.2  /  DBili  x   /  AST  52<H>  /  ALT  49  /  AlkPhos  279<H>  02-17    LIVER FUNCTIONS - ( 17 Feb 2021 11:38 )  Alb: 1.5 g/dL / Pro: 6.4 gm/dL / ALK PHOS: 279 U/L / ALT: 49 U/L / AST: 52 U/L / GGT: x           PT/INR - ( 17 Feb 2021 11:38 )   PT: 13.9 sec;   INR: 1.20 ratio         PTT - ( 17 Feb 2021 11:38 )  PTT:27.2 sec      Meds:  vancomycin  IVPB. 1000 milliGRAM(s) IV Intermittent once      Physical Exam:  Pt is AAOx3  General: Well developed, in no acute distress.   Chest: Lungs clear, no rales, no rhonchi, no wheezes.   Heart: RR, no murmurs, no rubs, no gallops.   Abdomen: Soft, no tenderness, no masses, BS normal   Back: Normal curvature, no tenderness. necrotic base sacral decubitus ulcer. foul smelling with drainage.   Neuro: Physiological, no localizing findings.   Skin: Normal, no rashes, no lesions noted.   Extremities: Warm, well perfused, no edema, Pulses intact

## 2021-02-17 NOTE — H&P ADULT - ASSESSMENT
71/F with PMHx of DM type 2, HTN, HLD, MDD, Hypothyroidism, Syncope, Depression, chronic diarrhea, sacral decubitus ulcer, UTIs, Anemia, Hyponatremia, admitted with     1) Large infected sacral ulcer: r/o sacral OM  admit to med floor  cont vanco + zosyn  ID consult  iv fluids  appreciate Sx consult; debridement in OR tomorrow  NPO past midnight  f/u blood cx    2) UTI + leukocytosis:  cont zosyn  f/u urine cx    3) Anemia; acute on chronic: MDD vs r/o GI occult bleed  check stool for OB  cbc in am    4) Hyponatremia 132:  iv fluids  check Na in am    5) DM :  ISS    6) HTN: cont current home meds    7) DVT PPX: venodynes

## 2021-02-17 NOTE — CONSULT NOTE ADULT - ATTENDING COMMENTS
Patient was seen and examined, modifications and corrections made  I agree with findings and plan      Large sacral decubitus ulcer    plan  NPO   Debridement of sacral decubitus ulcer  All material risks and benefits to surgery and no surgery were discussed with patient.  These include, but not exclusive to,  infection, hematoma, bleeding, abscess; organ/nerve/vascular injury; scarring, deformity and/or death.  All questions were answered.

## 2021-02-18 ENCOUNTER — RESULT REVIEW (OUTPATIENT)
Age: 71
End: 2021-02-18

## 2021-02-18 LAB
ANION GAP SERPL CALC-SCNC: 10 MMOL/L — SIGNIFICANT CHANGE UP (ref 5–17)
BUN SERPL-MCNC: 20 MG/DL — SIGNIFICANT CHANGE UP (ref 7–23)
CALCIUM SERPL-MCNC: 8.9 MG/DL — SIGNIFICANT CHANGE UP (ref 8.5–10.1)
CHLORIDE SERPL-SCNC: 104 MMOL/L — SIGNIFICANT CHANGE UP (ref 96–108)
CO2 SERPL-SCNC: 21 MMOL/L — LOW (ref 22–31)
CREAT SERPL-MCNC: 1.07 MG/DL — SIGNIFICANT CHANGE UP (ref 0.5–1.3)
GLUCOSE SERPL-MCNC: 101 MG/DL — HIGH (ref 70–99)
HCT VFR BLD CALC: 23.5 % — LOW (ref 34.5–45)
HGB BLD-MCNC: 7.4 G/DL — LOW (ref 11.5–15.5)
MCHC RBC-ENTMCNC: 24.3 PG — LOW (ref 27–34)
MCHC RBC-ENTMCNC: 31.5 GM/DL — LOW (ref 32–36)
MCV RBC AUTO: 77 FL — LOW (ref 80–100)
OB PNL STL: NEGATIVE — SIGNIFICANT CHANGE UP
PLATELET # BLD AUTO: 553 K/UL — HIGH (ref 150–400)
POTASSIUM SERPL-MCNC: 3.9 MMOL/L — SIGNIFICANT CHANGE UP (ref 3.5–5.3)
POTASSIUM SERPL-SCNC: 3.9 MMOL/L — SIGNIFICANT CHANGE UP (ref 3.5–5.3)
RBC # BLD: 3.05 M/UL — LOW (ref 3.8–5.2)
RBC # FLD: 17.7 % — HIGH (ref 10.3–14.5)
SODIUM SERPL-SCNC: 135 MMOL/L — SIGNIFICANT CHANGE UP (ref 135–145)
WBC # BLD: 18.17 K/UL — HIGH (ref 3.8–10.5)
WBC # FLD AUTO: 18.17 K/UL — HIGH (ref 3.8–10.5)

## 2021-02-18 PROCEDURE — 99232 SBSQ HOSP IP/OBS MODERATE 35: CPT

## 2021-02-18 PROCEDURE — 88304 TISSUE EXAM BY PATHOLOGIST: CPT | Mod: 26

## 2021-02-18 RX ORDER — VANCOMYCIN HCL 1 G
VIAL (EA) INTRAVENOUS
Refills: 0 | Status: DISCONTINUED | OUTPATIENT
Start: 2021-02-18 | End: 2021-02-20

## 2021-02-18 RX ORDER — ONDANSETRON 8 MG/1
4 TABLET, FILM COATED ORAL ONCE
Refills: 0 | Status: DISCONTINUED | OUTPATIENT
Start: 2021-02-18 | End: 2021-02-18

## 2021-02-18 RX ORDER — OXYCODONE HYDROCHLORIDE 5 MG/1
5 TABLET ORAL ONCE
Refills: 0 | Status: DISCONTINUED | OUTPATIENT
Start: 2021-02-18 | End: 2021-02-18

## 2021-02-18 RX ORDER — FENTANYL CITRATE 50 UG/ML
50 INJECTION INTRAVENOUS
Refills: 0 | Status: DISCONTINUED | OUTPATIENT
Start: 2021-02-18 | End: 2021-02-18

## 2021-02-18 RX ORDER — SODIUM CHLORIDE 9 MG/ML
1000 INJECTION, SOLUTION INTRAVENOUS
Refills: 0 | Status: DISCONTINUED | OUTPATIENT
Start: 2021-02-18 | End: 2021-02-18

## 2021-02-18 RX ORDER — PIPERACILLIN AND TAZOBACTAM 4; .5 G/20ML; G/20ML
3.38 INJECTION, POWDER, LYOPHILIZED, FOR SOLUTION INTRAVENOUS EVERY 8 HOURS
Refills: 0 | Status: DISCONTINUED | OUTPATIENT
Start: 2021-02-18 | End: 2021-02-21

## 2021-02-18 RX ORDER — VANCOMYCIN HCL 1 G
750 VIAL (EA) INTRAVENOUS EVERY 12 HOURS
Refills: 0 | Status: DISCONTINUED | OUTPATIENT
Start: 2021-02-19 | End: 2021-02-20

## 2021-02-18 RX ORDER — ACETAMINOPHEN 500 MG
1000 TABLET ORAL ONCE
Refills: 0 | Status: COMPLETED | OUTPATIENT
Start: 2021-02-18 | End: 2021-02-18

## 2021-02-18 RX ORDER — HYDROMORPHONE HYDROCHLORIDE 2 MG/ML
0.5 INJECTION INTRAMUSCULAR; INTRAVENOUS; SUBCUTANEOUS
Refills: 0 | Status: DISCONTINUED | OUTPATIENT
Start: 2021-02-18 | End: 2021-02-18

## 2021-02-18 RX ORDER — VANCOMYCIN HCL 1 G
750 VIAL (EA) INTRAVENOUS ONCE
Refills: 0 | Status: COMPLETED | OUTPATIENT
Start: 2021-02-18 | End: 2021-02-18

## 2021-02-18 RX ADMIN — HYDROMORPHONE HYDROCHLORIDE 0.5 MILLIGRAM(S): 2 INJECTION INTRAMUSCULAR; INTRAVENOUS; SUBCUTANEOUS at 18:30

## 2021-02-18 RX ADMIN — Medication 2: at 07:23

## 2021-02-18 RX ADMIN — Medication 2.5 MILLIGRAM(S): at 00:44

## 2021-02-18 RX ADMIN — Medication 50 MILLIGRAM(S): at 10:57

## 2021-02-18 RX ADMIN — Medication 2.5 MILLIGRAM(S): at 10:57

## 2021-02-18 RX ADMIN — PIPERACILLIN AND TAZOBACTAM 25 GRAM(S): 4; .5 INJECTION, POWDER, LYOPHILIZED, FOR SOLUTION INTRAVENOUS at 05:17

## 2021-02-18 RX ADMIN — OXYCODONE AND ACETAMINOPHEN 1 TABLET(S): 5; 325 TABLET ORAL at 11:33

## 2021-02-18 RX ADMIN — SERTRALINE 25 MILLIGRAM(S): 25 TABLET, FILM COATED ORAL at 22:04

## 2021-02-18 RX ADMIN — ATORVASTATIN CALCIUM 20 MILLIGRAM(S): 80 TABLET, FILM COATED ORAL at 22:03

## 2021-02-18 RX ADMIN — Medication 2.5 MILLIGRAM(S): at 22:03

## 2021-02-18 RX ADMIN — SODIUM CHLORIDE 75 MILLILITER(S): 9 INJECTION, SOLUTION INTRAVENOUS at 10:58

## 2021-02-18 RX ADMIN — Medication 50 MILLIGRAM(S): at 22:03

## 2021-02-18 RX ADMIN — Medication 250 MILLIGRAM(S): at 19:24

## 2021-02-18 RX ADMIN — PIPERACILLIN AND TAZOBACTAM 25 GRAM(S): 4; .5 INJECTION, POWDER, LYOPHILIZED, FOR SOLUTION INTRAVENOUS at 13:53

## 2021-02-18 RX ADMIN — PIPERACILLIN AND TAZOBACTAM 25 GRAM(S): 4; .5 INJECTION, POWDER, LYOPHILIZED, FOR SOLUTION INTRAVENOUS at 22:03

## 2021-02-18 RX ADMIN — TAMSULOSIN HYDROCHLORIDE 0.4 MILLIGRAM(S): 0.4 CAPSULE ORAL at 22:04

## 2021-02-18 RX ADMIN — Medication 400 MILLIGRAM(S): at 18:30

## 2021-02-18 NOTE — PROGRESS NOTE ADULT - SUBJECTIVE AND OBJECTIVE BOX
HOSPITALIST ATTENDING PROGRESS NOTE    Cc: Follow up for infected decubitus ulcer    HPI: c/o some pain at lower back/sacral ulcer site. Feels achy. No SOB/CP/Cough, fevers    EXAM  Vitals: Vital Signs Last 24 Hrs  T(C): 36.4 (18 Feb 2021 08:50), Max: 36.8 (17 Feb 2021 23:36)  T(F): 97.6 (18 Feb 2021 08:50), Max: 98.3 (17 Feb 2021 23:36)  HR: 81 (18 Feb 2021 08:50) (78 - 82)  BP: 136/58 (18 Feb 2021 08:50) (129/76 - 136/58)  BP(mean): --  RR: 18 (18 Feb 2021 08:50) (18 - 18)  SpO2: 98% (18 Feb 2021 08:50) (97% - 100%)  Gen: NAD, comfortable  HEENT: normocephalic, atraumatic, no nasal discharge, trachea midline, anicteric sclerae  CVS: Normal S1S2, RRR, no JVD  RESP: Clear to auscultation bilaterally, no wheezing, no rhonchi, normal respiratory effort  ABD: normal bowel sounds, non-tender, non-distended, no organomegaly  SKIN: large sacral decubitus ulcer with mild malodorous discharge.   EXT: no edema, clubbing or cynosis    MEDS  MEDICATIONS  (STANDING):  ascorbic acid 500 milliGRAM(s) Oral daily  atorvastatin 20 milliGRAM(s) Oral at bedtime  dextrose 40% Gel 15 Gram(s) Oral once  dextrose 5% + sodium chloride 0.9%. 1000 milliLiter(s) (75 mL/Hr) IV Continuous <Continuous>  dextrose 5%. 1000 milliLiter(s) (50 mL/Hr) IV Continuous <Continuous>  dextrose 5%. 1000 milliLiter(s) (100 mL/Hr) IV Continuous <Continuous>  dextrose 50% Injectable 25 Gram(s) IV Push once  dextrose 50% Injectable 12.5 Gram(s) IV Push once  dextrose 50% Injectable 25 Gram(s) IV Push once  enalapril 2.5 milliGRAM(s) Oral two times a day  ferrous    sulfate 325 milliGRAM(s) Oral daily  glucagon  Injectable 1 milliGRAM(s) IntraMuscular once  hydrALAZINE 50 milliGRAM(s) Oral two times a day  insulin lispro (ADMELOG) corrective regimen sliding scale   SubCutaneous three times a day before meals  insulin lispro (ADMELOG) corrective regimen sliding scale   SubCutaneous at bedtime  levothyroxine 25 MICROGram(s) Oral daily  multivitamin/minerals 1 Tablet(s) Oral daily  pantoprazole    Tablet 40 milliGRAM(s) Oral before breakfast  piperacillin/tazobactam IVPB.. 3.375 Gram(s) IV Intermittent every 8 hours  sertraline 25 milliGRAM(s) Oral daily  sodium chloride 0.9%. 500 milliLiter(s) (100 mL/Hr) IV Continuous <Continuous>  tamsulosin 0.4 milliGRAM(s) Oral at bedtime  vancomycin  IVPB 750 milliGRAM(s) IV Intermittent once  vancomycin  IVPB        MEDICATIONS  (PRN):  acetaminophen   Tablet .. 650 milliGRAM(s) Oral every 6 hours PRN Temp greater or equal to 38C (100.4F), Mild Pain (1 - 3)  ondansetron Injectable 4 milliGRAM(s) IV Push every 6 hours PRN Nausea  oxycodone    5 mG/acetaminophen 325 mG 1 Tablet(s) Oral daily PRN Moderate Pain (4 - 6)  simethicone 80 milliGRAM(s) Chew four times a day PRN Gas      LABS/RADIOLOGY                        7.4    18.17 )-----------( 553      ( 18 Feb 2021 09:08 )             23.5      02-18    135  |  104  |  20  ----------------------------<  101<H>  3.9   |  21<L>  |  1.07    Ca    8.9      18 Feb 2021 09:08    TPro  6.4  /  Alb  1.5<L>  /  TBili  0.2  /  DBili  x   /  AST  52<H>  /  ALT  49  /  AlkPhos  279<H>  02-17    CAPILLARY BLOOD GLUCOSE      POCT Blood Glucose.: 130 mg/dL (18 Feb 2021 12:33)  POCT Blood Glucose.: 172 mg/dL (18 Feb 2021 07:19)  POCT Blood Glucose.: 207 mg/dL (17 Feb 2021 22:09)      Culture - Blood (collected 17 Feb 2021 11:38)  Source: .Blood Blood-Venous  Preliminary Report (18 Feb 2021 16:01):    No growth to date.    Culture - Blood (collected 17 Feb 2021 11:38)  Source: .Blood Blood-Peripheral  Preliminary Report (18 Feb 2021 16:01):    No growth to date.      I&O's Summary    17 Feb 2021 07:01  -  18 Feb 2021 07:00  --------------------------------------------------------  IN: 0 mL / OUT: 100 mL / NET: -100 mL        ASSESMENT/PLAN    71/F with PMHx of DM type 2, HTN, HLD, MDD, Hypothyroidism, Syncope, Depression, chronic diarrhea, sacral decubitus ulcer, UTIs, Anemia, Hyponatremia, admitted with     # Infected large chronic decubitus ulcer, present on admission  #UTI  #Chronic diarrhea  - Cont zosyn. f/u cultures. watch for worsening of diarrhea. Seen by ID  -debridement today per surgery. 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 no sign of niki GI bleeding.   -Tx for Hb < 7  -Outpt Hem/onc f/u    # Hyponatremia, asymptomatic. resolved with IVF  -DC IVF    # DM :  ISS    # HTN, hypothyroidism  -hydralazine, synthroid    # DVT PPX  SCDs

## 2021-02-18 NOTE — PROGRESS NOTE ADULT - SUBJECTIVE AND OBJECTIVE BOX
Pt seen and examined at bedside, no acute events. Pt complaining of pain over sacrum.  Denied fever, chills, nausea, vomiting or SOB overnight.             Vital Signs Last 24 Hrs  T(C): 36.2 (18 Feb 2021 00:22), Max: 37.3 (17 Feb 2021 10:39)  T(F): 97.2 (18 Feb 2021 00:22), Max: 99.1 (17 Feb 2021 10:39)  HR: 79 (18 Feb 2021 00:22) (78 - 92)  BP: 129/76 (18 Feb 2021 00:22) (124/63 - 134/48)  BP(mean): --  RR: 18 (18 Feb 2021 00:22) (18 - 18)  SpO2: 97% (18 Feb 2021 00:22) (96% - 100%)    Labs:                                7.7    19.94 )-----------( 588      ( 17 Feb 2021 11:38 )             24.7     CBC Full  -  ( 17 Feb 2021 11:38 )  WBC Count : 19.94 K/uL  RBC Count : 3.18 M/uL  Hemoglobin : 7.7 g/dL  Hematocrit : 24.7 %  Platelet Count - Automated : 588 K/uL  Mean Cell Volume : 77.7 fl  Mean Cell Hemoglobin : 24.2 pg  Mean Cell Hemoglobin Concentration : 31.2 gm/dL  Auto Neutrophil # : 16.35 K/uL  Auto Lymphocyte # : 0.80 K/uL  Auto Monocyte # : 1.99 K/uL  Auto Eosinophil # : 0.00 K/uL  Auto Basophil # : 0.00 K/uL  Auto Neutrophil % : 81.0 %  Auto Lymphocyte % : 4.0 %  Auto Monocyte % : 10.0 %  Auto Eosinophil % : 0.0 %  Auto Basophil % : 0.0 %    02-17    132<L>  |  101  |  24<H>  ----------------------------<  223<H>  4.7   |  21<L>  |  1.06    Ca    8.6      17 Feb 2021 11:38    TPro  6.4  /  Alb  1.5<L>  /  TBili  0.2  /  DBili  x   /  AST  52<H>  /  ALT  49  /  AlkPhos  279<H>  02-17    LIVER FUNCTIONS - ( 17 Feb 2021 11:38 )  Alb: 1.5 g/dL / Pro: 6.4 gm/dL / ALK PHOS: 279 U/L / ALT: 49 U/L / AST: 52 U/L / GGT: x           PT/INR - ( 17 Feb 2021 11:38 )   PT: 13.9 sec;   INR: 1.20 ratio         PTT - ( 17 Feb 2021 11:38 )  PTT:27.2 sec      Meds:  acetaminophen   Tablet .. 650 milliGRAM(s) Oral every 6 hours PRN  ascorbic acid 500 milliGRAM(s) Oral daily  atorvastatin 20 milliGRAM(s) Oral at bedtime  dextrose 40% Gel 15 Gram(s) Oral once  dextrose 5% + sodium chloride 0.9%. 1000 milliLiter(s) IV Continuous <Continuous>  dextrose 5%. 1000 milliLiter(s) IV Continuous <Continuous>  dextrose 5%. 1000 milliLiter(s) IV Continuous <Continuous>  dextrose 50% Injectable 25 Gram(s) IV Push once  dextrose 50% Injectable 12.5 Gram(s) IV Push once  dextrose 50% Injectable 25 Gram(s) IV Push once  enalapril 2.5 milliGRAM(s) Oral two times a day  ferrous    sulfate 325 milliGRAM(s) Oral daily  glucagon  Injectable 1 milliGRAM(s) IntraMuscular once  hydrALAZINE 50 milliGRAM(s) Oral two times a day  insulin lispro (ADMELOG) corrective regimen sliding scale   SubCutaneous three times a day before meals  insulin lispro (ADMELOG) corrective regimen sliding scale   SubCutaneous at bedtime  levothyroxine 25 MICROGram(s) Oral daily  multivitamin/minerals 1 Tablet(s) Oral daily  ondansetron Injectable 4 milliGRAM(s) IV Push every 6 hours PRN  oxycodone    5 mG/acetaminophen 325 mG 1 Tablet(s) Oral daily PRN  pantoprazole    Tablet 40 milliGRAM(s) Oral before breakfast  piperacillin/tazobactam IVPB.. 3.375 Gram(s) IV Intermittent every 8 hours  sertraline 25 milliGRAM(s) Oral daily  simethicone 80 milliGRAM(s) Chew four times a day PRN  sodium chloride 0.9%. 500 milliLiter(s) IV Continuous <Continuous>  tamsulosin 0.4 milliGRAM(s) Oral at bedtime      Physical Exam:  Pt is AAOx3  General: Well developed, in no acute distress.   Chest: Lungs clear, no rales, no rhonchi, no wheezes.   Heart: RR, no murmurs, no rubs, no gallops.   Abdomen: Soft, no tenderness, no masses, BS normal   Back: Normal curvature, no tenderness. necrotic base sacral decubitus ulcer. foul smelling with drainage.   Neuro: Physiological, no localizing findings.   Skin: Normal, no rashes, no lesions noted.   Extremities: Warm, well perfused, no edema, Pulses intact

## 2021-02-18 NOTE — CONSULT NOTE ADULT - ASSESSMENT
71/F with PMHx of DM type 2, HTN, HLD, MDD, Hypothyroidism, Syncope, Depression, chronic diarrhea, sacral decubitus ulcer, UTIs, Anemia, Hyponatremia, presents to the ED BIBA from [facility] for re evaluation and possible debridement of sacral decubitus ulcer. Reports she has been undergoing treatment for ulcer for 1 year and has been admitted for infection in the past. Pt reports increase pain at site with local tenderness, doesn't know if there is any discharge. No fever. On oral Flagyl x2 days PTA. Here wbc ct 19,  UA positive, CT abd/pelvis with air/soft tissue surrounding sacral decub ulcer, she was seen in ED by Sx team and she is for debridement in OR. Was given vanco/zosyn.     1. infected sacral decubitus ulcer. pyuria/UTI. leukocytosis  - imaging reivewed  - surgical evaluation appreciated  - plan for debridement, f/u cultures  - fu urine cx blood cx  - on vancomycin 750mg BID check trough prior to 4th dose  - on zosyn [id number]   - continue with antibiotic coverage  - monitor temps  - tolerating abx well so far; no side effects noted  - reason for abx use and side effects reviewed with patient  - wound care    2. other issues - care per medicine

## 2021-02-18 NOTE — BRIEF OPERATIVE NOTE - NSICDXBRIEFPROCEDURE_GEN_ALL_CORE_FT
PROCEDURES:  Irrigation and debridement, dermis and epidermis, excisional, more than 20 sq cm 18-Feb-2021 18:15:48 sharp excisional debridment of sacral decubitus ulcer ANDREW PASTOR

## 2021-02-18 NOTE — CONSULT NOTE ADULT - SUBJECTIVE AND OBJECTIVE BOX
Patient is a 71y old  Female who presents with a chief complaint of sacral decubitus (2021 06:47)    HPI:  71/F with PMHx of DM type 2, HTN, HLD, MDD, Hypothyroidism, Syncope, Depression, chronic diarrhea, sacral decubitus ulcer, UTIs, Anemia, Hyponatremia, presents to the ED BIBA from [facility] for re evaluation and possible debridement of sacral decubitus ulcer. Reports she has been undergoing treatment for ulcer for 1 year and has been admitted for infection in the past. Pt reports increase pain at site with local tenderness, doesn't know if there is any discharge. No fever. As per NH paperwork, requesting Dr. Nunez in the ED. Recent COVID PCR on 02/15 which was negative. Blood work from yesterday with white count of 17.8 and sodium 131. On oral Flagyl x2 days.    She was seen in ED by Sx team and she is for debridement in OR tomorrow.    NO other complaints    CT abdo pelvis done; ? sacral Osteomyelitis    Fam Hx: does not remember (2021 17:28)      PMH: as above  PSH: as above  Meds: per reconciliation sheet, noted below  MEDICATIONS  (STANDING):  ascorbic acid 500 milliGRAM(s) Oral daily  atorvastatin 20 milliGRAM(s) Oral at bedtime  dextrose 40% Gel 15 Gram(s) Oral once  dextrose 5% + sodium chloride 0.9%. 1000 milliLiter(s) (75 mL/Hr) IV Continuous <Continuous>  dextrose 5%. 1000 milliLiter(s) (50 mL/Hr) IV Continuous <Continuous>  dextrose 5%. 1000 milliLiter(s) (100 mL/Hr) IV Continuous <Continuous>  dextrose 50% Injectable 25 Gram(s) IV Push once  dextrose 50% Injectable 12.5 Gram(s) IV Push once  dextrose 50% Injectable 25 Gram(s) IV Push once  enalapril 2.5 milliGRAM(s) Oral two times a day  ferrous    sulfate 325 milliGRAM(s) Oral daily  glucagon  Injectable 1 milliGRAM(s) IntraMuscular once  hydrALAZINE 50 milliGRAM(s) Oral two times a day  insulin lispro (ADMELOG) corrective regimen sliding scale   SubCutaneous three times a day before meals  insulin lispro (ADMELOG) corrective regimen sliding scale   SubCutaneous at bedtime  levothyroxine 25 MICROGram(s) Oral daily  multivitamin/minerals 1 Tablet(s) Oral daily  pantoprazole    Tablet 40 milliGRAM(s) Oral before breakfast  piperacillin/tazobactam IVPB.. 3.375 Gram(s) IV Intermittent every 8 hours  sertraline 25 milliGRAM(s) Oral daily  sodium chloride 0.9%. 500 milliLiter(s) (100 mL/Hr) IV Continuous <Continuous>  tamsulosin 0.4 milliGRAM(s) Oral at bedtime  vancomycin  IVPB        MEDICATIONS  (PRN):  acetaminophen   Tablet .. 650 milliGRAM(s) Oral every 6 hours PRN Temp greater or equal to 38C (100.4F), Mild Pain (1 - 3)  ondansetron Injectable 4 milliGRAM(s) IV Push every 6 hours PRN Nausea  oxycodone    5 mG/acetaminophen 325 mG 1 Tablet(s) Oral daily PRN Moderate Pain (4 - 6)  simethicone 80 milliGRAM(s) Chew four times a day PRN Gas    Allergies    Hytrin (Unknown)    Intolerances      Social: no smoking, no alcohol, no illegal drugs; no recent travel, no exposure to TB  FAMILY HISTORY:     no history of premature cardiovascular disease in first degree relatives    ROS: the patient denies fever, no chills, no HA, no dizziness, no sore throat, no blurry vision, no CP, no palpitations, no SOB, no cough, no abdominal pain, no diarrhea, no N/V, no dysuria, no leg pain, no claudication, no rash, no joint aches, no rectal pain or bleeding, no night sweats  All other systems reviewed and are negative    Vital Signs Last 24 Hrs  T(C): 36.4 (2021 08:50), Max: 37.1 (2021 15:16)  T(F): 97.6 (2021 08:50), Max: 98.7 (2021 15:16)  HR: 81 (2021 08:50) (78 - 82)  BP: 136/58 (2021 08:50) (128/60 - 136/58)  BP(mean): --  RR: 18 (2021 08:50) (18 - 18)  SpO2: 98% (2021 08:50) (96% - 100%)  Daily     Daily     PE:    Constitutional: frail looking  HEENT: NC/AT, EOMI, PERRLA, conjunctivae clear; ears and nose atraumatic; pharynx benign  Neck: supple; thyroid not palpable  Back: no tenderness  Respiratory: respiratory effort normal; clear to auscultation  Cardiovascular: S1S2 regular, no murmurs  Abdomen: soft, not tender, not distended, positive BS; liver and spleen WNL  Genitourinary: no suprapubic tenderness  Lymphatic: no LN palpable  Musculoskeletal: no muscle tenderness, no joint swelling or tenderness  Extremities: no pedal edema  Neurological/ Psychiatric: AxOx3, Judgement and insight normal;  moving all extremities  Skin: no rashes; no palpable lesions    Labs: all available labs reviewed                        7.4     )-----------( 553      ( 2021 09:08 )             23.5     02-18    135  |  104  |  20  ----------------------------<  101<H>  3.9   |  21<L>  |  1.07    Ca    8.9      2021 09:08    TPro  6.4  /  Alb  1.5<L>  /  TBili  0.2  /  DBili  x   /  AST  52<H>  /  ALT  49  /  AlkPhos  279<H>  02-17     LIVER FUNCTIONS - ( 2021 11:38 )  Alb: 1.5 g/dL / Pro: 6.4 gm/dL / ALK PHOS: 279 U/L / ALT: 49 U/L / AST: 52 U/L / GGT: x           Urinalysis Basic - ( 2021 15:24 )    Color: Yellow / Appearance: very cloudy / S.015 / pH: x  Gluc: x / Ketone: Negative  / Bili: Negative / Urobili: Negative mg/dL   Blood: x / Protein: 100 mg/dL / Nitrite: Negative   Leuk Esterase: Moderate / RBC: 0-2 /HPF / WBC 11-25   Sq Epi: x / Non Sq Epi: Occasional / Bacteria: TNTC          Radiology: all available radiological tests reviewed    Advanced directives addressed: full resuscitation Patient is a 71y old  Female who presents with a chief complaint of sacral decubitus (2021 06:47)    HPI:  71/F with PMHx of DM type 2, HTN, HLD, MDD, Hypothyroidism, Syncope, Depression, chronic diarrhea, sacral decubitus ulcer, UTIs, Anemia, Hyponatremia, presents to the ED BIBA from [facility] for re evaluation and possible debridement of sacral decubitus ulcer. Reports she has been undergoing treatment for ulcer for 1 year and has been admitted for infection in the past. Pt reports increase pain at site with local tenderness, doesn't know if there is any discharge. No fever. As per NH paperwork, requesting Dr. Nunez in the ED. Recent COVID PCR on 02/15 which was negative. On oral Flagyl x2 days PTA. Here wbc ct 19,  UA positive, CT abd/pelvis with air/soft tissue surrounding sacral decub ulcer, she was seen in ED by Sx team and she is for debridement in OR. Was given vanco/zosyn.     PMH: as above  PSH: as above  Meds: per reconciliation sheet, noted below  MEDICATIONS  (STANDING):  ascorbic acid 500 milliGRAM(s) Oral daily  atorvastatin 20 milliGRAM(s) Oral at bedtime  dextrose 40% Gel 15 Gram(s) Oral once  dextrose 5% + sodium chloride 0.9%. 1000 milliLiter(s) (75 mL/Hr) IV Continuous <Continuous>  dextrose 5%. 1000 milliLiter(s) (50 mL/Hr) IV Continuous <Continuous>  dextrose 5%. 1000 milliLiter(s) (100 mL/Hr) IV Continuous <Continuous>  dextrose 50% Injectable 25 Gram(s) IV Push once  dextrose 50% Injectable 12.5 Gram(s) IV Push once  dextrose 50% Injectable 25 Gram(s) IV Push once  enalapril 2.5 milliGRAM(s) Oral two times a day  ferrous    sulfate 325 milliGRAM(s) Oral daily  glucagon  Injectable 1 milliGRAM(s) IntraMuscular once  hydrALAZINE 50 milliGRAM(s) Oral two times a day  insulin lispro (ADMELOG) corrective regimen sliding scale   SubCutaneous three times a day before meals  insulin lispro (ADMELOG) corrective regimen sliding scale   SubCutaneous at bedtime  levothyroxine 25 MICROGram(s) Oral daily  multivitamin/minerals 1 Tablet(s) Oral daily  pantoprazole    Tablet 40 milliGRAM(s) Oral before breakfast  piperacillin/tazobactam IVPB.. 3.375 Gram(s) IV Intermittent every 8 hours  sertraline 25 milliGRAM(s) Oral daily  sodium chloride 0.9%. 500 milliLiter(s) (100 mL/Hr) IV Continuous <Continuous>  tamsulosin 0.4 milliGRAM(s) Oral at bedtime  vancomycin  IVPB        Allergies    Hytrin (Unknown)    Intolerances      Social: no smoking, no alcohol, no illegal drugs; no recent travel, no exposure to TB  FAMILY HISTORY:     no history of premature cardiovascular disease in first degree relatives    ROS: the patient denies fever, no chills, no HA, no dizziness, no sore throat, no blurry vision, no CP, no palpitations, no SOB, no cough, no abdominal pain, no diarrhea, no N/V, no dysuria, no leg pain, no claudication, no rash, no joint aches, no rectal pain or bleeding, no night sweats  All other systems reviewed and are negative    Vital Signs Last 24 Hrs  T(C): 36.4 (2021 08:50), Max: 37.1 (2021 15:16)  T(F): 97.6 (2021 08:50), Max: 98.7 (2021 15:16)  HR: 81 (2021 08:50) (78 - 82)  BP: 136/58 (2021 08:50) (128/60 - 136/58)  BP(mean): --  RR: 18 (2021 08:50) (18 - 18)  SpO2: 98% (2021 08:50) (96% - 100%)  Daily     Daily     PE:  Constitutional: frail looking  HEENT: NC/AT, EOMI, PERRLA, conjunctivae clear; ears and nose atraumatic; pharynx benign  Neck: supple; thyroid not palpable  Back: no tenderness  Respiratory: respiratory effort normal; clear to auscultation  Cardiovascular: S1S2 regular, no murmurs  Abdomen: soft, not tender, not distended, positive BS; liver and spleen WNL  Genitourinary: no suprapubic tenderness  Lymphatic: no LN palpable  Musculoskeletal: no muscle tenderness, no joint swelling or tenderness  Extremities: no pedal edema  Neurological/ Psychiatric:  moving all extremities  Skin: unstageable large sacral ulcer with necrosis along base and foul smelling drainage     Labs: all available labs reviewed                        7.4     )-----------( 553      ( 2021 09:08 )             23.5         135  |  104  |  20  ----------------------------<  101<H>  3.9   |  21<L>  |  1.07    Ca    8.9      2021 09:08    TPro  6.4  /  Alb  1.5<L>  /  TBili  0.2  /  DBili  x   /  AST  52<H>  /  ALT  49  /  AlkPhos  279<H>       LIVER FUNCTIONS - ( 2021 11:38 )  Alb: 1.5 g/dL / Pro: 6.4 gm/dL / ALK PHOS: 279 U/L / ALT: 49 U/L / AST: 52 U/L / GGT: x           Urinalysis Basic - ( 2021 15:24 )    Color: Yellow / Appearance: very cloudy / S.015 / pH: x  Gluc: x / Ketone: Negative  / Bili: Negative / Urobili: Negative mg/dL   Blood: x / Protein: 100 mg/dL / Nitrite: Negative   Leuk Esterase: Moderate / RBC: 0-2 /HPF / WBC 11-25   Sq Epi: x / Non Sq Epi: Occasional / Bacteria: TNTC          Radiology: all available radiological tests reviewed  < from: CT Abdomen and Pelvis w/ IV Cont (21 @ 14:10) >  EXAM:  CT ABDOMEN AND PELVIS IC                            PROCEDURE DATE:  2021          INTERPRETATION:  CLINICAL INFORMATION: Sacral decubitus ulcer    COMPARISON: CT scan of the abdomen and pelvis from 2011    PROCEDURE:  CT of the Abdomen and Pelvis was performed with intravenous contrast.  Intravenous contrast: 90 ml Omnipaque 350. 10 ml discarded.  Oral contrast: None.  Sagittal and coronal reformats were performed.    FINDINGS:  LOWER CHEST: Partially imaged left breast prosthesis and presumed tissue expander. Trace right pleural fluid.    LIVER: Within normal limits.  BILE DUCTS: Normal caliber.  GALLBLADDER: Layering stones or milk of calcium in the gallbladder  SPLEEN: Within normal limits.  PANCREAS: Within normal limits.  ADRENALS: 4.0 x 3.1 cm left adrenal mass measuring 52 Hounsfield units which is nondiagnostic. This has not significantly changed in size when compared with 2011.  KIDNEYS/URETERS: 2.1 cm left renal cyst.    BLADDER: Air within the bladder which may be iatrogenic. Please correlate clinically. Question of mild bladder wall thickening. This may be exaggerated by under distention although mild cystitis cannot be excluded. Please correlate clinically.  REPRODUCTIVE ORGANS: Coarse calcifications within the uterus most likely representing calcified leiomyomas. Radiopaque material in the region of the adnexa bilaterally. Has there been a prior tubal ligation?    BOWEL: No bowel obstruction. Appendix within normal limits  PERITONEUM: No ascites.  VESSELS: Vascular calcifications.  RETROPERITONEUM/LYMPH NODES: No lymphadenopathy.  ABDOMINAL WALL: There is a sacral decubitus ulcer with air and soft tissue extending in the subcutaneous tissue of the left buttock extending to the lateral aspect and extending slightly to the right of midline as well. Air and prominent soft tissue extends posteriorly to the lower sacrum and coccyx. I am not definitely seeing bone erosion, osteomyelitis cannot be excluded.  BONES: Degenerative changes of bone are present. There is mild spondylolisthesis with L4 anterior to L5 of less than 25%.    IMPRESSION:  Sacral decubitus ulcer with air and soft tissue extending in the subcutaneous region laterally to the left buttock and slightly to the right of midline. Soft tissue extends to the adjacent lower sacrum and coccyx. No gross destruction is identified although osteomyelitis cannot be excluded. Clinical correlation is necessary.        Advanced directives addressed: full resuscitation

## 2021-02-18 NOTE — BRIEF OPERATIVE NOTE - NSICDXBRIEFPREOP_GEN_ALL_CORE_FT
PRE-OP DIAGNOSIS:  Decubitus ulcer of sacral region, unstageable 18-Feb-2021 18:16:17  ANDREW PASTOR

## 2021-02-18 NOTE — PROGRESS NOTE ADULT - ASSESSMENT
71F with multiple comorbidities and with necrotic sacral decubitus ulcer requiring debridement, going for OR today.    -medical management as per primary team  -gas in soft tissues noted on CT scan; there are multiple areas of communication between subcutaneous tissue in ulcer base and outside air; low suspicion for nec fasc  -NPO  -OR today, can resume diet after OR  -pain control  -IV abx  -IVF  -monitor vitals    Plan discussed with Dr. Nunez.

## 2021-02-19 LAB
-  AMPICILLIN/SULBACTAM: SIGNIFICANT CHANGE UP
-  CEFAZOLIN: SIGNIFICANT CHANGE UP
-  CLINDAMYCIN: SIGNIFICANT CHANGE UP
-  ERYTHROMYCIN: SIGNIFICANT CHANGE UP
-  GENTAMICIN: SIGNIFICANT CHANGE UP
-  OXACILLIN: SIGNIFICANT CHANGE UP
-  PENICILLIN: SIGNIFICANT CHANGE UP
-  RIFAMPIN: SIGNIFICANT CHANGE UP
-  TETRACYCLINE: SIGNIFICANT CHANGE UP
-  TRIMETHOPRIM/SULFAMETHOXAZOLE: SIGNIFICANT CHANGE UP
-  VANCOMYCIN: SIGNIFICANT CHANGE UP
ANION GAP SERPL CALC-SCNC: 9 MMOL/L — SIGNIFICANT CHANGE UP (ref 5–17)
BUN SERPL-MCNC: 24 MG/DL — HIGH (ref 7–23)
CALCIUM SERPL-MCNC: 8.4 MG/DL — LOW (ref 8.5–10.1)
CHLORIDE SERPL-SCNC: 105 MMOL/L — SIGNIFICANT CHANGE UP (ref 96–108)
CO2 SERPL-SCNC: 18 MMOL/L — LOW (ref 22–31)
CREAT SERPL-MCNC: 1.52 MG/DL — HIGH (ref 0.5–1.3)
CULTURE RESULTS: SIGNIFICANT CHANGE UP
CULTURE RESULTS: SIGNIFICANT CHANGE UP
GLUCOSE BLDC GLUCOMTR-MCNC: 100 MG/DL — HIGH (ref 70–99)
GLUCOSE BLDC GLUCOMTR-MCNC: 131 MG/DL — HIGH (ref 70–99)
GLUCOSE BLDC GLUCOMTR-MCNC: 77 MG/DL — SIGNIFICANT CHANGE UP (ref 70–99)
GLUCOSE SERPL-MCNC: 178 MG/DL — HIGH (ref 70–99)
HCT VFR BLD CALC: 24.8 % — LOW (ref 34.5–45)
HGB BLD-MCNC: 7.6 G/DL — LOW (ref 11.5–15.5)
MCHC RBC-ENTMCNC: 24.1 PG — LOW (ref 27–34)
MCHC RBC-ENTMCNC: 30.6 GM/DL — LOW (ref 32–36)
MCV RBC AUTO: 78.7 FL — LOW (ref 80–100)
METHOD TYPE: SIGNIFICANT CHANGE UP
ORGANISM # SPEC MICROSCOPIC CNT: SIGNIFICANT CHANGE UP
ORGANISM # SPEC MICROSCOPIC CNT: SIGNIFICANT CHANGE UP
PLATELET # BLD AUTO: 506 K/UL — HIGH (ref 150–400)
POTASSIUM SERPL-MCNC: 3.5 MMOL/L — SIGNIFICANT CHANGE UP (ref 3.5–5.3)
POTASSIUM SERPL-SCNC: 3.5 MMOL/L — SIGNIFICANT CHANGE UP (ref 3.5–5.3)
RBC # BLD: 3.15 M/UL — LOW (ref 3.8–5.2)
RBC # FLD: 18 % — HIGH (ref 10.3–14.5)
SARS-COV-2 IGG SERPL QL IA: NEGATIVE — SIGNIFICANT CHANGE UP
SARS-COV-2 IGM SERPL IA-ACNC: <0.1 INDEX — SIGNIFICANT CHANGE UP
SODIUM SERPL-SCNC: 132 MMOL/L — LOW (ref 135–145)
SPECIMEN SOURCE: SIGNIFICANT CHANGE UP
SPECIMEN SOURCE: SIGNIFICANT CHANGE UP
VANCOMYCIN TROUGH SERPL-MCNC: 36 UG/ML — CRITICAL HIGH (ref 10–20)
WBC # BLD: 22.17 K/UL — HIGH (ref 3.8–10.5)
WBC # FLD AUTO: 22.17 K/UL — HIGH (ref 3.8–10.5)

## 2021-02-19 PROCEDURE — 99232 SBSQ HOSP IP/OBS MODERATE 35: CPT

## 2021-02-19 RX ORDER — MORPHINE SULFATE 50 MG/1
2 CAPSULE, EXTENDED RELEASE ORAL EVERY 4 HOURS
Refills: 0 | Status: DISCONTINUED | OUTPATIENT
Start: 2021-02-19 | End: 2021-02-20

## 2021-02-19 RX ORDER — OXYCODONE HYDROCHLORIDE 5 MG/1
10 TABLET ORAL THREE TIMES A DAY
Refills: 0 | Status: DISCONTINUED | OUTPATIENT
Start: 2021-02-19 | End: 2021-02-25

## 2021-02-19 RX ORDER — MORPHINE SULFATE 50 MG/1
2 CAPSULE, EXTENDED RELEASE ORAL EVERY 4 HOURS
Refills: 0 | Status: DISCONTINUED | OUTPATIENT
Start: 2021-02-19 | End: 2021-02-19

## 2021-02-19 RX ORDER — SODIUM HYPOCHLORITE 0.125 %
1 SOLUTION, NON-ORAL MISCELLANEOUS
Refills: 0 | Status: DISCONTINUED | OUTPATIENT
Start: 2021-02-19 | End: 2021-03-02

## 2021-02-19 RX ORDER — OXYCODONE AND ACETAMINOPHEN 5; 325 MG/1; MG/1
1 TABLET ORAL EVERY 4 HOURS
Refills: 0 | Status: DISCONTINUED | OUTPATIENT
Start: 2021-02-19 | End: 2021-02-26

## 2021-02-19 RX ORDER — SODIUM CHLORIDE 9 MG/ML
1000 INJECTION INTRAMUSCULAR; INTRAVENOUS; SUBCUTANEOUS
Refills: 0 | Status: DISCONTINUED | OUTPATIENT
Start: 2021-02-19 | End: 2021-02-21

## 2021-02-19 RX ADMIN — Medication 1 TABLET(S): at 08:15

## 2021-02-19 RX ADMIN — PIPERACILLIN AND TAZOBACTAM 25 GRAM(S): 4; .5 INJECTION, POWDER, LYOPHILIZED, FOR SOLUTION INTRAVENOUS at 06:46

## 2021-02-19 RX ADMIN — Medication 2.5 MILLIGRAM(S): at 08:16

## 2021-02-19 RX ADMIN — Medication 250 MILLIGRAM(S): at 05:03

## 2021-02-19 RX ADMIN — PIPERACILLIN AND TAZOBACTAM 25 GRAM(S): 4; .5 INJECTION, POWDER, LYOPHILIZED, FOR SOLUTION INTRAVENOUS at 20:37

## 2021-02-19 RX ADMIN — PIPERACILLIN AND TAZOBACTAM 25 GRAM(S): 4; .5 INJECTION, POWDER, LYOPHILIZED, FOR SOLUTION INTRAVENOUS at 14:39

## 2021-02-19 RX ADMIN — ATORVASTATIN CALCIUM 20 MILLIGRAM(S): 80 TABLET, FILM COATED ORAL at 20:37

## 2021-02-19 RX ADMIN — TAMSULOSIN HYDROCHLORIDE 0.4 MILLIGRAM(S): 0.4 CAPSULE ORAL at 20:37

## 2021-02-19 RX ADMIN — Medication 500 MILLIGRAM(S): at 08:15

## 2021-02-19 RX ADMIN — MORPHINE SULFATE 2 MILLIGRAM(S): 50 CAPSULE, EXTENDED RELEASE ORAL at 14:51

## 2021-02-19 RX ADMIN — Medication 6: at 08:17

## 2021-02-19 RX ADMIN — Medication 1 APPLICATION(S): at 16:23

## 2021-02-19 RX ADMIN — Medication 25 MICROGRAM(S): at 05:04

## 2021-02-19 RX ADMIN — Medication 325 MILLIGRAM(S): at 08:15

## 2021-02-19 RX ADMIN — Medication 250 MILLIGRAM(S): at 18:28

## 2021-02-19 RX ADMIN — SERTRALINE 25 MILLIGRAM(S): 25 TABLET, FILM COATED ORAL at 08:16

## 2021-02-19 RX ADMIN — MORPHINE SULFATE 2 MILLIGRAM(S): 50 CAPSULE, EXTENDED RELEASE ORAL at 20:37

## 2021-02-19 RX ADMIN — PANTOPRAZOLE SODIUM 40 MILLIGRAM(S): 20 TABLET, DELAYED RELEASE ORAL at 05:04

## 2021-02-19 RX ADMIN — Medication 50 MILLIGRAM(S): at 08:16

## 2021-02-19 RX ADMIN — OXYCODONE AND ACETAMINOPHEN 1 TABLET(S): 5; 325 TABLET ORAL at 05:45

## 2021-02-19 RX ADMIN — SODIUM CHLORIDE 100 MILLILITER(S): 9 INJECTION INTRAMUSCULAR; INTRAVENOUS; SUBCUTANEOUS at 18:28

## 2021-02-19 NOTE — DIETITIAN INITIAL EVALUATION ADULT. - PHYSICAL ASSESSMENT CALF
pt reports that she is unable to walk; likely that lack of mobility contributing to muscle wasting./moderate

## 2021-02-19 NOTE — DIETITIAN INITIAL EVALUATION ADULT. - ADD RECOMMEND
1. liberalize diet to consistnent cho 2. add glucerna TID and gelatein BID 3. consider appetite stimulant 4. encourage small frequent meals w/ protein source at each meal 5. monitor labs/poct BG 6. add MVI w/ minerals daily, 500 mg vitamin c BID, and 225 mg zinc sulfate BID x 10 days to promote wound healing.  7. weekly wt

## 2021-02-19 NOTE — PROGRESS NOTE ADULT - ASSESSMENT
71F with multiple comorbidities and with necrotic sacral decubitus ulcer requiring debridement POD1    -medical management as per primary team  -pain control  -daily dressing changes wet to dry and mepilex  -serial debridements if necessary    Plan discussed with Dr. Nunez. 71F with multiple comorbidities and with necrotic sacral decubitus ulcer requiring debridement POD1    Plan:  -medical management as per primary team  -pain control  -dressing changes with 1/4 dakins solution BID  -wound care consult    Plan discussed with Dr. Nunez.

## 2021-02-19 NOTE — PHYSICAL THERAPY INITIAL EVALUATION ADULT - MODALITIES TREATMENT COMMENTS
pt left in bed L S/L post Eval @ pt request; IV intact; callbell in reach; pt instructed not to get up alone; call nursing for assist; leonidas well; pain persists; RN Ngoc made aware

## 2021-02-19 NOTE — DIETITIAN INITIAL EVALUATION ADULT. - PERTINENT LABORATORY DATA
02-19    132<L>  |  105  |  24<H>  ----------------------------<  178<H>  3.5   |  18<L>  |  1.52<H>    Ca    8.4<L>      19 Feb 2021 09:19    TPro  6.4  /  Alb  1.5<L>  /  TBili  0.2  /  DBili  x   /  AST  52<H>  /  ALT  49  /  AlkPhos  279<H>  02-17    BMI: BMI (kg/m2): 27.2 (02-18-21 @ 00:22)  HbA1c: A1C with Estimated Average Glucose Result: 6.9 % (01-23-21 @ 12:39)    Glucose: POCT Blood Glucose.: 254 mg/dL (02-19-21 @ 08:13)    BP: 126/47 (02-19-21 @ 09:20) (104/43 - 153/57)  Lipid Panel:

## 2021-02-19 NOTE — DIETITIAN INITIAL EVALUATION ADULT. - ORAL INTAKE PTA/DIET HISTORY
Pt provides minimal detail to diet hx. Reports decreased po intake over the past 2 months, typically consuming 1.5 meals/day w/ 1-2 ensure/day. Based on recall pt likely not meeting at least 75% ENN > 1 month. Pt w/ increased energy/protein needs 2/2 unstagable pressure ulcer on sacrum.

## 2021-02-19 NOTE — DIETITIAN INITIAL EVALUATION ADULT. - OTHER INFO
71/F with PMHx of DM type 2, HTN, HLD, MDD, Hypothyroidism, Syncope, Depression, chronic diarrhea, sacral decubitus ulcer, UTIs, Anemia, Hyponatremia, admitted with Infected large chronic decubitus ulcer.     RD consulted 2/2 unstagable pressure ulcer. Pt is s/p debridement 2/19. As per EMR pt has been receiving treatment for pressure ulcer over the past year. Pt denies chewing/swallowing difficulties. No reported n/v/c/d; pt reports last BM this AM. No reported food allergies.

## 2021-02-19 NOTE — PROGRESS NOTE ADULT - SUBJECTIVE AND OBJECTIVE BOX
RN called to report critical value Vanco trough 36. RN reported that pt already received this evening dose of IV Vancomycin. D/w RN to hold on AM dose of Vancomycin. ID consult.      Hospitalist to f/u in AM.

## 2021-02-19 NOTE — DIETITIAN NUTRITION RISK NOTIFICATION - TREATMENT: THE FOLLOWING DIET HAS BEEN RECOMMENDED
Diet, Consistent Carbohydrate w/Evening Snack:   DASH/TLC {Sodium & Cholesterol Restricted} (DASH) (02-18-21 @ 18:13) [Active]

## 2021-02-19 NOTE — DIETITIAN INITIAL EVALUATION ADULT. - NS FNS WEIGHT CHANGE REASON
pt can not recall UBW; as per previous nutrition assessment (1/24/21)  son noted UBW 86.1kg and reported unintentional wt loss x 2 months. Based on current admission wt pt w/ further unintentional wt loss of 11.9kg  (13.6% BW) which is clinically significant.

## 2021-02-19 NOTE — PHYSICAL THERAPY INITIAL EVALUATION ADULT - CRITERIA FOR SKILLED THERAPEUTIC INTERVENTIONS
will attempt completion of Eval @ later date; pt with low H&H / currently on bedrest; further course of PT intervention pending

## 2021-02-19 NOTE — DIETITIAN INITIAL EVALUATION ADULT. - PERTINENT MEDS FT
MEDICATIONS  (STANDING):  ascorbic acid 500 milliGRAM(s) Oral daily  atorvastatin 20 milliGRAM(s) Oral at bedtime  Dakins Solution - 1/4 Strength 1 Application(s) Topical two times a day  dextrose 40% Gel 15 Gram(s) Oral once  dextrose 5%. 1000 milliLiter(s) (50 mL/Hr) IV Continuous <Continuous>  dextrose 5%. 1000 milliLiter(s) (100 mL/Hr) IV Continuous <Continuous>  dextrose 50% Injectable 25 Gram(s) IV Push once  dextrose 50% Injectable 12.5 Gram(s) IV Push once  dextrose 50% Injectable 25 Gram(s) IV Push once  enalapril 2.5 milliGRAM(s) Oral two times a day  ferrous    sulfate 325 milliGRAM(s) Oral daily  glucagon  Injectable 1 milliGRAM(s) IntraMuscular once  hydrALAZINE 50 milliGRAM(s) Oral two times a day  insulin lispro (ADMELOG) corrective regimen sliding scale   SubCutaneous three times a day before meals  insulin lispro (ADMELOG) corrective regimen sliding scale   SubCutaneous at bedtime  levothyroxine 25 MICROGram(s) Oral daily  multivitamin/minerals 1 Tablet(s) Oral daily  pantoprazole    Tablet 40 milliGRAM(s) Oral before breakfast  piperacillin/tazobactam IVPB.. 3.375 Gram(s) IV Intermittent every 8 hours  sertraline 25 milliGRAM(s) Oral daily  sodium chloride 0.9%. 500 milliLiter(s) (100 mL/Hr) IV Continuous <Continuous>  tamsulosin 0.4 milliGRAM(s) Oral at bedtime  vancomycin  IVPB 750 milliGRAM(s) IV Intermittent every 12 hours  vancomycin  IVPB        MEDICATIONS  (PRN):  acetaminophen   Tablet .. 650 milliGRAM(s) Oral every 6 hours PRN Temp greater or equal to 38C (100.4F), Mild Pain (1 - 3)  ondansetron Injectable 4 milliGRAM(s) IV Push every 6 hours PRN Nausea  oxycodone    5 mG/acetaminophen 325 mG 1 Tablet(s) Oral daily PRN Moderate Pain (4 - 6)  simethicone 80 milliGRAM(s) Chew four times a day PRN Gas

## 2021-02-19 NOTE — DIETITIAN INITIAL EVALUATION ADULT. - MALNUTRITION
Pt meets criteria for severe malnutrition in the context of chronic illness AEB PO intake < 75% ENN > 1 month and wt loss of 13.6% BW x 3 months

## 2021-02-19 NOTE — PROGRESS NOTE ADULT - SUBJECTIVE AND OBJECTIVE BOX
Pt seen and examined at bedside, no acute events. Pt complaining of pain over sacrum.  Denied fever, chills, nausea, vomiting or SOB overnight.                 Vital Signs Last 24 Hrs  T(C): 36.4 (18 Feb 2021 20:11), Max: 36.7 (18 Feb 2021 18:16)  T(F): 97.5 (18 Feb 2021 20:11), Max: 98 (18 Feb 2021 18:16)  HR: 76 (19 Feb 2021 02:13) (63 - 81)  BP: 104/43 (19 Feb 2021 02:13) (104/43 - 153/57)  BP(mean): --  RR: 17 (18 Feb 2021 20:11) (12 - 19)  SpO2: 98% (18 Feb 2021 20:11) (97% - 100%)    Labs:                                7.4    18.17 )-----------( 553      ( 18 Feb 2021 09:08 )             23.5     CBC Full  -  ( 18 Feb 2021 09:08 )  WBC Count : 18.17 K/uL  RBC Count : 3.05 M/uL  Hemoglobin : 7.4 g/dL  Hematocrit : 23.5 %  Platelet Count - Automated : 553 K/uL  Mean Cell Volume : 77.0 fl  Mean Cell Hemoglobin : 24.3 pg  Mean Cell Hemoglobin Concentration : 31.5 gm/dL  Auto Neutrophil # : x  Auto Lymphocyte # : x  Auto Monocyte # : x  Auto Eosinophil # : x  Auto Basophil # : x  Auto Neutrophil % : x  Auto Lymphocyte % : x  Auto Monocyte % : x  Auto Eosinophil % : x  Auto Basophil % : x    02-18    135  |  104  |  20  ----------------------------<  101<H>  3.9   |  21<L>  |  1.07    Ca    8.9      18 Feb 2021 09:08    TPro  6.4  /  Alb  1.5<L>  /  TBili  0.2  /  DBili  x   /  AST  52<H>  /  ALT  49  /  AlkPhos  279<H>  02-17    LIVER FUNCTIONS - ( 17 Feb 2021 11:38 )  Alb: 1.5 g/dL / Pro: 6.4 gm/dL / ALK PHOS: 279 U/L / ALT: 49 U/L / AST: 52 U/L / GGT: x           PT/INR - ( 17 Feb 2021 11:38 )   PT: 13.9 sec;   INR: 1.20 ratio         PTT - ( 17 Feb 2021 11:38 )  PTT:27.2 sec      Meds:  acetaminophen   Tablet .. 650 milliGRAM(s) Oral every 6 hours PRN  ascorbic acid 500 milliGRAM(s) Oral daily  atorvastatin 20 milliGRAM(s) Oral at bedtime  dextrose 40% Gel 15 Gram(s) Oral once  dextrose 5%. 1000 milliLiter(s) IV Continuous <Continuous>  dextrose 5%. 1000 milliLiter(s) IV Continuous <Continuous>  dextrose 50% Injectable 25 Gram(s) IV Push once  dextrose 50% Injectable 12.5 Gram(s) IV Push once  dextrose 50% Injectable 25 Gram(s) IV Push once  enalapril 2.5 milliGRAM(s) Oral two times a day  ferrous    sulfate 325 milliGRAM(s) Oral daily  glucagon  Injectable 1 milliGRAM(s) IntraMuscular once  hydrALAZINE 50 milliGRAM(s) Oral two times a day  insulin lispro (ADMELOG) corrective regimen sliding scale   SubCutaneous three times a day before meals  insulin lispro (ADMELOG) corrective regimen sliding scale   SubCutaneous at bedtime  levothyroxine 25 MICROGram(s) Oral daily  multivitamin/minerals 1 Tablet(s) Oral daily  ondansetron Injectable 4 milliGRAM(s) IV Push every 6 hours PRN  oxycodone    5 mG/acetaminophen 325 mG 1 Tablet(s) Oral daily PRN  pantoprazole    Tablet 40 milliGRAM(s) Oral before breakfast  piperacillin/tazobactam IVPB.. 3.375 Gram(s) IV Intermittent every 8 hours  sertraline 25 milliGRAM(s) Oral daily  simethicone 80 milliGRAM(s) Chew four times a day PRN  sodium chloride 0.9%. 500 milliLiter(s) IV Continuous <Continuous>  tamsulosin 0.4 milliGRAM(s) Oral at bedtime  vancomycin  IVPB 750 milliGRAM(s) IV Intermittent every 12 hours  vancomycin  IVPB          Radiology:        Physical Exam:  Pt is AAOx3  General: Well developed, in no acute distress.   Chest: Lungs clear, no rales, no rhonchi, no wheezes.   Heart: RR, no murmurs, no rubs, no gallops.   Abdomen: Soft, no tenderness, no masses, BS normal   Back: Normal curvature, no tenderness. sacral ulcer base s/p debridement with fibrinous tissue but no necrotic tissue seen, no bleeding visualized   Neuro: Physiological, no localizing findings.   Skin: Normal, no rashes, no lesions noted.   Extremities: Warm, well perfused, no edema, Pulses intact

## 2021-02-19 NOTE — PROGRESS NOTE ADULT - SUBJECTIVE AND OBJECTIVE BOX
HOSPITALIST ATTENDING PROGRESS NOTE    Cc: Follow up for infected sacral decubitus    HPI: pt seen. feels tired. low oral intake. no n/v/d/abd pain    EXAM  Vitals: Vital Signs Last 24 Hrs  T(C): 36 (19 Feb 2021 17:23), Max: 36.6 (19 Feb 2021 09:20)  T(F): 96.8 (19 Feb 2021 17:23), Max: 97.8 (19 Feb 2021 09:20)  HR: 80 (19 Feb 2021 17:23) (63 - 80)  BP: 107/28 (19 Feb 2021 17:23) (104/43 - 153/57)  BP(mean): --  RR: 18 (19 Feb 2021 17:23) (14 - 18)  SpO2: 98% (19 Feb 2021 17:23) (98% - 100%)  Gen: NAD, comfortable  HEENT: normocephalic, atraumatic, no nasal discharge, trachea midline, anicteric sclerae  CVS: Normal S1S2, RRR, no JVD  RESP: Clear to auscultation bilaterally, no wheezing, no rhonchi, normal respiratory effort  ABD: normal bowel sounds, non-tender, non-distended, no organomegaly  SKIN: large sacral decubitus ulcer with mild malodorous discharge.   EXT: no edema, clubbing or cynosis    MEDS  MEDICATIONS  (STANDING):  ascorbic acid 500 milliGRAM(s) Oral daily  atorvastatin 20 milliGRAM(s) Oral at bedtime  Dakins Solution - 1/4 Strength 1 Application(s) Topical two times a day  dextrose 40% Gel 15 Gram(s) Oral once  dextrose 5%. 1000 milliLiter(s) (50 mL/Hr) IV Continuous <Continuous>  dextrose 5%. 1000 milliLiter(s) (100 mL/Hr) IV Continuous <Continuous>  dextrose 50% Injectable 25 Gram(s) IV Push once  dextrose 50% Injectable 12.5 Gram(s) IV Push once  dextrose 50% Injectable 25 Gram(s) IV Push once  ferrous    sulfate 325 milliGRAM(s) Oral daily  glucagon  Injectable 1 milliGRAM(s) IntraMuscular once  insulin lispro (ADMELOG) corrective regimen sliding scale   SubCutaneous three times a day before meals  insulin lispro (ADMELOG) corrective regimen sliding scale   SubCutaneous at bedtime  levothyroxine 25 MICROGram(s) Oral daily  multivitamin/minerals 1 Tablet(s) Oral daily  pantoprazole    Tablet 40 milliGRAM(s) Oral before breakfast  piperacillin/tazobactam IVPB.. 3.375 Gram(s) IV Intermittent every 8 hours  sertraline 25 milliGRAM(s) Oral daily  sodium chloride 0.9%. 500 milliLiter(s) (100 mL/Hr) IV Continuous <Continuous>  sodium chloride 0.9%. 1000 milliLiter(s) (100 mL/Hr) IV Continuous <Continuous>  tamsulosin 0.4 milliGRAM(s) Oral at bedtime  vancomycin  IVPB 750 milliGRAM(s) IV Intermittent every 12 hours  vancomycin  IVPB        MEDICATIONS  (PRN):  acetaminophen   Tablet .. 650 milliGRAM(s) Oral every 6 hours PRN Temp greater or equal to 38C (100.4F), Mild Pain (1 - 3)  morphine  - Injectable 2 milliGRAM(s) IV Push every 4 hours PRN Severe Pain (7 - 10)  ondansetron Injectable 4 milliGRAM(s) IV Push every 6 hours PRN Nausea  oxycodone    5 mG/acetaminophen 325 mG 1 Tablet(s) Oral every 4 hours PRN Moderate Pain (4 - 6)  oxyCODONE    IR 10 milliGRAM(s) Oral three times a day PRN Severe Pain (7 - 10)  simethicone 80 milliGRAM(s) Chew four times a day PRN Gas      LABS/RADIOLOGY                        7.6    22.17 )-----------( 506      ( 19 Feb 2021 09:19 )             24.8      02-19    132<L>  |  105  |  24<H>  ----------------------------<  178<H>  3.5   |  18<L>  |  1.52<H>    Ca    8.4<L>      19 Feb 2021 09:19          POCT Blood Glucose.: 100 mg/dL (19 Feb 2021 17:50)  POCT Blood Glucose.: 77 mg/dL (19 Feb 2021 12:21)  POCT Blood Glucose.: 254 mg/dL (19 Feb 2021 08:13)  POCT Blood Glucose.: 189 mg/dL (18 Feb 2021 21:35)      ASSESMENT/PLAN    71/F with PMHx of DM type 2, HTN, HLD, MDD, Hypothyroidism, Syncope, Depression, chronic diarrhea, sacral decubitus ulcer, UTIs, Anemia, Hyponatremia, admitted with     # Infected large chronic decubitus ulcer, present on admission  #UTI  #Chronic diarrhea  - Cont vanco, zosyn. f/u cultures. 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 no sign of niki GI bleeding.   -Tx for Hb < 7  -Outpt Hem/onc f/u    # Hyponatremia, asymptomatic. resolved with IVF  -start IVF b/c of low oral intake    # DM :  ISS    # HTN, hypothyroidism  -hydralazine, synthroid    # DVT PPX  SCDs

## 2021-02-20 LAB
ALBUMIN SERPL ELPH-MCNC: 1.4 G/DL — LOW (ref 3.3–5)
ALP SERPL-CCNC: 253 U/L — HIGH (ref 40–120)
ALT FLD-CCNC: 29 U/L — SIGNIFICANT CHANGE UP (ref 12–78)
ANION GAP SERPL CALC-SCNC: 11 MMOL/L — SIGNIFICANT CHANGE UP (ref 5–17)
AST SERPL-CCNC: 27 U/L — SIGNIFICANT CHANGE UP (ref 15–37)
BILIRUB SERPL-MCNC: 0.3 MG/DL — SIGNIFICANT CHANGE UP (ref 0.2–1.2)
BUN SERPL-MCNC: 26 MG/DL — HIGH (ref 7–23)
CALCIUM SERPL-MCNC: 8.4 MG/DL — LOW (ref 8.5–10.1)
CHLORIDE SERPL-SCNC: 105 MMOL/L — SIGNIFICANT CHANGE UP (ref 96–108)
CO2 SERPL-SCNC: 19 MMOL/L — LOW (ref 22–31)
CREAT SERPL-MCNC: 2.03 MG/DL — HIGH (ref 0.5–1.3)
GLUCOSE BLDC GLUCOMTR-MCNC: 116 MG/DL — HIGH (ref 70–99)
GLUCOSE BLDC GLUCOMTR-MCNC: 152 MG/DL — HIGH (ref 70–99)
GLUCOSE BLDC GLUCOMTR-MCNC: 174 MG/DL — HIGH (ref 70–99)
GLUCOSE BLDC GLUCOMTR-MCNC: 99 MG/DL — SIGNIFICANT CHANGE UP (ref 70–99)
GLUCOSE SERPL-MCNC: 108 MG/DL — HIGH (ref 70–99)
HCT VFR BLD CALC: 24.9 % — LOW (ref 34.5–45)
HGB BLD-MCNC: 7.7 G/DL — LOW (ref 11.5–15.5)
MCHC RBC-ENTMCNC: 23.7 PG — LOW (ref 27–34)
MCHC RBC-ENTMCNC: 30.9 GM/DL — LOW (ref 32–36)
MCV RBC AUTO: 76.6 FL — LOW (ref 80–100)
PLATELET # BLD AUTO: 508 K/UL — HIGH (ref 150–400)
POTASSIUM SERPL-MCNC: 3.9 MMOL/L — SIGNIFICANT CHANGE UP (ref 3.5–5.3)
POTASSIUM SERPL-SCNC: 3.9 MMOL/L — SIGNIFICANT CHANGE UP (ref 3.5–5.3)
PROT SERPL-MCNC: 6 GM/DL — SIGNIFICANT CHANGE UP (ref 6–8.3)
RBC # BLD: 3.25 M/UL — LOW (ref 3.8–5.2)
RBC # FLD: 18 % — HIGH (ref 10.3–14.5)
SODIUM SERPL-SCNC: 135 MMOL/L — SIGNIFICANT CHANGE UP (ref 135–145)
WBC # BLD: 20.19 K/UL — HIGH (ref 3.8–10.5)
WBC # FLD AUTO: 20.19 K/UL — HIGH (ref 3.8–10.5)

## 2021-02-20 PROCEDURE — 99232 SBSQ HOSP IP/OBS MODERATE 35: CPT

## 2021-02-20 RX ADMIN — PIPERACILLIN AND TAZOBACTAM 25 GRAM(S): 4; .5 INJECTION, POWDER, LYOPHILIZED, FOR SOLUTION INTRAVENOUS at 14:01

## 2021-02-20 RX ADMIN — PIPERACILLIN AND TAZOBACTAM 25 GRAM(S): 4; .5 INJECTION, POWDER, LYOPHILIZED, FOR SOLUTION INTRAVENOUS at 22:09

## 2021-02-20 RX ADMIN — Medication 2: at 12:01

## 2021-02-20 RX ADMIN — Medication 325 MILLIGRAM(S): at 10:14

## 2021-02-20 RX ADMIN — TAMSULOSIN HYDROCHLORIDE 0.4 MILLIGRAM(S): 0.4 CAPSULE ORAL at 22:09

## 2021-02-20 RX ADMIN — PIPERACILLIN AND TAZOBACTAM 25 GRAM(S): 4; .5 INJECTION, POWDER, LYOPHILIZED, FOR SOLUTION INTRAVENOUS at 05:41

## 2021-02-20 RX ADMIN — SODIUM CHLORIDE 100 MILLILITER(S): 9 INJECTION INTRAMUSCULAR; INTRAVENOUS; SUBCUTANEOUS at 10:15

## 2021-02-20 RX ADMIN — SERTRALINE 25 MILLIGRAM(S): 25 TABLET, FILM COATED ORAL at 10:14

## 2021-02-20 RX ADMIN — Medication 500 MILLIGRAM(S): at 10:14

## 2021-02-20 RX ADMIN — MORPHINE SULFATE 2 MILLIGRAM(S): 50 CAPSULE, EXTENDED RELEASE ORAL at 10:14

## 2021-02-20 RX ADMIN — OXYCODONE HYDROCHLORIDE 10 MILLIGRAM(S): 5 TABLET ORAL at 17:24

## 2021-02-20 RX ADMIN — SODIUM CHLORIDE 75 MILLILITER(S): 9 INJECTION INTRAMUSCULAR; INTRAVENOUS; SUBCUTANEOUS at 22:09

## 2021-02-20 RX ADMIN — Medication 1 APPLICATION(S): at 17:24

## 2021-02-20 RX ADMIN — Medication 1 TABLET(S): at 10:14

## 2021-02-20 RX ADMIN — Medication 1 APPLICATION(S): at 10:15

## 2021-02-20 RX ADMIN — ATORVASTATIN CALCIUM 20 MILLIGRAM(S): 80 TABLET, FILM COATED ORAL at 22:09

## 2021-02-20 RX ADMIN — Medication 25 MICROGRAM(S): at 06:00

## 2021-02-20 RX ADMIN — SODIUM CHLORIDE 100 MILLILITER(S): 9 INJECTION INTRAMUSCULAR; INTRAVENOUS; SUBCUTANEOUS at 05:41

## 2021-02-20 RX ADMIN — PANTOPRAZOLE SODIUM 40 MILLIGRAM(S): 20 TABLET, DELAYED RELEASE ORAL at 05:59

## 2021-02-20 RX ADMIN — MORPHINE SULFATE 2 MILLIGRAM(S): 50 CAPSULE, EXTENDED RELEASE ORAL at 05:56

## 2021-02-20 NOTE — CONSULT NOTE ADULT - SUBJECTIVE AND OBJECTIVE BOX
NEPHROLOGY INTERVAL HPI/OVERNIGHT EVENTS:  MICHAEL [id number]  HPI:  71/F with PMHx of DM type 2, HTN, HLD, MDD, Hypothyroidism, Syncope, Depression, chronic diarrhea, sacral decubitus ulcer, UTIs, Anemia, Hyponatremia, presents to the ED BIBA from [facility] for re evaluation and possible debridement of sacral decubitus ulcer. Reports she has been undergoing treatment for ulcer for 1 year and has been admitted for infection in the past. Pt reports increase pain at site with local tenderness, doesn't know if there is any discharge. No fever. As per NH paperwork, requesting Dr. Nunez in the ED. Recent COVID PCR on 02/15 which was negative. Blood work from yesterday with white count of 17.8 and sodium 131. On oral Flagyl x2 days.    She was seen in ED by Sx team and she is for debridement in OR tomorrow.    CT abdo pelvis done; ? sacral Osteomyelitis  -------------------------------------------  history from chart,   pt in for debridement of her sacral decub  done 2/19 with hemodynamic variability   has been on zosyn and vanc since admission   no iv contrast studies   + uop with no diarrhea   rising scr and renal consult requested with vanc level ( drawn post infusion) of 36    PAST MEDICAL & SURGICAL HISTORY:  MARRY (acute respiratory infection)  Hypothyroid  MDD (major depressive disorder)  Diabetes mellitus  type 2  Hypertension  Hyperlipidemia  Depression    MEDICATIONS  (STANDING):  ascorbic acid 500 milliGRAM(s) Oral daily  atorvastatin 20 milliGRAM(s) Oral at bedtime  Dakins Solution - 1/4 Strength 1 Application(s) Topical two times a day  dextrose 40% Gel 15 Gram(s) Oral once  dextrose 5%. 1000 milliLiter(s) (50 mL/Hr) IV Continuous <Continuous>  dextrose 5%. 1000 milliLiter(s) (100 mL/Hr) IV Continuous <Continuous>  dextrose 50% Injectable 25 Gram(s) IV Push once  dextrose 50% Injectable 12.5 Gram(s) IV Push once  dextrose 50% Injectable 25 Gram(s) IV Push once  ferrous    sulfate 325 milliGRAM(s) Oral daily  glucagon  Injectable 1 milliGRAM(s) IntraMuscular once  insulin lispro (ADMELOG) corrective regimen sliding scale   SubCutaneous three times a day before meals  insulin lispro (ADMELOG) corrective regimen sliding scale   SubCutaneous at bedtime  levothyroxine 25 MICROGram(s) Oral daily  multivitamin/minerals 1 Tablet(s) Oral daily  pantoprazole    Tablet 40 milliGRAM(s) Oral before breakfast  piperacillin/tazobactam IVPB.. 3.375 Gram(s) IV Intermittent every 8 hours  sertraline 25 milliGRAM(s) Oral daily  sodium chloride 0.9%. 500 milliLiter(s) (100 mL/Hr) IV Continuous <Continuous>  sodium chloride 0.9%. 1000 milliLiter(s) (100 mL/Hr) IV Continuous <Continuous>  tamsulosin 0.4 milliGRAM(s) Oral at bedtime    MEDICATIONS  (PRN):  acetaminophen   Tablet .. 650 milliGRAM(s) Oral every 6 hours PRN Temp greater or equal to 38C (100.4F), Mild Pain (1 - 3)  ondansetron Injectable 4 milliGRAM(s) IV Push every 6 hours PRN Nausea  oxycodone    5 mG/acetaminophen 325 mG 1 Tablet(s) Oral every 4 hours PRN Moderate Pain (4 - 6)  oxyCODONE    IR 10 milliGRAM(s) Oral three times a day PRN Severe Pain (7 - 10)  simethicone 80 milliGRAM(s) Chew four times a day PRN Gas      Allergies    Hytrin (Unknown)    Intolerances        I&O's Summary      Home Medications:  Admelog SoloStar 100 units/mL injectable solution: Inject subcutaneously 3 times daily before meals as per sliding scale:  &lt; 200 = 0 units  201-250 = 3units  251-300 = 6 units  301-350 = 9 units  351-400 = 12 units  &gt; 400 = call md (17 Feb 2021 17:33)  ascorbic acid 500 mg oral tablet: 1 tab(s) orally once a day (17 Feb 2021 17:33)  Aspirin Enteric Coated 81 mg oral delayed release tablet: 1 tab(s) orally once a day (17 Feb 2021 17:33)  atorvastatin 20 mg oral tablet: 1 tab(s) orally once a day (17 Feb 2021 17:33)  Basaglar KwikPen 100 units/mL subcutaneous solution: 20 unit(s) subcutaneously once a day (at bedtime) (17 Feb 2021 17:33)  enalapril 2.5 mg oral tablet: 1 tab(s) orally 2 times a day  ***hold for sbp &lt; 100, HR &lt; 60*** (17 Feb 2021 17:33)  ferrous sulfate 325 mg (65 mg elemental iron) oral tablet: 1 tab(s) orally once a day (17 Feb 2021 17:33)  Flomax 0.4 mg oral capsule: 1 cap(s) orally once a day (at bedtime) (17 Feb 2021 17:33)  hydrALAZINE 50 mg oral tablet: 1 tab(s) orally 2 times a day  ***hold for HR &lt; 60 or SBP &lt; 110*** (17 Feb 2021 17:33)  Hysept 0.25% topical solution: Cleanse sacrum with Dakins cleanse, crush flagyl 500mg tab and sprinkle in wound, lightly soak gauze with dakins, pack wound and cover with Allevyn bandage once daily (17 Feb 2021 17:33)  levothyroxine 25 mcg (0.025 mg) oral tablet: 1 tab(s) orally once a day (17 Feb 2021 17:33)  metroNIDAZOLE 500 mg oral tablet: 1 tab(s) crushed and sprinkled on wound on sacrum once daily (17 Feb 2021 17:33)  Multiple Vitamins with Minerals oral tablet: 1 tab(s) orally once a day (17 Feb 2021 17:33)  oxycodone-acetaminophen 5 mg-325 mg oral tablet: 1 tab(s) orally once a day 1/2 hour prior to treatment (17 Feb 2021 17:33)  oxycodone-acetaminophen 5 mg-325 mg oral tablet: 1 tab(s) orally once a day as needed for breakthrough pain (17 Feb 2021 17:33)  PriLOSEC 40 mg oral delayed release capsule: 1 cap(s) orally once a day (17 Feb 2021 17:33)  Retacrit 10,000 units/mL injectable solution: 53585 unit(s) injectable once a week on Monday (17 Feb 2021 17:33)  sertraline 25 mg oral tablet: 1 tab(s) orally once a day (17 Feb 2021 17:33)  simethicone 80 mg oral tablet, chewable: 1 tab(s) orally 4 times a day (after meals and at bedtime), As Needed (17 Feb 2021 17:33)  Tylenol 325 mg oral tablet: 2 tab(s) orally every 6 hours, As Needed (17 Feb 2021 17:33)      Vital Signs Last 24 Hrs  T(C): 36.8 (20 Feb 2021 15:34), Max: 36.8 (20 Feb 2021 01:26)  T(F): 98.2 (20 Feb 2021 15:34), Max: 98.2 (20 Feb 2021 01:26)  HR: 74 (20 Feb 2021 15:34) (74 - 86)  BP: 132/36 (20 Feb 2021 15:34) (104/61 - 141/91)  BP(mean): --  RR: 18 (20 Feb 2021 15:34) (17 - 18)  SpO2: 96% (20 Feb 2021 15:34) (96% - 98%)  Daily     Daily   I&O's Summary      PHYSICAL EXAM:  GEN: alert awake NAD, lying flat in bed   HEENT: MMM  NECK supple no jvd  CV: RRR s1s2  LUNGS: b/l CTA  ABD: +bs soft,   EXT: no edema    LABS:                        7.7    20.19 )-----------( 508      ( 20 Feb 2021 09:33 )             24.9     02-20    135  |  105  |  26<H>  ----------------------------<  108<H>  3.9   |  19<L>  |  2.03<H>    Ca    8.4<L>      20 Feb 2021 09:33    TPro  6.0  /  Alb  1.4<L>  /  TBili  0.3  /  DBili  x   /  AST  27  /  ALT  29  /  AlkPhos  253<H>  02-20

## 2021-02-20 NOTE — PROGRESS NOTE ADULT - SUBJECTIVE AND OBJECTIVE BOX
Date of service: 02-20-21 @ 15:28      Patient lying in bed; afebrile, no complaints  Admitted with sacral ulcer, has elevated vancomycin trough, drawn after drug given, not a trough      ROS: no fever or chills; denies dizziness, no HA, no SOB or cough, no abdominal pain, no diarrhea or constipation; no dysuria, no urinary frequency, no legs pain, no rashes    MEDICATIONS  (STANDING):  ascorbic acid 500 milliGRAM(s) Oral daily  atorvastatin 20 milliGRAM(s) Oral at bedtime  Dakins Solution - 1/4 Strength 1 Application(s) Topical two times a day  dextrose 40% Gel 15 Gram(s) Oral once  dextrose 5%. 1000 milliLiter(s) (50 mL/Hr) IV Continuous <Continuous>  dextrose 5%. 1000 milliLiter(s) (100 mL/Hr) IV Continuous <Continuous>  dextrose 50% Injectable 25 Gram(s) IV Push once  dextrose 50% Injectable 12.5 Gram(s) IV Push once  dextrose 50% Injectable 25 Gram(s) IV Push once  ferrous    sulfate 325 milliGRAM(s) Oral daily  glucagon  Injectable 1 milliGRAM(s) IntraMuscular once  insulin lispro (ADMELOG) corrective regimen sliding scale   SubCutaneous three times a day before meals  insulin lispro (ADMELOG) corrective regimen sliding scale   SubCutaneous at bedtime  levothyroxine 25 MICROGram(s) Oral daily  multivitamin/minerals 1 Tablet(s) Oral daily  pantoprazole    Tablet 40 milliGRAM(s) Oral before breakfast  piperacillin/tazobactam IVPB.. 3.375 Gram(s) IV Intermittent every 8 hours  sertraline 25 milliGRAM(s) Oral daily  sodium chloride 0.9%. 500 milliLiter(s) (100 mL/Hr) IV Continuous <Continuous>  sodium chloride 0.9%. 1000 milliLiter(s) (100 mL/Hr) IV Continuous <Continuous>  tamsulosin 0.4 milliGRAM(s) Oral at bedtime    MEDICATIONS  (PRN):  acetaminophen   Tablet .. 650 milliGRAM(s) Oral every 6 hours PRN Temp greater or equal to 38C (100.4F), Mild Pain (1 - 3)  morphine  - Injectable 2 milliGRAM(s) IV Push every 4 hours PRN Severe Pain (7 - 10)  ondansetron Injectable 4 milliGRAM(s) IV Push every 6 hours PRN Nausea  oxycodone    5 mG/acetaminophen 325 mG 1 Tablet(s) Oral every 4 hours PRN Moderate Pain (4 - 6)  oxyCODONE    IR 10 milliGRAM(s) Oral three times a day PRN Severe Pain (7 - 10)  simethicone 80 milliGRAM(s) Chew four times a day PRN Gas      Vital Signs Last 24 Hrs  T(C): 36.7 (20 Feb 2021 15:04), Max: 36.8 (20 Feb 2021 01:26)  T(F): 98.1 (20 Feb 2021 15:04), Max: 98.2 (20 Feb 2021 01:26)  HR: 78 (20 Feb 2021 15:04) (77 - 86)  BP: 113/23 (20 Feb 2021 15:04) (104/61 - 141/91)  BP(mean): --  RR: 18 (20 Feb 2021 15:04) (17 - 18)  SpO2: 98% (20 Feb 2021 15:04) (96% - 98%)        Physical Exam:              PE:  Constitutional: frail looking  HEENT: NC/AT, EOMI, PERRLA, conjunctivae clear; ears and nose atraumatic; pharynx benign  Neck: supple; thyroid not palpable  Back: no tenderness  Respiratory: respiratory effort normal; clear to auscultation  Cardiovascular: S1S2 regular, no murmurs  Abdomen: soft, not tender, not distended, positive BS; liver and spleen WNL  Genitourinary: no suprapubic tenderness  Musculoskeletal: no muscle tenderness, no joint swelling or tenderness  Extremities: no pedal edema  Neurological/ Psychiatric:  moving all extremities  Skin: unstageable large sacral ulcer with necrosis along base and foul smelling drainage     Labs: all available labs reviewed                       Labs:                        7.7    20.19 )-----------( 508      ( 20 Feb 2021 09:33 )             24.9     02-20    135  |  105  |  26<H>  ----------------------------<  108<H>  3.9   |  19<L>  |  2.03<H>    Ca    8.4<L>      20 Feb 2021 09:33    TPro  6.0  /  Alb  1.4<L>  /  TBili  0.3  /  DBili  x   /  AST  27  /  ALT  29  /  AlkPhos  253<H>  02-20       Vancomycin Level, Trough: 36.0 ug/mL (02-19 @ 20:30)      Cultures:       Culture - Surgical Swab (collected 02-18-21 @ 17:30)  Source: .Surgical Swab 2- sacral decubitus ulcer fluid #2  Preliminary Report (02-19-21 @ 18:28):    Moderate Escherichia coli    Moderate Staphylococcus aureus    Culture - Surgical Swab (collected 02-18-21 @ 17:30)  Source: .Surgical Swab 1- sacral decubitus ulcer fluid #1  Preliminary Report (02-19-21 @ 18:19):    Few Escherichia coli    Few Enterococcus faecalis    Culture - Urine (collected 02-18-21 @ 05:28)  Source: .Urine Clean Catch (Midstream)  Final Report (02-19-21 @ 09:25):    <10,000 CFU/mL Normal Urogenital Mike    Culture - Other (collected 02-17-21 @ 15:24)  Source: .Other sacral decub ulcer, superf.  Final Report (02-19-21 @ 16:26):    Culture yields growth of greater than 3 colony types of    bacteria,  which may indicate contamination and normal mike    Call client services within 7 days if further workup is clinically    indicated.    Culture includes    Numerous Staphylococcus aureus  Organism: Staphylococcus aureus (02-19-21 @ 16:26)  Organism: Staphylococcus aureus (02-19-21 @ 16:26)      -  Ampicillin/Sulbactam: S <=8/4      -  Cefazolin: S <=4      -  Clindamycin: S <=0.25      -  Erythromycin: R >4      -  Gentamicin: S 4 Should not be used as monotherapy      -  Oxacillin: S <=0.25      -  Penicillin: R 8      -  RIF- Rifampin: S <=1 Should not be used as monotherapy      -  Tetra/Doxy: S <=1      -  Trimethoprim/Sulfamethoxazole: S <=0.5/9.5      -  Vancomycin: S 1      Method Type: LEONILA    Culture - Blood (collected 02-17-21 @ 11:38)  Source: .Blood Blood-Venous  Preliminary Report (02-18-21 @ 16:01):    No growth to date.    Culture - Blood (collected 02-17-21 @ 11:38)  Source: .Blood Blood-Peripheral  Preliminary Report (02-18-21 @ 16:01):    No growth to date.          Radiology: all available radiological tests reviewed  < from: CT Abdomen and Pelvis w/ IV Cont (02.17.21 @ 14:10) >  EXAM:  CT ABDOMEN AND PELVIS IC                            PROCEDURE DATE:  02/17/2021          INTERPRETATION:  CLINICAL INFORMATION: Sacral decubitus ulcer    COMPARISON: CT scan of the abdomen and pelvis from 5/22/2011    PROCEDURE:  CT of the Abdomen and Pelvis was performed with intravenous contrast.  Intravenous contrast: 90 ml Omnipaque 350. 10 ml discarded.  Oral contrast: None.  Sagittal and coronal reformats were performed.    FINDINGS:  LOWER CHEST: Partially imaged left breast prosthesis and presumed tissue expander. Trace right pleural fluid.    LIVER: Within normal limits.  BILE DUCTS: Normal caliber.  GALLBLADDER: Layering stones or milk of calcium in the gallbladder  SPLEEN: Within normal limits.  PANCREAS: Within normal limits.  ADRENALS: 4.0 x 3.1 cm left adrenal mass measuring 52 Hounsfield units which is nondiagnostic. This has not significantly changed in size when compared with 5/22/2011.  KIDNEYS/URETERS: 2.1 cm left renal cyst.    BLADDER: Air within the bladder which may be iatrogenic. Please correlate clinically. Question of mild bladder wall thickening. This may be exaggerated by under distention although mild cystitis cannot be excluded. Please correlate clinically.  REPRODUCTIVE ORGANS: Coarse calcifications within the uterus most likely representing calcified leiomyomas. Radiopaque material in the region of the adnexa bilaterally. Has there been a prior tubal ligation?    BOWEL: No bowel obstruction. Appendix within normal limits  PERITONEUM: No ascites.  VESSELS: Vascular calcifications.  RETROPERITONEUM/LYMPH NODES: No lymphadenopathy.  ABDOMINAL WALL: There is a sacral decubitus ulcer with air and soft tissue extending in the subcutaneous tissue of the left buttock extending to the lateral aspect and extending slightly to the right of midline as well. Air and prominent soft tissue extends posteriorly to the lower sacrum and coccyx. I am not definitely seeing bone erosion, osteomyelitis cannot be excluded.  BONES: Degenerative changes of bone are present. There is mild spondylolisthesis with L4 anterior to L5 of less than 25%.    IMPRESSION:  Sacral decubitus ulcer with air and soft tissue extending in the subcutaneous region laterally to the left buttock and slightly to the right of midline. Soft tissue extends to the adjacent lower sacrum and coccyx. No gross destruction is identified although osteomyelitis cannot be excluded. Clinical correlation is necessary.        Advanced directives addressed: full resuscitation

## 2021-02-20 NOTE — PROGRESS NOTE ADULT - SUBJECTIVE AND OBJECTIVE BOX
HOSPITALIST ATTENDING PROGRESS NOTE    Cc: Follow up for infected sacral decubitus ulcer    HPI: feels tired. Drinking fluids OK. Chronically poor eater.     EXAM  Vitals: Vital Signs Last 24 Hrs  T(C): 36.8 (20 Feb 2021 15:34), Max: 36.8 (20 Feb 2021 01:26)  T(F): 98.2 (20 Feb 2021 15:34), Max: 98.2 (20 Feb 2021 01:26)  HR: 74 (20 Feb 2021 15:34) (74 - 86)  BP: 132/36 (20 Feb 2021 15:34) (104/61 - 141/91)  BP(mean): --  RR: 18 (20 Feb 2021 15:34) (17 - 18)  SpO2: 96% (20 Feb 2021 15:34) (96% - 98%)  Gen: NAD, comfortable  HEENT: normocephalic, atraumatic, no nasal discharge, trachea midline, anicteric sclerae  CVS: Normal S1S2, RRR, no JVD  RESP: Clear to auscultation bilaterally, no wheezing, no rhonchi, normal respiratory effort  ABD: normal bowel sounds, non-tender, non-distended, no organomegaly  SKIN: large sacral decubitus ulcer with mild malodorous discharge.   EXT: no edema, clubbing or cynosis    MEDS  MEDICATIONS  (STANDING):  ascorbic acid 500 milliGRAM(s) Oral daily  atorvastatin 20 milliGRAM(s) Oral at bedtime  Dakins Solution - 1/4 Strength 1 Application(s) Topical two times a day  dextrose 40% Gel 15 Gram(s) Oral once  dextrose 5%. 1000 milliLiter(s) (50 mL/Hr) IV Continuous <Continuous>  dextrose 5%. 1000 milliLiter(s) (100 mL/Hr) IV Continuous <Continuous>  dextrose 50% Injectable 25 Gram(s) IV Push once  dextrose 50% Injectable 12.5 Gram(s) IV Push once  dextrose 50% Injectable 25 Gram(s) IV Push once  ferrous    sulfate 325 milliGRAM(s) Oral daily  glucagon  Injectable 1 milliGRAM(s) IntraMuscular once  insulin lispro (ADMELOG) corrective regimen sliding scale   SubCutaneous three times a day before meals  insulin lispro (ADMELOG) corrective regimen sliding scale   SubCutaneous at bedtime  levothyroxine 25 MICROGram(s) Oral daily  multivitamin/minerals 1 Tablet(s) Oral daily  pantoprazole    Tablet 40 milliGRAM(s) Oral before breakfast  piperacillin/tazobactam IVPB.. 3.375 Gram(s) IV Intermittent every 8 hours  sertraline 25 milliGRAM(s) Oral daily  sodium chloride 0.9%. 500 milliLiter(s) (100 mL/Hr) IV Continuous <Continuous>  sodium chloride 0.9%. 1000 milliLiter(s) (100 mL/Hr) IV Continuous <Continuous>  tamsulosin 0.4 milliGRAM(s) Oral at bedtime    MEDICATIONS  (PRN):  acetaminophen   Tablet .. 650 milliGRAM(s) Oral every 6 hours PRN Temp greater or equal to 38C (100.4F), Mild Pain (1 - 3)  ondansetron Injectable 4 milliGRAM(s) IV Push every 6 hours PRN Nausea  oxycodone    5 mG/acetaminophen 325 mG 1 Tablet(s) Oral every 4 hours PRN Moderate Pain (4 - 6)  oxyCODONE    IR 10 milliGRAM(s) Oral three times a day PRN Severe Pain (7 - 10)  simethicone 80 milliGRAM(s) Chew four times a day PRN Gas    LABS/RADIOLOGY                          7.7    20.19 )-----------( 508      ( 20 Feb 2021 09:33 )             24.9    02-20    135  |  105  |  26<H>  ----------------------------<  108<H>  3.9   |  19<L>  |  2.03<H>    Ca    8.4<L>      20 Feb 2021 09:33    TPro  6.0  /  Alb  1.4<L>  /  TBili  0.3  /  DBili  x   /  AST  27  /  ALT  29  /  AlkPhos  253<H>  02-20    CAPILLARY BLOOD GLUCOSE      POCT Blood Glucose.: 99 mg/dL (20 Feb 2021 16:47)  POCT Blood Glucose.: 174 mg/dL (20 Feb 2021 11:59)  POCT Blood Glucose.: 116 mg/dL (20 Feb 2021 07:56)  POCT Blood Glucose.: 131 mg/dL (19 Feb 2021 20:33)  POCT Blood Glucose.: 100 mg/dL (19 Feb 2021 17:50)      Culture - Surgical Swab (collected 18 Feb 2021 17:30)  Source: .Surgical Swab 2- sacral decubitus ulcer fluid #2  Preliminary Report (19 Feb 2021 18:28):    Moderate Escherichia coli    Moderate Staphylococcus aureus    Culture - Surgical Swab (collected 18 Feb 2021 17:30)  Source: .Surgical Swab 1- sacral decubitus ulcer fluid #1  Preliminary Report (19 Feb 2021 18:19):    Few Escherichia coli    Few Enterococcus faecalis    Culture - Urine (collected 18 Feb 2021 05:28)  Source: .Urine Clean Catch (Midstream)  Final Report (19 Feb 2021 09:25):    <10,000 CFU/mL Normal Urogenital Jeannine      I&O's Summary      ASSESMENT/PLAN  71/F with PMHx of DM type 2, HTN, HLD, MDD, Hypothyroidism, Syncope, Depression, chronic diarrhea, sacral decubitus ulcer, UTIs, Anemia, Hyponatremia, admitted with     # Infected large chronic decubitus ulcer, present on admission  #UTI  #Chronic diarrhea  - hold vanco for elevated trough, check random levels tomorrow. Cont zosyn. Cultures growing: e.coli, enterococcus, staph: sensitivity pending.   -s/p excisional debridement on 2/18. 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 resolved with IVF  -reduce IVF rate    DANIEL on CKD 3, likely from high vanco trough  -hold vanco. Check levels tomorrow. cont ivf. Appreciate nephro c/s    # DM :  ISS    # HTN, hypothyroidism  -hydralazine, synthroid    # DVT PPX  SCDs

## 2021-02-20 NOTE — PROGRESS NOTE ADULT - SUBJECTIVE AND OBJECTIVE BOX
Pt seen and examined at bedside, no acute events. Pt complaining of pain over sacrum.  Denied fever, chills, nausea, vomiting or SOB overnight.             Vital Signs Last 24 Hrs  T(C): 36.6 (20 Feb 2021 08:47), Max: 36.8 (20 Feb 2021 01:26)  T(F): 97.9 (20 Feb 2021 08:47), Max: 98.2 (20 Feb 2021 01:26)  HR: 86 (20 Feb 2021 08:47) (77 - 86)  BP: 141/91 (20 Feb 2021 08:47) (104/61 - 141/91)  BP(mean): --  RR: 17 (20 Feb 2021 08:47) (17 - 18)  SpO2: 97% (20 Feb 2021 08:47) (96% - 100%)    Labs:                                7.6    22.17 )-----------( 506      ( 19 Feb 2021 09:19 )             24.8     CBC Full  -  ( 19 Feb 2021 09:19 )  WBC Count : 22.17 K/uL  RBC Count : 3.15 M/uL  Hemoglobin : 7.6 g/dL  Hematocrit : 24.8 %  Platelet Count - Automated : 506 K/uL  Mean Cell Volume : 78.7 fl  Mean Cell Hemoglobin : 24.1 pg  Mean Cell Hemoglobin Concentration : 30.6 gm/dL  Auto Neutrophil # : x  Auto Lymphocyte # : x  Auto Monocyte # : x  Auto Eosinophil # : x  Auto Basophil # : x  Auto Neutrophil % : x  Auto Lymphocyte % : x  Auto Monocyte % : x  Auto Eosinophil % : x  Auto Basophil % : x    02-19    132<L>  |  105  |  24<H>  ----------------------------<  178<H>  3.5   |  18<L>  |  1.52<H>    Ca    8.4<L>      19 Feb 2021 09:19              Meds:  acetaminophen   Tablet .. 650 milliGRAM(s) Oral every 6 hours PRN  ascorbic acid 500 milliGRAM(s) Oral daily  atorvastatin 20 milliGRAM(s) Oral at bedtime  Dakins Solution - 1/4 Strength 1 Application(s) Topical two times a day  dextrose 40% Gel 15 Gram(s) Oral once  dextrose 5%. 1000 milliLiter(s) IV Continuous <Continuous>  dextrose 5%. 1000 milliLiter(s) IV Continuous <Continuous>  dextrose 50% Injectable 12.5 Gram(s) IV Push once  dextrose 50% Injectable 25 Gram(s) IV Push once  dextrose 50% Injectable 25 Gram(s) IV Push once  ferrous    sulfate 325 milliGRAM(s) Oral daily  glucagon  Injectable 1 milliGRAM(s) IntraMuscular once  insulin lispro (ADMELOG) corrective regimen sliding scale   SubCutaneous three times a day before meals  insulin lispro (ADMELOG) corrective regimen sliding scale   SubCutaneous at bedtime  levothyroxine 25 MICROGram(s) Oral daily  morphine  - Injectable 2 milliGRAM(s) IV Push every 4 hours PRN  multivitamin/minerals 1 Tablet(s) Oral daily  ondansetron Injectable 4 milliGRAM(s) IV Push every 6 hours PRN  oxycodone    5 mG/acetaminophen 325 mG 1 Tablet(s) Oral every 4 hours PRN  oxyCODONE    IR 10 milliGRAM(s) Oral three times a day PRN  pantoprazole    Tablet 40 milliGRAM(s) Oral before breakfast  piperacillin/tazobactam IVPB.. 3.375 Gram(s) IV Intermittent every 8 hours  sertraline 25 milliGRAM(s) Oral daily  simethicone 80 milliGRAM(s) Chew four times a day PRN  sodium chloride 0.9%. 500 milliLiter(s) IV Continuous <Continuous>  sodium chloride 0.9%. 1000 milliLiter(s) IV Continuous <Continuous>  tamsulosin 0.4 milliGRAM(s) Oral at bedtime  vancomycin  IVPB 750 milliGRAM(s) IV Intermittent every 12 hours  vancomycin  IVPB          Radiology:            Physical Exam:  Pt is AAOx3  General: Well developed, in no acute distress.   Chest: Lungs clear, no rales, no rhonchi, no wheezes.   Heart: RR, no murmurs, no rubs, no gallops.   Abdomen: Soft, no tenderness, no masses, BS normal   Back: Normal curvature, no tenderness. sacral ulcer base s/p debridement with fibrinous tissue but no necrotic tissue seen, no bleeding visualized   Neuro: Physiological, no localizing findings.   Skin: Normal, no rashes, no lesions noted.   Extremities: Warm, well perfused, no edema, Pulses intact

## 2021-02-20 NOTE — PROGRESS NOTE ADULT - ASSESSMENT
71F with multiple comorbidities and with necrotic sacral decubitus ulcer requiring debridement POD2    Plan:  -medical management as per primary team  -pain control  -dressing changes with 1/4 dakins solution BID  -wound care consult    Plan discussed with Dr. Nunez.

## 2021-02-20 NOTE — CONSULT NOTE ADULT - ASSESSMENT
71 you with hx of sacral decub presents with pain and discomfort in back region.  Admitted with infected sacral decub and uti with s/p debridement on 2/18   NOted with DANIEL in setting of zosyn, vanc and hemodynamic variability   t2dm  htn   MDS  on epogen as outpt     Plan  - agree with ivf    pt off ace I   - ua and urine eos   - monitor off vanc and will fu on current dose of zosyn, if further rise of scr, will need to change to q12 dose   - monitor UOP and avoid nsaids for pain control   arsh rn

## 2021-02-20 NOTE — PROGRESS NOTE ADULT - ASSESSMENT
71/F with PMHx of DM type 2, HTN, HLD, MDD, Hypothyroidism, Syncope, Depression, chronic diarrhea, sacral decubitus ulcer, UTIs, Anemia, Hyponatremia, presents to the ED BIBA from [facility] for re evaluation and possible debridement of sacral decubitus ulcer. Reports she has been undergoing treatment for ulcer for 1 year and has been admitted for infection in the past. Pt reports increase pain at site with local tenderness, doesn't know if there is any discharge. No fever. On oral Flagyl x2 days PTA. Here wbc ct 19,  UA positive, CT abd/pelvis with air/soft tissue surrounding sacral decub ulcer, she was seen in ED by Sx team and she is for debridement in OR. Was given vanco/zosyn.     1. infected sacral decubitus ulcer. pyuria/UTI. leukocytosis  - imaging reivewed  - surgical evaluation appreciated  - plan for debridement, f/u cultures  - fu urine cx blood cx  - on vancomycin 750mg BID check trough prior to 4th dose, elevated trough, however drawn after drug was given, therefore not a trough, will stop vancomycin and repeat level in am  - on zosyn 3.375q8h   - if Enterococcus in ampicillin sensitive then will not need further vancomycin as the Staph aureus is MSSA  - monitor temps  - tolerating abx well so far; no side effects noted  - reason for abx use and side effects reviewed with patient  - wound care    2. other issues - care per medicine

## 2021-02-21 LAB
ANION GAP SERPL CALC-SCNC: 11 MMOL/L — SIGNIFICANT CHANGE UP (ref 5–17)
BUN SERPL-MCNC: 25 MG/DL — HIGH (ref 7–23)
CALCIUM SERPL-MCNC: 8 MG/DL — LOW (ref 8.5–10.1)
CHLORIDE SERPL-SCNC: 109 MMOL/L — HIGH (ref 96–108)
CO2 SERPL-SCNC: 18 MMOL/L — LOW (ref 22–31)
CREAT SERPL-MCNC: 1.91 MG/DL — HIGH (ref 0.5–1.3)
GLUCOSE BLDC GLUCOMTR-MCNC: 143 MG/DL — HIGH (ref 70–99)
GLUCOSE BLDC GLUCOMTR-MCNC: 152 MG/DL — HIGH (ref 70–99)
GLUCOSE BLDC GLUCOMTR-MCNC: 181 MG/DL — HIGH (ref 70–99)
GLUCOSE BLDC GLUCOMTR-MCNC: 195 MG/DL — HIGH (ref 70–99)
GLUCOSE SERPL-MCNC: 119 MG/DL — HIGH (ref 70–99)
HCT VFR BLD CALC: 23.6 % — LOW (ref 34.5–45)
HGB BLD-MCNC: 7.3 G/DL — LOW (ref 11.5–15.5)
MCHC RBC-ENTMCNC: 24.1 PG — LOW (ref 27–34)
MCHC RBC-ENTMCNC: 30.9 GM/DL — LOW (ref 32–36)
MCV RBC AUTO: 77.9 FL — LOW (ref 80–100)
PLATELET # BLD AUTO: 443 K/UL — HIGH (ref 150–400)
POTASSIUM SERPL-MCNC: 3.7 MMOL/L — SIGNIFICANT CHANGE UP (ref 3.5–5.3)
POTASSIUM SERPL-SCNC: 3.7 MMOL/L — SIGNIFICANT CHANGE UP (ref 3.5–5.3)
RBC # BLD: 3.03 M/UL — LOW (ref 3.8–5.2)
RBC # FLD: 18.1 % — HIGH (ref 10.3–14.5)
SODIUM SERPL-SCNC: 138 MMOL/L — SIGNIFICANT CHANGE UP (ref 135–145)
VANCOMYCIN FLD-MCNC: 22.1 UG/ML — HIGH (ref 10–20)
VANCOMYCIN TROUGH SERPL-MCNC: 22.1 UG/ML — HIGH (ref 10–20)
WBC # BLD: 16.66 K/UL — HIGH (ref 3.8–10.5)
WBC # FLD AUTO: 16.66 K/UL — HIGH (ref 3.8–10.5)

## 2021-02-21 PROCEDURE — 99232 SBSQ HOSP IP/OBS MODERATE 35: CPT

## 2021-02-21 RX ORDER — MEROPENEM 1 G/30ML
500 INJECTION INTRAVENOUS EVERY 12 HOURS
Refills: 0 | Status: DISCONTINUED | OUTPATIENT
Start: 2021-02-21 | End: 2021-02-22

## 2021-02-21 RX ORDER — IRON SUCROSE 20 MG/ML
100 INJECTION, SOLUTION INTRAVENOUS EVERY 24 HOURS
Refills: 0 | Status: DISCONTINUED | OUTPATIENT
Start: 2021-02-21 | End: 2021-02-21

## 2021-02-21 RX ORDER — ERYTHROPOIETIN 10000 [IU]/ML
20000 INJECTION, SOLUTION INTRAVENOUS; SUBCUTANEOUS ONCE
Refills: 0 | Status: DISCONTINUED | OUTPATIENT
Start: 2021-02-21 | End: 2021-02-21

## 2021-02-21 RX ADMIN — SERTRALINE 25 MILLIGRAM(S): 25 TABLET, FILM COATED ORAL at 09:02

## 2021-02-21 RX ADMIN — Medication 500 MILLIGRAM(S): at 09:02

## 2021-02-21 RX ADMIN — Medication 1 TABLET(S): at 09:02

## 2021-02-21 RX ADMIN — OXYCODONE HYDROCHLORIDE 10 MILLIGRAM(S): 5 TABLET ORAL at 05:28

## 2021-02-21 RX ADMIN — PIPERACILLIN AND TAZOBACTAM 25 GRAM(S): 4; .5 INJECTION, POWDER, LYOPHILIZED, FOR SOLUTION INTRAVENOUS at 05:28

## 2021-02-21 RX ADMIN — Medication 325 MILLIGRAM(S): at 09:02

## 2021-02-21 RX ADMIN — Medication 2: at 17:44

## 2021-02-21 RX ADMIN — Medication 1 APPLICATION(S): at 05:28

## 2021-02-21 RX ADMIN — ATORVASTATIN CALCIUM 20 MILLIGRAM(S): 80 TABLET, FILM COATED ORAL at 22:35

## 2021-02-21 RX ADMIN — Medication 1 APPLICATION(S): at 17:44

## 2021-02-21 RX ADMIN — MEROPENEM 100 MILLIGRAM(S): 1 INJECTION INTRAVENOUS at 22:35

## 2021-02-21 RX ADMIN — Medication 2: at 13:10

## 2021-02-21 RX ADMIN — MEROPENEM 100 MILLIGRAM(S): 1 INJECTION INTRAVENOUS at 13:10

## 2021-02-21 RX ADMIN — TAMSULOSIN HYDROCHLORIDE 0.4 MILLIGRAM(S): 0.4 CAPSULE ORAL at 22:35

## 2021-02-21 RX ADMIN — SODIUM CHLORIDE 75 MILLILITER(S): 9 INJECTION INTRAMUSCULAR; INTRAVENOUS; SUBCUTANEOUS at 09:02

## 2021-02-21 RX ADMIN — Medication 25 MICROGRAM(S): at 05:28

## 2021-02-21 RX ADMIN — PANTOPRAZOLE SODIUM 40 MILLIGRAM(S): 20 TABLET, DELAYED RELEASE ORAL at 05:28

## 2021-02-21 NOTE — PROGRESS NOTE ADULT - ASSESSMENT
71 you with hx of sacral decub presents with pain and discomfort in back region.  Admitted with infected sacral decub and uti with s/p debridement on 2/18   NOted with DANIEL in setting of zosyn, vanc and hemodynamic variability   t2dm  htn   MDS  on epogen as outpt     Plan  - agree with ivf    pt off ace I   - ua and urine eos   - monitor off vanc and will fu on current dose of zosyn, if further rise of scr, will need to change to q12 dose   - monitor UOP and avoid nsaids for pain control   dw rn     2/21   - DANIEL improving off abx and on ivf, may dc if with adequate po  intkae    fu urine studies   - necrotic sacral decub s/p debridement     monitor on meropenem     off vanc      71 you with hx of sacral decub presents with pain and discomfort in back region.  Admitted with infected sacral decub and uti with s/p debridement on 2/18   NOted with DANIEL in setting of zosyn, vanc and hemodynamic variability   t2dm  htn   MDS  on epogen as outpt     Plan  - agree with ivf    pt off ace I   - ua and urine eos   - monitor off vanc and will fu on current dose of zosyn, if further rise of scr, will need to change to q12 dose   - monitor UOP and avoid nsaids for pain control   dw rn     2/21   - DANIEL improving off abx and on ivf, may dc if with adequate po  intkae    fu urine studies   - metabolic acidosis monitor trend   - necrotic sacral decub s/p debridement     monitor on meropenem     off vanc

## 2021-02-21 NOTE — CHART NOTE - NSCHARTNOTEFT_GEN_A_CORE
Reviewed culture results; ESBL E coli, anaerobes, MSSA, Enterococcus, modified antibiotics to meropenem which should cover all above pathogens; place on isolation

## 2021-02-21 NOTE — PROGRESS NOTE ADULT - SUBJECTIVE AND OBJECTIVE BOX
Pt seen and examined at bedside, no acute events. Pt complaining of pain over sacrum.  Denied fever, chills, nausea, vomiting or SOB overnight.             Vital Signs Last 24 Hrs  T(C): 36.7 (20 Feb 2021 22:00), Max: 36.8 (20 Feb 2021 15:34)  T(F): 98.1 (20 Feb 2021 22:00), Max: 98.2 (20 Feb 2021 15:34)  HR: 81 (20 Feb 2021 22:00) (74 - 86)  BP: 147/75 (20 Feb 2021 22:00) (113/23 - 147/75)  BP(mean): --  RR: 18 (20 Feb 2021 22:00) (17 - 18)  SpO2: 98% (20 Feb 2021 22:00) (96% - 98%)    Labs:                                7.7    20.19 )-----------( 508      ( 20 Feb 2021 09:33 )             24.9     CBC Full  -  ( 20 Feb 2021 09:33 )  WBC Count : 20.19 K/uL  RBC Count : 3.25 M/uL  Hemoglobin : 7.7 g/dL  Hematocrit : 24.9 %  Platelet Count - Automated : 508 K/uL  Mean Cell Volume : 76.6 fl  Mean Cell Hemoglobin : 23.7 pg  Mean Cell Hemoglobin Concentration : 30.9 gm/dL  Auto Neutrophil # : x  Auto Lymphocyte # : x  Auto Monocyte # : x  Auto Eosinophil # : x  Auto Basophil # : x  Auto Neutrophil % : x  Auto Lymphocyte % : x  Auto Monocyte % : x  Auto Eosinophil % : x  Auto Basophil % : x    02-20    135  |  105  |  26<H>  ----------------------------<  108<H>  3.9   |  19<L>  |  2.03<H>    Ca    8.4<L>      20 Feb 2021 09:33    TPro  6.0  /  Alb  1.4<L>  /  TBili  0.3  /  DBili  x   /  AST  27  /  ALT  29  /  AlkPhos  253<H>  02-20    LIVER FUNCTIONS - ( 20 Feb 2021 09:33 )  Alb: 1.4 g/dL / Pro: 6.0 gm/dL / ALK PHOS: 253 U/L / ALT: 29 U/L / AST: 27 U/L / GGT: x                 Meds:  acetaminophen   Tablet .. 650 milliGRAM(s) Oral every 6 hours PRN  ascorbic acid 500 milliGRAM(s) Oral daily  atorvastatin 20 milliGRAM(s) Oral at bedtime  Dakins Solution - 1/4 Strength 1 Application(s) Topical two times a day  dextrose 40% Gel 15 Gram(s) Oral once  dextrose 5%. 1000 milliLiter(s) IV Continuous <Continuous>  dextrose 5%. 1000 milliLiter(s) IV Continuous <Continuous>  dextrose 50% Injectable 25 Gram(s) IV Push once  dextrose 50% Injectable 12.5 Gram(s) IV Push once  dextrose 50% Injectable 25 Gram(s) IV Push once  ferrous    sulfate 325 milliGRAM(s) Oral daily  glucagon  Injectable 1 milliGRAM(s) IntraMuscular once  insulin lispro (ADMELOG) corrective regimen sliding scale   SubCutaneous three times a day before meals  insulin lispro (ADMELOG) corrective regimen sliding scale   SubCutaneous at bedtime  levothyroxine 25 MICROGram(s) Oral daily  multivitamin/minerals 1 Tablet(s) Oral daily  ondansetron Injectable 4 milliGRAM(s) IV Push every 6 hours PRN  oxycodone    5 mG/acetaminophen 325 mG 1 Tablet(s) Oral every 4 hours PRN  oxyCODONE    IR 10 milliGRAM(s) Oral three times a day PRN  pantoprazole    Tablet 40 milliGRAM(s) Oral before breakfast  piperacillin/tazobactam IVPB.. 3.375 Gram(s) IV Intermittent every 8 hours  sertraline 25 milliGRAM(s) Oral daily  simethicone 80 milliGRAM(s) Chew four times a day PRN  sodium chloride 0.9%. 500 milliLiter(s) IV Continuous <Continuous>  sodium chloride 0.9%. 1000 milliLiter(s) IV Continuous <Continuous>  tamsulosin 0.4 milliGRAM(s) Oral at bedtime      Radiology:        Physical Exam:  Pt is AAOx3  General: Well developed, in no acute distress.   Chest: Lungs clear, no rales, no rhonchi, no wheezes.   Heart: RR, no murmurs, no rubs, no gallops.   Abdomen: Soft, no tenderness, no masses, BS normal   Back: Normal curvature, no tenderness. sacral ulcer base s/p debridement with fibrinous tissue but no necrotic tissue seen, no bleeding visualized   Neuro: Physiological, no localizing findings.   Skin: Normal, no rashes, no lesions noted.   Extremities: Warm, well perfused, no edema, Pulses intact

## 2021-02-21 NOTE — PROGRESS NOTE ADULT - SUBJECTIVE AND OBJECTIVE BOX
HOSPITALIST ATTENDING PROGRESS NOTE    Cc: Follow up for sacral decubitus wound infection, anemia/MDS, renal failure    HPI: feels better today than yesterday. Less tired. eating better. no difficulty in urination    EXAM  Vitals: Vital Signs Last 24 Hrs  T(C): 36.5 (21 Feb 2021 08:18), Max: 36.7 (20 Feb 2021 22:00)  T(F): 97.7 (21 Feb 2021 08:18), Max: 98.1 (20 Feb 2021 22:00)  HR: 71 (21 Feb 2021 08:18) (71 - 81)  BP: 132/50 (21 Feb 2021 08:18) (132/50 - 147/75)  BP(mean): --  RR: 17 (21 Feb 2021 08:18) (17 - 18)  SpO2: 97% (21 Feb 2021 08:18) (97% - 98%)  Gen: NAD, comfortable  HEENT: normocephalic, atraumatic, no nasal discharge, trachea midline, anicteric sclerae  CVS: Normal S1S2, RRR, no JVD  RESP: Clear to auscultation bilaterally, no wheezing, no rhonchi, normal respiratory effort  ABD: normal bowel sounds, non-tender, non-distended, no organomegaly  SKIN: sacral wound dressing C/D/I  EXT: no edema, clubbing or cynosis    MEDS  MEDICATIONS  (STANDING):  ascorbic acid 500 milliGRAM(s) Oral daily  atorvastatin 20 milliGRAM(s) Oral at bedtime  Dakins Solution - 1/4 Strength 1 Application(s) Topical two times a day  dextrose 40% Gel 15 Gram(s) Oral once  dextrose 5%. 1000 milliLiter(s) (50 mL/Hr) IV Continuous <Continuous>  dextrose 5%. 1000 milliLiter(s) (100 mL/Hr) IV Continuous <Continuous>  dextrose 50% Injectable 25 Gram(s) IV Push once  dextrose 50% Injectable 12.5 Gram(s) IV Push once  dextrose 50% Injectable 25 Gram(s) IV Push once  epoetin aliyah-epbx (RETACRIT) Injectable 19494 Unit(s) SubCutaneous once  ferrous    sulfate 325 milliGRAM(s) Oral daily  glucagon  Injectable 1 milliGRAM(s) IntraMuscular once  insulin lispro (ADMELOG) corrective regimen sliding scale   SubCutaneous three times a day before meals  insulin lispro (ADMELOG) corrective regimen sliding scale   SubCutaneous at bedtime  iron sucrose IVPB 100 milliGRAM(s) IV Intermittent every 24 hours  levothyroxine 25 MICROGram(s) Oral daily  meropenem  IVPB 500 milliGRAM(s) IV Intermittent every 12 hours  multivitamin/minerals 1 Tablet(s) Oral daily  pantoprazole    Tablet 40 milliGRAM(s) Oral before breakfast  sertraline 25 milliGRAM(s) Oral daily  sodium chloride 0.9%. 500 milliLiter(s) (100 mL/Hr) IV Continuous <Continuous>  sodium chloride 0.9%. 1000 milliLiter(s) (75 mL/Hr) IV Continuous <Continuous>  tamsulosin 0.4 milliGRAM(s) Oral at bedtime    MEDICATIONS  (PRN):  acetaminophen   Tablet .. 650 milliGRAM(s) Oral every 6 hours PRN Temp greater or equal to 38C (100.4F), Mild Pain (1 - 3)  ondansetron Injectable 4 milliGRAM(s) IV Push every 6 hours PRN Nausea  oxycodone    5 mG/acetaminophen 325 mG 1 Tablet(s) Oral every 4 hours PRN Moderate Pain (4 - 6)  oxyCODONE    IR 10 milliGRAM(s) Oral three times a day PRN Severe Pain (7 - 10)  simethicone 80 milliGRAM(s) Chew four times a day PRN Gas    LABS/RADIOLOGY                          7.3    16.66 )-----------( 443      ( 21 Feb 2021 08:51 )             23.6    02-21    138  |  109<H>  |  25<H>  ----------------------------<  119<H>  3.7   |  18<L>  |  1.91<H>    Ca    8.0<L>      21 Feb 2021 08:51    TPro  6.0  /  Alb  1.4<L>  /  TBili  0.3  /  DBili  x   /  AST  27  /  ALT  29  /  AlkPhos  253<H>  02-20    CAPILLARY BLOOD GLUCOSE      POCT Blood Glucose.: 195 mg/dL (21 Feb 2021 12:40)  POCT Blood Glucose.: 143 mg/dL (21 Feb 2021 08:58)  POCT Blood Glucose.: 152 mg/dL (20 Feb 2021 22:08)  POCT Blood Glucose.: 99 mg/dL (20 Feb 2021 16:47)      Culture - Surgical Swab (collected 18 Feb 2021 17:30)  Source: .Surgical Swab 2- sacral decubitus ulcer fluid #2  Preliminary Report (20 Feb 2021 20:45):    Moderate Escherichia coli ESBL    Moderate Staphylococcus aureus    Moderate Bacteroides fragilis "Susceptibilities not performed"    Rare Enterococcus faecium  Organism: Escherichia coli ESBL  Staphylococcus aureus (20 Feb 2021 18:49)  Organism: Staphylococcus aureus (20 Feb 2021 18:49)  Organism: Escherichia coli ESBL (20 Feb 2021 18:46)    Culture - Surgical Swab (collected 18 Feb 2021 17:30)  Source: .Surgical Swab 1- sacral decubitus ulcer fluid #1  Final Report (20 Feb 2021 20:48):    Few Escherichia coli ESBL    Few Enterococcus faecalis    Moderate Bacteroides fragilis "Susceptibilities not performed"  Organism: Escherichia coli ESBL  Enterococcus faecalis (20 Feb 2021 20:48)  Organism: Enterococcus faecalis (20 Feb 2021 20:48)  Organism: Escherichia coli ESBL (20 Feb 2021 20:48)      I&O's Summary    20 Feb 2021 07:01  -  21 Feb 2021 07:00  --------------------------------------------------------  IN: 100 mL / OUT: 1 mL / NET: 99 mL        ASSESMENT/PLAN  71/F with PMHx of DM type 2, HTN, HLD, MDD, Hypothyroidism, Syncope, Depression, chronic diarrhea, sacral decubitus ulcer, UTIs, Anemia, Hyponatremia, admitted with     # Infected large chronic decubitus ulcer, present on admission  #UTI  #Chronic diarrhea  - s/p excisional debridement on 2/18. Surgery f/u  -wound Cultures growing: ESBL e.coli, enterococcus, staph. Abx switched to meropenem by ID     # Anemia; acute on chronic: likely from chronic disease, iron deficiency and reported Hx of MDS  -FOBT negative  -Tx for Hb < 7  -get anemia work up. Hematology consult  -may benefit from epogen/ iv iron.     # Hyponatremia, asymptomatic. resolved with IVF  -continue current IVF     DANIEL on CKD 3, likely from high vanco levels, improved  -off of vanco. cont ivf. Appreciate nephro c/s    # DM :  ISS    # HTN, hypothyroidism  -hydralazine, synthroid    # DVT PPX  SCDs

## 2021-02-21 NOTE — PROGRESS NOTE ADULT - SUBJECTIVE AND OBJECTIVE BOX
NEPHROLOGY INTERVAL HPI/OVERNIGHT EVENTS:  02-21-21 @ 18:08    2/21 no c/o tolerating po with + uop   71/F with PMHx of DM type 2, HTN, HLD, MDD, Hypothyroidism, Syncope, Depression, chronic diarrhea, sacral decubitus ulcer, UTIs, Anemia, Hyponatremia, presents to the ED BIBA from [facility] for re evaluation and possible debridement of sacral decubitus ulcer. Reports she has been undergoing treatment for ulcer for 1 year and has been admitted for infection in the past. Pt reports increase pain at site with local tenderness, doesn't know if there is any discharge. No fever. As per NH paperwork, requesting Dr. Nunez in the ED. Recent COVID PCR on 02/15 which was negative. Blood work from yesterday with white count of 17.8 and sodium 131. On oral Flagyl x2 days.    She was seen in ED by Sx team and she is for debridement in OR tomorrow.    CT abdo pelvis done; ? sacral Osteomyelitis  -------------------------------------------  history from chart,   pt in for debridement of her sacral decub  done 2/19 with hemodynamic variability   has been on zosyn and vanc since admission   no iv contrast studies   + uop with no diarrhea   rising scr and renal consult requested with vanc level ( drawn post infusion) of 36      MEDICATIONS  (STANDING):  ascorbic acid 500 milliGRAM(s) Oral daily  atorvastatin 20 milliGRAM(s) Oral at bedtime  Dakins Solution - 1/4 Strength 1 Application(s) Topical two times a day  dextrose 40% Gel 15 Gram(s) Oral once  dextrose 5%. 1000 milliLiter(s) (50 mL/Hr) IV Continuous <Continuous>  dextrose 5%. 1000 milliLiter(s) (100 mL/Hr) IV Continuous <Continuous>  dextrose 50% Injectable 25 Gram(s) IV Push once  dextrose 50% Injectable 12.5 Gram(s) IV Push once  dextrose 50% Injectable 25 Gram(s) IV Push once  ferrous    sulfate 325 milliGRAM(s) Oral daily  glucagon  Injectable 1 milliGRAM(s) IntraMuscular once  insulin lispro (ADMELOG) corrective regimen sliding scale   SubCutaneous three times a day before meals  insulin lispro (ADMELOG) corrective regimen sliding scale   SubCutaneous at bedtime  levothyroxine 25 MICROGram(s) Oral daily  meropenem  IVPB 500 milliGRAM(s) IV Intermittent every 12 hours  multivitamin/minerals 1 Tablet(s) Oral daily  pantoprazole    Tablet 40 milliGRAM(s) Oral before breakfast  sertraline 25 milliGRAM(s) Oral daily  sodium chloride 0.9%. 500 milliLiter(s) (100 mL/Hr) IV Continuous <Continuous>  sodium chloride 0.9%. 1000 milliLiter(s) (75 mL/Hr) IV Continuous <Continuous>  tamsulosin 0.4 milliGRAM(s) Oral at bedtime    MEDICATIONS  (PRN):  acetaminophen   Tablet .. 650 milliGRAM(s) Oral every 6 hours PRN Temp greater or equal to 38C (100.4F), Mild Pain (1 - 3)  ondansetron Injectable 4 milliGRAM(s) IV Push every 6 hours PRN Nausea  oxycodone    5 mG/acetaminophen 325 mG 1 Tablet(s) Oral every 4 hours PRN Moderate Pain (4 - 6)  oxyCODONE    IR 10 milliGRAM(s) Oral three times a day PRN Severe Pain (7 - 10)  simethicone 80 milliGRAM(s) Chew four times a day PRN Gas      Allergies    Hytrin (Unknown)    Intolerances        I&O's Detail    20 Feb 2021 07:01  -  21 Feb 2021 07:00  --------------------------------------------------------  IN:    Oral Fluid: 100 mL  Total IN: 100 mL    OUT:    Post-Void Residual per Intermittent Catheterization (mL): 1 mL  Total OUT: 1 mL    Total NET: 99 mL      21 Feb 2021 07:01  -  21 Feb 2021 18:08  --------------------------------------------------------  IN:    IV PiggyBack: 100 mL    Oral Fluid: 850 mL    sodium chloride 0.9%: 825 mL  Total IN: 1775 mL    OUT:  Total OUT: 0 mL    Total NET: 1775 mL      Vital Signs Last 24 Hrs  T(C): 36.2 (21 Feb 2021 16:41), Max: 36.7 (20 Feb 2021 22:00)  T(F): 97.1 (21 Feb 2021 16:41), Max: 98.1 (20 Feb 2021 22:00)  HR: 84 (21 Feb 2021 16:41) (71 - 84)  BP: 167/72 (21 Feb 2021 16:41) (132/50 - 167/72)  BP(mean): --  RR: 18 (21 Feb 2021 16:41) (17 - 18)  SpO2: 98% (21 Feb 2021 16:41) (97% - 98%)  Daily     Daily     PHYSICAL EXAM:  General: alert. awake Ox3  HEENT: MMM  CV: s1s2 rrr  LUNGS: B/L CTA  EXT: no edema    LABS:                        7.3    16.66 )-----------( 443      ( 21 Feb 2021 08:51 )             23.6     02-21    138  |  109<H>  |  25<H>  ----------------------------<  119<H>  3.7   |  18<L>  |  1.91<H>    Ca    8.0<L>      21 Feb 2021 08:51    TPro  6.0  /  Alb  1.4<L>  /  TBili  0.3  /  DBili  x   /  AST  27  /  ALT  29  /  AlkPhos  253<H>  02-20

## 2021-02-21 NOTE — PROGRESS NOTE ADULT - ASSESSMENT
71F with multiple comorbidities and with necrotic sacral decubitus ulcer requiring debridement POD3    Plan:  -medical management as per primary team  -pain control  -dressing changes with 1/4 dakins solution BID  -wound care consult    Plan discussed with Dr. Nunez.

## 2021-02-22 LAB
ANION GAP SERPL CALC-SCNC: 11 MMOL/L — SIGNIFICANT CHANGE UP (ref 5–17)
BUN SERPL-MCNC: 22 MG/DL — SIGNIFICANT CHANGE UP (ref 7–23)
CALCIUM SERPL-MCNC: 8 MG/DL — LOW (ref 8.5–10.1)
CHLORIDE SERPL-SCNC: 112 MMOL/L — HIGH (ref 96–108)
CO2 SERPL-SCNC: 17 MMOL/L — LOW (ref 22–31)
CREAT SERPL-MCNC: 1.62 MG/DL — HIGH (ref 0.5–1.3)
CULTURE RESULTS: SIGNIFICANT CHANGE UP
CULTURE RESULTS: SIGNIFICANT CHANGE UP
FERRITIN SERPL-MCNC: 405 NG/ML — HIGH (ref 15–150)
GLUCOSE BLDC GLUCOMTR-MCNC: 153 MG/DL — HIGH (ref 70–99)
GLUCOSE BLDC GLUCOMTR-MCNC: 194 MG/DL — HIGH (ref 70–99)
GLUCOSE BLDC GLUCOMTR-MCNC: 230 MG/DL — HIGH (ref 70–99)
GLUCOSE SERPL-MCNC: 196 MG/DL — HIGH (ref 70–99)
HCT VFR BLD CALC: 23.6 % — LOW (ref 34.5–45)
HGB BLD-MCNC: 7.2 G/DL — LOW (ref 11.5–15.5)
IRON SATN MFR SERPL: 13 % — LOW (ref 14–50)
IRON SATN MFR SERPL: 18 UG/DL — LOW (ref 30–160)
MCHC RBC-ENTMCNC: 24 PG — LOW (ref 27–34)
MCHC RBC-ENTMCNC: 30.5 GM/DL — LOW (ref 32–36)
MCV RBC AUTO: 78.7 FL — LOW (ref 80–100)
PLATELET # BLD AUTO: 438 K/UL — HIGH (ref 150–400)
POTASSIUM SERPL-MCNC: 3.7 MMOL/L — SIGNIFICANT CHANGE UP (ref 3.5–5.3)
POTASSIUM SERPL-SCNC: 3.7 MMOL/L — SIGNIFICANT CHANGE UP (ref 3.5–5.3)
RBC # BLD: 3 M/UL — LOW (ref 3.8–5.2)
RBC # BLD: 3 M/UL — LOW (ref 3.8–5.2)
RBC # FLD: 18.3 % — HIGH (ref 10.3–14.5)
RETICS #: 51.9 K/UL — SIGNIFICANT CHANGE UP (ref 25–125)
RETICS/RBC NFR: 1.7 % — SIGNIFICANT CHANGE UP (ref 0.5–2.5)
SODIUM SERPL-SCNC: 140 MMOL/L — SIGNIFICANT CHANGE UP (ref 135–145)
SPECIMEN SOURCE: SIGNIFICANT CHANGE UP
SPECIMEN SOURCE: SIGNIFICANT CHANGE UP
TIBC SERPL-MCNC: 136 UG/DL — LOW (ref 220–430)
TSH SERPL-MCNC: 3.5 UU/ML — SIGNIFICANT CHANGE UP (ref 0.34–4.82)
UIBC SERPL-MCNC: 118 UG/DL — SIGNIFICANT CHANGE UP (ref 110–370)
VIT B12 SERPL-MCNC: >2000 PG/ML — HIGH (ref 232–1245)
WBC # BLD: 13.22 K/UL — HIGH (ref 3.8–10.5)
WBC # FLD AUTO: 13.22 K/UL — HIGH (ref 3.8–10.5)

## 2021-02-22 PROCEDURE — 99232 SBSQ HOSP IP/OBS MODERATE 35: CPT

## 2021-02-22 PROCEDURE — 99221 1ST HOSP IP/OBS SF/LOW 40: CPT

## 2021-02-22 RX ORDER — MEROPENEM 1 G/30ML
1000 INJECTION INTRAVENOUS EVERY 12 HOURS
Refills: 0 | Status: DISCONTINUED | OUTPATIENT
Start: 2021-02-22 | End: 2021-02-25

## 2021-02-22 RX ADMIN — OXYCODONE AND ACETAMINOPHEN 1 TABLET(S): 5; 325 TABLET ORAL at 09:50

## 2021-02-22 RX ADMIN — Medication 500 MILLIGRAM(S): at 09:50

## 2021-02-22 RX ADMIN — MEROPENEM 100 MILLIGRAM(S): 1 INJECTION INTRAVENOUS at 11:36

## 2021-02-22 RX ADMIN — SERTRALINE 25 MILLIGRAM(S): 25 TABLET, FILM COATED ORAL at 09:50

## 2021-02-22 RX ADMIN — Medication 2: at 16:54

## 2021-02-22 RX ADMIN — OXYCODONE HYDROCHLORIDE 10 MILLIGRAM(S): 5 TABLET ORAL at 15:22

## 2021-02-22 RX ADMIN — Medication 4: at 07:54

## 2021-02-22 RX ADMIN — OXYCODONE HYDROCHLORIDE 10 MILLIGRAM(S): 5 TABLET ORAL at 22:30

## 2021-02-22 RX ADMIN — Medication 325 MILLIGRAM(S): at 09:50

## 2021-02-22 RX ADMIN — Medication 2: at 11:37

## 2021-02-22 RX ADMIN — Medication 1 APPLICATION(S): at 05:40

## 2021-02-22 RX ADMIN — ATORVASTATIN CALCIUM 20 MILLIGRAM(S): 80 TABLET, FILM COATED ORAL at 22:06

## 2021-02-22 RX ADMIN — PANTOPRAZOLE SODIUM 40 MILLIGRAM(S): 20 TABLET, DELAYED RELEASE ORAL at 05:45

## 2021-02-22 RX ADMIN — Medication 1 APPLICATION(S): at 15:42

## 2021-02-22 RX ADMIN — Medication 25 MICROGRAM(S): at 05:45

## 2021-02-22 RX ADMIN — OXYCODONE HYDROCHLORIDE 10 MILLIGRAM(S): 5 TABLET ORAL at 05:45

## 2021-02-22 RX ADMIN — Medication 1 TABLET(S): at 09:50

## 2021-02-22 RX ADMIN — MEROPENEM 100 MILLIGRAM(S): 1 INJECTION INTRAVENOUS at 21:56

## 2021-02-22 RX ADMIN — TAMSULOSIN HYDROCHLORIDE 0.4 MILLIGRAM(S): 0.4 CAPSULE ORAL at 21:58

## 2021-02-22 NOTE — ADVANCED PRACTICE NURSE CONSULT - RECOMMEDATIONS
1) Continue to turn and position every 2 hours  2) Continue to elevate heels off mattress  3) Continue to change dressing with Dakins per orders  4) Low airloss mattress ordered for pressure redistribution  5) Albumin-1.4 on 2/20/2021. registered dietician following patient with the current recommendations:    1. liberalize diet to consistnent cho 2. add glucerna TID and gelatein BID 3. consider appetite stimulant 4. encourage small frequent meals w/ protein source at each meal 5. monitor labs/poct BG 6. add MVI w/ minerals daily, 500 mg vitamin c BID, and 225 mg zinc sulfate BID x 10 days to promote wound healing.  7. weekly wt

## 2021-02-22 NOTE — PROGRESS NOTE ADULT - SUBJECTIVE AND OBJECTIVE BOX
Date of service: 02-22-21 @ 12:04    Pt seen and examined  Patient lying in bed; afebrile, no complaints  POD #4 s/p sacral decub debridement     ROS: no fever or chills; denies dizziness, no HA, no SOB or cough, no abdominal pain, no diarrhea or constipation; no dysuria, no urinary frequency, no legs pain, no rashes    MEDICATIONS  (STANDING):  ascorbic acid 500 milliGRAM(s) Oral daily  atorvastatin 20 milliGRAM(s) Oral at bedtime  Dakins Solution - 1/4 Strength 1 Application(s) Topical two times a day  dextrose 40% Gel 15 Gram(s) Oral once  dextrose 5%. 1000 milliLiter(s) (50 mL/Hr) IV Continuous <Continuous>  dextrose 5%. 1000 milliLiter(s) (100 mL/Hr) IV Continuous <Continuous>  dextrose 50% Injectable 25 Gram(s) IV Push once  dextrose 50% Injectable 12.5 Gram(s) IV Push once  dextrose 50% Injectable 25 Gram(s) IV Push once  ferrous    sulfate 325 milliGRAM(s) Oral daily  glucagon  Injectable 1 milliGRAM(s) IntraMuscular once  insulin lispro (ADMELOG) corrective regimen sliding scale   SubCutaneous three times a day before meals  insulin lispro (ADMELOG) corrective regimen sliding scale   SubCutaneous at bedtime  levothyroxine 25 MICROGram(s) Oral daily  meropenem  IVPB 1000 milliGRAM(s) IV Intermittent every 12 hours  multivitamin/minerals 1 Tablet(s) Oral daily  pantoprazole    Tablet 40 milliGRAM(s) Oral before breakfast  sertraline 25 milliGRAM(s) Oral daily  sodium chloride 0.9%. 500 milliLiter(s) (100 mL/Hr) IV Continuous <Continuous>  tamsulosin 0.4 milliGRAM(s) Oral at bedtime    Vital Signs Last 24 Hrs  T(C): 36.6 (22 Feb 2021 07:56), Max: 36.6 (22 Feb 2021 07:56)  T(F): 97.9 (22 Feb 2021 07:56), Max: 97.9 (22 Feb 2021 07:56)  HR: 81 (22 Feb 2021 07:56) (81 - 84)  BP: 159/83 (22 Feb 2021 07:56) (159/83 - 167/72)  BP(mean): --  RR: 19 (22 Feb 2021 07:56) (18 - 19)  SpO2: 97% (22 Feb 2021 07:56) (97% - 98%)      PE:  Constitutional: frail looking  HEENT: NC/AT, EOMI, PERRLA, conjunctivae clear; ears and nose atraumatic; pharynx benign  Neck: supple; thyroid not palpable  Back: no tenderness  Respiratory: decreased breath sounds   Cardiovascular: S1S2 regular, no murmurs  Abdomen: soft, not tender, not distended, positive BS; liver and spleen WNL  Genitourinary: no suprapubic tenderness  Musculoskeletal: no muscle tenderness, no joint swelling or tenderness  Extremities: no pedal edema  Neurological/ Psychiatric:  moving all extremities  Skin: unstageable large sacral ulcer with necrosis along base and foul smelling drainage     Labs: all available labs reviewed                        7.2    13.22 )-----------( 438      ( 22 Feb 2021 08:37 )             23.6     02-22    140  |  112<H>  |  22  ----------------------------<  196<H>  3.7   |  17<L>  |  1.62<H>    Ca    8.0<L>      22 Feb 2021 08:37        Vancomycin Level, Trough: 22.1 ug/mL (02-21 @ 08:51)  Vancomycin Level, Random: 22.1 ug/mL (02-21 @ 08:51)      Cultures:          Vancomycin Level, Trough: 36.0 ug/mL (02-19 @ 20:30)      Cultures:       Culture - Surgical Swab (collected 02-18-21 @ 17:30)  Source: .Surgical Swab 2- sacral decubitus ulcer fluid #2  Preliminary Report (02-19-21 @ 18:28):    Moderate Escherichia coli    Moderate Staphylococcus aureus    Culture - Surgical Swab (collected 02-18-21 @ 17:30)  Source: .Surgical Swab 1- sacral decubitus ulcer fluid #1  Preliminary Report (02-19-21 @ 18:19):    Few Escherichia coli    Few Enterococcus faecalis    Culture - Urine (collected 02-18-21 @ 05:28)  Source: .Urine Clean Catch (Midstream)  Final Report (02-19-21 @ 09:25):    <10,000 CFU/mL Normal Urogenital Mike    Culture - Other (collected 02-17-21 @ 15:24)  Source: .Other sacral decub ulcer, superf.  Final Report (02-19-21 @ 16:26):    Culture yields growth of greater than 3 colony types of    bacteria,  which may indicate contamination and normal mike    Call client services within 7 days if further workup is clinically    indicated.    Culture includes    Numerous Staphylococcus aureus  Organism: Staphylococcus aureus (02-19-21 @ 16:26)  Organism: Staphylococcus aureus (02-19-21 @ 16:26)      -  Ampicillin/Sulbactam: S <=8/4      -  Cefazolin: S <=4      -  Clindamycin: S <=0.25      -  Erythromycin: R >4      -  Gentamicin: S 4 Should not be used as monotherapy      -  Oxacillin: S <=0.25      -  Penicillin: R 8      -  RIF- Rifampin: S <=1 Should not be used as monotherapy      -  Tetra/Doxy: S <=1      -  Trimethoprim/Sulfamethoxazole: S <=0.5/9.5      -  Vancomycin: S 1      Method Type: LEONILA    Culture - Blood (collected 02-17-21 @ 11:38)  Source: .Blood Blood-Venous  Preliminary Report (02-18-21 @ 16:01):    No growth to date.    Culture - Blood (collected 02-17-21 @ 11:38)  Source: .Blood Blood-Peripheral  Preliminary Report (02-18-21 @ 16:01):    No growth to date.          Radiology: all available radiological tests reviewed  < from: CT Abdomen and Pelvis w/ IV Cont (02.17.21 @ 14:10) >  EXAM:  CT ABDOMEN AND PELVIS IC                            PROCEDURE DATE:  02/17/2021          INTERPRETATION:  CLINICAL INFORMATION: Sacral decubitus ulcer    COMPARISON: CT scan of the abdomen and pelvis from 5/22/2011    PROCEDURE:  CT of the Abdomen and Pelvis was performed with intravenous contrast.  Intravenous contrast: 90 ml Omnipaque 350. 10 ml discarded.  Oral contrast: None.  Sagittal and coronal reformats were performed.    FINDINGS:  LOWER CHEST: Partially imaged left breast prosthesis and presumed tissue expander. Trace right pleural fluid.    LIVER: Within normal limits.  BILE DUCTS: Normal caliber.  GALLBLADDER: Layering stones or milk of calcium in the gallbladder  SPLEEN: Within normal limits.  PANCREAS: Within normal limits.  ADRENALS: 4.0 x 3.1 cm left adrenal mass measuring 52 Hounsfield units which is nondiagnostic. This has not significantly changed in size when compared with 5/22/2011.  KIDNEYS/URETERS: 2.1 cm left renal cyst.    BLADDER: Air within the bladder which may be iatrogenic. Please correlate clinically. Question of mild bladder wall thickening. This may be exaggerated by under distention although mild cystitis cannot be excluded. Please correlate clinically.  REPRODUCTIVE ORGANS: Coarse calcifications within the uterus most likely representing calcified leiomyomas. Radiopaque material in the region of the adnexa bilaterally. Has there been a prior tubal ligation?    BOWEL: No bowel obstruction. Appendix within normal limits  PERITONEUM: No ascites.  VESSELS: Vascular calcifications.  RETROPERITONEUM/LYMPH NODES: No lymphadenopathy.  ABDOMINAL WALL: There is a sacral decubitus ulcer with air and soft tissue extending in the subcutaneous tissue of the left buttock extending to the lateral aspect and extending slightly to the right of midline as well. Air and prominent soft tissue extends posteriorly to the lower sacrum and coccyx. I am not definitely seeing bone erosion, osteomyelitis cannot be excluded.  BONES: Degenerative changes of bone are present. There is mild spondylolisthesis with L4 anterior to L5 of less than 25%.    IMPRESSION:  Sacral decubitus ulcer with air and soft tissue extending in the subcutaneous region laterally to the left buttock and slightly to the right of midline. Soft tissue extends to the adjacent lower sacrum and coccyx. No gross destruction is identified although osteomyelitis cannot be excluded. Clinical correlation is necessary.        Advanced directives addressed: full resuscitation Date of service: 02-22-21 @ 12:04    Pt seen and examined  Patient lying in bed; afebrile, no complaints  POD #4 s/p sacral decub debridement     ROS: no fever or chills; denies dizziness, no HA, no SOB or cough, no abdominal pain, no diarrhea or constipation; no dysuria, no urinary frequency, no legs pain, no rashes    MEDICATIONS  (STANDING):  ascorbic acid 500 milliGRAM(s) Oral daily  atorvastatin 20 milliGRAM(s) Oral at bedtime  Dakins Solution - 1/4 Strength 1 Application(s) Topical two times a day  dextrose 40% Gel 15 Gram(s) Oral once  dextrose 5%. 1000 milliLiter(s) (50 mL/Hr) IV Continuous <Continuous>  dextrose 5%. 1000 milliLiter(s) (100 mL/Hr) IV Continuous <Continuous>  dextrose 50% Injectable 25 Gram(s) IV Push once  dextrose 50% Injectable 12.5 Gram(s) IV Push once  dextrose 50% Injectable 25 Gram(s) IV Push once  ferrous    sulfate 325 milliGRAM(s) Oral daily  glucagon  Injectable 1 milliGRAM(s) IntraMuscular once  insulin lispro (ADMELOG) corrective regimen sliding scale   SubCutaneous three times a day before meals  insulin lispro (ADMELOG) corrective regimen sliding scale   SubCutaneous at bedtime  levothyroxine 25 MICROGram(s) Oral daily  meropenem  IVPB 1000 milliGRAM(s) IV Intermittent every 12 hours  multivitamin/minerals 1 Tablet(s) Oral daily  pantoprazole    Tablet 40 milliGRAM(s) Oral before breakfast  sertraline 25 milliGRAM(s) Oral daily  sodium chloride 0.9%. 500 milliLiter(s) (100 mL/Hr) IV Continuous <Continuous>  tamsulosin 0.4 milliGRAM(s) Oral at bedtime    Vital Signs Last 24 Hrs  T(C): 36.6 (22 Feb 2021 07:56), Max: 36.6 (22 Feb 2021 07:56)  T(F): 97.9 (22 Feb 2021 07:56), Max: 97.9 (22 Feb 2021 07:56)  HR: 81 (22 Feb 2021 07:56) (81 - 84)  BP: 159/83 (22 Feb 2021 07:56) (159/83 - 167/72)  BP(mean): --  RR: 19 (22 Feb 2021 07:56) (18 - 19)  SpO2: 97% (22 Feb 2021 07:56) (97% - 98%)      PE:  Constitutional: frail looking  HEENT: NC/AT, EOMI, PERRLA, conjunctivae clear; ears and nose atraumatic; pharynx benign  Neck: supple; thyroid not palpable  Back: no tenderness  Respiratory: decreased breath sounds   Cardiovascular: S1S2 regular, no murmurs  Abdomen: soft, not tender, not distended, positive BS; liver and spleen WNL  Genitourinary: no suprapubic tenderness  Musculoskeletal: no muscle tenderness, no joint swelling or tenderness  Extremities: no pedal edema  Neurological/ Psychiatric:  moving all extremities  Skin: unstageable large sacral ulcer s/p debridement     Labs: all available labs reviewed                        7.2    13.22 )-----------( 438      ( 22 Feb 2021 08:37 )             23.6     02-22    140  |  112<H>  |  22  ----------------------------<  196<H>  3.7   |  17<L>  |  1.62<H>    Ca    8.0<L>      22 Feb 2021 08:37      Vancomycin Level, Trough: 22.1 ug/mL (02-21 @ 08:51)  Vancomycin Level, Random: 22.1 ug/mL (02-21 @ 08:51)    Culture - Surgical Swab (02.18.21 @ 17:30)   - Vancomycin: S 1   - Vancomycin: S 1   - Trimethoprim/Sulfamethoxazole: R >2/38   - Trimethoprim/Sulfamethoxazole: S <=0.5/9.5   - Tetra/Doxy: S <=1   - Tetra/Doxy: R >8   - RIF- Rifampin: S <=1 Should not be used as monotherapy   - Tobramycin: S <=2   - Erythromycin: R >4   - Imipenem: S <=1   - Levofloxacin: I 1   - Meropenem: S <=1   - Oxacillin: S <=0.25   - Penicillin: R 2   - Piperacillin/Tazobactam: R <=8   - Amikacin: S <=16   - Amoxicillin/Clavulanic Acid: S <=8/4   - Ampicillin: R >16 These ampicillin results predict results for amoxicillin   - Ampicillin: R >8 Predicts results to ampicillin/sulbactam, amoxacillin-clavulanate and piperacillin-tazobactam.   - Ampicillin/Sulbactam: S <=8/4   - Ampicillin/Sulbactam: R 8/4 Enterobacter, Citrobacter, and Serratia may develop resistance during prolonged therapy (3-4 days)   - Aztreonam: R 16   - Cefazolin: R >16 Enterobacter, Citrobacter, and Serratia may develop resistance during prolonged therapy (3-4 days)   - Cefazolin: S <=4   - Cefepime: R >16   - Cefoxitin: S <=8   - Ceftriaxone: R >32 Enterobacter, Citrobacter, and Serratia may develop resistance during prolonged therapy   - Ciprofloxacin: I 0.5   - Clindamycin: S <=0.25   - Ertapenem: S <=0.5   - Gentamicin: S <=1 Should not be used as monotherapy   - Gentamicin: S <=2   Specimen Source: .Surgical Swab 2- sacral decubitus ulcer fluid #2   Culture Results:   Moderate Escherichia coli ESBL   Moderate Staphylococcus aureus   Moderate Bacteroides fragilis "Susceptibilities not performed"   Rare Enterococcus faecium   Organism Identification: Escherichia coli ESBL   Staphylococcus aureus   Enterococcus faecium   Organism: Escherichia coli ESBL   Organism: Staphylococcus aureus   Organism: Enterococcus faecium   Method Type: LEONILA   Method Type: LEONILA   Method Type: LEONILA     Culture - Urine (collected 02-18-21 @ 05:28)  Source: .Urine Clean Catch (Midstream)  Final Report (02-19-21 @ 09:25):    <10,000 CFU/mL Normal Urogenital Jeannine    Culture - Blood (collected 02-17-21 @ 11:38)  Source: .Blood Blood-Venous  Preliminary Report (02-18-21 @ 16:01):    No growth to date.    Culture - Blood (collected 02-17-21 @ 11:38)  Source: .Blood Blood-Peripheral  Preliminary Report (02-18-21 @ 16:01):    No growth to date.      Radiology: all available radiological tests reviewed  < from: CT Abdomen and Pelvis w/ IV Cont (02.17.21 @ 14:10) >  EXAM:  CT ABDOMEN AND PELVIS IC                            PROCEDURE DATE:  02/17/2021          INTERPRETATION:  CLINICAL INFORMATION: Sacral decubitus ulcer    COMPARISON: CT scan of the abdomen and pelvis from 5/22/2011    PROCEDURE:  CT of the Abdomen and Pelvis was performed with intravenous contrast.  Intravenous contrast: 90 ml Omnipaque 350. 10 ml discarded.  Oral contrast: None.  Sagittal and coronal reformats were performed.    FINDINGS:  LOWER CHEST: Partially imaged left breast prosthesis and presumed tissue expander. Trace right pleural fluid.    LIVER: Within normal limits.  BILE DUCTS: Normal caliber.  GALLBLADDER: Layering stones or milk of calcium in the gallbladder  SPLEEN: Within normal limits.  PANCREAS: Within normal limits.  ADRENALS: 4.0 x 3.1 cm left adrenal mass measuring 52 Hounsfield units which is nondiagnostic. This has not significantly changed in size when compared with 5/22/2011.  KIDNEYS/URETERS: 2.1 cm left renal cyst.    BLADDER: Air within the bladder which may be iatrogenic. Please correlate clinically. Question of mild bladder wall thickening. This may be exaggerated by under distention although mild cystitis cannot be excluded. Please correlate clinically.  REPRODUCTIVE ORGANS: Coarse calcifications within the uterus most likely representing calcified leiomyomas. Radiopaque material in the region of the adnexa bilaterally. Has there been a prior tubal ligation?    BOWEL: No bowel obstruction. Appendix within normal limits  PERITONEUM: No ascites.  VESSELS: Vascular calcifications.  RETROPERITONEUM/LYMPH NODES: No lymphadenopathy.  ABDOMINAL WALL: There is a sacral decubitus ulcer with air and soft tissue extending in the subcutaneous tissue of the left buttock extending to the lateral aspect and extending slightly to the right of midline as well. Air and prominent soft tissue extends posteriorly to the lower sacrum and coccyx. I am not definitely seeing bone erosion, osteomyelitis cannot be excluded.  BONES: Degenerative changes of bone are present. There is mild spondylolisthesis with L4 anterior to L5 of less than 25%.    IMPRESSION:  Sacral decubitus ulcer with air and soft tissue extending in the subcutaneous region laterally to the left buttock and slightly to the right of midline. Soft tissue extends to the adjacent lower sacrum and coccyx. No gross destruction is identified although osteomyelitis cannot be excluded. Clinical correlation is necessary.        Advanced directives addressed: full resuscitation

## 2021-02-22 NOTE — PROGRESS NOTE ADULT - SUBJECTIVE AND OBJECTIVE BOX
NEPHROLOGY INTERVAL HPI/OVERNIGHT EVENTS:    Date of Service: 02-22-21 @ 16:12    2/22--Feeling somewhat improved.  No distress.  No SOB.  UO not recorded but reports positive urine output.  2/21 no c/o tolerating po with + uop   71/F with PMHx of DM type 2, HTN, HLD, MDD, Hypothyroidism, Syncope, Depression, chronic diarrhea, sacral decubitus ulcer, UTIs, Anemia, Hyponatremia, presents to the ED BIBA from [facility] for re evaluation and possible debridement of sacral decubitus ulcer. Reports she has been undergoing treatment for ulcer for 1 year and has been admitted for infection in the past. Pt reports increase pain at site with local tenderness, doesn't know if there is any discharge. No fever. As per NH paperwork, requesting Dr. Nunez in the ED. Recent COVID PCR on 02/15 which was negative. Blood work from yesterday with white count of 17.8 and sodium 131. On oral Flagyl x2 days.    She was seen in ED by Sx team and she is for debridement in OR tomorrow.    CT abdo pelvis done; ? sacral Osteomyelitis  -------------------------------------------  history from chart,   pt in for debridement of her sacral decub  done 2/19 with hemodynamic variability   has been on zosyn and vanc since admission   no iv contrast studies   + uop with no diarrhea   rising scr and renal consult requested with vanc level ( drawn post infusion) of 36    MEDICATIONS  (STANDING):  ascorbic acid 500 milliGRAM(s) Oral daily  atorvastatin 20 milliGRAM(s) Oral at bedtime  Dakins Solution - 1/4 Strength 1 Application(s) Topical two times a day  dextrose 40% Gel 15 Gram(s) Oral once  dextrose 5%. 1000 milliLiter(s) (50 mL/Hr) IV Continuous <Continuous>  dextrose 5%. 1000 milliLiter(s) (100 mL/Hr) IV Continuous <Continuous>  dextrose 50% Injectable 25 Gram(s) IV Push once  dextrose 50% Injectable 12.5 Gram(s) IV Push once  dextrose 50% Injectable 25 Gram(s) IV Push once  ferrous    sulfate 325 milliGRAM(s) Oral daily  glucagon  Injectable 1 milliGRAM(s) IntraMuscular once  insulin lispro (ADMELOG) corrective regimen sliding scale   SubCutaneous three times a day before meals  insulin lispro (ADMELOG) corrective regimen sliding scale   SubCutaneous at bedtime  levothyroxine 25 MICROGram(s) Oral daily  meropenem  IVPB 1000 milliGRAM(s) IV Intermittent every 12 hours  multivitamin/minerals 1 Tablet(s) Oral daily  pantoprazole    Tablet 40 milliGRAM(s) Oral before breakfast  sertraline 25 milliGRAM(s) Oral daily  tamsulosin 0.4 milliGRAM(s) Oral at bedtime    MEDICATIONS  (PRN):  acetaminophen   Tablet .. 650 milliGRAM(s) Oral every 6 hours PRN Temp greater or equal to 38C (100.4F), Mild Pain (1 - 3)  ondansetron Injectable 4 milliGRAM(s) IV Push every 6 hours PRN Nausea  oxycodone    5 mG/acetaminophen 325 mG 1 Tablet(s) Oral every 4 hours PRN Moderate Pain (4 - 6)  oxyCODONE    IR 10 milliGRAM(s) Oral three times a day PRN Severe Pain (7 - 10)  simethicone 80 milliGRAM(s) Chew four times a day PRN Gas    Vital Signs Last 24 Hrs  T(C): 36.9 (22 Feb 2021 14:57), Max: 36.9 (22 Feb 2021 14:57)  T(F): 98.4 (22 Feb 2021 14:57), Max: 98.4 (22 Feb 2021 14:57)  HR: 80 (22 Feb 2021 14:57) (80 - 84)  BP: 114/87 (22 Feb 2021 14:57) (114/87 - 167/72)  BP(mean): --  RR: 18 (22 Feb 2021 14:57) (18 - 19)  SpO2: 98% (22 Feb 2021 14:57) (97% - 98%)  Daily     Daily     02-21 @ 07:01  -  02-22 @ 07:00  --------------------------------------------------------  IN: 1875 mL / OUT: 0 mL / NET: 1875 mL    PHYSICAL EXAM:  GENERAL: comfortable.  CHEST/LUNG: Clear to aus  HEART: S1S2 RRR  ABDOMEN: soft  EXTREMITIES: no edema  SKIN:     LABS:                        7.2    13.22 )-----------( 438      ( 22 Feb 2021 08:37 )             23.6     02-22    140  |  112<H>  |  22  ----------------------------<  196<H>  3.7   |  17<L>  |  1.62<H>    Ca    8.0<L>      22 Feb 2021 08:37                  RADIOLOGY & ADDITIONAL TESTS:

## 2021-02-22 NOTE — PROVIDER CONTACT NOTE (CRITICAL VALUE NOTIFICATION) - SITUATION
CARLOS Erickson notified of elevated vanco trough of 36.
Ecoli/ ESBL, moderate staph, moderate bacteroid fragilis, rare enterococcus faecium.

## 2021-02-22 NOTE — PROGRESS NOTE ADULT - ASSESSMENT
71 you with hx of sacral decub presents with pain and discomfort in back region.  Admitted with infected sacral decub and uti with s/p debridement on 2/18   NOted with DANIEL in setting of zosyn, vanc and hemodynamic variability   t2dm  htn   MDS  on epogen as outpt     Plan  - agree with ivf    pt off ace I   - ua and urine eos   - monitor off vanc and will fu on current dose of zosyn, if further rise of scr, will need to change to q12 dose   - monitor UOP and avoid nsaids for pain control   dw rn     2/21   - DANIEL improving off abx and on ivf, may dc if with adequate po  intkae    fu urine studies   - metabolic acidosis monitor trend   - necrotic sacral decub s/p debridement     monitor on meropenem     off vanc     2/22 SY  --DANIEL : slowly improving.  Continue to monitor off IVF.  --Mild metabolic acidosis : continue to trend.    --Sacral dub ulcer : post debridement : continue Meropenem.

## 2021-02-22 NOTE — PROGRESS NOTE ADULT - SUBJECTIVE AND OBJECTIVE BOX
71/F with PMHx of DM type 2, HTN, HLD, MDD, Hypothyroidism, Syncope, Depression, chronic diarrhea, sacral decubitus ulcer, UTIs, Anemia, Hyponatremia, admitted with infected decubitus ulcer; s/p surgical debridement    2.22: c/o back pain        REVIEW OF SYSTEMS:    CONSTITUTIONAL: No weakness, No fevers or chills  ENT: No ear ache, No sorethroat  NECK: No pain, No stiffness  RESPIRATORY: No cough, No wheezing, No hemoptysis; No dyspnea  CARDIOVASCULAR: No chest pain, No palpitations  GASTROINTESTINAL: No abd pain, No nausea, No vomiting, No hematemesis, No diarrhea or constipation. No melena, No hematochezia.  GENITOURINARY: No dysuria, No  hematuria  NEUROLOGICAL: No diplopia, No paresthesia, No motor dysfunction  MUSCULOSKELETAL: No arthralgia, No myalgia  SKIN: No rashes, or lesions   PSYCH: no anxiety, no suicidal ideation    All other review of systems is negative unless indicated above    Vital Signs Last 24 Hrs  T(C): 36.9 (22 Feb 2021 14:57), Max: 36.9 (22 Feb 2021 14:57)  T(F): 98.4 (22 Feb 2021 14:57), Max: 98.4 (22 Feb 2021 14:57)  HR: 80 (22 Feb 2021 14:57) (80 - 84)  BP: 114/87 (22 Feb 2021 14:57) (114/87 - 167/72)  RR: 18 (22 Feb 2021 14:57) (18 - 19)  SpO2: 98% (22 Feb 2021 14:57) (97% - 98%)    PHYSICAL EXAM:    GENERAL: NAD, Well nourished  HEENT:  NC/AT, EOMI, PERRLA, No scleral icterus, Moist mucous membranes  NECK: Supple, No JVD  CNS:  Alert & Oriented X3, Motor Strength 5/5 B/L upper and lower extremities; DTRs 2+ intact   LUNG: Normal Breath sounds, Clear to auscultation bilaterally, No rales, No rhonchi, No wheezing  HEART: RRR; No murmurs, No rubs  ABDOMEN: +BS, ST/ND/NT  GENITOURINARY: Voiding, Bladder not distended  EXTREMITIES:  2+ Peripheral Pulses, No clubbing, No cyanosis, No tibial edema  MUSCULOSKELTAL: + sacral decubitus ulcer soiled with feces   SKIN: no rashes  RECTAL: deferred, not indicated  BREAST: deferred                          7.2    13.22 )-----------( 438      ( 22 Feb 2021 08:37 )             23.6     02-22    140  |  112<H>  |  22  ----------------------------<  196<H>  3.7   |  17<L>  |  1.62<H>    Ca    8.0<L>      22 Feb 2021 08:37      Vancomycin levels: Vancomycin Level, Trough: 22.1 ug/mL (02-21 @ 08:51)  Vancomycin Level, Random: 22.1 ug/mL (02-21 @ 08:51)    Cultures:     MEDICATIONS  (STANDING):  ascorbic acid 500 milliGRAM(s) Oral daily  atorvastatin 20 milliGRAM(s) Oral at bedtime  Dakins Solution - 1/4 Strength 1 Application(s) Topical two times a day  ferrous    sulfate 325 milliGRAM(s) Oral daily  glucagon  Injectable 1 milliGRAM(s) IntraMuscular once  insulin lispro (ADMELOG) corrective regimen sliding scale   SubCutaneous three times a day before meals  insulin lispro (ADMELOG) corrective regimen sliding scale   SubCutaneous at bedtime  levothyroxine 25 MICROGram(s) Oral daily  meropenem  IVPB 1000 milliGRAM(s) IV Intermittent every 12 hours  multivitamin/minerals 1 Tablet(s) Oral daily  pantoprazole    Tablet 40 milliGRAM(s) Oral before breakfast  sertraline 25 milliGRAM(s) Oral daily  sodium chloride 0.9%. 500 milliLiter(s) (100 mL/Hr) IV Continuous <Continuous>  tamsulosin 0.4 milliGRAM(s) Oral at bedtime    MEDICATIONS  (PRN):  acetaminophen   Tablet .. 650 milliGRAM(s) Oral every 6 hours PRN Temp greater or equal to 38C (100.4F), Mild Pain (1 - 3)  ondansetron Injectable 4 milliGRAM(s) IV Push every 6 hours PRN Nausea  oxycodone    5 mG/acetaminophen 325 mG 1 Tablet(s) Oral every 4 hours PRN Moderate Pain (4 - 6)  oxyCODONE    IR 10 milliGRAM(s) Oral three times a day PRN Severe Pain (7 - 10)  simethicone 80 milliGRAM(s) Chew four times a day PRN Gas      all labs reviewed  all imaging reviewed        MEDS  MEDICATIONS  (STANDING):  ascorbic acid 500 milliGRAM(s) Oral daily  atorvastatin 20 milliGRAM(s) Oral at bedtime  Dakins Solution - 1/4 Strength 1 Application(s) Topical two times a day  epoetin aliyah-epbx (RETACRIT) Injectable 70383 Unit(s) SubCutaneous once  ferrous    sulfate 325 milliGRAM(s) Oral daily  glucagon  Injectable 1 milliGRAM(s) IntraMuscular once  insulin lispro (ADMELOG) corrective regimen sliding scale   SubCutaneous three times a day before meals  insulin lispro (ADMELOG) corrective regimen sliding scale   SubCutaneous at bedtime  iron sucrose IVPB 100 milliGRAM(s) IV Intermittent every 24 hours  levothyroxine 25 MICROGram(s) Oral daily  meropenem  IVPB 500 milliGRAM(s) IV Intermittent every 12 hours  multivitamin/minerals 1 Tablet(s) Oral daily  pantoprazole    Tablet 40 milliGRAM(s) Oral before breakfast  sertraline 25 milliGRAM(s) Oral daily  sodium chloride 0.9%. 500 milliLiter(s) (100 mL/Hr) IV Continuous <Continuous>  sodium chloride 0.9%. 1000 milliLiter(s) (75 mL/Hr) IV Continuous <Continuous>  tamsulosin 0.4 milliGRAM(s) Oral at bedtime    MEDICATIONS  (PRN):  acetaminophen   Tablet .. 650 milliGRAM(s) Oral every 6 hours PRN Temp greater or equal to 38C (100.4F), Mild Pain (1 - 3)  ondansetron Injectable 4 milliGRAM(s) IV Push every 6 hours PRN Nausea  oxycodone    5 mG/acetaminophen 325 mG 1 Tablet(s) Oral every 4 hours PRN Moderate Pain (4 - 6)  oxyCODONE    IR 10 milliGRAM(s) Oral three times a day PRN Severe Pain (7 - 10)  simethicone 80 milliGRAM(s) Chew four times a day PRN Gas    LABS/RADIOLOGY                          7.3    16.66 )-----------( 443      ( 21 Feb 2021 08:51 )             23.6    02-21    138  |  109<H>  |  25<H>  ----------------------------<  119<H>  3.7   |  18<L>  |  1.91<H>    Ca    8.0<L>      21 Feb 2021 08:51    TPro  6.0  /  Alb  1.4<L>  /  TBili  0.3  /  DBili  x   /  AST  27  /  ALT  29  /  AlkPhos  253<H>  02-20    CAPILLARY BLOOD GLUCOSE          Culture - Surgical Swab (collected 18 Feb 2021 17:30)  Source: .Surgical Swab 1- sacral decubitus ulcer fluid #1  Final Report (20 Feb 2021 20:48):    Few Escherichia coli ESBL    Few Enterococcus faecalis    Moderate Bacteroides fragilis "Susceptibilities not performed"  Organism: Escherichia coli ESBL  Enterococcus faecalis (20 Feb 2021 20:48)  Organism: Enterococcus faecalis (20 Feb 2021 20:48)  Organism: Escherichia coli ESBL (20 Feb 2021 20:48)            ASSESMENT/PLAN  71/F with PMHx of DM type 2, HTN, HLD, MDD, Hypothyroidism, Syncope, Depression, chronic diarrhea, sacral decubitus ulcer, UTIs, Anemia, Hyponatremia, admitted with     # Infected large chronic decubitus ulcer, present on admission  - s/p excisional debridement on 2/18. Surgery f/u  -wound Cultures growing: ESBL e.coli, enterococcus, staph. Abx switched to meropenem by ID     #UTI    #Chronic diarrhea:   f/u Cdiff toxin    # Anemia; acute on chronic: likely from chronic disease, iron deficiency and reported Hx of MDS  -FOBT negative  -Tx for Hb < 7  -get anemia work up. Hematology consult  -may benefit from epogen/ iv iron.     # Hyponatremia, asymptomatic. resolved with IVF  -continue current IVF     DANIEL on CKD 3, likely from high vanco levels, improved  -off of vanco. cont ivf. Appreciate nephro c/s    # DM :  ISS    # HTN, hypothyroidism  -hydralazine, synthroid    # DVT PPX  SCDs

## 2021-02-22 NOTE — PROVIDER CONTACT NOTE (CRITICAL VALUE NOTIFICATION) - ACTION/TREATMENT ORDERED:
CARLOS Erickson stated that RN should hold next vanco dose, which is 2/20/21 6am. She also stated that she would place an ID consult.
Reconsult ID - no further orders at this time.
already on IV abx

## 2021-02-22 NOTE — PROVIDER CONTACT NOTE (CRITICAL VALUE NOTIFICATION) - TEST AND RESULT REPORTED:
Final surgical swab culture
Traci tr 36
2/18 surgical swab culture final results: few e.coli ESBL enterrococus facilis moderate bacteroides fragilis

## 2021-02-22 NOTE — PROGRESS NOTE ADULT - SUBJECTIVE AND OBJECTIVE BOX
Pt seen and examined at bedside, no acute events. Pt complaining of pain over sacrum.  Denied fever, chills, nausea, vomiting or SOB overnight.               Vital Signs Last 24 Hrs  T(C): 36.4 (21 Feb 2021 23:00), Max: 36.5 (21 Feb 2021 08:18)  T(F): 97.6 (21 Feb 2021 23:00), Max: 97.7 (21 Feb 2021 08:18)  HR: 84 (21 Feb 2021 23:00) (71 - 84)  BP: 161/65 (21 Feb 2021 23:00) (132/50 - 167/72)  BP(mean): --  RR: 18 (21 Feb 2021 23:00) (17 - 18)  SpO2: 98% (21 Feb 2021 23:00) (97% - 98%)    Labs:                                7.3    16.66 )-----------( 443      ( 21 Feb 2021 08:51 )             23.6     CBC Full  -  ( 21 Feb 2021 08:51 )  WBC Count : 16.66 K/uL  RBC Count : 3.03 M/uL  Hemoglobin : 7.3 g/dL  Hematocrit : 23.6 %  Platelet Count - Automated : 443 K/uL  Mean Cell Volume : 77.9 fl  Mean Cell Hemoglobin : 24.1 pg  Mean Cell Hemoglobin Concentration : 30.9 gm/dL  Auto Neutrophil # : x  Auto Lymphocyte # : x  Auto Monocyte # : x  Auto Eosinophil # : x  Auto Basophil # : x  Auto Neutrophil % : x  Auto Lymphocyte % : x  Auto Monocyte % : x  Auto Eosinophil % : x  Auto Basophil % : x    02-21    138  |  109<H>  |  25<H>  ----------------------------<  119<H>  3.7   |  18<L>  |  1.91<H>    Ca    8.0<L>      21 Feb 2021 08:51    TPro  6.0  /  Alb  1.4<L>  /  TBili  0.3  /  DBili  x   /  AST  27  /  ALT  29  /  AlkPhos  253<H>  02-20    LIVER FUNCTIONS - ( 20 Feb 2021 09:33 )  Alb: 1.4 g/dL / Pro: 6.0 gm/dL / ALK PHOS: 253 U/L / ALT: 29 U/L / AST: 27 U/L / GGT: x                 Meds:  acetaminophen   Tablet .. 650 milliGRAM(s) Oral every 6 hours PRN  ascorbic acid 500 milliGRAM(s) Oral daily  atorvastatin 20 milliGRAM(s) Oral at bedtime  Dakins Solution - 1/4 Strength 1 Application(s) Topical two times a day  dextrose 40% Gel 15 Gram(s) Oral once  dextrose 5%. 1000 milliLiter(s) IV Continuous <Continuous>  dextrose 5%. 1000 milliLiter(s) IV Continuous <Continuous>  dextrose 50% Injectable 25 Gram(s) IV Push once  dextrose 50% Injectable 12.5 Gram(s) IV Push once  dextrose 50% Injectable 25 Gram(s) IV Push once  ferrous    sulfate 325 milliGRAM(s) Oral daily  glucagon  Injectable 1 milliGRAM(s) IntraMuscular once  insulin lispro (ADMELOG) corrective regimen sliding scale   SubCutaneous three times a day before meals  insulin lispro (ADMELOG) corrective regimen sliding scale   SubCutaneous at bedtime  levothyroxine 25 MICROGram(s) Oral daily  meropenem  IVPB 500 milliGRAM(s) IV Intermittent every 12 hours  multivitamin/minerals 1 Tablet(s) Oral daily  ondansetron Injectable 4 milliGRAM(s) IV Push every 6 hours PRN  oxycodone    5 mG/acetaminophen 325 mG 1 Tablet(s) Oral every 4 hours PRN  oxyCODONE    IR 10 milliGRAM(s) Oral three times a day PRN  pantoprazole    Tablet 40 milliGRAM(s) Oral before breakfast  sertraline 25 milliGRAM(s) Oral daily  simethicone 80 milliGRAM(s) Chew four times a day PRN  sodium chloride 0.9%. 500 milliLiter(s) IV Continuous <Continuous>  tamsulosin 0.4 milliGRAM(s) Oral at bedtime      Radiology:          Physical Exam:  Pt is AAOx3  General: Well developed, in no acute distress.   Chest: Lungs clear, no rales, no rhonchi, no wheezes.   Heart: RR, no murmurs, no rubs, no gallops.   Abdomen: Soft, no tenderness, no masses, BS normal   Back: Normal curvature, no tenderness. sacral ulcer base s/p debridement with fibrinous tissue but no necrotic tissue seen, no bleeding visualized   Neuro: Physiological, no localizing findings.   Skin: Normal, no rashes, no lesions noted.   Extremities: Warm, well perfused, no edema, Pulses intact

## 2021-02-22 NOTE — PROGRESS NOTE ADULT - ASSESSMENT
71F with multiple comorbidities and with necrotic sacral decubitus ulcer requiring debridement POD4    Plan:  -medical management as per primary team  -pain control  -dressing changes with 1/4 dakins solution BID  -wound care consult  -no further surgical intervention at this time    Plan discussed with Dr. Nunez.

## 2021-02-22 NOTE — PROGRESS NOTE ADULT - ASSESSMENT
71/F with PMHx of DM type 2, HTN, HLD, MDD, Hypothyroidism, Syncope, Depression, chronic diarrhea, sacral decubitus ulcer, UTIs, Anemia, Hyponatremia, presents to the ED BIBA from [facility] for re evaluation and possible debridement of sacral decubitus ulcer. Reports she has been undergoing treatment for ulcer for 1 year and has been admitted for infection in the past. Pt reports increase pain at site with local tenderness, doesn't know if there is any discharge. No fever. On oral Flagyl x2 days PTA. Here wbc ct 19,  UA positive, CT abd/pelvis with air/soft tissue surrounding sacral decub ulcer, she was seen in ED by Sx team and she is for debridement in OR. Was given vanco/zosyn.     1. infected sacral decubitus ulcer. pyuria/UTI. leukocytosis  - imaging reivewed  - surgical evaluation appreciated  - plan for debridement, f/u cultures  - fu urine cx blood cx  - on vancomycin 750mg BID check trough prior to 4th dose, elevated trough, however drawn after drug was given, therefore not a trough, will stop vancomycin and repeat level in am  - on zosyn [id number]   - if Enterococcus in ampicillin sensitive then will not need further vancomycin as the Staph aureus is MSSA  - monitor temps  - tolerating abx well so far; no side effects noted  - reason for abx use and side effects reviewed with patient  - wound care    2. other issues - care per medicine  71/F with PMHx of DM type 2, HTN, HLD, MDD, Hypothyroidism, Syncope, Depression, chronic diarrhea, sacral decubitus ulcer, UTIs, Anemia, Hyponatremia, presents to the ED BIBA from [facility] for re evaluation and possible debridement of sacral decubitus ulcer. Reports she has been undergoing treatment for ulcer for 1 year and has been admitted for infection in the past. Pt reports increase pain at site with local tenderness, doesn't know if there is any discharge. No fever. On oral Flagyl x2 days PTA. Here wbc ct 19,  UA positive, CT abd/pelvis with air/soft tissue surrounding sacral decub ulcer, she was seen in ED by Sx team and she is for debridement in OR. Was given vanco/zosyn.     1. infected sacral decubitus ulcer s/p debridement   - imaging reivewed  - surgical evaluation appreciated; s/p debridement  - wound cx with polymicrobial growth, MSSA, E faecium, ESBL Ecoli, bacteroides  - blood cx no growth urine cx contaminated   - s/p vanco/zosyn #2-3, now on merrem [id number] #2   - continue with antibiotic coverage   - monitor temps  - tolerating abx well so far; no side effects noted  - reason for abx use and side effects reviewed with patient  - wound care  - fu cbc  2. other issues - care per medicine

## 2021-02-23 LAB
ANION GAP SERPL CALC-SCNC: 8 MMOL/L — SIGNIFICANT CHANGE UP (ref 5–17)
BUN SERPL-MCNC: 16 MG/DL — SIGNIFICANT CHANGE UP (ref 7–23)
CALCIUM SERPL-MCNC: 8.4 MG/DL — LOW (ref 8.5–10.1)
CHLORIDE SERPL-SCNC: 112 MMOL/L — HIGH (ref 96–108)
CO2 SERPL-SCNC: 19 MMOL/L — LOW (ref 22–31)
CREAT SERPL-MCNC: 1.12 MG/DL — SIGNIFICANT CHANGE UP (ref 0.5–1.3)
CULTURE RESULTS: SIGNIFICANT CHANGE UP
GLUCOSE SERPL-MCNC: 116 MG/DL — HIGH (ref 70–99)
HCT VFR BLD CALC: 24.6 % — LOW (ref 34.5–45)
HGB BLD-MCNC: 7.4 G/DL — LOW (ref 11.5–15.5)
MCHC RBC-ENTMCNC: 23.5 PG — LOW (ref 27–34)
MCHC RBC-ENTMCNC: 30.1 GM/DL — LOW (ref 32–36)
MCV RBC AUTO: 78.1 FL — LOW (ref 80–100)
PLATELET # BLD AUTO: 454 K/UL — HIGH (ref 150–400)
POTASSIUM SERPL-MCNC: 3.7 MMOL/L — SIGNIFICANT CHANGE UP (ref 3.5–5.3)
POTASSIUM SERPL-SCNC: 3.7 MMOL/L — SIGNIFICANT CHANGE UP (ref 3.5–5.3)
RBC # BLD: 3.15 M/UL — LOW (ref 3.8–5.2)
RBC # FLD: 18.4 % — HIGH (ref 10.3–14.5)
SODIUM SERPL-SCNC: 139 MMOL/L — SIGNIFICANT CHANGE UP (ref 135–145)
SPECIMEN SOURCE: SIGNIFICANT CHANGE UP
WBC # BLD: 11.68 K/UL — HIGH (ref 3.8–10.5)
WBC # FLD AUTO: 11.68 K/UL — HIGH (ref 3.8–10.5)

## 2021-02-23 PROCEDURE — 99232 SBSQ HOSP IP/OBS MODERATE 35: CPT

## 2021-02-23 RX ORDER — AMLODIPINE BESYLATE 2.5 MG/1
5 TABLET ORAL DAILY
Refills: 0 | Status: DISCONTINUED | OUTPATIENT
Start: 2021-02-23 | End: 2021-03-02

## 2021-02-23 RX ADMIN — Medication 2: at 11:43

## 2021-02-23 RX ADMIN — Medication 0: at 20:57

## 2021-02-23 RX ADMIN — AMLODIPINE BESYLATE 5 MILLIGRAM(S): 2.5 TABLET ORAL at 16:53

## 2021-02-23 RX ADMIN — Medication 25 MICROGRAM(S): at 05:41

## 2021-02-23 RX ADMIN — MEROPENEM 100 MILLIGRAM(S): 1 INJECTION INTRAVENOUS at 20:57

## 2021-02-23 RX ADMIN — SERTRALINE 25 MILLIGRAM(S): 25 TABLET, FILM COATED ORAL at 09:31

## 2021-02-23 RX ADMIN — PANTOPRAZOLE SODIUM 40 MILLIGRAM(S): 20 TABLET, DELAYED RELEASE ORAL at 05:41

## 2021-02-23 RX ADMIN — Medication 4: at 16:54

## 2021-02-23 RX ADMIN — Medication 1 TABLET(S): at 09:30

## 2021-02-23 RX ADMIN — MEROPENEM 100 MILLIGRAM(S): 1 INJECTION INTRAVENOUS at 09:31

## 2021-02-23 RX ADMIN — Medication 500 MILLIGRAM(S): at 09:30

## 2021-02-23 RX ADMIN — Medication 325 MILLIGRAM(S): at 09:30

## 2021-02-23 RX ADMIN — Medication 1 APPLICATION(S): at 05:41

## 2021-02-23 RX ADMIN — ATORVASTATIN CALCIUM 20 MILLIGRAM(S): 80 TABLET, FILM COATED ORAL at 20:56

## 2021-02-23 RX ADMIN — TAMSULOSIN HYDROCHLORIDE 0.4 MILLIGRAM(S): 0.4 CAPSULE ORAL at 20:56

## 2021-02-23 NOTE — PROGRESS NOTE ADULT - SUBJECTIVE AND OBJECTIVE BOX
71/F with PMHx of DM type 2, HTN, HLD, MDD, Hypothyroidism, Syncope, Depression, chronic diarrhea, sacral decubitus ulcer, UTIs, Anemia, Hyponatremia, admitted with infected decubitus ulcer; s/p surgical debridement    2.22: c/o back pain  2.23: c/o diarrhea, c/o back pain        REVIEW OF SYSTEMS:    CONSTITUTIONAL: No weakness, No fevers or chills  ENT: No ear ache, No sorethroat  NECK: No pain, No stiffness  RESPIRATORY: No cough, No wheezing, No hemoptysis; No dyspnea  CARDIOVASCULAR: No chest pain, No palpitations  GASTROINTESTINAL: No abd pain, No nausea, No vomiting, No hematemesis, No diarrhea or constipation. No melena, No hematochezia.  GENITOURINARY: No dysuria, No  hematuria  NEUROLOGICAL: No diplopia, No paresthesia, No motor dysfunction  MUSCULOSKELETAL: No arthralgia, + myalgia  SKIN: No rashes, or lesions   PSYCH: no anxiety, no suicidal ideation    All other review of systems is negative unless indicated above    Vital Signs Last 24 Hrs  T(C): 37 (23 Feb 2021 08:32), Max: 37 (23 Feb 2021 08:32)  T(F): 98.6 (23 Feb 2021 08:32), Max: 98.6 (23 Feb 2021 08:32)  HR: 76 (23 Feb 2021 08:32) (76 - 80)  BP: 160/79 (23 Feb 2021 08:32) (114/87 - 160/79)  BP(mean): --  RR: 17 (23 Feb 2021 08:32) (17 - 18)  SpO2: 97% (23 Feb 2021 08:32) (97% - 98%)  PHYSICAL EXAM:    GENERAL: NAD, Well nourished  HEENT:  NC/AT, EOMI, PERRLA, No scleral icterus, Moist mucous membranes  NECK: Supple, No JVD  CNS:  Alert & Oriented X3, Motor Strength 5/5 B/L upper and lower extremities; DTRs 2+ intact   LUNG: Normal Breath sounds, Clear to auscultation bilaterally, No rales, No rhonchi, No wheezing  HEART: RRR; No murmurs, No rubs  ABDOMEN: +BS, ST/ND/NT  GENITOURINARY: Voiding, Bladder not distended  EXTREMITIES:  2+ Peripheral Pulses, No clubbing, No cyanosis, No tibial edema  MUSCULOSKELTAL: + sacral decubitus ulcer soiled with feces   SKIN: no rashes  RECTAL: deferred, not indicated  BREAST: deferred                          7.2    13.22 )-----------( 438      ( 22 Feb 2021 08:37 )             23.6     02-22    140  |  112<H>  |  22  ----------------------------<  196<H>  3.7   |  17<L>  |  1.62<H>    Ca    8.0<L>      22 Feb 2021 08:37    Labs:                        7.4    11.68 )-----------( 454      ( 23 Feb 2021 08:31 )             24.6     02-23    139  |  112<H>  |  16  ----------------------------<  116<H>  3.7   |  19<L>  |  1.12    Ca    8.4<L>      23 Feb 2021 08:31             Cultures:     Cultures:     MEDICATIONS  (STANDING):  ascorbic acid 500 milliGRAM(s) Oral daily  atorvastatin 20 milliGRAM(s) Oral at bedtime  Dakins Solution - 1/4 Strength 1 Application(s) Topical two times a day  ferrous    sulfate 325 milliGRAM(s) Oral daily  glucagon  Injectable 1 milliGRAM(s) IntraMuscular once  insulin lispro (ADMELOG) corrective regimen sliding scale   SubCutaneous three times a day before meals  insulin lispro (ADMELOG) corrective regimen sliding scale   SubCutaneous at bedtime  levothyroxine 25 MICROGram(s) Oral daily  meropenem  IVPB 1000 milliGRAM(s) IV Intermittent every 12 hours  multivitamin/minerals 1 Tablet(s) Oral daily  pantoprazole    Tablet 40 milliGRAM(s) Oral before breakfast  sertraline 25 milliGRAM(s) Oral daily  sodium chloride 0.9%. 500 milliLiter(s) (100 mL/Hr) IV Continuous <Continuous>  tamsulosin 0.4 milliGRAM(s) Oral at bedtime    MEDICATIONS  (PRN):  acetaminophen   Tablet .. 650 milliGRAM(s) Oral every 6 hours PRN Temp greater or equal to 38C (100.4F), Mild Pain (1 - 3)  ondansetron Injectable 4 milliGRAM(s) IV Push every 6 hours PRN Nausea  oxycodone    5 mG/acetaminophen 325 mG 1 Tablet(s) Oral every 4 hours PRN Moderate Pain (4 - 6)  oxyCODONE    IR 10 milliGRAM(s) Oral three times a day PRN Severe Pain (7 - 10)  simethicone 80 milliGRAM(s) Chew four times a day PRN Gas      all labs reviewed  all imaging reviewed        MEDICATIONS  (STANDING):  ascorbic acid 500 milliGRAM(s) Oral daily  atorvastatin 20 milliGRAM(s) Oral at bedtime  Dakins Solution - 1/4 Strength 1 Application(s) Topical two times a day  ferrous    sulfate 325 milliGRAM(s) Oral daily  glucagon  Injectable 1 milliGRAM(s) IntraMuscular once  insulin lispro (ADMELOG) corrective regimen sliding scale   SubCutaneous three times a day before meals  insulin lispro (ADMELOG) corrective regimen sliding scale   SubCutaneous at bedtime  levothyroxine 25 MICROGram(s) Oral daily  meropenem  IVPB 1000 milliGRAM(s) IV Intermittent every 12 hours  multivitamin/minerals 1 Tablet(s) Oral daily  pantoprazole    Tablet 40 milliGRAM(s) Oral before breakfast  sertraline 25 milliGRAM(s) Oral daily  tamsulosin 0.4 milliGRAM(s) Oral at bedtime            Culture - Surgical Swab (collected 18 Feb 2021 17:30)  Source: .Surgical Swab 1- sacral decubitus ulcer fluid #1  Final Report (20 Feb 2021 20:48):    Few Escherichia coli ESBL    Few Enterococcus faecalis    Moderate Bacteroides fragilis "Susceptibilities not performed"  Organism: Escherichia coli ESBL  Enterococcus faecalis (20 Feb 2021 20:48)  Organism: Enterococcus faecalis (20 Feb 2021 20:48)  Organism: Escherichia coli ESBL (20 Feb 2021 20:48)            ASSESMENT/PLAN  71/F with PMHx of DM type 2, HTN, HLD, MDD, Hypothyroidism, Syncope, Depression, chronic diarrhea, sacral decubitus ulcer, UTIs, Anemia, Hyponatremia, admitted with     # Infected large chronic decubitus ulcer, present on admission  - s/p excisional debridement on 2/18. Surgery f/u  -wound Cultures growing: ESBL e.coli, enterococcus, staph. Abx switched to meropenem by ID   -local wound care    #UTI: on IV Abx    #Chronic diarrhea:   f/u Cdiff toxin, if negative start Imodium     # Anemia; acute on chronic: likely from chronic disease, iron deficiency and reported Hx of MDS  -FOBT negative  -Tx for Hb < 7  -iron supplements       # Hyponatremia, asymptomatic. resolved with IVF  -continue current IVF     DANIEL on CKD 3,   prerenal azotemia vs ATN from Vancomycin   -off of vanco. cont ivf. Appreciate nephro c/s    # DM :  ISS    # HTN,   -start Norvasc     # DVT PPX  SCDs

## 2021-02-23 NOTE — PROGRESS NOTE ADULT - ASSESSMENT
71 you with hx of sacral decub presents with pain and discomfort in back region.  Admitted with infected sacral decub and uti with s/p debridement on 2/18   NOted with DANIEL in setting of zosyn, vanc and hemodynamic variability   t2dm  htn   MDS  on epogen as outpt     Plan  - agree with ivf    pt off ace I   - ua and urine eos   - monitor off vanc and will fu on current dose of zosyn, if further rise of scr, will need to change to q12 dose   - monitor UOP and avoid nsaids for pain control   dw rn     2/21   - DANIEL improving off abx and on ivf, may dc if with adequate po  intkae    fu urine studies   - metabolic acidosis monitor trend   - necrotic sacral decub s/p debridement     monitor on meropenem     off vanc     2/22 SY  --DANIEL : slowly improving.  Continue to monitor off IVF.  --Mild metabolic acidosis : continue to trend.    --Sacral dub ulcer : post debridement : continue Meropenem.    2/23  DANIEL improved  off ivf, taking adequate fluids  Metab acidosis improving  will sign off , please reconsult prn

## 2021-02-23 NOTE — PROGRESS NOTE ADULT - ASSESSMENT
71/F with PMHx of DM type 2, HTN, HLD, MDD, Hypothyroidism, Syncope, Depression, chronic diarrhea, sacral decubitus ulcer, UTIs, Anemia, Hyponatremia, presents to the ED BIBA from [facility] for re evaluation and possible debridement of sacral decubitus ulcer. Reports she has been undergoing treatment for ulcer for 1 year and has been admitted for infection in the past. Pt reports increase pain at site with local tenderness, doesn't know if there is any discharge. No fever. On oral Flagyl x2 days PTA. Here wbc ct 19,  UA positive, CT abd/pelvis with air/soft tissue surrounding sacral decub ulcer, she was seen in ED by Sx team and she is for debridement in OR. Was given vanco/zosyn.     1. infected sacral decubitus ulcer s/p debridement   - imaging reivewed  - surgical evaluation appreciated; s/p debridement  - wound cx with polymicrobial growth, MSSA, E faecium, ESBL Ecoli, bacteroides  - blood cx no growth urine cx contaminated   - s/p vanco/zosyn #2-3, now on merrem [id number] #3  - continue with antibiotic coverage   - monitor temps  - tolerating abx well so far; no side effects noted  - reason for abx use and side effects reviewed with patient  - wound care eval appreciated   - fu cbc  2. other issues - care per medicine

## 2021-02-23 NOTE — PROGRESS NOTE ADULT - SUBJECTIVE AND OBJECTIVE BOX
NEPHROLOGY INTERVAL HPI/OVERNIGHT EVENTS:    Date of Service: 02-22-21 @ 16:12  2/23-- feels well, very happy re improvement of renal function  2/22--Feeling somewhat improved.  No distress.  No SOB.  UO not recorded but reports positive urine output.  2/21 no c/o tolerating po with + uop   71/F with PMHx of DM type 2, HTN, HLD, MDD, Hypothyroidism, Syncope, Depression, chronic diarrhea, sacral decubitus ulcer, UTIs, Anemia, Hyponatremia, presents to the ED BIBA from [facility] for re evaluation and possible debridement of sacral decubitus ulcer. Reports she has been undergoing treatment for ulcer for 1 year and has been admitted for infection in the past. Pt reports increase pain at site with local tenderness, doesn't know if there is any discharge. No fever. As per NH paperwork, requesting Dr. Nunez in the ED. Recent COVID PCR on 02/15 which was negative. Blood work from yesterday with white count of 17.8 and sodium 131. On oral Flagyl x2 days.    She was seen in ED by Sx team and she is for debridement in OR tomorrow.    CT abdo pelvis done; ? sacral Osteomyelitis  -------------------------------------------  history from chart,   pt in for debridement of her sacral decub  done 2/19 with hemodynamic variability   has been on zosyn and vanc since admission   no iv contrast studies   + uop with no diarrhea   rising scr and renal consult requested with vanc level ( drawn post infusion) of 36       MEDICATIONS  (STANDING):  amLODIPine   Tablet 5 milliGRAM(s) Oral daily  ascorbic acid 500 milliGRAM(s) Oral daily  atorvastatin 20 milliGRAM(s) Oral at bedtime  Dakins Solution - 1/4 Strength 1 Application(s) Topical two times a day  dextrose 40% Gel 15 Gram(s) Oral once  dextrose 5%. 1000 milliLiter(s) (50 mL/Hr) IV Continuous <Continuous>  dextrose 5%. 1000 milliLiter(s) (100 mL/Hr) IV Continuous <Continuous>  dextrose 50% Injectable 25 Gram(s) IV Push once  dextrose 50% Injectable 12.5 Gram(s) IV Push once  dextrose 50% Injectable 25 Gram(s) IV Push once  ferrous    sulfate 325 milliGRAM(s) Oral daily  glucagon  Injectable 1 milliGRAM(s) IntraMuscular once  insulin lispro (ADMELOG) corrective regimen sliding scale   SubCutaneous three times a day before meals  insulin lispro (ADMELOG) corrective regimen sliding scale   SubCutaneous at bedtime  levothyroxine 25 MICROGram(s) Oral daily  meropenem  IVPB 1000 milliGRAM(s) IV Intermittent every 12 hours  multivitamin/minerals 1 Tablet(s) Oral daily  pantoprazole    Tablet 40 milliGRAM(s) Oral before breakfast  sertraline 25 milliGRAM(s) Oral daily  tamsulosin 0.4 milliGRAM(s) Oral at bedtime    MEDICATIONS  (PRN):  acetaminophen   Tablet .. 650 milliGRAM(s) Oral every 6 hours PRN Temp greater or equal to 38C (100.4F), Mild Pain (1 - 3)  ondansetron Injectable 4 milliGRAM(s) IV Push every 6 hours PRN Nausea  oxycodone    5 mG/acetaminophen 325 mG 1 Tablet(s) Oral every 4 hours PRN Moderate Pain (4 - 6)  oxyCODONE    IR 10 milliGRAM(s) Oral three times a day PRN Severe Pain (7 - 10)  simethicone 80 milliGRAM(s) Chew four times a day PRN Gas    Vital Signs Last 24 Hrs  T(C): 37.3 (23 Feb 2021 16:44), Max: 37.3 (23 Feb 2021 16:44)  T(F): 99.1 (23 Feb 2021 16:44), Max: 99.1 (23 Feb 2021 16:44)  HR: 89 (23 Feb 2021 16:44) (76 - 89)  BP: 169/51 (23 Feb 2021 16:44) (150/76 - 169/51)  BP(mean): --  RR: 16 (23 Feb 2021 16:44) (16 - 17)  SpO2: 98% (23 Feb 2021 16:44) (97% - 98%)    02-21 @ 07:01  -  02-22 @ 07:00  --------------------------------------------------------  IN: 1875 mL / OUT: 0 mL / NET: 1875 mL    PHYSICAL EXAM:  GENERAL: comfortable.  CHEST/LUNG: Clear to aus  HEART: S1S2 RRR  ABDOMEN: soft  EXTREMITIES: no edema  SKIN:     LABS:                          7.4    11.68 )-----------( 454      ( 23 Feb 2021 08:31 )             24.6                           7.2    13.22 )-----------( 438      ( 22 Feb 2021 08:37 )             23.6     02-23    139  |  112<H>  |  16  ----------------------------<  116<H>  3.7   |  19<L>  |  1.12    Ca    8.4<L>      23 Feb 2021 08:31        02-22    140  |  112<H>  |  22  ----------------------------<  196<H>  3.7   |  17<L>  |  1.62<H>    Ca    8.0<L>      22 Feb 2021 08:37                  RADIOLOGY & ADDITIONAL TESTS:

## 2021-02-23 NOTE — PROGRESS NOTE ADULT - SUBJECTIVE AND OBJECTIVE BOX
Date of service: 02-23-21 @ 10:50    Pt seen and examined  Patient lying in bed  POD #5 s/p sacral decub debridement   No fevers or overnight events    ROS: no fever or chills; denies dizziness, no HA, no SOB or cough, no abdominal pain, no diarrhea or constipation; no dysuria, no urinary frequency, no legs pain, no rashes    MEDICATIONS  (STANDING):  ascorbic acid 500 milliGRAM(s) Oral daily  atorvastatin 20 milliGRAM(s) Oral at bedtime  Dakins Solution - 1/4 Strength 1 Application(s) Topical two times a day  dextrose 40% Gel 15 Gram(s) Oral once  dextrose 5%. 1000 milliLiter(s) (50 mL/Hr) IV Continuous <Continuous>  dextrose 5%. 1000 milliLiter(s) (100 mL/Hr) IV Continuous <Continuous>  dextrose 50% Injectable 25 Gram(s) IV Push once  dextrose 50% Injectable 12.5 Gram(s) IV Push once  dextrose 50% Injectable 25 Gram(s) IV Push once  ferrous    sulfate 325 milliGRAM(s) Oral daily  glucagon  Injectable 1 milliGRAM(s) IntraMuscular once  insulin lispro (ADMELOG) corrective regimen sliding scale   SubCutaneous three times a day before meals  insulin lispro (ADMELOG) corrective regimen sliding scale   SubCutaneous at bedtime  levothyroxine 25 MICROGram(s) Oral daily  meropenem  IVPB 1000 milliGRAM(s) IV Intermittent every 12 hours  multivitamin/minerals 1 Tablet(s) Oral daily  pantoprazole    Tablet 40 milliGRAM(s) Oral before breakfast  sertraline 25 milliGRAM(s) Oral daily  tamsulosin 0.4 milliGRAM(s) Oral at bedtime    Vital Signs Last 24 Hrs  T(C): 37 (23 Feb 2021 08:32), Max: 37 (23 Feb 2021 08:32)  T(F): 98.6 (23 Feb 2021 08:32), Max: 98.6 (23 Feb 2021 08:32)  HR: 76 (23 Feb 2021 08:32) (76 - 80)  BP: 160/79 (23 Feb 2021 08:32) (114/87 - 160/79)  BP(mean): --  RR: 17 (23 Feb 2021 08:32) (17 - 18)  SpO2: 97% (23 Feb 2021 08:32) (97% - 98%)    PE:  Constitutional: frail looking  HEENT: NC/AT, EOMI, PERRLA, conjunctivae clear; ears and nose atraumatic; pharynx benign  Neck: supple; thyroid not palpable  Back: no tenderness  Respiratory: decreased breath sounds   Cardiovascular: S1S2 regular, no murmurs  Abdomen: soft, not tender, not distended, positive BS; liver and spleen WNL  Genitourinary: no suprapubic tenderness  Musculoskeletal: no muscle tenderness, no joint swelling or tenderness  Extremities: no pedal edema  Neurological/ Psychiatric:  moving all extremities  Skin: unstageable large sacral ulcer s/p debridement     Labs: all available labs reviewed                                   7.4 11.68 )-----------( 454      ( 23 Feb 2021 08:31 )             24.6     02-23    139  |  112<H>  |  16  ----------------------------<  116<H>  3.7   |  19<L>  |  1.12    Ca    8.4<L>      23 Feb 2021 08:31        Vancomycin Level, Trough: 22.1 ug/mL (02-21 @ 08:51)  Vancomycin Level, Random: 22.1 ug/mL (02-21 @ 08:51)    Culture - Surgical Swab (02.18.21 @ 17:30)   - Vancomycin: S 1   - Vancomycin: S 1   - Trimethoprim/Sulfamethoxazole: R >2/38   - Trimethoprim/Sulfamethoxazole: S <=0.5/9.5   - Tetra/Doxy: S <=1   - Tetra/Doxy: R >8   - RIF- Rifampin: S <=1 Should not be used as monotherapy   - Tobramycin: S <=2   - Erythromycin: R >4   - Imipenem: S <=1   - Levofloxacin: I 1   - Meropenem: S <=1   - Oxacillin: S <=0.25   - Penicillin: R 2   - Piperacillin/Tazobactam: R <=8   - Amikacin: S <=16   - Amoxicillin/Clavulanic Acid: S <=8/4   - Ampicillin: R >16 These ampicillin results predict results for amoxicillin   - Ampicillin: R >8 Predicts results to ampicillin/sulbactam, amoxacillin-clavulanate and piperacillin-tazobactam.   - Ampicillin/Sulbactam: S <=8/4   - Ampicillin/Sulbactam: R 8/4 Enterobacter, Citrobacter, and Serratia may develop resistance during prolonged therapy (3-4 days)   - Aztreonam: R 16   - Cefazolin: R >16 Enterobacter, Citrobacter, and Serratia may develop resistance during prolonged therapy (3-4 days)   - Cefazolin: S <=4   - Cefepime: R >16   - Cefoxitin: S <=8   - Ceftriaxone: R >32 Enterobacter, Citrobacter, and Serratia may develop resistance during prolonged therapy   - Ciprofloxacin: I 0.5   - Clindamycin: S <=0.25   - Ertapenem: S <=0.5   - Gentamicin: S <=1 Should not be used as monotherapy   - Gentamicin: S <=2   Specimen Source: .Surgical Swab 2- sacral decubitus ulcer fluid #2   Culture Results:   Moderate Escherichia coli ESBL   Moderate Staphylococcus aureus   Moderate Bacteroides fragilis "Susceptibilities not performed"   Rare Enterococcus faecium   Organism Identification: Escherichia coli ESBL   Staphylococcus aureus   Enterococcus faecium   Organism: Escherichia coli ESBL   Organism: Staphylococcus aureus   Organism: Enterococcus faecium   Method Type: LEONILA   Method Type: LEONILA   Method Type: LEONILA     Culture - Urine (collected 02-18-21 @ 05:28)  Source: .Urine Clean Catch (Midstream)  Final Report (02-19-21 @ 09:25):    <10,000 CFU/mL Normal Urogenital Jeannine    Culture - Blood (collected 02-17-21 @ 11:38)  Source: .Blood Blood-Venous  Preliminary Report (02-18-21 @ 16:01):    No growth to date.    Culture - Blood (collected 02-17-21 @ 11:38)  Source: .Blood Blood-Peripheral  Preliminary Report (02-18-21 @ 16:01):    No growth to date.      Radiology: all available radiological tests reviewed  < from: CT Abdomen and Pelvis w/ IV Cont (02.17.21 @ 14:10) >  EXAM:  CT ABDOMEN AND PELVIS IC                            PROCEDURE DATE:  02/17/2021          INTERPRETATION:  CLINICAL INFORMATION: Sacral decubitus ulcer    COMPARISON: CT scan of the abdomen and pelvis from 5/22/2011    PROCEDURE:  CT of the Abdomen and Pelvis was performed with intravenous contrast.  Intravenous contrast: 90 ml Omnipaque 350. 10 ml discarded.  Oral contrast: None.  Sagittal and coronal reformats were performed.    FINDINGS:  LOWER CHEST: Partially imaged left breast prosthesis and presumed tissue expander. Trace right pleural fluid.    LIVER: Within normal limits.  BILE DUCTS: Normal caliber.  GALLBLADDER: Layering stones or milk of calcium in the gallbladder  SPLEEN: Within normal limits.  PANCREAS: Within normal limits.  ADRENALS: 4.0 x 3.1 cm left adrenal mass measuring 52 Hounsfield units which is nondiagnostic. This has not significantly changed in size when compared with 5/22/2011.  KIDNEYS/URETERS: 2.1 cm left renal cyst.    BLADDER: Air within the bladder which may be iatrogenic. Please correlate clinically. Question of mild bladder wall thickening. This may be exaggerated by under distention although mild cystitis cannot be excluded. Please correlate clinically.  REPRODUCTIVE ORGANS: Coarse calcifications within the uterus most likely representing calcified leiomyomas. Radiopaque material in the region of the adnexa bilaterally. Has there been a prior tubal ligation?    BOWEL: No bowel obstruction. Appendix within normal limits  PERITONEUM: No ascites.  VESSELS: Vascular calcifications.  RETROPERITONEUM/LYMPH NODES: No lymphadenopathy.  ABDOMINAL WALL: There is a sacral decubitus ulcer with air and soft tissue extending in the subcutaneous tissue of the left buttock extending to the lateral aspect and extending slightly to the right of midline as well. Air and prominent soft tissue extends posteriorly to the lower sacrum and coccyx. I am not definitely seeing bone erosion, osteomyelitis cannot be excluded.  BONES: Degenerative changes of bone are present. There is mild spondylolisthesis with L4 anterior to L5 of less than 25%.    IMPRESSION:  Sacral decubitus ulcer with air and soft tissue extending in the subcutaneous region laterally to the left buttock and slightly to the right of midline. Soft tissue extends to the adjacent lower sacrum and coccyx. No gross destruction is identified although osteomyelitis cannot be excluded. Clinical correlation is necessary.        Advanced directives addressed: full resuscitation

## 2021-02-24 LAB
C DIFF BY PCR RESULT: SIGNIFICANT CHANGE UP
C DIFF TOX GENS STL QL NAA+PROBE: SIGNIFICANT CHANGE UP

## 2021-02-24 PROCEDURE — 99232 SBSQ HOSP IP/OBS MODERATE 35: CPT

## 2021-02-24 RX ORDER — LOPERAMIDE HCL 2 MG
4 TABLET ORAL ONCE
Refills: 0 | Status: COMPLETED | OUTPATIENT
Start: 2021-02-24 | End: 2021-02-24

## 2021-02-24 RX ORDER — INSULIN LISPRO 100/ML
3 VIAL (ML) SUBCUTANEOUS
Refills: 0 | Status: DISCONTINUED | OUTPATIENT
Start: 2021-02-24 | End: 2021-03-01

## 2021-02-24 RX ORDER — INSULIN GLARGINE 100 [IU]/ML
10 INJECTION, SOLUTION SUBCUTANEOUS AT BEDTIME
Refills: 0 | Status: DISCONTINUED | OUTPATIENT
Start: 2021-02-24 | End: 2021-03-02

## 2021-02-24 RX ORDER — HYDRALAZINE HCL 50 MG
50 TABLET ORAL
Refills: 0 | Status: DISCONTINUED | OUTPATIENT
Start: 2021-02-24 | End: 2021-02-27

## 2021-02-24 RX ORDER — LOPERAMIDE HCL 2 MG
2 TABLET ORAL THREE TIMES A DAY
Refills: 0 | Status: DISCONTINUED | OUTPATIENT
Start: 2021-02-24 | End: 2021-03-02

## 2021-02-24 RX ADMIN — Medication 500 MILLIGRAM(S): at 09:20

## 2021-02-24 RX ADMIN — PANTOPRAZOLE SODIUM 40 MILLIGRAM(S): 20 TABLET, DELAYED RELEASE ORAL at 09:21

## 2021-02-24 RX ADMIN — MEROPENEM 100 MILLIGRAM(S): 1 INJECTION INTRAVENOUS at 09:20

## 2021-02-24 RX ADMIN — SERTRALINE 25 MILLIGRAM(S): 25 TABLET, FILM COATED ORAL at 09:20

## 2021-02-24 RX ADMIN — Medication 325 MILLIGRAM(S): at 09:20

## 2021-02-24 RX ADMIN — Medication 4 MILLIGRAM(S): at 17:44

## 2021-02-24 RX ADMIN — Medication 50 MILLIGRAM(S): at 09:55

## 2021-02-24 RX ADMIN — MEROPENEM 100 MILLIGRAM(S): 1 INJECTION INTRAVENOUS at 22:45

## 2021-02-24 RX ADMIN — ATORVASTATIN CALCIUM 20 MILLIGRAM(S): 80 TABLET, FILM COATED ORAL at 22:45

## 2021-02-24 RX ADMIN — Medication 4: at 08:11

## 2021-02-24 RX ADMIN — Medication 25 MICROGRAM(S): at 06:40

## 2021-02-24 RX ADMIN — AMLODIPINE BESYLATE 5 MILLIGRAM(S): 2.5 TABLET ORAL at 09:20

## 2021-02-24 RX ADMIN — Medication 1 TABLET(S): at 09:20

## 2021-02-24 RX ADMIN — Medication 1 APPLICATION(S): at 09:19

## 2021-02-24 RX ADMIN — Medication 3 UNIT(S): at 17:44

## 2021-02-24 RX ADMIN — TAMSULOSIN HYDROCHLORIDE 0.4 MILLIGRAM(S): 0.4 CAPSULE ORAL at 22:45

## 2021-02-24 RX ADMIN — Medication 2: at 12:46

## 2021-02-24 RX ADMIN — Medication 4: at 17:44

## 2021-02-24 RX ADMIN — INSULIN GLARGINE 10 UNIT(S): 100 INJECTION, SOLUTION SUBCUTANEOUS at 22:44

## 2021-02-24 RX ADMIN — Medication 50 MILLIGRAM(S): at 22:45

## 2021-02-24 NOTE — PROGRESS NOTE ADULT - SUBJECTIVE AND OBJECTIVE BOX
C/C: infn of sacral decubitus ulcer    HPI:  71/F with PMHx of DM type 2, HTN, HLD, MDD, Hypothyroidism, Syncope, Depression, chronic diarrhea, sacral decubitus ulcer, UTIs, Anemia, Hyponatremia, admitted with infected decubitus ulcer; s/p surgical debridement. hospital course notable for daniel d/t atn. Cx with ESBL requiring change of abx to meropenem.      2.22: c/o back pain  2.23: c/o diarrhea, c/o back pain  2/24: pt seen and examined this am. Felt ok. Had some pain to surgical site. no sob/chest pain.     ROS: all 10 systems reviewed and is as above otherwise negative.     Vital Signs Last 24 Hrs  T(C): 36.7 (24 Feb 2021 15:39), Max: 37.7 (24 Feb 2021 00:45)  T(F): 98 (24 Feb 2021 15:39), Max: 99.9 (24 Feb 2021 00:45)  HR: 78 (24 Feb 2021 15:39) (78 - 88)  BP: 135/43 (24 Feb 2021 15:39) (135/43 - 173/63)  RR: 16 (24 Feb 2021 15:39) (16 - 19)  SpO2: 100% (24 Feb 2021 15:39) (97% - 100%)    PHYSICAL EXAM:    GENERAL: Comfortable, no acute distress   HEAD:  Normocephalic, atraumatic  EYES: EOMI, PERRLA  HEENT: Moist mucous membranes  NECK: Supple, No JVD  NERVOUS SYSTEM:  Alert & Oriented X3  CHEST/LUNG: Clear to auscultation bilaterally  HEART: Regular rate and rhythm  ABDOMEN: Soft, Nontender, Nondistended, Bowel sounds present  GENITOURINARY: Voiding, no palpable bladder  EXTREMITIES:   No clubbing, cyanosis, or edema  MUSCULOSKELETAL- dressing over sacral decub c/d/i  SKIN-no rash    LABS:                        7.4    11.68 )-----------( 454      ( 23 Feb 2021 08:31 )             24.6     02-23    139  |  112<H>  |  16  ----------------------------<  116<H>  3.7   |  19<L>  |  1.12    Ca    8.4<L>      23 Feb 2021 08:31      MEDICATIONS  (STANDING):  amLODIPine   Tablet 5 milliGRAM(s) Oral daily  ascorbic acid 500 milliGRAM(s) Oral daily  atorvastatin 20 milliGRAM(s) Oral at bedtime  Dakins Solution - 1/4 Strength 1 Application(s) Topical two times a day  dextrose 40% Gel 15 Gram(s) Oral once  dextrose 5%. 1000 milliLiter(s) (50 mL/Hr) IV Continuous <Continuous>  dextrose 5%. 1000 milliLiter(s) (100 mL/Hr) IV Continuous <Continuous>  dextrose 50% Injectable 25 Gram(s) IV Push once  dextrose 50% Injectable 12.5 Gram(s) IV Push once  dextrose 50% Injectable 25 Gram(s) IV Push once  ferrous    sulfate 325 milliGRAM(s) Oral daily  glucagon  Injectable 1 milliGRAM(s) IntraMuscular once  hydrALAZINE 50 milliGRAM(s) Oral two times a day  insulin lispro (ADMELOG) corrective regimen sliding scale   SubCutaneous three times a day before meals  insulin lispro (ADMELOG) corrective regimen sliding scale   SubCutaneous at bedtime  levothyroxine 25 MICROGram(s) Oral daily  loperamide 4 milliGRAM(s) Oral once  meropenem  IVPB 1000 milliGRAM(s) IV Intermittent every 12 hours  multivitamin/minerals 1 Tablet(s) Oral daily  pantoprazole    Tablet 40 milliGRAM(s) Oral before breakfast  sertraline 25 milliGRAM(s) Oral daily  tamsulosin 0.4 milliGRAM(s) Oral at bedtime    MEDICATIONS  (PRN):  acetaminophen   Tablet .. 650 milliGRAM(s) Oral every 6 hours PRN Temp greater or equal to 38C (100.4F), Mild Pain (1 - 3)  loperamide 2 milliGRAM(s) Oral three times a day PRN Diarrhea  ondansetron Injectable 4 milliGRAM(s) IV Push every 6 hours PRN Nausea  oxycodone    5 mG/acetaminophen 325 mG 1 Tablet(s) Oral every 4 hours PRN Moderate Pain (4 - 6)  oxyCODONE    IR 10 milliGRAM(s) Oral three times a day PRN Severe Pain (7 - 10)  simethicone 80 milliGRAM(s) Chew four times a day PRN Gas      ASSESMENT/PLAN  71/F with PMHx of DM type 2, HTN, HLD, MDD, Hypothyroidism, Syncope, Depression, chronic diarrhea, sacral decubitus ulcer, UTIs, Anemia, Hyponatremia, admitted with     # Infected large chronic decubitus ulcer, present on admission  - s/p excisional debridement on 2/18  -no further surgical mx.   -wound Cultures growing: ESBL e.coli, enterococcus, staph. Abx switched to meropenem by ID [id number]  -local wound care per wound care RN    #UTI:   on IV Abx    #Chronic diarrhea:   -c diff neg  -prn imodium    # Anemia; acute on chronic: likely from chronic disease, iron deficiency and reported Hx of MDS  -FOBT negative  -Tx for Hb < 7  -iron supplements     # Hyponatremia  - resolved with IVF    #DANIEL on CKD 3,   prerenal azotemia vs ATN from Vancomycin   -now stable    # DMII with diabetic nephropathy:  -start lantus/premeal insulin  -continue ss    # HTN,   -resume hydralazine  -started on norvasc while here.     # DVT PPX  -start hep sq.    #Dispo:  d/w ID, plan is for 6 weeks of iv abx.   PICC line, then dc planning to [facility] once bed available.  d/w RN

## 2021-02-24 NOTE — PROGRESS NOTE ADULT - ASSESSMENT
71/F with PMHx of DM type 2, HTN, HLD, MDD, Hypothyroidism, Syncope, Depression, chronic diarrhea, sacral decubitus ulcer, UTIs, Anemia, Hyponatremia, presents to the ED BIBA from [facility] for re evaluation and possible debridement of sacral decubitus ulcer. Reports she has been undergoing treatment for ulcer for 1 year and has been admitted for infection in the past. Pt reports increase pain at site with local tenderness, doesn't know if there is any discharge. No fever. On oral Flagyl x2 days PTA. Here wbc ct 19,  UA positive, CT abd/pelvis with air/soft tissue surrounding sacral decub ulcer, she was seen in ED by Sx team and she is for debridement in OR. Was given vanco/zosyn.     1. infected sacral decubitus ulcer s/p debridement   - imaging reivewed  - surgical evaluation appreciated; s/p debridement  - wound cx with polymicrobial growth, MSSA, E faecium, ESBL Ecoli, bacteroides  - blood cx no growth urine cx contaminated   - s/p vanco/zosyn #2-3, now on merrem [id number] #4  - continue with antibiotic coverage   - monitor temps  - tolerating abx well so far; no side effects noted  - reason for abx use and side effects reviewed with patient  - wound care eval appreciated   - fu cbc  2. other issues - care per medicine  71/F with PMHx of DM type 2, HTN, HLD, MDD, Hypothyroidism, Syncope, Depression, chronic diarrhea, sacral decubitus ulcer, UTIs, Anemia, Hyponatremia, presents to the ED BIBA from [facility] for re evaluation and possible debridement of sacral decubitus ulcer. Reports she has been undergoing treatment for ulcer for 1 year and has been admitted for infection in the past. Pt reports increase pain at site with local tenderness, doesn't know if there is any discharge. No fever. On oral Flagyl x2 days PTA. Here wbc ct 19,  UA positive, CT abd/pelvis with air/soft tissue surrounding sacral decub ulcer, she was seen in ED by Sx team and she is for debridement in OR. Was given vanco/zosyn.     1. infected sacral decubitus ulcer s/p debridement   - imaging reviewed   - surgical evaluation appreciated; s/p debridement  - wound cx with polymicrobial growth, MSSA, E faecium, ESBL Ecoli, bacteroides  - blood cx no growth urine cx contaminated   - s/p vanco/zosyn #2-3, now on merrem [id number] #4  - continue with antibiotic coverage   - monitor temps  - tolerating abx well so far; no side effects noted  - reason for abx use and side effects reviewed with patient  - will require 6 weeks of above IV abx via picc line on dc  - wound care eval appreciated   - fu cbc  2. other issues - care per medicine

## 2021-02-24 NOTE — CHART NOTE - NSCHARTNOTEFT_GEN_A_CORE
Clinical Nutrition Follow Up Note:    *72yo male  year old admitted for large chronic decub ulcer (infected) s/p debridement on 2/18, UTI, anemia, hyponatremia, DANIEL on CKD3 (improving, seen by nephrology).    *Labs Reviewed: 02-23    139  |  112<H>  |  16  ----------------------------<  116<H>  3.7   |  19<L>  |  1.12    Ca    8.4<L>      23 Feb 2021 08:31      HbA1c: A1C with Estimated Average Glucose Result: 6.9 % (01-23-21 @ 12:39)  Glucose: POCT Blood Glucose.: 249 mg/dL (02-24-21 @ 07:58)      POCT Blood Glucose.: 249 mg/dL (24 Feb 2021 07:58)  POCT Blood Glucose.: 214 mg/dL (23 Feb 2021 20:47)  POCT Blood Glucose.: 229 mg/dL (23 Feb 2021 16:46)    *lytes WNL; hyperglycemia noted, on insulin regimen.  monitor and adjust insulin regimen to maintain tight glycemic control.    *I and O's: 02-23-21 @ 07:01  -  02-24-21 @ 07:00  --------------------------------------------------------  IN:    IV PiggyBack: 50 mL  Total IN: 50 mL    OUT:    Voided (mL): 500 mL  Total OUT: 500 mL    Total NET: -450 mL    *pt with negative fluid balance; pt with no PO intake documented.    *(+) BM on 2/23  ; pt not on bowel regimen.    *donan score of  12: unstageable on sacrum PU documented.  (+2) on Rt hand edema documented.    *Pt c/w minimal PO intake; meeting <75% of estimated nutr needs x 7 days.  Pt ordered for glucerna TID and gelatein BID; rec'd encourage glucerna TID to optimize PO intake.  consider adding appetite stimulant.    *Malnutrition dx: severe malnutrition in the context of chronic illness AEB PO intake < 75% ENN > 1 month and wt loss of 13.6% BW x 3 months.  *pt continues to meet criteria for severe malnutrition given continued poor PO intake*    Recommendations:  1) encourage oral supplements; glucerna TID and gelatein BID  2) consider ading appetite stimulant to increase PO intake  3) obtain new wt when feasible to track/trend changes  4) monitor POCT; adjust insulin regimen to maintain tight glycemic control.  5) monitor BM: add bowel regimen if no BM >3 days        Diet, Consistent Carbohydrate w/Evening Snack:   DASH/TLC {Sodium & Cholesterol Restricted} (DASH) (02-18-21 @ 18:13) [Active]        Estimated Needs: Based on Kg 74Kg admit wt  Calories: 9900-4479 Kcal (30-35 Kcal/Kg)  Protein: 134-148 g (1.8-2 g/Kg)  Fluids:  1202-8699 mL (30-35 mL/Kg)    *Wt Hx:  no new wt documented since admission; obtain new wt when feasible to track/trend changes  Height (cm): 165.1 (02-17-21 @ 10:39)  Weight (kg): 74.2 (02-18-21 @ 00:22)  BMI (kg/m2): 27.2 (02-18-21 @ 00:22)  BSA (m2): 1.82 (02-18-21 @ 00:22)    *will continue to monitor and follow up with adjustments to treatment plan prn*    Fozia Warner MA, RDN, CDN, [facility] [phone]

## 2021-02-24 NOTE — PROGRESS NOTE ADULT - SUBJECTIVE AND OBJECTIVE BOX
Date of service: 02-24-21 @ 11:05    Pt seen and examined  POD #6 s/p sacral decub debridement   No fevers or overnight events  Comfortable     ROS: no fever or chills; denies dizziness, no HA, no SOB or cough, no abdominal pain, no diarrhea or constipation; no dysuria, no urinary frequency, no legs pain, no rashes    MEDICATIONS  (STANDING):  amLODIPine   Tablet 5 milliGRAM(s) Oral daily  ascorbic acid 500 milliGRAM(s) Oral daily  atorvastatin 20 milliGRAM(s) Oral at bedtime  Dakins Solution - 1/4 Strength 1 Application(s) Topical two times a day  dextrose 40% Gel 15 Gram(s) Oral once  dextrose 5%. 1000 milliLiter(s) (100 mL/Hr) IV Continuous <Continuous>  dextrose 5%. 1000 milliLiter(s) (50 mL/Hr) IV Continuous <Continuous>  dextrose 50% Injectable 25 Gram(s) IV Push once  dextrose 50% Injectable 12.5 Gram(s) IV Push once  dextrose 50% Injectable 25 Gram(s) IV Push once  ferrous    sulfate 325 milliGRAM(s) Oral daily  glucagon  Injectable 1 milliGRAM(s) IntraMuscular once  hydrALAZINE 50 milliGRAM(s) Oral two times a day  insulin lispro (ADMELOG) corrective regimen sliding scale   SubCutaneous three times a day before meals  insulin lispro (ADMELOG) corrective regimen sliding scale   SubCutaneous at bedtime  levothyroxine 25 MICROGram(s) Oral daily  meropenem  IVPB 1000 milliGRAM(s) IV Intermittent every 12 hours  multivitamin/minerals 1 Tablet(s) Oral daily  pantoprazole    Tablet 40 milliGRAM(s) Oral before breakfast  sertraline 25 milliGRAM(s) Oral daily  tamsulosin 0.4 milliGRAM(s) Oral at bedtime    Vital Signs Last 24 Hrs  T(C): 36.6 (24 Feb 2021 07:47), Max: 37.7 (24 Feb 2021 00:45)  T(F): 97.9 (24 Feb 2021 07:47), Max: 99.9 (24 Feb 2021 00:45)  HR: 81 (24 Feb 2021 07:47) (81 - 89)  BP: 173/63 (24 Feb 2021 07:47) (138/49 - 173/63)  BP(mean): --  RR: 17 (24 Feb 2021 07:47) (16 - 19)  SpO2: 98% (24 Feb 2021 07:47) (97% - 99%)      PE:  Constitutional: frail looking  HEENT: NC/AT, EOMI, PERRLA, conjunctivae clear; ears and nose atraumatic; pharynx benign  Neck: supple; thyroid not palpable  Back: no tenderness  Respiratory: decreased breath sounds   Cardiovascular: S1S2 regular, no murmurs  Abdomen: soft, not tender, not distended, positive BS; liver and spleen WNL  Genitourinary: no suprapubic tenderness  Musculoskeletal: no muscle tenderness, no joint swelling or tenderness  Extremities: no pedal edema  Neurological/ Psychiatric: moving all extremities  Skin: unstageable large sacral ulcer s/p debridement     Labs: all available labs reviewed                                              7.4 11.68 )-----------( 454      ( 23 Feb 2021 08:31 )             24.6     02-23    139  |  112<H>  |  16  ----------------------------<  116<H>  3.7   |  19<L>  |  1.12    Ca    8.4<L>      23 Feb 2021 08:31      Culture - Surgical Swab (02.18.21 @ 17:30)   - Vancomycin: S 1   - Vancomycin: S 1   - Trimethoprim/Sulfamethoxazole: R >2/38   - Trimethoprim/Sulfamethoxazole: S <=0.5/9.5   - Tetra/Doxy: S <=1   - Tetra/Doxy: R >8   - RIF- Rifampin: S <=1 Should not be used as monotherapy   - Tobramycin: S <=2   - Erythromycin: R >4   - Imipenem: S <=1   - Levofloxacin: I 1   - Meropenem: S <=1   - Oxacillin: S <=0.25   - Penicillin: R 2   - Piperacillin/Tazobactam: R <=8   - Amikacin: S <=16   - Amoxicillin/Clavulanic Acid: S <=8/4   - Ampicillin: R >16 These ampicillin results predict results for amoxicillin   - Ampicillin: R >8 Predicts results to ampicillin/sulbactam, amoxacillin-clavulanate and piperacillin-tazobactam.   - Ampicillin/Sulbactam: S <=8/4   - Ampicillin/Sulbactam: R 8/4 Enterobacter, Citrobacter, and Serratia may develop resistance during prolonged therapy (3-4 days)   - Aztreonam: R 16   - Cefazolin: R >16 Enterobacter, Citrobacter, and Serratia may develop resistance during prolonged therapy (3-4 days)   - Cefazolin: S <=4   - Cefepime: R >16   - Cefoxitin: S <=8   - Ceftriaxone: R >32 Enterobacter, Citrobacter, and Serratia may develop resistance during prolonged therapy   - Ciprofloxacin: I 0.5   - Clindamycin: S <=0.25   - Ertapenem: S <=0.5   - Gentamicin: S <=1 Should not be used as monotherapy   - Gentamicin: S <=2   Specimen Source: .Surgical Swab 2- sacral decubitus ulcer fluid #2   Culture Results:   Moderate Escherichia coli ESBL   Moderate Staphylococcus aureus   Moderate Bacteroides fragilis "Susceptibilities not performed"   Rare Enterococcus faecium   Organism Identification: Escherichia coli ESBL   Staphylococcus aureus   Enterococcus faecium   Organism: Escherichia coli ESBL   Organism: Staphylococcus aureus   Organism: Enterococcus faecium   Method Type: LEONILA   Method Type: LEONILA   Method Type: LEONILA     Culture - Urine (collected 02-18-21 @ 05:28)  Source: .Urine Clean Catch (Midstream)  Final Report (02-19-21 @ 09:25):    <10,000 CFU/mL Normal Urogenital Jeannine    Culture - Blood (collected 02-17-21 @ 11:38)  Source: .Blood Blood-Venous  Preliminary Report (02-18-21 @ 16:01):    No growth to date.    Culture - Blood (collected 02-17-21 @ 11:38)  Source: .Blood Blood-Peripheral  Preliminary Report (02-18-21 @ 16:01):    No growth to date.      Radiology: all available radiological tests reviewed  < from: CT Abdomen and Pelvis w/ IV Cont (02.17.21 @ 14:10) >  EXAM:  CT ABDOMEN AND PELVIS IC                            PROCEDURE DATE:  02/17/2021          INTERPRETATION:  CLINICAL INFORMATION: Sacral decubitus ulcer    COMPARISON: CT scan of the abdomen and pelvis from 5/22/2011    PROCEDURE:  CT of the Abdomen and Pelvis was performed with intravenous contrast.  Intravenous contrast: 90 ml Omnipaque 350. 10 ml discarded.  Oral contrast: None.  Sagittal and coronal reformats were performed.    FINDINGS:  LOWER CHEST: Partially imaged left breast prosthesis and presumed tissue expander. Trace right pleural fluid.    LIVER: Within normal limits.  BILE DUCTS: Normal caliber.  GALLBLADDER: Layering stones or milk of calcium in the gallbladder  SPLEEN: Within normal limits.  PANCREAS: Within normal limits.  ADRENALS: 4.0 x 3.1 cm left adrenal mass measuring 52 Hounsfield units which is nondiagnostic. This has not significantly changed in size when compared with 5/22/2011.  KIDNEYS/URETERS: 2.1 cm left renal cyst.    BLADDER: Air within the bladder which may be iatrogenic. Please correlate clinically. Question of mild bladder wall thickening. This may be exaggerated by under distention although mild cystitis cannot be excluded. Please correlate clinically.  REPRODUCTIVE ORGANS: Coarse calcifications within the uterus most likely representing calcified leiomyomas. Radiopaque material in the region of the adnexa bilaterally. Has there been a prior tubal ligation?    BOWEL: No bowel obstruction. Appendix within normal limits  PERITONEUM: No ascites.  VESSELS: Vascular calcifications.  RETROPERITONEUM/LYMPH NODES: No lymphadenopathy.  ABDOMINAL WALL: There is a sacral decubitus ulcer with air and soft tissue extending in the subcutaneous tissue of the left buttock extending to the lateral aspect and extending slightly to the right of midline as well. Air and prominent soft tissue extends posteriorly to the lower sacrum and coccyx. I am not definitely seeing bone erosion, osteomyelitis cannot be excluded.  BONES: Degenerative changes of bone are present. There is mild spondylolisthesis with L4 anterior to L5 of less than 25%.    IMPRESSION:  Sacral decubitus ulcer with air and soft tissue extending in the subcutaneous region laterally to the left buttock and slightly to the right of midline. Soft tissue extends to the adjacent lower sacrum and coccyx. No gross destruction is identified although osteomyelitis cannot be excluded. Clinical correlation is necessary.        Advanced directives addressed: full resuscitation

## 2021-02-25 LAB
ANION GAP SERPL CALC-SCNC: 8 MMOL/L — SIGNIFICANT CHANGE UP (ref 5–17)
BASOPHILS # BLD AUTO: 0 K/UL — SIGNIFICANT CHANGE UP (ref 0–0.2)
BASOPHILS NFR BLD AUTO: 0 % — SIGNIFICANT CHANGE UP (ref 0–2)
BUN SERPL-MCNC: 15 MG/DL — SIGNIFICANT CHANGE UP (ref 7–23)
CALCIUM SERPL-MCNC: 8.8 MG/DL — SIGNIFICANT CHANGE UP (ref 8.5–10.1)
CHLORIDE SERPL-SCNC: 106 MMOL/L — SIGNIFICANT CHANGE UP (ref 96–108)
CO2 SERPL-SCNC: 22 MMOL/L — SIGNIFICANT CHANGE UP (ref 22–31)
CREAT SERPL-MCNC: 0.8 MG/DL — SIGNIFICANT CHANGE UP (ref 0.5–1.3)
EOSINOPHIL # BLD AUTO: 0.28 K/UL — SIGNIFICANT CHANGE UP (ref 0–0.5)
EOSINOPHIL NFR BLD AUTO: 2 % — SIGNIFICANT CHANGE UP (ref 0–6)
GLUCOSE SERPL-MCNC: 182 MG/DL — HIGH (ref 70–99)
HCT VFR BLD CALC: 27.3 % — LOW (ref 34.5–45)
HGB BLD-MCNC: 8.3 G/DL — LOW (ref 11.5–15.5)
LYMPHOCYTES # BLD AUTO: 18 % — SIGNIFICANT CHANGE UP (ref 13–44)
LYMPHOCYTES # BLD AUTO: 2.54 K/UL — SIGNIFICANT CHANGE UP (ref 1–3.3)
MAGNESIUM SERPL-MCNC: 1.6 MG/DL — SIGNIFICANT CHANGE UP (ref 1.6–2.6)
MCHC RBC-ENTMCNC: 23.6 PG — LOW (ref 27–34)
MCHC RBC-ENTMCNC: 30.4 GM/DL — LOW (ref 32–36)
MCV RBC AUTO: 77.6 FL — LOW (ref 80–100)
MONOCYTES # BLD AUTO: 1.13 K/UL — HIGH (ref 0–0.9)
MONOCYTES NFR BLD AUTO: 8 % — SIGNIFICANT CHANGE UP (ref 2–14)
NEUTROPHILS # BLD AUTO: 10 K/UL — HIGH (ref 1.8–7.4)
NEUTROPHILS NFR BLD AUTO: 70 % — SIGNIFICANT CHANGE UP (ref 43–77)
NRBC # BLD: SIGNIFICANT CHANGE UP /100 WBCS (ref 0–0)
PHOSPHATE SERPL-MCNC: 2 MG/DL — LOW (ref 2.5–4.5)
PLATELET # BLD AUTO: 451 K/UL — HIGH (ref 150–400)
POTASSIUM SERPL-MCNC: 3.8 MMOL/L — SIGNIFICANT CHANGE UP (ref 3.5–5.3)
POTASSIUM SERPL-SCNC: 3.8 MMOL/L — SIGNIFICANT CHANGE UP (ref 3.5–5.3)
RBC # BLD: 3.52 M/UL — LOW (ref 3.8–5.2)
RBC # FLD: 18.8 % — HIGH (ref 10.3–14.5)
SODIUM SERPL-SCNC: 136 MMOL/L — SIGNIFICANT CHANGE UP (ref 135–145)
WBC # BLD: 14.09 K/UL — HIGH (ref 3.8–10.5)
WBC # FLD AUTO: 14.09 K/UL — HIGH (ref 3.8–10.5)

## 2021-02-25 PROCEDURE — 99233 SBSQ HOSP IP/OBS HIGH 50: CPT

## 2021-02-25 RX ORDER — SODIUM,POTASSIUM PHOSPHATES 278-250MG
1 POWDER IN PACKET (EA) ORAL THREE TIMES A DAY
Refills: 0 | Status: COMPLETED | OUTPATIENT
Start: 2021-02-25 | End: 2021-02-27

## 2021-02-25 RX ORDER — SODIUM CHLORIDE 9 MG/ML
1000 INJECTION INTRAMUSCULAR; INTRAVENOUS; SUBCUTANEOUS ONCE
Refills: 0 | Status: COMPLETED | OUTPATIENT
Start: 2021-02-25 | End: 2021-02-25

## 2021-02-25 RX ORDER — SERTRALINE 25 MG/1
50 TABLET, FILM COATED ORAL DAILY
Refills: 0 | Status: DISCONTINUED | OUTPATIENT
Start: 2021-02-26 | End: 2021-03-02

## 2021-02-25 RX ORDER — ENOXAPARIN SODIUM 100 MG/ML
40 INJECTION SUBCUTANEOUS DAILY
Refills: 0 | Status: DISCONTINUED | OUTPATIENT
Start: 2021-02-25 | End: 2021-03-02

## 2021-02-25 RX ORDER — MEROPENEM 1 G/30ML
1000 INJECTION INTRAVENOUS EVERY 8 HOURS
Refills: 0 | Status: DISCONTINUED | OUTPATIENT
Start: 2021-02-25 | End: 2021-03-02

## 2021-02-25 RX ORDER — SERTRALINE 25 MG/1
25 TABLET, FILM COATED ORAL ONCE
Refills: 0 | Status: COMPLETED | OUTPATIENT
Start: 2021-02-25 | End: 2021-02-25

## 2021-02-25 RX ADMIN — Medication 325 MILLIGRAM(S): at 10:03

## 2021-02-25 RX ADMIN — Medication 3 UNIT(S): at 17:42

## 2021-02-25 RX ADMIN — Medication 25 MICROGRAM(S): at 05:52

## 2021-02-25 RX ADMIN — MEROPENEM 100 MILLIGRAM(S): 1 INJECTION INTRAVENOUS at 18:07

## 2021-02-25 RX ADMIN — Medication 1 PACKET(S): at 21:14

## 2021-02-25 RX ADMIN — ATORVASTATIN CALCIUM 20 MILLIGRAM(S): 80 TABLET, FILM COATED ORAL at 21:14

## 2021-02-25 RX ADMIN — Medication 3 UNIT(S): at 08:33

## 2021-02-25 RX ADMIN — Medication 1 TABLET(S): at 10:03

## 2021-02-25 RX ADMIN — Medication 3 UNIT(S): at 12:58

## 2021-02-25 RX ADMIN — PANTOPRAZOLE SODIUM 40 MILLIGRAM(S): 20 TABLET, DELAYED RELEASE ORAL at 11:37

## 2021-02-25 RX ADMIN — Medication 4: at 08:32

## 2021-02-25 RX ADMIN — Medication 50 MILLIGRAM(S): at 10:03

## 2021-02-25 RX ADMIN — OXYCODONE HYDROCHLORIDE 10 MILLIGRAM(S): 5 TABLET ORAL at 22:40

## 2021-02-25 RX ADMIN — MEROPENEM 100 MILLIGRAM(S): 1 INJECTION INTRAVENOUS at 11:42

## 2021-02-25 RX ADMIN — Medication 4: at 17:42

## 2021-02-25 RX ADMIN — Medication 500 MILLIGRAM(S): at 10:03

## 2021-02-25 RX ADMIN — SERTRALINE 25 MILLIGRAM(S): 25 TABLET, FILM COATED ORAL at 11:50

## 2021-02-25 RX ADMIN — ENOXAPARIN SODIUM 40 MILLIGRAM(S): 100 INJECTION SUBCUTANEOUS at 11:37

## 2021-02-25 RX ADMIN — SERTRALINE 25 MILLIGRAM(S): 25 TABLET, FILM COATED ORAL at 10:03

## 2021-02-25 RX ADMIN — Medication 1 PACKET(S): at 14:43

## 2021-02-25 RX ADMIN — INSULIN GLARGINE 10 UNIT(S): 100 INJECTION, SOLUTION SUBCUTANEOUS at 21:15

## 2021-02-25 RX ADMIN — AMLODIPINE BESYLATE 5 MILLIGRAM(S): 2.5 TABLET ORAL at 10:02

## 2021-02-25 RX ADMIN — TAMSULOSIN HYDROCHLORIDE 0.4 MILLIGRAM(S): 0.4 CAPSULE ORAL at 21:14

## 2021-02-25 NOTE — PROGRESS NOTE ADULT - ASSESSMENT
71/F with PMHx of DM type 2, HTN, HLD, MDD, Hypothyroidism, Syncope, Depression, chronic diarrhea, sacral decubitus ulcer, UTIs, Anemia, Hyponatremia, presents to the ED BIBA from [facility] for re evaluation and possible debridement of sacral decubitus ulcer. Reports she has been undergoing treatment for ulcer for 1 year and has been admitted for infection in the past. Pt reports increase pain at site with local tenderness, doesn't know if there is any discharge. No fever. On oral Flagyl x2 days PTA. Here wbc ct 19,  UA positive, CT abd/pelvis with air/soft tissue surrounding sacral decub ulcer, she was seen in ED by Sx team and she is for debridement in OR. Was given vanco/zosyn.     1. infected sacral decubitus ulcer s/p debridement   - imaging reviewed   - surgical evaluation appreciated; s/p debridement  - wound cx with polymicrobial growth, MSSA, E faecium/faecalis, ESBL Ecoli, bacteroides  - blood cx no growth urine cx contaminated   - s/p vanco/zosyn #2-3, now on merrem [id number] #5  - continue with antibiotic coverage   - monitor temps  - tolerating abx well so far; no side effects noted  - reason for abx use and side effects reviewed with patient  - will require 6 weeks of above IV abx via picc line on dc until 4/3 with weekly cbc, cmp, esr, crp  - wound care eval appreciated   - fu cbc  2. other issues - care per medicine

## 2021-02-25 NOTE — PROGRESS NOTE ADULT - SUBJECTIVE AND OBJECTIVE BOX
Date of service: 02-25-21 @ 10:52      Pt seen and examined  POD #7 s/p sacral decub debridement   No fevers or overnight events  Flat affect, depressive mood    ROS: no fever or chills; denies dizziness, no HA, no SOB or cough, no abdominal pain, no diarrhea or constipation; no dysuria, no urinary frequency, no legs pain, no rashes    MEDICATIONS  (STANDING):  amLODIPine   Tablet 5 milliGRAM(s) Oral daily  ascorbic acid 500 milliGRAM(s) Oral daily  atorvastatin 20 milliGRAM(s) Oral at bedtime  Dakins Solution - 1/4 Strength 1 Application(s) Topical two times a day  dextrose 40% Gel 15 Gram(s) Oral once  dextrose 5%. 1000 milliLiter(s) (50 mL/Hr) IV Continuous <Continuous>  dextrose 5%. 1000 milliLiter(s) (100 mL/Hr) IV Continuous <Continuous>  dextrose 50% Injectable 25 Gram(s) IV Push once  dextrose 50% Injectable 12.5 Gram(s) IV Push once  dextrose 50% Injectable 25 Gram(s) IV Push once  enoxaparin Injectable 40 milliGRAM(s) SubCutaneous daily  ferrous    sulfate 325 milliGRAM(s) Oral daily  glucagon  Injectable 1 milliGRAM(s) IntraMuscular once  hydrALAZINE 50 milliGRAM(s) Oral two times a day  insulin glargine Injectable (LANTUS) 10 Unit(s) SubCutaneous at bedtime  insulin lispro (ADMELOG) corrective regimen sliding scale   SubCutaneous three times a day before meals  insulin lispro (ADMELOG) corrective regimen sliding scale   SubCutaneous at bedtime  insulin lispro Injectable (ADMELOG) 3 Unit(s) SubCutaneous three times a day before meals  levothyroxine 25 MICROGram(s) Oral daily  meropenem  IVPB 1000 milliGRAM(s) IV Intermittent every 8 hours  multivitamin/minerals 1 Tablet(s) Oral daily  pantoprazole    Tablet 40 milliGRAM(s) Oral before breakfast  potassium phosphate / sodium phosphate Powder (PHOS-NaK) 1 Packet(s) Oral three times a day  sertraline 25 milliGRAM(s) Oral daily  tamsulosin 0.4 milliGRAM(s) Oral at bedtime    Vital Signs Last 24 Hrs  T(C): 36.1 (25 Feb 2021 08:50), Max: 36.7 (24 Feb 2021 15:39)  T(F): 96.9 (25 Feb 2021 08:50), Max: 98 (24 Feb 2021 15:39)  HR: 74 (25 Feb 2021 08:50) (69 - 78)  BP: 159/45 (25 Feb 2021 08:50) (114/69 - 159/45)  BP(mean): --  RR: 18 (25 Feb 2021 08:50) (16 - 18)  SpO2: 100% (25 Feb 2021 08:50) (98% - 100%)      PE:  Constitutional: frail looking  HEENT: NC/AT, EOMI, PERRLA, conjunctivae clear; ears and nose atraumatic; pharynx benign  Neck: supple; thyroid not palpable  Back: no tenderness  Respiratory: decreased breath sounds   Cardiovascular: S1S2 regular, no murmurs  Abdomen: soft, not tender, not distended, positive BS; liver and spleen WNL  Genitourinary: no suprapubic tenderness  Musculoskeletal: no muscle tenderness, no joint swelling or tenderness  Extremities: no pedal edema  Neurological/ Psychiatric: moving all extremities  Skin: unstageable large sacral ulcer s/p debridement, packing in place, no odor     Labs: all available labs reviewed                                                              8.3    14.09 )-----------( 451      ( 25 Feb 2021 08:17 )             27.3     02-25    136  |  106  |  15  ----------------------------<  182<H>  3.8   |  22  |  0.80    Ca    8.8      25 Feb 2021 08:17  Phos  2.0     02-25  Mg     1.6     02-25      Culture - Surgical Swab (02.18.21 @ 17:30)   - Vancomycin: S 1   - Vancomycin: S 1   - Trimethoprim/Sulfamethoxazole: R >2/38   - Trimethoprim/Sulfamethoxazole: S <=0.5/9.5   - Tetra/Doxy: S <=1   - Tetra/Doxy: R >8   - RIF- Rifampin: S <=1 Should not be used as monotherapy   - Tobramycin: S <=2   - Erythromycin: R >4   - Imipenem: S <=1   - Levofloxacin: I 1   - Meropenem: S <=1   - Oxacillin: S <=0.25   - Penicillin: R 2   - Piperacillin/Tazobactam: R <=8   - Amikacin: S <=16   - Amoxicillin/Clavulanic Acid: S <=8/4   - Ampicillin: R >16 These ampicillin results predict results for amoxicillin   - Ampicillin: R >8 Predicts results to ampicillin/sulbactam, amoxacillin-clavulanate and piperacillin-tazobactam.   - Ampicillin/Sulbactam: S <=8/4   - Ampicillin/Sulbactam: R 8/4 Enterobacter, Citrobacter, and Serratia may develop resistance during prolonged therapy (3-4 days)   - Aztreonam: R 16   - Cefazolin: R >16 Enterobacter, Citrobacter, and Serratia may develop resistance during prolonged therapy (3-4 days)   - Cefazolin: S <=4   - Cefepime: R >16   - Cefoxitin: S <=8   - Ceftriaxone: R >32 Enterobacter, Citrobacter, and Serratia may develop resistance during prolonged therapy   - Ciprofloxacin: I 0.5   - Clindamycin: S <=0.25   - Ertapenem: S <=0.5   - Gentamicin: S <=1 Should not be used as monotherapy   - Gentamicin: S <=2   Specimen Source: .Surgical Swab 2- sacral decubitus ulcer fluid #2   Culture Results:   Moderate Escherichia coli ESBL   Moderate Staphylococcus aureus   Moderate Bacteroides fragilis "Susceptibilities not performed"   Rare Enterococcus faecium   Organism Identification: Escherichia coli ESBL   Staphylococcus aureus   Enterococcus faecium   Organism: Escherichia coli ESBL   Organism: Staphylococcus aureus   Organism: Enterococcus faecium   Method Type: LEONILA   Method Type: LEONILA   Method Type: LEONILA     Culture - Urine (collected 02-18-21 @ 05:28)  Source: .Urine Clean Catch (Midstream)  Final Report (02-19-21 @ 09:25):    <10,000 CFU/mL Normal Urogenital Jeannine    Culture - Blood (collected 02-17-21 @ 11:38)  Source: .Blood Blood-Venous  Preliminary Report (02-18-21 @ 16:01):    No growth to date.    Culture - Blood (collected 02-17-21 @ 11:38)  Source: .Blood Blood-Peripheral  Preliminary Report (02-18-21 @ 16:01):    No growth to date.      Radiology: all available radiological tests reviewed  < from: CT Abdomen and Pelvis w/ IV Cont (02.17.21 @ 14:10) >  EXAM:  CT ABDOMEN AND PELVIS IC                            PROCEDURE DATE:  02/17/2021          INTERPRETATION:  CLINICAL INFORMATION: Sacral decubitus ulcer    COMPARISON: CT scan of the abdomen and pelvis from 5/22/2011    PROCEDURE:  CT of the Abdomen and Pelvis was performed with intravenous contrast.  Intravenous contrast: 90 ml Omnipaque 350. 10 ml discarded.  Oral contrast: None.  Sagittal and coronal reformats were performed.    FINDINGS:  LOWER CHEST: Partially imaged left breast prosthesis and presumed tissue expander. Trace right pleural fluid.    LIVER: Within normal limits.  BILE DUCTS: Normal caliber.  GALLBLADDER: Layering stones or milk of calcium in the gallbladder  SPLEEN: Within normal limits.  PANCREAS: Within normal limits.  ADRENALS: 4.0 x 3.1 cm left adrenal mass measuring 52 Hounsfield units which is nondiagnostic. This has not significantly changed in size when compared with 5/22/2011.  KIDNEYS/URETERS: 2.1 cm left renal cyst.    BLADDER: Air within the bladder which may be iatrogenic. Please correlate clinically. Question of mild bladder wall thickening. This may be exaggerated by under distention although mild cystitis cannot be excluded. Please correlate clinically.  REPRODUCTIVE ORGANS: Coarse calcifications within the uterus most likely representing calcified leiomyomas. Radiopaque material in the region of the adnexa bilaterally. Has there been a prior tubal ligation?    BOWEL: No bowel obstruction. Appendix within normal limits  PERITONEUM: No ascites.  VESSELS: Vascular calcifications.  RETROPERITONEUM/LYMPH NODES: No lymphadenopathy.  ABDOMINAL WALL: There is a sacral decubitus ulcer with air and soft tissue extending in the subcutaneous tissue of the left buttock extending to the lateral aspect and extending slightly to the right of midline as well. Air and prominent soft tissue extends posteriorly to the lower sacrum and coccyx. I am not definitely seeing bone erosion, osteomyelitis cannot be excluded.  BONES: Degenerative changes of bone are present. There is mild spondylolisthesis with L4 anterior to L5 of less than 25%.    IMPRESSION:  Sacral decubitus ulcer with air and soft tissue extending in the subcutaneous region laterally to the left buttock and slightly to the right of midline. Soft tissue extends to the adjacent lower sacrum and coccyx. No gross destruction is identified although osteomyelitis cannot be excluded. Clinical correlation is necessary.        Advanced directives addressed: full resuscitation

## 2021-02-25 NOTE — PROGRESS NOTE ADULT - SUBJECTIVE AND OBJECTIVE BOX
Cheif complaints and Diagnosis:  infected sacral decubitus/  UTI    Subjective:       REVIEW OF SYSTEMS:    CONSTITUTIONAL: No weakness, fevers or chills  EYES/ENT: No visual changes;  No vertigo or throat pain   NECK: No pain or stiffness  RESPIRATORY: No cough, wheezing, hemoptysis; No shortness of breath  CARDIOVASCULAR: No chest pain or palpitations  GASTROINTESTINAL: No abdominal or epigastric pain. No nausea, vomiting, or hematemesis; No diarrhea or constipation. No melena or hematochezia.  GENITOURINARY: No dysuria, frequency or hematuria  NEUROLOGICAL: No numbness or weakness  SKIN: No itching, burning, rashes, or lesions   All other review of systems is negative unless indicated above      Vital Signs Last 24 Hrs  T(C): 36.1 (25 Feb 2021 08:50), Max: 36.7 (24 Feb 2021 15:39)  T(F): 96.9 (25 Feb 2021 08:50), Max: 98 (24 Feb 2021 15:39)  HR: 74 (25 Feb 2021 08:50) (69 - 78)  BP: 159/45 (25 Feb 2021 08:50) (114/69 - 159/45)  BP(mean): --  RR: 18 (25 Feb 2021 08:50) (16 - 18)  SpO2: 100% (25 Feb 2021 08:50) (98% - 100%)    HEENT:   pupils equal and reactive, EOMI, no oropharyngeal lesions, erythema, exudates, oral thrush    NECK:   supple, no carotid bruits, no palpable lymph nodes, no thyromegaly    CV:  +S1, +S2, regular, no murmurs or rubs    RESP:   lungs clear to auscultation bilaterally, no wheezing, rales, rhonchi, good air entry bilaterally    BREAST:  not examined    GI:  abdomen soft, non-tender, non-distended, normal BS, no bruits, no abdominal masses, no palpable masses    RECTAL:  not examined    :  not examined    MSK:   normal muscle tone, no atrophy, no rigidity, no contractions    EXT:   no clubbing, no cyanosis, no edema, no calf pain, swelling or erythema    VASCULAR:  pulses equal and symmetric in the upper and lower extremities    NEURO:  AAOX3, no focal neurological deficits, follows all commands, able to move extremities spontaneously    SKIN:  no ulcers, lesions or rashes    MEDICATIONS  (STANDING):  amLODIPine   Tablet 5 milliGRAM(s) Oral daily  ascorbic acid 500 milliGRAM(s) Oral daily  atorvastatin 20 milliGRAM(s) Oral at bedtime  Dakins Solution - 1/4 Strength 1 Application(s) Topical two times a day  dextrose 40% Gel 15 Gram(s) Oral once  dextrose 5%. 1000 milliLiter(s) (50 mL/Hr) IV Continuous <Continuous>  dextrose 5%. 1000 milliLiter(s) (100 mL/Hr) IV Continuous <Continuous>  dextrose 50% Injectable 25 Gram(s) IV Push once  dextrose 50% Injectable 12.5 Gram(s) IV Push once  dextrose 50% Injectable 25 Gram(s) IV Push once  ferrous    sulfate 325 milliGRAM(s) Oral daily  glucagon  Injectable 1 milliGRAM(s) IntraMuscular once  hydrALAZINE 50 milliGRAM(s) Oral two times a day  insulin glargine Injectable (LANTUS) 10 Unit(s) SubCutaneous at bedtime  insulin lispro (ADMELOG) corrective regimen sliding scale   SubCutaneous three times a day before meals  insulin lispro (ADMELOG) corrective regimen sliding scale   SubCutaneous at bedtime  insulin lispro Injectable (ADMELOG) 3 Unit(s) SubCutaneous three times a day before meals  levothyroxine 25 MICROGram(s) Oral daily  meropenem  IVPB 1000 milliGRAM(s) IV Intermittent every 12 hours  multivitamin/minerals 1 Tablet(s) Oral daily  pantoprazole    Tablet 40 milliGRAM(s) Oral before breakfast  sertraline 25 milliGRAM(s) Oral daily  tamsulosin 0.4 milliGRAM(s) Oral at bedtime    MEDICATIONS  (PRN):  acetaminophen   Tablet .. 650 milliGRAM(s) Oral every 6 hours PRN Temp greater or equal to 38C (100.4F), Mild Pain (1 - 3)  loperamide 2 milliGRAM(s) Oral three times a day PRN Diarrhea  ondansetron Injectable 4 milliGRAM(s) IV Push every 6 hours PRN Nausea  oxycodone    5 mG/acetaminophen 325 mG 1 Tablet(s) Oral every 4 hours PRN Moderate Pain (4 - 6)  oxyCODONE    IR 10 milliGRAM(s) Oral three times a day PRN Severe Pain (7 - 10)  simethicone 80 milliGRAM(s) Chew four times a day PRN Gas      25 Feb 2021 08:17    136    |  106    |  15     ----------------------------<  182    3.8     |  22     |  0.80     Ca    8.8        25 Feb 2021 08:17  Phos  2.0       25 Feb 2021 08:17  Mg     1.6       25 Feb 2021 08:17    CBC Full  -  ( 25 Feb 2021 08:17 )  WBC Count : 14.09 K/uL  Hemoglobin : 8.3 g/dL  Hematocrit : 27.3 %  Platelet Count - Automated : 451 K/uL  Mean Cell Volume : 77.6 fl  Mean Cell Hemoglobin : 23.6 pg  Mean Cell Hemoglobin Concentration : 30.4 gm/dL  Auto Neutrophil # : x  Auto Lymphocyte # : x  Auto Monocyte # : x  Auto Eosinophil # : x  Auto Basophil # : x  Auto Neutrophil % : x  Auto Lymphocyte % : x  Auto Monocyte % : x  Auto Eosinophil % : x  Auto Basophil % : x                RADIOLOGY RESULTS:          Assessment and Plan:        ASSESMENT/PLAN    71/F with PMHx of DM type 2, HTN, HLD, MDD, Hypothyroidism, Syncope, Depression, chronic diarrhea, sacral decubitus ulcer, UTIs, Anemia, Hyponatremia, admitted with     # Infected large chronic decubitus ulcer, present on admission  - s/p excisional debridement on 2/18  -no further surgical mx.   -wound Cultures growing: ESBL e.coli, enterococcus, staph. Abx switched to meropenem by ID [id number]  -local wound care per wound care RN    #UTI:   on IV Abx    #Chronic diarrhea:   -c diff neg  -prn imodium    # Anemia; acute on chronic: likely from chronic disease, iron deficiency and reported Hx of MDS  -FOBT negative  -Tx for Hb < 7  -iron supplements     # Hyponatremia  - resolved with IVF    #DANIEL on CKD 3,   prerenal azotemia vs ATN from Vancomycin   -now stable    # DMII with diabetic nephropathy:  -start lantus/premeal insulin  -continue ss    # HTN,   -resume hydralazine  -started on norvasc while here.     # DVT PPX  -start hep sq.    #Dispo:  d/w ID, plan is for 6 weeks of iv abx.   PICC line, then dc planning to [facility] once bed available.  d/w RN   Cheif complaints and Diagnosis:  infected sacral decubitus/  UTI    Subjective: no complaints      REVIEW OF SYSTEMS:    CONSTITUTIONAL: No weakness, fevers or chills  EYES/ENT: No visual changes;  No vertigo or throat pain   NECK: No pain or stiffness  RESPIRATORY: No cough, wheezing, hemoptysis; No shortness of breath  CARDIOVASCULAR: No chest pain or palpitations  GASTROINTESTINAL: No abdominal or epigastric pain. No nausea, vomiting, or hematemesis; No diarrhea or constipation. No melena or hematochezia.  GENITOURINARY: No dysuria, frequency or hematuria  NEUROLOGICAL: No numbness or weakness  SKIN: No itching, burning, rashes, or lesions   All other review of systems is negative unless indicated above      Vital Signs Last 24 Hrs  T(C): 36.1 (25 Feb 2021 08:50), Max: 36.7 (24 Feb 2021 15:39)  T(F): 96.9 (25 Feb 2021 08:50), Max: 98 (24 Feb 2021 15:39)  HR: 74 (25 Feb 2021 08:50) (69 - 78)  BP: 159/45 (25 Feb 2021 08:50) (114/69 - 159/45)  BP(mean): --  RR: 18 (25 Feb 2021 08:50) (16 - 18)  SpO2: 100% (25 Feb 2021 08:50) (98% - 100%)    HEENT:   pupils equal and reactive, EOMI, no oropharyngeal lesions, erythema, exudates, oral thrush    NECK:   supple, no carotid bruits, no palpable lymph nodes, no thyromegaly    CV:  +S1, +S2, regular, no murmurs or rubs    RESP:   lungs clear to auscultation bilaterally, no wheezing, rales, rhonchi, good air entry bilaterally    BREAST:  not examined    GI:  abdomen soft, non-tender, non-distended, normal BS, no bruits, no abdominal masses, no palpable masses    RECTAL:  not examined    :  not examined    MSK:   normal muscle tone, no atrophy, no rigidity, no contractions    EXT:   no clubbing, no cyanosis, no edema, no calf pain, swelling or erythema    VASCULAR:  pulses equal and symmetric in the upper and lower extremities    NEURO:  AAOX3, no focal neurological deficits, follows all commands, able to move extremities spontaneously    SKIN:  no ulcers, lesions or rashes    MEDICATIONS  (STANDING):  amLODIPine   Tablet 5 milliGRAM(s) Oral daily  ascorbic acid 500 milliGRAM(s) Oral daily  atorvastatin 20 milliGRAM(s) Oral at bedtime  Dakins Solution - 1/4 Strength 1 Application(s) Topical two times a day  dextrose 40% Gel 15 Gram(s) Oral once  dextrose 5%. 1000 milliLiter(s) (50 mL/Hr) IV Continuous <Continuous>  dextrose 5%. 1000 milliLiter(s) (100 mL/Hr) IV Continuous <Continuous>  dextrose 50% Injectable 25 Gram(s) IV Push once  dextrose 50% Injectable 12.5 Gram(s) IV Push once  dextrose 50% Injectable 25 Gram(s) IV Push once  ferrous    sulfate 325 milliGRAM(s) Oral daily  glucagon  Injectable 1 milliGRAM(s) IntraMuscular once  hydrALAZINE 50 milliGRAM(s) Oral two times a day  insulin glargine Injectable (LANTUS) 10 Unit(s) SubCutaneous at bedtime  insulin lispro (ADMELOG) corrective regimen sliding scale   SubCutaneous three times a day before meals  insulin lispro (ADMELOG) corrective regimen sliding scale   SubCutaneous at bedtime  insulin lispro Injectable (ADMELOG) 3 Unit(s) SubCutaneous three times a day before meals  levothyroxine 25 MICROGram(s) Oral daily  meropenem  IVPB 1000 milliGRAM(s) IV Intermittent every 12 hours  multivitamin/minerals 1 Tablet(s) Oral daily  pantoprazole    Tablet 40 milliGRAM(s) Oral before breakfast  sertraline 25 milliGRAM(s) Oral daily  tamsulosin 0.4 milliGRAM(s) Oral at bedtime    MEDICATIONS  (PRN):  acetaminophen   Tablet .. 650 milliGRAM(s) Oral every 6 hours PRN Temp greater or equal to 38C (100.4F), Mild Pain (1 - 3)  loperamide 2 milliGRAM(s) Oral three times a day PRN Diarrhea  ondansetron Injectable 4 milliGRAM(s) IV Push every 6 hours PRN Nausea  oxycodone    5 mG/acetaminophen 325 mG 1 Tablet(s) Oral every 4 hours PRN Moderate Pain (4 - 6)  oxyCODONE    IR 10 milliGRAM(s) Oral three times a day PRN Severe Pain (7 - 10)  simethicone 80 milliGRAM(s) Chew four times a day PRN Gas      25 Feb 2021 08:17    136    |  106    |  15     ----------------------------<  182    3.8     |  22     |  0.80     Ca    8.8        25 Feb 2021 08:17  Phos  2.0       25 Feb 2021 08:17  Mg     1.6       25 Feb 2021 08:17    CBC Full  -  ( 25 Feb 2021 08:17 )  WBC Count : 14.09 K/uL  Hemoglobin : 8.3 g/dL  Hematocrit : 27.3 %  Platelet Count - Automated : 451 K/uL  Mean Cell Volume : 77.6 fl  Mean Cell Hemoglobin : 23.6 pg  Mean Cell Hemoglobin Concentration : 30.4 gm/dL  Auto Neutrophil # : x  Auto Lymphocyte # : x  Auto Monocyte # : x  Auto Eosinophil # : x  Auto Basophil # : x  Auto Neutrophil % : x  Auto Lymphocyte % : x  Auto Monocyte % : x  Auto Eosinophil % : x  Auto Basophil % : x              ASSESMENT/PLAN    71/F with PMHx of DM type 2, HTN, HLD, MDD, Hypothyroidism, Syncope, Depression, chronic diarrhea, sacral decubitus ulcer, UTIs, Anemia, Hyponatremia, admitted with     # Infected large chronic decubitus ulcer, present on admission  - s/p excisional debridement on 2/18  -no further surgical mx.   -wound Cultures growing: ESBL e.coli, enterococcus, staph. Abx switched to meropenem by ID [id number]  -will require 6 weeks of above IV abx via picc line on dc until 4/3 with weekly cbc, cmp, esr, crp  -local wound care per wound care RN    #UTI:   on IV Abx    #Depression  -increase sertraline 50mg daily  -no suicidal thouhts or intentions    #Chronic diarrhea:   -c diff neg  -prn imodium    # Anemia; acute on chronic: likely from chronic disease, iron deficiency and reported Hx of MDS  -FOBT negative  -Tx for Hb < 7  -iron supplements     # Hyponatremia  - resolved with IVF    #DANIEL on CKD 3,   prerenal azotemia vs ATN from Vancomycin   -now stable    # DMII with diabetic nephropathy:  -start lantus/premeal insulin  -continue ss    # HTN,   -resume hydralazine  -started on norvasc while here.     # DVT PPX  -start hep sq.    #Dispo:  d/w ID, plan is for 6 weeks of iv abx.   PICC line, then dc planning to [facility] once bed available.  d/w RN

## 2021-02-26 PROCEDURE — 71045 X-RAY EXAM CHEST 1 VIEW: CPT | Mod: 26

## 2021-02-26 PROCEDURE — 99232 SBSQ HOSP IP/OBS MODERATE 35: CPT

## 2021-02-26 RX ADMIN — Medication 3 UNIT(S): at 08:40

## 2021-02-26 RX ADMIN — PANTOPRAZOLE SODIUM 40 MILLIGRAM(S): 20 TABLET, DELAYED RELEASE ORAL at 11:27

## 2021-02-26 RX ADMIN — Medication 2: at 12:37

## 2021-02-26 RX ADMIN — SERTRALINE 50 MILLIGRAM(S): 25 TABLET, FILM COATED ORAL at 12:36

## 2021-02-26 RX ADMIN — Medication 500 MILLIGRAM(S): at 11:27

## 2021-02-26 RX ADMIN — AMLODIPINE BESYLATE 5 MILLIGRAM(S): 2.5 TABLET ORAL at 11:27

## 2021-02-26 RX ADMIN — Medication 1 TABLET(S): at 14:01

## 2021-02-26 RX ADMIN — Medication 1 PACKET(S): at 14:01

## 2021-02-26 RX ADMIN — INSULIN GLARGINE 10 UNIT(S): 100 INJECTION, SOLUTION SUBCUTANEOUS at 21:52

## 2021-02-26 RX ADMIN — OXYCODONE AND ACETAMINOPHEN 1 TABLET(S): 5; 325 TABLET ORAL at 12:37

## 2021-02-26 RX ADMIN — Medication 50 MILLIGRAM(S): at 21:53

## 2021-02-26 RX ADMIN — ENOXAPARIN SODIUM 40 MILLIGRAM(S): 100 INJECTION SUBCUTANEOUS at 11:27

## 2021-02-26 RX ADMIN — Medication 650 MILLIGRAM(S): at 09:37

## 2021-02-26 RX ADMIN — MEROPENEM 100 MILLIGRAM(S): 1 INJECTION INTRAVENOUS at 21:53

## 2021-02-26 RX ADMIN — ATORVASTATIN CALCIUM 20 MILLIGRAM(S): 80 TABLET, FILM COATED ORAL at 21:52

## 2021-02-26 RX ADMIN — Medication 50 MILLIGRAM(S): at 11:27

## 2021-02-26 RX ADMIN — Medication 1 PACKET(S): at 06:34

## 2021-02-26 RX ADMIN — Medication 1 APPLICATION(S): at 06:29

## 2021-02-26 RX ADMIN — Medication 325 MILLIGRAM(S): at 11:27

## 2021-02-26 RX ADMIN — Medication 3 UNIT(S): at 12:37

## 2021-02-26 RX ADMIN — Medication 25 MICROGRAM(S): at 06:34

## 2021-02-26 RX ADMIN — Medication 1 PACKET(S): at 21:53

## 2021-02-26 RX ADMIN — TAMSULOSIN HYDROCHLORIDE 0.4 MILLIGRAM(S): 0.4 CAPSULE ORAL at 21:53

## 2021-02-26 RX ADMIN — MEROPENEM 100 MILLIGRAM(S): 1 INJECTION INTRAVENOUS at 14:01

## 2021-02-26 NOTE — PROGRESS NOTE ADULT - SUBJECTIVE AND OBJECTIVE BOX
CHIEF COMPLAINT/diagnosis:  depression/bacteremia/ uti/ infected decubitus ulcer    SUBJECTIVE: no complaints    REVIEW OF SYSTEMS:    CONSTITUTIONAL: No weakness, fevers or chills  EYES/ENT: No visual changes;  No vertigo or throat pain   NECK: No pain or stiffness  RESPIRATORY: No cough, wheezing, hemoptysis; No shortness of breath  CARDIOVASCULAR: No chest pain or palpitations  GASTROINTESTINAL: No abdominal or epigastric pain. No nausea, vomiting, or hematemesis; No diarrhea or constipation. No melena or hematochezia.  GENITOURINARY: No dysuria, frequency or hematuria  NEUROLOGICAL: No numbness or weakness  SKIN: No itching, burning, rashes, or lesions   All other review of systems is negative unless indicated above    Vital Signs Last 24 Hrs  T(C): 36.4 (26 Feb 2021 15:26), Max: 36.9 (25 Feb 2021 22:17)  T(F): 97.5 (26 Feb 2021 15:26), Max: 98.4 (25 Feb 2021 22:17)  HR: 72 (26 Feb 2021 15:26) (72 - 81)  BP: 124/56 (26 Feb 2021 15:26) (113/33 - 144/51)  BP(mean): --  RR: 18 (26 Feb 2021 15:26) (18 - 18)  SpO2: 97% (26 Feb 2021 15:26) (97% - 100%)    I&O's Summary    25 Feb 2021 07:01  -  26 Feb 2021 07:00  --------------------------------------------------------  IN: 100 mL / OUT: 0 mL / NET: 100 mL    26 Feb 2021 07:01  -  26 Feb 2021 16:12  --------------------------------------------------------  IN: 100 mL / OUT: 0 mL / NET: 100 mL        CAPILLARY BLOOD GLUCOSE      POCT Blood Glucose.: 168 mg/dL (26 Feb 2021 12:01)  POCT Blood Glucose.: 178 mg/dL (26 Feb 2021 08:07)  POCT Blood Glucose.: 112 mg/dL (25 Feb 2021 21:12)  POCT Blood Glucose.: 247 mg/dL (25 Feb 2021 16:47)      PHYSICAL EXAM:    Constitutional: NAD, awake and alert, well-developed  HEENT: PERR, EOMI, Normal Hearing, MMM  Neck: Soft and supple, No LAD, No JVD  Respiratory: Breath sounds are clear bilaterally, No wheezing, rales or rhonchi  Cardiovascular: S1 and S2, regular rate and rhythm, no Murmurs, gallops or rubs  Gastrointestinal: Bowel Sounds present, soft, nontender, nondistended, no guarding, no rebound  Extremities: No peripheral edema  Vascular: 2+ peripheral pulses  Neurological: A/O x 3, no focal deficits  Musculoskeletal: 5/5 strength b/l upper and lower extremities  Skin: No rashes    MEDICATIONS:  MEDICATIONS  (STANDING):  amLODIPine   Tablet 5 milliGRAM(s) Oral daily  ascorbic acid 500 milliGRAM(s) Oral daily  atorvastatin 20 milliGRAM(s) Oral at bedtime  Dakins Solution - 1/4 Strength 1 Application(s) Topical two times a day  dextrose 40% Gel 15 Gram(s) Oral once  dextrose 5%. 1000 milliLiter(s) (50 mL/Hr) IV Continuous <Continuous>  dextrose 5%. 1000 milliLiter(s) (100 mL/Hr) IV Continuous <Continuous>  dextrose 50% Injectable 25 Gram(s) IV Push once  dextrose 50% Injectable 12.5 Gram(s) IV Push once  dextrose 50% Injectable 25 Gram(s) IV Push once  enoxaparin Injectable 40 milliGRAM(s) SubCutaneous daily  ferrous    sulfate 325 milliGRAM(s) Oral daily  glucagon  Injectable 1 milliGRAM(s) IntraMuscular once  hydrALAZINE 50 milliGRAM(s) Oral two times a day  insulin glargine Injectable (LANTUS) 10 Unit(s) SubCutaneous at bedtime  insulin lispro (ADMELOG) corrective regimen sliding scale   SubCutaneous three times a day before meals  insulin lispro (ADMELOG) corrective regimen sliding scale   SubCutaneous at bedtime  insulin lispro Injectable (ADMELOG) 3 Unit(s) SubCutaneous three times a day before meals  levothyroxine 25 MICROGram(s) Oral daily  meropenem  IVPB 1000 milliGRAM(s) IV Intermittent every 8 hours  multivitamin/minerals 1 Tablet(s) Oral daily  pantoprazole    Tablet 40 milliGRAM(s) Oral before breakfast  potassium phosphate / sodium phosphate Powder (PHOS-NaK) 1 Packet(s) Oral three times a day  sertraline 50 milliGRAM(s) Oral daily  tamsulosin 0.4 milliGRAM(s) Oral at bedtime      LABS: All Labs Reviewed:                        8.3    14.09 )-----------( 451      ( 25 Feb 2021 08:17 )             27.3     02-25    136  |  106  |  15  ----------------------------<  182<H>  3.8   |  22  |  0.80    Ca    8.8      25 Feb 2021 08:17  Phos  2.0     02-25  Mg     1.6     02-25            Blood Culture: 02-23 @ 11:50  Organism --  Gram Stain Blood -- Gram Stain --  Specimen Source .Stool Feces  Culture-Blood --            ASSESMENT/PLAN    71/F with PMHx of DM type 2, HTN, HLD, MDD, Hypothyroidism, Syncope, Depression, chronic diarrhea, sacral decubitus ulcer, UTIs, Anemia, Hyponatremia, admitted with     # Infected large chronic decubitus ulcer, present on admission  - s/p excisional debridement on 2/18  -no further surgical mx.   -wound Cultures growing: ESBL e.coli, enterococcus, staph. Abx switched to meropenem by ID [id number]  -will require 6 weeks of above IV abx via picc line on dc until 4/3 with weekly cbc, cmp, esr, crp  -local wound care per wound care RN    #UTI:   on IV Abx    #Depression  -increase sertraline 50mg daily  -no suicidal thouhts or intentions    #Chronic diarrhea:   -c diff neg  -prn imodium    # Anemia; acute on chronic: likely from chronic disease, iron deficiency and reported Hx of MDS  -FOBT negative  -Tx for Hb < 7  -iron supplements     # Hyponatremia  - resolved with IVF    #DANIEL on CKD 3,   prerenal azotemia vs ATN from Vancomycin   -now stable    # DMII with diabetic nephropathy:  -start lantus/premeal insulin  -continue ss    # HTN,   -resume hydralazine  -started on norvasc while here.     # DVT PPX  -start hep sq.    #Dispo:  d/w ID, plan is for 6 weeks of iv abx.   PICC line, then dc planning to [facility] once bed available.  d/w RN

## 2021-02-26 NOTE — ADVANCED PRACTICE NURSE CONSULT - ASSESSMENT
Patient received awake, alert, oriented x 4, calm and cooperative. Benefits, risks and possible complications of procedure explained to patient verbalizes understanding. Gave verbal and written consent. Hand hygiene and time out performed. Patient verified using first and last name, , MRN and account number. Correct site confirmed, site prepped with CHG, draped in sterile fashion, and lidocaine subdermal injected to site prior to start of procedure. Using strict aeseptic with MST technique, Navilyst 4F with PASV technology inserted via ultrasound guidance to right brachial vein, and cut to 40cm. Line secured to skin using statlock, site covered with gauze and DSD. End cap placed. Sterile field maintained throughout procedure. Minimal blood loss. No complications. Chest xray to confirm placement. All sharps accounted for. Report given to district RN.  (LOT # [id number]) 
HPI:  This is a 71 year old female that was admitted to the hospital on 2/17/2021 for pressure injury to sacrum.  PMH-  DM type 2, HTN, HLD, MDD, Hypothyroidism, Syncope, Depression, chronic diarrhea, sacral decubitus ulcer, UTIs, Anemia, Hyponatremia, presents to the ED BIBA from [facility] for re evaluation and possible debridement of sacral decubitus ulcer. Reports she has been undergoing treatment for ulcer for 1 year and has been admitted for infection in the past. Pt reports increase pain at site with local tenderness, doesn't know if there is any discharge. No fever. As per NH paperwork, requesting Dr. Nunez in the ED. Recent COVID PCR on 02/15 which was negative.   Consulted to evaluate patient’s pressure injury. Patient’s wound was debrided on 2/18/2021. Patient presents on an [employer]mos[employer] 9000 MRS on her right side. Old dressing removed. Large full-thickness wound noted measuring [id number] with undermining at 9 o’clock measuring 5 cm. Periwound to undermined area noted to be indurated and painful when palpated. Wound bed with 50% pink tissue and 50% tan tissue. No odor to wound. Wound irrigated with normal saline. Dakin’s applied to kerlix and gently fluffed into wound bed. Foam dressing placed as cover dressing  Patient is also grossly incontinent of urine and stool. Patient may be candidate for wound vac after Dakin’s dressings completed. But the stool incontinence may pose a problem with the adherence of the wound vac dressing. Patient positioned to her right side with both heels elevated off mattress.   PAST MEDICAL & SURGICAL HISTORY:  MARRY (acute respiratory infection)  Hypothyroid  MDD (major depressive disorder)  Diabetes mellitus-type 2  Hypertension, Hyperlipidemia  Depression, Changes in skin texture  Flatulence, Major depressive disorder, recurrent  Disorder of thyroid, unspecified, Ascorbic acid deficiency  Pain, unspecified, Hyperlipidemia, unspecified  Acute kidney failure, unspecified  Syncope and collapse, Essential (primary) hypertension  Type 2 diabetes mellitus without complication  Intraoperative hemorrhage and hematoma of a genitourinary system organ or structure complicating other procedure  High cholesterol, Diabetes  HTN (hypertension), History of colon surgery  H/O shoulder surgery, H/O lumpectomy  REVIEW OF SYSTEMS  General:	  Skin/Breast:  Ophthalmologic:  ENMT:	  Respiratory and Thorax: Denies shortness of breath  Cardiovascular:	Denies chest pain  Gastrointestinal:	  Genitourinary:	  Musculoskeletal:	  Neurological:	  Psychiatric:	  Hematology/Lymphatics:	  Endocrine:	  Allergic/Immunologic:	  MEDICATIONS  (STANDING):  ascorbic acid 500 milliGRAM(s) Oral daily  atorvastatin 20 milliGRAM(s) Oral at bedtime  Dakins Solution - 1/4 Strength 1 Application(s) Topical two times a day  dextrose 40% Gel 15 Gram(s) Oral once  ferrous    sulfate 325 milliGRAM(s) Oral daily  glucagon  Injectable 1 milliGRAM(s) IntraMuscular once  insulin lispro (ADMELOG) corrective regimen sliding scale   SubCutaneous three times a day before meals  insulin lispro (ADMELOG) corrective regimen sliding scale   SubCutaneous at bedtime  levothyroxine 25 MICROGram(s) Oral daily  meropenem  IVPB 1000 milliGRAM(s) IV Intermittent every 12 hours  multivitamin/minerals 1 Tablet(s) Oral daily  pantoprazole    Tablet 40 milliGRAM(s) Oral before breakfast  sertraline 25 milliGRAM(s) Oral daily  sodium chloride 0.9%. 500 milliLiter(s) (100 mL/Hr) IV Continuous <Continuous>  tamsulosin 0.4 milliGRAM(s) Oral at bedtime  Allergies  Hytrin (Unknown)  Intolerances  SOCIAL HISTORY:  FAMILY HISTORY:  Vital Signs Last 24 Hrs  T(C): 36.9 (22 Feb 2021 14:57), Max: 36.9 (22 Feb 2021 14:57)  T(F): 98.4 (22 Feb 2021 14:57), Max: 98.4 (22 Feb 2021 14:57)  HR: 80 (22 Feb 2021 14:57) (80 - 84)  BP: 114/87 (22 Feb 2021 14:57) (114/87 - 167/72)  RR: 18 (22 Feb 2021 14:57) (18 - 19)  SpO2: 98% (22 Feb 2021 14:57) (97% - 98%)  PHYSICAL EXAM:  Constitutional:  Eyes: conjunctiva and sclera clear  ENMT: Moist mucous membranes  Respiratory: Respirations even and unlabored  Cardiovascular:  Gastrointestinal: Incontinent of loose stool  Genitourinary: Incontinent of urine  Neurological: Alert and oriented x 4  Skin: Stage 4 pressure injury to sacrum  LABS:                        7.2    13.22 )-----------( 438      ( 22 Feb 2021 08:37 )             23.6

## 2021-02-27 ENCOUNTER — TRANSCRIPTION ENCOUNTER (OUTPATIENT)
Age: 71
End: 2021-02-27

## 2021-02-27 PROCEDURE — 99232 SBSQ HOSP IP/OBS MODERATE 35: CPT

## 2021-02-27 RX ORDER — ACETAMINOPHEN 500 MG
2 TABLET ORAL
Qty: 0 | Refills: 0 | DISCHARGE

## 2021-02-27 RX ORDER — OXYCODONE AND ACETAMINOPHEN 5; 325 MG/1; MG/1
1 TABLET ORAL EVERY 4 HOURS
Refills: 0 | Status: DISCONTINUED | OUTPATIENT
Start: 2021-02-27 | End: 2021-03-02

## 2021-02-27 RX ORDER — OXYCODONE HYDROCHLORIDE 5 MG/1
10 TABLET ORAL THREE TIMES A DAY
Refills: 0 | Status: DISCONTINUED | OUTPATIENT
Start: 2021-02-27 | End: 2021-03-02

## 2021-02-27 RX ORDER — HYDRALAZINE HCL 50 MG
1 TABLET ORAL
Qty: 0 | Refills: 0 | DISCHARGE

## 2021-02-27 RX ORDER — AMLODIPINE BESYLATE 2.5 MG/1
1 TABLET ORAL
Qty: 0 | Refills: 0 | DISCHARGE
Start: 2021-02-27

## 2021-02-27 RX ORDER — MEROPENEM 1 G/30ML
1000 INJECTION INTRAVENOUS
Qty: 0 | Refills: 0 | DISCHARGE
Start: 2021-02-27

## 2021-02-27 RX ORDER — LOPERAMIDE HCL 2 MG
1 TABLET ORAL
Qty: 0 | Refills: 0 | DISCHARGE
Start: 2021-02-27

## 2021-02-27 RX ORDER — METRONIDAZOLE 500 MG
1 TABLET ORAL
Qty: 0 | Refills: 0 | DISCHARGE

## 2021-02-27 RX ADMIN — AMLODIPINE BESYLATE 5 MILLIGRAM(S): 2.5 TABLET ORAL at 09:44

## 2021-02-27 RX ADMIN — Medication 3 UNIT(S): at 16:51

## 2021-02-27 RX ADMIN — INSULIN GLARGINE 10 UNIT(S): 100 INJECTION, SOLUTION SUBCUTANEOUS at 23:04

## 2021-02-27 RX ADMIN — Medication 25 MICROGRAM(S): at 05:29

## 2021-02-27 RX ADMIN — MEROPENEM 100 MILLIGRAM(S): 1 INJECTION INTRAVENOUS at 05:29

## 2021-02-27 RX ADMIN — ATORVASTATIN CALCIUM 20 MILLIGRAM(S): 80 TABLET, FILM COATED ORAL at 22:18

## 2021-02-27 RX ADMIN — ENOXAPARIN SODIUM 40 MILLIGRAM(S): 100 INJECTION SUBCUTANEOUS at 09:44

## 2021-02-27 RX ADMIN — SERTRALINE 50 MILLIGRAM(S): 25 TABLET, FILM COATED ORAL at 09:42

## 2021-02-27 RX ADMIN — Medication 1 APPLICATION(S): at 18:02

## 2021-02-27 RX ADMIN — PANTOPRAZOLE SODIUM 40 MILLIGRAM(S): 20 TABLET, DELAYED RELEASE ORAL at 09:44

## 2021-02-27 RX ADMIN — Medication 1 APPLICATION(S): at 05:29

## 2021-02-27 RX ADMIN — TAMSULOSIN HYDROCHLORIDE 0.4 MILLIGRAM(S): 0.4 CAPSULE ORAL at 22:18

## 2021-02-27 RX ADMIN — Medication 2: at 09:41

## 2021-02-27 RX ADMIN — MEROPENEM 100 MILLIGRAM(S): 1 INJECTION INTRAVENOUS at 22:18

## 2021-02-27 RX ADMIN — Medication 650 MILLIGRAM(S): at 05:30

## 2021-02-27 RX ADMIN — Medication 2 MILLIGRAM(S): at 05:29

## 2021-02-27 RX ADMIN — Medication 1 PACKET(S): at 05:29

## 2021-02-27 RX ADMIN — Medication 325 MILLIGRAM(S): at 09:44

## 2021-02-27 RX ADMIN — Medication 1 APPLICATION(S): at 16:52

## 2021-02-27 RX ADMIN — Medication 3 UNIT(S): at 09:41

## 2021-02-27 RX ADMIN — MEROPENEM 100 MILLIGRAM(S): 1 INJECTION INTRAVENOUS at 13:17

## 2021-02-27 RX ADMIN — OXYCODONE AND ACETAMINOPHEN 1 TABLET(S): 5; 325 TABLET ORAL at 14:22

## 2021-02-27 RX ADMIN — Medication 1 TABLET(S): at 09:42

## 2021-02-27 RX ADMIN — Medication 500 MILLIGRAM(S): at 09:42

## 2021-02-27 RX ADMIN — Medication 50 MILLIGRAM(S): at 09:44

## 2021-02-27 NOTE — DISCHARGE NOTE PROVIDER - HOSPITAL COURSE
Vital Signs Last 24 Hrs  T(C): 36.3 (27 Feb 2021 08:10), Max: 36.7 (26 Feb 2021 23:53)  T(F): 97.3 (27 Feb 2021 08:10), Max: 98 (26 Feb 2021 23:53)  HR: 83 (27 Feb 2021 08:10) (72 - 83)  BP: 132/49 (27 Feb 2021 08:10) (124/56 - 155/71)  BP(mean): --  RR: 18 (27 Feb 2021 08:10) (18 - 18)  SpO2: 99% (27 Feb 2021 08:10) (97% - 99%)    HEENT:   pupils equal and reactive, EOMI, no oropharyngeal lesions, erythema, exudates, oral thrush    NECK:   supple, no carotid bruits, no palpable lymph nodes, no thyromegaly    CV:  +S1, +S2, regular, no murmurs or rubs    RESP:   lungs clear to auscultation bilaterally, no wheezing, rales, rhonchi, good air entry bilaterally    BREAST:  not examined    GI:  abdomen soft, non-tender, non-distended, normal BS, no bruits, no abdominal masses, no palpable masses    RECTAL:  not examined    :  not examined    MSK:   normal muscle tone, no atrophy, no rigidity, no contractions    EXT:   no clubbing, no cyanosis, no edema, no calf pain, swelling or erythema    VASCULAR:  pulses equal and symmetric in the upper and lower extremities    NEURO:  AAOX3, no focal neurological deficits, follows all commands, able to move extremities spontaneously    SKIN:  no ulcers, lesions or rashes          Hospital course:      71/F with PMHx of DM type 2, HTN, HLD, MDD, Hypothyroidism, Syncope, Depression, chronic diarrhea, sacral decubitus ulcer, UTIs, Anemia, Hyponatremia, admitted with     # Infected large chronic decubitus ulcer, present on admission  - s/p excisional debridement on 2/18  -no further surgical mx.   -wound Cultures growing: ESBL e.coli, enterococcus, staph. Abx switched to meropenem by ID D#[id number]  -will require 6 weeks of above IV abx via picc line on dc until 4/3 with weekly cbc, cmp, esr, crp  -local wound care per wound care RN    #UTI:   on IV Abx    #Depression  -increase sertraline 50mg daily  -no suicidal thouhts or intentions    #Chronic diarrhea:   -c diff neg  -prn imodium    # Anemia; acute on chronic: likely from chronic disease, iron deficiency and reported Hx of MDS  -FOBT negative  -Tx for Hb < 7  -iron supplements            Hospital course:      71/F with PMHx of DM type 2, HTN, HLD, MDD, Hypothyroidism, Syncope, Depression, chronic diarrhea, sacral decubitus ulcer, UTIs, Anemia, Hyponatremia, admitted with     # Infected large chronic decubitus ulcer, present on admission  - s/p excisional debridement on 2/18  -no further surgical mx.   -wound Cultures growing: ESBL e.coli, enterococcus, staph. Abx switched to meropenem by ID D#[id number]  -will require 6 weeks of above IV abx via picc line on dc until 4/3 with weekly cbc, cmp, esr, crp  -local wound care per wound care RN    #UTI:   on IV Abx    #Depression  -increase sertraline 50mg daily  -no suicidal thouhts or intentions    #Chronic diarrhea:   -c diff neg  -prn imodium    # Anemia; acute on chronic: likely from chronic disease, iron deficiency and reported Hx of MDS  -FOBT negative  -Tx for Hb < 7  -increase iron supplements BID monitor CBC weekly    # DM  - decrease lantus 10U QHS, stop premeal 3 UNTIS and continue ISS as patient not eating much

## 2021-02-27 NOTE — DISCHARGE NOTE PROVIDER - NSDCDCMDCOMP_GEN_ALL_CORE
This document is complete and the patient is ready for discharge.
Classic ECM rash. 12 x 14 cm oval area on the L scapular/upper back. No warmth. No lymphangitic streaking.

## 2021-02-27 NOTE — DISCHARGE NOTE PROVIDER - DETAILS OF MALNUTRITION DIAGNOSIS/DIAGNOSES
This patient has been assessed with a concern for Malnutrition and was treated during this hospitalization for the following Nutrition diagnosis/diagnoses:     -  02/19/2021: Severe protein-calorie malnutrition   This patient has been assessed with a concern for Malnutrition and was treated during this hospitalization for the following Nutrition diagnosis/diagnoses:     -  02/19/2021: Severe protein-calorie malnutrition    This patient has been assessed with a concern for Malnutrition and was treated during this hospitalization for the following Nutrition diagnosis/diagnoses:     -  02/19/2021: Severe protein-calorie malnutrition

## 2021-02-27 NOTE — DISCHARGE NOTE PROVIDER - NSDCMRMEDTOKEN_GEN_ALL_CORE_FT
Admelog SoloStar 100 units/mL injectable solution: Inject subcutaneously 3 times daily before meals as per sliding scale:  &lt; 200 = 0 units  201-250 = 3units  251-300 = 6 units  301-350 = 9 units  351-400 = 12 units  &gt; 400 = call md  amLODIPine 5 mg oral tablet: 1 tab(s) orally once a day  ascorbic acid 500 mg oral tablet: 1 tab(s) orally once a day  Aspirin Enteric Coated 81 mg oral delayed release tablet: 1 tab(s) orally once a day  atorvastatin 20 mg oral tablet: 1 tab(s) orally once a day  Basaglar KwikPen 100 units/mL subcutaneous solution: 20 unit(s) subcutaneously once a day (at bedtime)  enalapril 5 mg oral tablet: 1 tab(s) orally once a day  ferrous sulfate 325 mg (65 mg elemental iron) oral tablet: 1 tab(s) orally once a day  Flomax 0.4 mg oral capsule: 1 cap(s) orally once a day (at bedtime)  Hysept 0.25% topical solution: Cleanse sacrum with Dakins cleanse, crush flagyl 500mg tab and sprinkle in wound, lightly soak gauze with dakins, pack wound and cover with Allevyn bandage once daily  levothyroxine 25 mcg (0.025 mg) oral tablet: 1 tab(s) orally once a day  loperamide 2 mg oral capsule: 1 cap(s) orally 3 times a day, As needed, Diarrhea  meropenem 1000 mg intravenous injection: 1000 milligram(s) intravenous every 8 hours  Multiple Vitamins with Minerals oral tablet: 1 tab(s) orally once a day  oxycodone-acetaminophen 5 mg-325 mg oral tablet: 1 tab(s) orally once a day 1/2 hour prior to treatment  oxycodone-acetaminophen 5 mg-325 mg oral tablet: 1 tab(s) orally once a day as needed for breakthrough pain  PriLOSEC 40 mg oral delayed release capsule: 1 cap(s) orally once a day  Retacrit 10,000 units/mL injectable solution: 86361 unit(s) injectable once a week on Monday  sertraline 25 mg oral tablet: 1 tab(s) orally once a day  simethicone 80 mg oral tablet, chewable: 1 tab(s) orally 4 times a day (after meals and at bedtime), As Needed   amLODIPine 5 mg oral tablet: 1 tab(s) orally once a day  ascorbic acid 500 mg oral tablet: 1 tab(s) orally once a day  Aspirin Enteric Coated 81 mg oral delayed release tablet: 1 tab(s) orally once a day  atorvastatin 20 mg oral tablet: 1 tab(s) orally once a day  enalapril 5 mg oral tablet: 1 tab(s) orally once a day  ferrous sulfate 325 mg (65 mg elemental iron) oral tablet: 1 tab(s) orally 2 times a day  Flomax 0.4 mg oral capsule: 1 cap(s) orally once a day (at bedtime)  Hysept 0.25% topical solution: Cleanse sacrum with Dakins cleanse, crush flagyl 500mg tab and sprinkle in wound, lightly soak gauze with dakins, pack wound and cover with Allevyn bandage once daily  insulin glargine: 10 unit(s) injectable once a day (at bedtime)  insulin lispro 100 units/mL injectable solution: 2 Unit(s) if Glucose 151 - 200  4 Unit(s) if Glucose 201 - 250  6 Unit(s) if Glucose 251 - 300  8 Unit(s) if Glucose 301 - 350  10 Unit(s) if Glucose 351 - 400  12 Unit(s) if Glucose Greater Than 400  levothyroxine 25 mcg (0.025 mg) oral tablet: 1 tab(s) orally once a day  loperamide 2 mg oral capsule: 1 cap(s) orally 3 times a day, As needed, Diarrhea  meropenem 1000 mg intravenous injection: 1000 milligram(s) intravenous every 8 hours  Multiple Vitamins with Minerals oral tablet: 1 tab(s) orally once a day  oxycodone-acetaminophen 5 mg-325 mg oral tablet: 1 tab(s) orally once a day 1/2 hour prior to treatment  oxycodone-acetaminophen 5 mg-325 mg oral tablet: 1 tab(s) orally once a day as needed for breakthrough pain  PriLOSEC 40 mg oral delayed release capsule: 1 cap(s) orally once a day  Retacrit 10,000 units/mL injectable solution: 35527 unit(s) injectable once a week on Monday  sertraline 50 mg oral tablet: 1 tab(s) orally once a day  simethicone 80 mg oral tablet, chewable: 1 tab(s) orally 4 times a day (after meals and at bedtime), As Needed

## 2021-02-27 NOTE — DISCHARGE NOTE PROVIDER - NSDCCPCAREPLAN_GEN_ALL_CORE_FT
PRINCIPAL DISCHARGE DIAGNOSIS  Diagnosis: Infected decubitus ulcer, unspecified ulcer stage  Assessment and Plan of Treatment: *continue with IV meropenem for total of 6 weeks; end date 4/3  with weekly cbc, cmp, esr, crp until completed  *reccomend to take oral probiotics for 2 months.   *wound care daily:   Recommendations	  1) Continue to turn and position every 2 hours  2) Continue to elevate heels off mattress  3) Continue to change dressing with Dakins per orders  4) Low airloss mattress ordered for pressure redistribution  5) Albumin-1.4 on 2/20/2021. registered dietician following patient with the current recommendations:  1. liberalize diet to consistnent cho 2. add glucerna TID and gelatein BID 3. consider appetite stimulant 4. encourage small frequent meals w/ protein source at each meal 5. monitor labs/poct BG 6. add MVI w/ minerals daily, 500 mg vitamin c BID, and 225 mg zinc sulfate BID x 10 days to promote wound healing.  7. weekly wt        SECONDARY DISCHARGE DIAGNOSES  Diagnosis: Depression  Assessment and Plan of Treatment: *Increased her Sertraline to 50mg daily (previously 25mg daily) on 2/24/21.   *may need to be further increased to re-evaluated  after 2 weeks after being adjusted.     PRINCIPAL DISCHARGE DIAGNOSIS  Diagnosis: Infected decubitus ulcer, unspecified ulcer stage  Assessment and Plan of Treatment: *continue with IV meropenem for total of 6 weeks; end date 4/3  with weekly cbc, cmp, esr, crp until completed  *reccomend to take oral probiotics for 2 months.   *wound care daily:   Recommendations	  1) Continue to turn and position every 2 hours  2) Continue to elevate heels off mattress  3) Continue to change dressing with Dakins per orders  4) Low airloss mattress ordered for pressure redistribution  5) Albumin-1.4 on 2/20/2021. registered dietician following patient with the current recommendations:  1. liberalize diet to consistnent cho 2. add glucerna TID and gelatein BID 3. consider appetite stimulant 4. encourage small frequent meals w/ protein source at each meal 5. monitor labs/poct BG 6. add MVI w/ minerals daily, 500 mg vitamin c BID, and 225 mg zinc sulfate BID x 10 days to promote wound healing.  7. weekly wt        SECONDARY DISCHARGE DIAGNOSES  Diagnosis: Anemia due to chronic kidney disease  Assessment and Plan of Treatment:   # Anemia; acute on chronic: likely from chronic disease, iron deficiency and reported Hx of MDS  -FOBT negative  -Tx for Hb < 7  -iron supplement increased BID check CBC weekly    Diagnosis: Diabetes mellitus  Assessment and Plan of Treatment: #Diabetes   -A1C 6.9  -Diet controlled at home, not on any meds   -here has been on lantus 10 U QHS  and 3 units premeals  -low sugars so 3 untis premeals D/C on 3/1      Diagnosis: Depression  Assessment and Plan of Treatment: *Increased her Sertraline to 50mg daily (previously 25mg daily) on 2/24/21.   *may need to be further increased to re-evaluated  after 2 weeks after being adjusted.

## 2021-02-28 PROCEDURE — 99232 SBSQ HOSP IP/OBS MODERATE 35: CPT

## 2021-02-28 RX ADMIN — AMLODIPINE BESYLATE 5 MILLIGRAM(S): 2.5 TABLET ORAL at 10:23

## 2021-02-28 RX ADMIN — Medication 1 APPLICATION(S): at 17:28

## 2021-02-28 RX ADMIN — MEROPENEM 100 MILLIGRAM(S): 1 INJECTION INTRAVENOUS at 22:29

## 2021-02-28 RX ADMIN — Medication 500 MILLIGRAM(S): at 10:23

## 2021-02-28 RX ADMIN — Medication 5 MILLIGRAM(S): at 10:23

## 2021-02-28 RX ADMIN — MEROPENEM 100 MILLIGRAM(S): 1 INJECTION INTRAVENOUS at 06:17

## 2021-02-28 RX ADMIN — SERTRALINE 50 MILLIGRAM(S): 25 TABLET, FILM COATED ORAL at 10:23

## 2021-02-28 RX ADMIN — PANTOPRAZOLE SODIUM 40 MILLIGRAM(S): 20 TABLET, DELAYED RELEASE ORAL at 10:23

## 2021-02-28 RX ADMIN — OXYCODONE AND ACETAMINOPHEN 1 TABLET(S): 5; 325 TABLET ORAL at 15:50

## 2021-02-28 RX ADMIN — TAMSULOSIN HYDROCHLORIDE 0.4 MILLIGRAM(S): 0.4 CAPSULE ORAL at 22:29

## 2021-02-28 RX ADMIN — Medication 1 TABLET(S): at 10:23

## 2021-02-28 RX ADMIN — Medication 325 MILLIGRAM(S): at 10:23

## 2021-02-28 RX ADMIN — ATORVASTATIN CALCIUM 20 MILLIGRAM(S): 80 TABLET, FILM COATED ORAL at 22:29

## 2021-02-28 RX ADMIN — Medication 1 APPLICATION(S): at 06:00

## 2021-02-28 RX ADMIN — MEROPENEM 100 MILLIGRAM(S): 1 INJECTION INTRAVENOUS at 15:50

## 2021-02-28 RX ADMIN — OXYCODONE HYDROCHLORIDE 10 MILLIGRAM(S): 5 TABLET ORAL at 06:16

## 2021-02-28 RX ADMIN — ENOXAPARIN SODIUM 40 MILLIGRAM(S): 100 INJECTION SUBCUTANEOUS at 10:23

## 2021-02-28 RX ADMIN — Medication 25 MICROGRAM(S): at 06:17

## 2021-02-28 NOTE — PROGRESS NOTE ADULT - SUBJECTIVE AND OBJECTIVE BOX
Cheif complaints and Diagnosis : sacral decubitus ulcer/ UTI/ depression    Subjective: patient continues to refuse to work with PT, refused again today; prefers to lay in bed most of day       REVIEW OF SYSTEMS:    CONSTITUTIONAL: No weakness, fevers or chills  EYES/ENT: No visual changes;  No vertigo or throat pain   NECK: No pain or stiffness  RESPIRATORY: No cough, wheezing, hemoptysis; No shortness of breath  CARDIOVASCULAR: No chest pain or palpitations  GASTROINTESTINAL: No abdominal or epigastric pain. No nausea, vomiting, or hematemesis; No diarrhea or constipation. No melena or hematochezia.  GENITOURINARY: No dysuria, frequency or hematuria  NEUROLOGICAL: No numbness or weakness  SKIN: No itching, burning, rashes, or lesions   All other review of systems is negative unless indicated above      Vital Signs Last 24 Hrs  T(C): 37.4 (28 Feb 2021 15:05), Max: 37.4 (28 Feb 2021 15:05)  T(F): 99.3 (28 Feb 2021 15:05), Max: 99.3 (28 Feb 2021 15:05)  HR: 83 (28 Feb 2021 15:05) (72 - 83)  BP: 117/42 (28 Feb 2021 15:05) (117/42 - 134/49)  BP(mean): --  RR: 18 (28 Feb 2021 15:05) (18 - 18)  SpO2: 99% (28 Feb 2021 15:05) (96% - 99%)    HEENT:   pupils equal and reactive, EOMI, no oropharyngeal lesions, erythema, exudates, oral thrush    NECK:   supple, no carotid bruits, no palpable lymph nodes, no thyromegaly    CV:  +S1, +S2, regular, no murmurs or rubs    RESP:   lungs clear to auscultation bilaterally, no wheezing, rales, rhonchi, good air entry bilaterally    BREAST:  not examined    GI:  abdomen soft, non-tender, non-distended, normal BS, no bruits, no abdominal masses, no palpable masses    RECTAL:  not examined    :  not examined    MSK:   normal muscle tone, no atrophy, no rigidity, no contractions    EXT:   no clubbing, no cyanosis, no edema, no calf pain, swelling or erythema    VASCULAR:  pulses equal and symmetric in the upper and lower extremities    NEURO:  AAOX3, no focal neurological deficits, follows all commands, able to move extremities spontaneously    SKIN:  no ulcers, lesions or rashes    MEDICATIONS  (STANDING):  amLODIPine   Tablet 5 milliGRAM(s) Oral daily  ascorbic acid 500 milliGRAM(s) Oral daily  atorvastatin 20 milliGRAM(s) Oral at bedtime  Dakins Solution - 1/4 Strength 1 Application(s) Topical two times a day  dextrose 40% Gel 15 Gram(s) Oral once  dextrose 5%. 1000 milliLiter(s) (50 mL/Hr) IV Continuous <Continuous>  dextrose 5%. 1000 milliLiter(s) (100 mL/Hr) IV Continuous <Continuous>  dextrose 50% Injectable 25 Gram(s) IV Push once  dextrose 50% Injectable 12.5 Gram(s) IV Push once  dextrose 50% Injectable 25 Gram(s) IV Push once  enalapril 5 milliGRAM(s) Oral daily  enoxaparin Injectable 40 milliGRAM(s) SubCutaneous daily  ferrous    sulfate 325 milliGRAM(s) Oral daily  glucagon  Injectable 1 milliGRAM(s) IntraMuscular once  insulin glargine Injectable (LANTUS) 10 Unit(s) SubCutaneous at bedtime  insulin lispro (ADMELOG) corrective regimen sliding scale   SubCutaneous three times a day before meals  insulin lispro (ADMELOG) corrective regimen sliding scale   SubCutaneous at bedtime  insulin lispro Injectable (ADMELOG) 3 Unit(s) SubCutaneous three times a day before meals  levothyroxine 25 MICROGram(s) Oral daily  meropenem  IVPB 1000 milliGRAM(s) IV Intermittent every 8 hours  multivitamin/minerals 1 Tablet(s) Oral daily  pantoprazole    Tablet 40 milliGRAM(s) Oral before breakfast  sertraline 50 milliGRAM(s) Oral daily  tamsulosin 0.4 milliGRAM(s) Oral at bedtime    MEDICATIONS  (PRN):  acetaminophen   Tablet .. 650 milliGRAM(s) Oral every 6 hours PRN Temp greater or equal to 38C (100.4F), Mild Pain (1 - 3)  loperamide 2 milliGRAM(s) Oral three times a day PRN Diarrhea  ondansetron Injectable 4 milliGRAM(s) IV Push every 6 hours PRN Nausea  oxycodone    5 mG/acetaminophen 325 mG 1 Tablet(s) Oral every 4 hours PRN Moderate Pain (4 - 6)  oxyCODONE    IR 10 milliGRAM(s) Oral three times a day PRN Severe Pain (7 - 10)  simethicone 80 milliGRAM(s) Chew four times a day PRN Gas            Hospital course:      71/F with PMHx of DM type 2, HTN, HLD, MDD, Hypothyroidism, Syncope, Depression, chronic diarrhea, sacral decubitus ulcer, UTIs, Anemia, Hyponatremia, admitted with     # Infected large chronic decubitus ulcer, present on admission  - s/p excisional debridement on 2/18  -no further surgical mx.   -wound Cultures growing: ESBL e.coli, enterococcus, staph. Abx switched to meropenem by ID [id number]  -will require 6 weeks of above IV abx via picc line on dc until 4/3 with weekly cbc, cmp, esr, crp  -local wound care per wound care RN    #UTI:   on IV Abx    #Depression  -increase sertraline 50mg daily  -no suicidal thouhts or intentions    #Chronic diarrhea:   -c diff neg  -prn imodium    # Anemia; acute on chronic: likely from chronic disease, iron deficiency and reported Hx of MDS  -FOBT negative  -Tx for Hb < 7  -iron supplements       off service note:  patietn admitted with infected sacral decubitus. will need iv abx for 4/3.   cleared for dc/ picc line in.    waiting insurance auth. [facility] also refusing to work with PT.   DC complete.

## 2021-02-28 NOTE — PHYSICAL THERAPY INITIAL EVALUATION ADULT - DIAGNOSIS, PT EVAL
Infected Sacral Decubitus Ulcer
71 y.o. female with infected sacral decubitus/UTI- wound Cultures growing: ESBL e.coli, enterococcus, staph.

## 2021-02-28 NOTE — PHYSICAL THERAPY INITIAL EVALUATION ADULT - PERTINENT HX OF CURRENT PROBLEM, REHAB EVAL
71/F with PMHx of DM type 2, HTN, HLD, MDD, Hypothyroidism, Syncope, Depression, chronic diarrhea, sacral decubitus ulcer, UTIs, Anemia, Hyponatremia, admitted with Infected large chronic decubitus ulcer s/p excisional debridement on 2/18 plan is for 6 weeks of iv abx
Chuy of sacral wound; sent from [facility]

## 2021-02-28 NOTE — PHYSICAL THERAPY INITIAL EVALUATION ADULT - LIVES WITH, PROFILE
son; pt was @ [facility] for [facility]/children
son; pt was @ [facility] for [facility]/children

## 2021-03-01 LAB — SARS-COV-2 RNA SPEC QL NAA+PROBE: SIGNIFICANT CHANGE UP

## 2021-03-01 PROCEDURE — 99232 SBSQ HOSP IP/OBS MODERATE 35: CPT

## 2021-03-01 RX ADMIN — SERTRALINE 50 MILLIGRAM(S): 25 TABLET, FILM COATED ORAL at 09:52

## 2021-03-01 RX ADMIN — Medication 2: at 21:36

## 2021-03-01 RX ADMIN — Medication 25 MICROGRAM(S): at 06:39

## 2021-03-01 RX ADMIN — ATORVASTATIN CALCIUM 20 MILLIGRAM(S): 80 TABLET, FILM COATED ORAL at 21:21

## 2021-03-01 RX ADMIN — ENOXAPARIN SODIUM 40 MILLIGRAM(S): 100 INJECTION SUBCUTANEOUS at 09:52

## 2021-03-01 RX ADMIN — Medication 325 MILLIGRAM(S): at 09:52

## 2021-03-01 RX ADMIN — Medication 500 MILLIGRAM(S): at 09:52

## 2021-03-01 RX ADMIN — PANTOPRAZOLE SODIUM 40 MILLIGRAM(S): 20 TABLET, DELAYED RELEASE ORAL at 09:52

## 2021-03-01 RX ADMIN — Medication 5 MILLIGRAM(S): at 09:52

## 2021-03-01 RX ADMIN — Medication 1 APPLICATION(S): at 17:12

## 2021-03-01 RX ADMIN — Medication 1 APPLICATION(S): at 06:39

## 2021-03-01 RX ADMIN — MEROPENEM 100 MILLIGRAM(S): 1 INJECTION INTRAVENOUS at 06:38

## 2021-03-01 RX ADMIN — AMLODIPINE BESYLATE 5 MILLIGRAM(S): 2.5 TABLET ORAL at 09:52

## 2021-03-01 RX ADMIN — Medication 1 TABLET(S): at 09:52

## 2021-03-01 RX ADMIN — OXYCODONE AND ACETAMINOPHEN 1 TABLET(S): 5; 325 TABLET ORAL at 17:22

## 2021-03-01 RX ADMIN — INSULIN GLARGINE 10 UNIT(S): 100 INJECTION, SOLUTION SUBCUTANEOUS at 21:36

## 2021-03-01 RX ADMIN — TAMSULOSIN HYDROCHLORIDE 0.4 MILLIGRAM(S): 0.4 CAPSULE ORAL at 21:21

## 2021-03-01 RX ADMIN — MEROPENEM 100 MILLIGRAM(S): 1 INJECTION INTRAVENOUS at 14:02

## 2021-03-01 RX ADMIN — MEROPENEM 100 MILLIGRAM(S): 1 INJECTION INTRAVENOUS at 21:21

## 2021-03-01 NOTE — PROGRESS NOTE ADULT - SUBJECTIVE AND OBJECTIVE BOX
HOSPITALIST PROGRESS NOTE:  SUBJECTIVE:  PCP:  Chief Complaint: Patient is a 71y old  Female who presents with a chief complaint of sacral decubitus (28 Feb 2021 16:14)      HPI:  71/F with PMHx of DM type 2, HTN, HLD, MDD, Hypothyroidism, Syncope, Depression, chronic diarrhea, sacral decubitus ulcer, UTIs, Anemia, Hyponatremia, presents to the ED BIBA from [facility] for re evaluation and possible debridement of sacral decubitus ulcer. Reports she has been undergoing treatment for ulcer for 1 year and has been admitted for infection in the past. Pt reports increase pain at site with local tenderness, doesn't know if there is any discharge. No fever. As per NH paperwork, requesting Dr. Nunez in the ED. Recent COVID PCR on 02/15 which was negative. Blood work from yesterday with white count of 17.8 and sodium 131. On oral Flagyl x2 days.  She was seen in ED by Sx team and she is for debridement in OR tomorrow.  NO other complaints  CT abdo pelvis done; ? sacral Osteomyelitis  Fam Hx: does not remember (17 Feb 2021 17:28)    3/1: Patient has pain in decubit and lower back refusing to work with PT; No other complaints; States she has been eating breakfast and lunch.     Allergies:  Hytrin (Unknown)    REVIEW OF SYSTEMS:  See HPI. All other review of systems is negative unless indicated above.     OBJECTIVE  Physical Exam:  Vital Signs:    Vital Signs Last 24 Hrs  T(C): 37.1 (01 Mar 2021 07:59), Max: 37.4 (28 Feb 2021 15:05)  T(F): 98.7 (01 Mar 2021 07:59), Max: 99.3 (28 Feb 2021 15:05)  HR: 80 (01 Mar 2021 07:59) (73 - 83)  BP: 125/56 (01 Mar 2021 07:59) (115/45 - 125/56)  BP(mean): --  RR: 18 (01 Mar 2021 07:59) (18 - 18)  SpO2: 99% (01 Mar 2021 07:59) (98% - 99%)  I&O's Summary    01 Mar 2021 07:01  -  01 Mar 2021 14:14  --------------------------------------------------------  IN: 100 mL / OUT: 0 mL / NET: 100 mL        Constitutional: NAD, awake and alert, well-developed  Neurological: AAO x 3, no focal deficits  HEENT: PERRLA, EOMI, MMM  Neck: Soft and supple, No LAD, No JVD  Respiratory: Breath sounds are clear bilaterally, No wheezing, rales or rhonchi  Cardiovascular: S1 and S2, regular rate and rhythm; no Murmurs, gallops or rubs  Gastrointestinal: Bowel Sounds present, soft, nontender, nondistended, no guarding, no rebound tenderness  Back: No CVA tenderness + sacral decubiti Ulcer   Extremities: No peripheral edema  Vascular: 2+ peripheral pulses  Musculoskeletal: 5/5 strength b/l upper and lower extremities  Skin: No rashes  Breast: Deferred  Rectal: Deferred    MEDICATIONS  (STANDING):  amLODIPine   Tablet 5 milliGRAM(s) Oral daily  ascorbic acid 500 milliGRAM(s) Oral daily  atorvastatin 20 milliGRAM(s) Oral at bedtime  Dakins Solution - 1/4 Strength 1 Application(s) Topical two times a day  dextrose 40% Gel 15 Gram(s) Oral once  dextrose 5%. 1000 milliLiter(s) (50 mL/Hr) IV Continuous <Continuous>  dextrose 5%. 1000 milliLiter(s) (100 mL/Hr) IV Continuous <Continuous>  dextrose 50% Injectable 25 Gram(s) IV Push once  dextrose 50% Injectable 12.5 Gram(s) IV Push once  dextrose 50% Injectable 25 Gram(s) IV Push once  enalapril 5 milliGRAM(s) Oral daily  enoxaparin Injectable 40 milliGRAM(s) SubCutaneous daily  ferrous    sulfate 325 milliGRAM(s) Oral daily  glucagon  Injectable 1 milliGRAM(s) IntraMuscular once  insulin glargine Injectable (LANTUS) 10 Unit(s) SubCutaneous at bedtime  insulin lispro (ADMELOG) corrective regimen sliding scale   SubCutaneous three times a day before meals  insulin lispro (ADMELOG) corrective regimen sliding scale   SubCutaneous at bedtime  levothyroxine 25 MICROGram(s) Oral daily  meropenem  IVPB 1000 milliGRAM(s) IV Intermittent every 8 hours  multivitamin/minerals 1 Tablet(s) Oral daily  pantoprazole    Tablet 40 milliGRAM(s) Oral before breakfast  sertraline 50 milliGRAM(s) Oral daily  tamsulosin 0.4 milliGRAM(s) Oral at bedtime      LABS: All Labs Reviewed:         RADIOLOGY/EKG:    xay< from: Xray Chest 1 View- PORTABLE-Urgent (Xray Chest 1 View- PORTABLE-Urgent .) (02.26.21 @ 10:55) >    IMPRESSION: Right PICC line inserted. Slight right base infiltrate seen prior no longer evident. Cardiac findings are stable.        < end of copied text >  < from: CT Abdomen and Pelvis w/ IV Cont (02.17.21 @ 14:10) >    IMPRESSION:  Sacral decubitus ulcer with air and soft tissue extending in the subcutaneous region laterally to the left buttock and slightly to the right of midline. Soft tissue extends to the adjacent lower sacrum and coccyx. No gross destruction is identified although osteomyelitis cannot be excluded. Clinical correlation is necessary.    < end of copied text >

## 2021-03-01 NOTE — PROGRESS NOTE ADULT - ASSESSMENT
71/F with PMHx of DM type 2, HTN, HLD, MDD, Hypothyroidism, Syncope, Depression, chronic diarrhea, sacral decubitus ulcer, UTIs, Anemia, Hyponatremia, admitted with     # Infected large chronic decubitus ulcer, present on admission  -s/p excisional debridement on 2/18  -no further surgical mx.   -wound Cultures growing: ESBL e.coli, enterococcus, staph. Abx switched to meropenem by ID [id number]  -will require 6 weeks of above IV abx via picc line on dc until 4/3 with weekly cbc, cmp, esr, crp  -local wound care per wound care RN    #UTI:   on IV Abx    #Depression  -increase sertraline 50mg daily  -no suicidal thouhts or intentions    #Chronic diarrhea:   -c diff neg  -prn imodium    # Anemia; acute on chronic: likely from chronic disease, iron deficiency and reported Hx of MDS  -FOBT negative  -Tx for Hb < 7  -iron supplements     #Diabetes   -A1C 6.9  -Diet controlled at home, not on any meds   -here has been on lantus 10 U QHS  and 3 units premeals  -low sugars so 3 untis premeals D/C on 3/1      off service note:  patietn admitted with infected sacral decubitus. will need iv abx for 4/3.   cleared for dc/ picc line in.    waiting insurance auth. [facility] also refusing to work with PT.   DC complete.

## 2021-03-02 ENCOUNTER — TRANSCRIPTION ENCOUNTER (OUTPATIENT)
Age: 71
End: 2021-03-02

## 2021-03-02 VITALS
HEART RATE: 80 BPM | SYSTOLIC BLOOD PRESSURE: 123 MMHG | OXYGEN SATURATION: 100 % | DIASTOLIC BLOOD PRESSURE: 41 MMHG | TEMPERATURE: 98 F | RESPIRATION RATE: 18 BRPM

## 2021-03-02 LAB
ANION GAP SERPL CALC-SCNC: 7 MMOL/L — SIGNIFICANT CHANGE UP (ref 5–17)
BUN SERPL-MCNC: 21 MG/DL — SIGNIFICANT CHANGE UP (ref 7–23)
CALCIUM SERPL-MCNC: 8.4 MG/DL — LOW (ref 8.5–10.1)
CHLORIDE SERPL-SCNC: 107 MMOL/L — SIGNIFICANT CHANGE UP (ref 96–108)
CO2 SERPL-SCNC: 22 MMOL/L — SIGNIFICANT CHANGE UP (ref 22–31)
CREAT SERPL-MCNC: 1.03 MG/DL — SIGNIFICANT CHANGE UP (ref 0.5–1.3)
GLUCOSE SERPL-MCNC: 115 MG/DL — HIGH (ref 70–99)
HCT VFR BLD CALC: 25.4 % — LOW (ref 34.5–45)
HGB BLD-MCNC: 7.7 G/DL — LOW (ref 11.5–15.5)
MAGNESIUM SERPL-MCNC: 1.9 MG/DL — SIGNIFICANT CHANGE UP (ref 1.6–2.6)
MCHC RBC-ENTMCNC: 23.8 PG — LOW (ref 27–34)
MCHC RBC-ENTMCNC: 30.3 GM/DL — LOW (ref 32–36)
MCV RBC AUTO: 78.4 FL — LOW (ref 80–100)
PHOSPHATE SERPL-MCNC: 2.8 MG/DL — SIGNIFICANT CHANGE UP (ref 2.5–4.5)
PLATELET # BLD AUTO: 421 K/UL — HIGH (ref 150–400)
POTASSIUM SERPL-MCNC: 5 MMOL/L — SIGNIFICANT CHANGE UP (ref 3.5–5.3)
POTASSIUM SERPL-SCNC: 5 MMOL/L — SIGNIFICANT CHANGE UP (ref 3.5–5.3)
RBC # BLD: 3.24 M/UL — LOW (ref 3.8–5.2)
RBC # FLD: 19.7 % — HIGH (ref 10.3–14.5)
SODIUM SERPL-SCNC: 136 MMOL/L — SIGNIFICANT CHANGE UP (ref 135–145)
WBC # BLD: 10.1 K/UL — SIGNIFICANT CHANGE UP (ref 3.8–10.5)
WBC # FLD AUTO: 10.1 K/UL — SIGNIFICANT CHANGE UP (ref 3.8–10.5)

## 2021-03-02 PROCEDURE — 99239 HOSP IP/OBS DSCHRG MGMT >30: CPT

## 2021-03-02 RX ORDER — FERROUS SULFATE 325(65) MG
1 TABLET ORAL
Qty: 0 | Refills: 0 | DISCHARGE

## 2021-03-02 RX ORDER — INSULIN LISPRO 100/ML
0 VIAL (ML) SUBCUTANEOUS
Qty: 0 | Refills: 0 | DISCHARGE

## 2021-03-02 RX ORDER — SERTRALINE 25 MG/1
1 TABLET, FILM COATED ORAL
Qty: 0 | Refills: 0 | DISCHARGE
Start: 2021-03-02

## 2021-03-02 RX ORDER — INSULIN GLARGINE 100 [IU]/ML
10 INJECTION, SOLUTION SUBCUTANEOUS
Qty: 0 | Refills: 0 | DISCHARGE
Start: 2021-03-02

## 2021-03-02 RX ORDER — INSULIN GLARGINE 100 [IU]/ML
20 INJECTION, SOLUTION SUBCUTANEOUS
Qty: 0 | Refills: 0 | DISCHARGE

## 2021-03-02 RX ORDER — INSULIN LISPRO 100/ML
0 VIAL (ML) SUBCUTANEOUS
Qty: 0 | Refills: 0 | DISCHARGE
Start: 2021-03-02

## 2021-03-02 RX ORDER — SERTRALINE 25 MG/1
1 TABLET, FILM COATED ORAL
Qty: 0 | Refills: 0 | DISCHARGE

## 2021-03-02 RX ADMIN — SERTRALINE 50 MILLIGRAM(S): 25 TABLET, FILM COATED ORAL at 12:59

## 2021-03-02 RX ADMIN — Medication 25 MICROGRAM(S): at 12:59

## 2021-03-02 RX ADMIN — Medication 1 APPLICATION(S): at 16:26

## 2021-03-02 RX ADMIN — Medication 500 MILLIGRAM(S): at 12:59

## 2021-03-02 RX ADMIN — OXYCODONE AND ACETAMINOPHEN 1 TABLET(S): 5; 325 TABLET ORAL at 16:26

## 2021-03-02 RX ADMIN — ENOXAPARIN SODIUM 40 MILLIGRAM(S): 100 INJECTION SUBCUTANEOUS at 12:59

## 2021-03-02 RX ADMIN — Medication 5 MILLIGRAM(S): at 12:59

## 2021-03-02 RX ADMIN — Medication 325 MILLIGRAM(S): at 12:59

## 2021-03-02 RX ADMIN — MEROPENEM 100 MILLIGRAM(S): 1 INJECTION INTRAVENOUS at 15:24

## 2021-03-02 RX ADMIN — AMLODIPINE BESYLATE 5 MILLIGRAM(S): 2.5 TABLET ORAL at 12:59

## 2021-03-02 RX ADMIN — Medication 1 TABLET(S): at 12:59

## 2021-03-02 RX ADMIN — PANTOPRAZOLE SODIUM 40 MILLIGRAM(S): 20 TABLET, DELAYED RELEASE ORAL at 12:59

## 2021-03-02 RX ADMIN — MEROPENEM 100 MILLIGRAM(S): 1 INJECTION INTRAVENOUS at 06:14

## 2021-03-02 NOTE — PROGRESS NOTE ADULT - SUBJECTIVE AND OBJECTIVE BOX
HOSPITALIST PROGRESS NOTE:  SUBJECTIVE:  PCP:  Chief Complaint: Patient is a 71y old  Female who presents with a chief complaint of sacral decubitus (28 Feb 2021 16:14)      HPI:  71/F with PMHx of DM type 2, HTN, HLD, MDD, Hypothyroidism, Syncope, Depression, chronic diarrhea, sacral decubitus ulcer, UTIs, Anemia, Hyponatremia, presents to the ED BIBA from [facility] for re evaluation and possible debridement of sacral decubitus ulcer. Reports she has been undergoing treatment for ulcer for 1 year and has been admitted for infection in the past. Pt reports increase pain at site with local tenderness, doesn't know if there is any discharge. No fever. As per NH paperwork, requesting Dr. Nunez in the ED. Recent COVID PCR on 02/15 which was negative. Blood work from yesterday with white count of 17.8 and sodium 131. On oral Flagyl x2 days.  She was seen in ED by Sx team and she is for debridement in OR tomorrow.  NO other complaints  CT abdo pelvis done; ? sacral Osteomyelitis  Fam Hx: does not remember (17 Feb 2021 17:28)    3/1: Patient has pain in decubit and lower back refusing to work with PT; No other complaints; States she has been eating breakfast and lunch.   3/2: Drop in H/H but patient is asymptomatic. has no complaints     Allergies:  Hytrin (Unknown)    REVIEW OF SYSTEMS:  See HPI. All other review of systems is negative unless indicated above.     OBJECTIVE  Physical Exam:  Vital Signs Last 24 Hrs  T(C): 36.4 (02 Mar 2021 07:53), Max: 37.3 (01 Mar 2021 15:38)  T(F): 97.6 (02 Mar 2021 07:53), Max: 99.1 (01 Mar 2021 15:38)  HR: 74 (02 Mar 2021 07:53) (71 - 83)  BP: 142/59 (02 Mar 2021 07:53) (109/45 - 142/59)  BP(mean): --  RR: 18 (02 Mar 2021 07:53) (18 - 19)  SpO2: 96% (02 Mar 2021 07:53) (96% - 100%)        Constitutional: NAD, awake and alert, well-developed  Neurological: AAO x 3, no focal deficits  HEENT: PERRLA, EOMI, MMM  Neck: Soft and supple, No LAD, No JVD  Respiratory: Breath sounds are clear bilaterally, No wheezing, rales or rhonchi  Cardiovascular: S1 and S2, regular rate and rhythm; no Murmurs, gallops or rubs  Gastrointestinal: Bowel Sounds present, soft, nontender, nondistended, no guarding, no rebound tenderness  Back: No CVA tenderness + sacral decubiti Ulcer   Extremities: No peripheral edema  Vascular: 2+ peripheral pulses  Musculoskeletal: 5/5 strength b/l upper and lower extremities  Skin: No rashes  Breast: Deferred  Rectal: Deferred    MEDICATIONS  (STANDING):  amLODIPine   Tablet 5 milliGRAM(s) Oral daily  ascorbic acid 500 milliGRAM(s) Oral daily  atorvastatin 20 milliGRAM(s) Oral at bedtime  Dakins Solution - 1/4 Strength 1 Application(s) Topical two times a day  dextrose 40% Gel 15 Gram(s) Oral once  dextrose 5%. 1000 milliLiter(s) (50 mL/Hr) IV Continuous <Continuous>  dextrose 5%. 1000 milliLiter(s) (100 mL/Hr) IV Continuous <Continuous>  dextrose 50% Injectable 25 Gram(s) IV Push once  dextrose 50% Injectable 12.5 Gram(s) IV Push once  dextrose 50% Injectable 25 Gram(s) IV Push once  enalapril 5 milliGRAM(s) Oral daily  enoxaparin Injectable 40 milliGRAM(s) SubCutaneous daily  ferrous    sulfate 325 milliGRAM(s) Oral daily  glucagon  Injectable 1 milliGRAM(s) IntraMuscular once  insulin glargine Injectable (LANTUS) 10 Unit(s) SubCutaneous at bedtime  insulin lispro (ADMELOG) corrective regimen sliding scale   SubCutaneous three times a day before meals  insulin lispro (ADMELOG) corrective regimen sliding scale   SubCutaneous at bedtime  levothyroxine 25 MICROGram(s) Oral daily  meropenem  IVPB 1000 milliGRAM(s) IV Intermittent every 8 hours  multivitamin/minerals 1 Tablet(s) Oral daily  pantoprazole    Tablet 40 milliGRAM(s) Oral before breakfast  sertraline 50 milliGRAM(s) Oral daily  tamsulosin 0.4 milliGRAM(s) Oral at bedtime      LABS: All Labs Reviewed:         RADIOLOGY/EKG:    xay< from: Xray Chest 1 View- PORTABLE-Urgent (Xray Chest 1 View- PORTABLE-Urgent .) (02.26.21 @ 10:55) >    IMPRESSION: Right PICC line inserted. Slight right base infiltrate seen prior no longer evident. Cardiac findings are stable.        < end of copied text >  < from: CT Abdomen and Pelvis w/ IV Cont (02.17.21 @ 14:10) >    IMPRESSION:  Sacral decubitus ulcer with air and soft tissue extending in the subcutaneous region laterally to the left buttock and slightly to the right of midline. Soft tissue extends to the adjacent lower sacrum and coccyx. No gross destruction is identified although osteomyelitis cannot be excluded. Clinical correlation is necessary.    < end of copied text >

## 2021-03-02 NOTE — PROGRESS NOTE ADULT - PROVIDER SPECIALTY LIST ADULT
Hospitalist
Infectious Disease
Nephrology
Surgery
Hospitalist
Nephrology
Hospitalist
Hospitalist
Infectious Disease
Nephrology
Surgery
Surgery
Infectious Disease
Surgery
Surgery

## 2021-03-02 NOTE — PROGRESS NOTE ADULT - NUTRITIONAL ASSESSMENT
This patient has been assessed with a concern for Malnutrition and has been determined to have a diagnosis/diagnoses of Severe protein-calorie malnutrition.    This patient is being managed with:   Diet Consistent Carbohydrate w/Evening Snack-  DASH/TLC {Sodium & Cholesterol Restricted} (DASH)  Entered: Feb 18 2021  6:13PM    
This patient has been assessed with a concern for Malnutrition and has been determined to have a diagnosis/diagnoses of Severe protein-calorie malnutrition.    This patient is being managed with:   Diet Consistent Carbohydrate w/Evening Snack-  DASH/TLC {Sodium & Cholesterol Restricted} (DASH)  Entered: Feb 18 2021  6:13PM      This patient has been assessed with a concern for Malnutrition and has been determined to have a diagnosis/diagnoses of Severe protein-calorie malnutrition.    This patient is being managed with:   Diet Consistent Carbohydrate w/Evening Snack-  DASH/TLC {Sodium & Cholesterol Restricted} (DASH)  Entered: Feb 18 2021  6:13PM    
This patient has been assessed with a concern for Malnutrition and has been determined to have a diagnosis/diagnoses of Severe protein-calorie malnutrition.    This patient is being managed with:   Diet Consistent Carbohydrate w/Evening Snack-  DASH/TLC {Sodium & Cholesterol Restricted} (DASH)  Entered: Feb 18 2021  6:13PM      This patient has been assessed with a concern for Malnutrition and has been determined to have a diagnosis/diagnoses of Severe protein-calorie malnutrition.    This patient is being managed with:   Diet Consistent Carbohydrate w/Evening Snack-  DASH/TLC {Sodium & Cholesterol Restricted} (DASH)  Entered: Feb 18 2021  6:13PM    
This patient has been assessed with a concern for Malnutrition and has been determined to have a diagnosis/diagnoses of Severe protein-calorie malnutrition.    This patient is being managed with:   Diet Consistent Carbohydrate w/Evening Snack-  DASH/TLC {Sodium & Cholesterol Restricted} (DASH)  Entered: Feb 18 2021  6:13PM    

## 2021-03-02 NOTE — DISCHARGE NOTE NURSING/CASE MANAGEMENT/SOCIAL WORK - PATIENT PORTAL LINK FT
You can access the FollowMyHealth Patient Portal offered by [facility] by registering at the following website: [email or web address]. By joining [employer]’s FollowMyHealth portal, you will also be able to view your health information using other applications (apps) compatible with our system.

## 2021-03-02 NOTE — PROGRESS NOTE ADULT - REASON FOR ADMISSION
sacral decubitus

## 2021-03-02 NOTE — PROGRESS NOTE ADULT - NSHPATTENDINGPLANDISCUSS_GEN_ALL_CORE
patient, nurse
patient, nursing, staff
patient,nursing, staff
patient, nurse
patient, nursing, staff

## 2021-03-10 NOTE — CDI QUERY NOTE - NSCDIOTHERTXTBX_GEN_ALL_CORE_HH
Documentation on chart of excisional debridement of sacral decubitus ulcer skin, subcutaneous tissue fat, fascia, muscle, and down to level of  bone.     Please document the lowest level of debridement.     A statement referring to “down to the level of ___” does not describe the specificity for coding.    A) Excisional debridement of sacral decubitus ulcer  including skin, subcutaneous tissue, fat, fascia, muscle, and bone.  B) Excisional debridement of sacral decubitus ulcer including skin, subcutaneous tissue, fat, fascia,  and muscle.  C) Other( Please specify)  D) Unable to determine

## 2021-08-26 NOTE — ED ADULT NURSE REASSESSMENT NOTE - NS ED NURSE REASSESS COMMENT FT1
Pt alert and oriented, no apparent distress noted at this time. Pt handed off to marianne JOHNSON in stable condition. No

## 2021-11-23 NOTE — DISCHARGE NOTE NURSING/CASE MANAGEMENT/SOCIAL WORK - NSPROEXTENSIONSOFSELF_GEN_A_NUR
no lesions,  no deformities,  no traumatic injuries,  no significant scars are present,  chest wall non-tender,  no masses present, breathing is unlabored without accessory muscle use, normal breath sounds
walker

## 2022-09-20 NOTE — CONSULT NOTE ADULT - PROBLEM SELECTOR RECOMMENDATION 9
covid 19 management in progress - remdesivir and decadron   AMS eval -   monitor for Hypoglycemia - eval in progress  on ABX for Acute Infection - cx pending  ID eval noted  tylenol and robitussin PRN for sx management  HOB elev - asp prec - oral hygiene - assist with needs  serial labs - lytes - markers -   D dimer - serially - DVT p   fio2 titration and support - keep sat > 88 pct  GOC documented - pt is DNR  curr on D10 IVF
hypoglycemia due to sulfonylurea in setting of diminished po intake, no resolved  increase admelog 6 units 3x/day before meals  add lantus 10 units qam  oral anti-diabetes agents on hold  goal bg 100-180 in hosp setting  cont cons cho diet
Warm
Thank you for consulting us and involving us in the management of this most interesting and challenging case. I certainly appreciate the challenges, stress and sacrifice during these difficult and challenging times.  Please call us at [phone] or text me directly on my cell# [phone] with any further questions or changes in clinical status for which ID input would be helpful

## 2022-11-25 ENCOUNTER — INPATIENT (INPATIENT)
Facility: HOSPITAL | Age: 72
LOS: 9 days | Discharge: HOSPICE | DRG: 871 | End: 2022-12-05
Attending: INTERNAL MEDICINE | Admitting: INTERNAL MEDICINE
Payer: MEDICARE

## 2022-11-25 VITALS — WEIGHT: 175.05 LBS | HEIGHT: 64 IN

## 2022-11-25 DIAGNOSIS — Z98.890 OTHER SPECIFIED POSTPROCEDURAL STATES: Chronic | ICD-10-CM

## 2022-11-25 DIAGNOSIS — K92.2 GASTROINTESTINAL HEMORRHAGE, UNSPECIFIED: ICD-10-CM

## 2022-11-25 LAB
ALBUMIN SERPL ELPH-MCNC: 2.4 G/DL — LOW (ref 3.3–5)
ALP SERPL-CCNC: 92 U/L — SIGNIFICANT CHANGE UP (ref 40–120)
ALT FLD-CCNC: 12 U/L — SIGNIFICANT CHANGE UP (ref 12–78)
ANION GAP SERPL CALC-SCNC: 14 MMOL/L — SIGNIFICANT CHANGE UP (ref 5–17)
APPEARANCE UR: CLEAR — SIGNIFICANT CHANGE UP
APTT BLD: 22.3 SEC — LOW (ref 27.5–35.5)
AST SERPL-CCNC: 12 U/L — LOW (ref 15–37)
BASOPHILS # BLD AUTO: 0.05 K/UL — SIGNIFICANT CHANGE UP (ref 0–0.2)
BASOPHILS NFR BLD AUTO: 0.2 % — SIGNIFICANT CHANGE UP (ref 0–2)
BILIRUB SERPL-MCNC: 0.2 MG/DL — SIGNIFICANT CHANGE UP (ref 0.2–1.2)
BILIRUB UR-MCNC: NEGATIVE — SIGNIFICANT CHANGE UP
BUN SERPL-MCNC: 90 MG/DL — HIGH (ref 7–23)
CALCIUM SERPL-MCNC: 11.1 MG/DL — HIGH (ref 8.5–10.1)
CHLORIDE SERPL-SCNC: 92 MMOL/L — LOW (ref 96–108)
CO2 SERPL-SCNC: 23 MMOL/L — SIGNIFICANT CHANGE UP (ref 22–31)
COLOR SPEC: YELLOW — SIGNIFICANT CHANGE UP
CREAT SERPL-MCNC: 1.9 MG/DL — HIGH (ref 0.5–1.3)
DIFF PNL FLD: ABNORMAL
EGFR: 27 ML/MIN/1.73M2 — LOW
EOSINOPHIL # BLD AUTO: 0 K/UL — SIGNIFICANT CHANGE UP (ref 0–0.5)
EOSINOPHIL NFR BLD AUTO: 0 % — SIGNIFICANT CHANGE UP (ref 0–6)
FLUAV AG NPH QL: SIGNIFICANT CHANGE UP
FLUBV AG NPH QL: SIGNIFICANT CHANGE UP
GLUCOSE SERPL-MCNC: 391 MG/DL — HIGH (ref 70–99)
GLUCOSE UR QL: 1000 MG/DL
HCT VFR BLD CALC: 25 % — LOW (ref 34.5–45)
HCT VFR BLD CALC: 33.6 % — LOW (ref 34.5–45)
HGB BLD-MCNC: 11.3 G/DL — LOW (ref 11.5–15.5)
HGB BLD-MCNC: 8 G/DL — LOW (ref 11.5–15.5)
IMM GRANULOCYTES NFR BLD AUTO: 0.7 % — SIGNIFICANT CHANGE UP (ref 0–0.9)
INR BLD: 0.97 RATIO — SIGNIFICANT CHANGE UP (ref 0.88–1.16)
KETONES UR-MCNC: ABNORMAL
LACTATE SERPL-SCNC: 3.3 MMOL/L — HIGH (ref 0.7–2)
LACTATE SERPL-SCNC: 6.1 MMOL/L — CRITICAL HIGH (ref 0.7–2)
LEUKOCYTE ESTERASE UR-ACNC: ABNORMAL
LYMPHOCYTES # BLD AUTO: 16.5 % — SIGNIFICANT CHANGE UP (ref 13–44)
LYMPHOCYTES # BLD AUTO: 4.24 K/UL — HIGH (ref 1–3.3)
MCHC RBC-ENTMCNC: 24.4 PG — LOW (ref 27–34)
MCHC RBC-ENTMCNC: 26 PG — LOW (ref 27–34)
MCHC RBC-ENTMCNC: 32 GM/DL — SIGNIFICANT CHANGE UP (ref 32–36)
MCHC RBC-ENTMCNC: 33.6 GM/DL — SIGNIFICANT CHANGE UP (ref 32–36)
MCV RBC AUTO: 76.2 FL — LOW (ref 80–100)
MCV RBC AUTO: 77.4 FL — LOW (ref 80–100)
MONOCYTES # BLD AUTO: 1.71 K/UL — HIGH (ref 0–0.9)
MONOCYTES NFR BLD AUTO: 6.7 % — SIGNIFICANT CHANGE UP (ref 2–14)
NEUTROPHILS # BLD AUTO: 19.53 K/UL — HIGH (ref 1.8–7.4)
NEUTROPHILS NFR BLD AUTO: 75.9 % — SIGNIFICANT CHANGE UP (ref 43–77)
NITRITE UR-MCNC: POSITIVE
PH UR: 6.5 — SIGNIFICANT CHANGE UP (ref 5–8)
PLATELET # BLD AUTO: 402 K/UL — HIGH (ref 150–400)
PLATELET # BLD AUTO: 563 K/UL — HIGH (ref 150–400)
POTASSIUM SERPL-MCNC: 3.9 MMOL/L — SIGNIFICANT CHANGE UP (ref 3.5–5.3)
POTASSIUM SERPL-SCNC: 3.9 MMOL/L — SIGNIFICANT CHANGE UP (ref 3.5–5.3)
PROT SERPL-MCNC: 6.7 GM/DL — SIGNIFICANT CHANGE UP (ref 6–8.3)
PROT UR-MCNC: 100
PROTHROM AB SERPL-ACNC: 11.3 SEC — SIGNIFICANT CHANGE UP (ref 10.5–13.4)
RBC # BLD: 3.28 M/UL — LOW (ref 3.8–5.2)
RBC # BLD: 4.34 M/UL — SIGNIFICANT CHANGE UP (ref 3.8–5.2)
RBC # FLD: 15.6 % — HIGH (ref 10.3–14.5)
RBC # FLD: 15.7 % — HIGH (ref 10.3–14.5)
RSV RNA NPH QL NAA+NON-PROBE: SIGNIFICANT CHANGE UP
SARS-COV-2 RNA SPEC QL NAA+PROBE: DETECTED
SODIUM SERPL-SCNC: 129 MMOL/L — LOW (ref 135–145)
SP GR SPEC: 1.01 — SIGNIFICANT CHANGE UP (ref 1.01–1.02)
TROPONIN I, HIGH SENSITIVITY RESULT: 142.68 NG/L — HIGH
TROPONIN I, HIGH SENSITIVITY RESULT: 88.76 NG/L — HIGH
UROBILINOGEN FLD QL: NEGATIVE — SIGNIFICANT CHANGE UP
WBC # BLD: 18.07 K/UL — HIGH (ref 3.8–10.5)
WBC # BLD: 25.71 K/UL — HIGH (ref 3.8–10.5)
WBC # FLD AUTO: 18.07 K/UL — HIGH (ref 3.8–10.5)
WBC # FLD AUTO: 25.71 K/UL — HIGH (ref 3.8–10.5)

## 2022-11-25 PROCEDURE — 36430 TRANSFUSION BLD/BLD COMPNT: CPT

## 2022-11-25 PROCEDURE — 36415 COLL VENOUS BLD VENIPUNCTURE: CPT

## 2022-11-25 PROCEDURE — 83036 HEMOGLOBIN GLYCOSYLATED A1C: CPT

## 2022-11-25 PROCEDURE — 82728 ASSAY OF FERRITIN: CPT

## 2022-11-25 PROCEDURE — 76770 US EXAM ABDO BACK WALL COMP: CPT

## 2022-11-25 PROCEDURE — 83605 ASSAY OF LACTIC ACID: CPT

## 2022-11-25 PROCEDURE — 82962 GLUCOSE BLOOD TEST: CPT

## 2022-11-25 PROCEDURE — 83735 ASSAY OF MAGNESIUM: CPT

## 2022-11-25 PROCEDURE — 85652 RBC SED RATE AUTOMATED: CPT

## 2022-11-25 PROCEDURE — 99223 1ST HOSP IP/OBS HIGH 75: CPT

## 2022-11-25 PROCEDURE — 80053 COMPREHEN METABOLIC PANEL: CPT

## 2022-11-25 PROCEDURE — 85045 AUTOMATED RETICULOCYTE COUNT: CPT

## 2022-11-25 PROCEDURE — 97162 PT EVAL MOD COMPLEX 30 MIN: CPT | Mod: GP

## 2022-11-25 PROCEDURE — 93306 TTE W/DOPPLER COMPLETE: CPT

## 2022-11-25 PROCEDURE — 83550 IRON BINDING TEST: CPT

## 2022-11-25 PROCEDURE — 71045 X-RAY EXAM CHEST 1 VIEW: CPT

## 2022-11-25 PROCEDURE — 93010 ELECTROCARDIOGRAM REPORT: CPT | Mod: 76

## 2022-11-25 PROCEDURE — 83615 LACTATE (LD) (LDH) ENZYME: CPT

## 2022-11-25 PROCEDURE — 83010 ASSAY OF HAPTOGLOBIN QUANT: CPT

## 2022-11-25 PROCEDURE — 84484 ASSAY OF TROPONIN QUANT: CPT

## 2022-11-25 PROCEDURE — 80069 RENAL FUNCTION PANEL: CPT

## 2022-11-25 PROCEDURE — 93005 ELECTROCARDIOGRAM TRACING: CPT

## 2022-11-25 PROCEDURE — 85379 FIBRIN DEGRADATION QUANT: CPT

## 2022-11-25 PROCEDURE — 80048 BASIC METABOLIC PNL TOTAL CA: CPT

## 2022-11-25 PROCEDURE — 83540 ASSAY OF IRON: CPT

## 2022-11-25 PROCEDURE — 99291 CRITICAL CARE FIRST HOUR: CPT | Mod: CS

## 2022-11-25 PROCEDURE — 85027 COMPLETE CBC AUTOMATED: CPT

## 2022-11-25 PROCEDURE — 71045 X-RAY EXAM CHEST 1 VIEW: CPT | Mod: 26

## 2022-11-25 PROCEDURE — C9113: CPT

## 2022-11-25 PROCEDURE — 83880 ASSAY OF NATRIURETIC PEPTIDE: CPT

## 2022-11-25 RX ORDER — ERYTHROPOIETIN 10000 [IU]/ML
10000 INJECTION, SOLUTION INTRAVENOUS; SUBCUTANEOUS
Qty: 0 | Refills: 0 | DISCHARGE

## 2022-11-25 RX ORDER — OXYCODONE AND ACETAMINOPHEN 5; 325 MG/1; MG/1
1 TABLET ORAL
Qty: 0 | Refills: 0 | DISCHARGE

## 2022-11-25 RX ORDER — TAMSULOSIN HYDROCHLORIDE 0.4 MG/1
1 CAPSULE ORAL
Qty: 0 | Refills: 0 | DISCHARGE

## 2022-11-25 RX ORDER — SODIUM CHLORIDE 9 MG/ML
1000 INJECTION INTRAMUSCULAR; INTRAVENOUS; SUBCUTANEOUS
Refills: 0 | Status: DISCONTINUED | OUTPATIENT
Start: 2022-11-25 | End: 2022-11-28

## 2022-11-25 RX ORDER — INSULIN LISPRO 100/ML
VIAL (ML) SUBCUTANEOUS
Refills: 0 | Status: DISCONTINUED | OUTPATIENT
Start: 2022-11-25 | End: 2022-12-05

## 2022-11-25 RX ORDER — OMEPRAZOLE 10 MG/1
1 CAPSULE, DELAYED RELEASE ORAL
Qty: 0 | Refills: 0 | DISCHARGE

## 2022-11-25 RX ORDER — LEVOTHYROXINE SODIUM 125 MCG
25 TABLET ORAL DAILY
Refills: 0 | Status: DISCONTINUED | OUTPATIENT
Start: 2022-11-26 | End: 2022-12-05

## 2022-11-25 RX ORDER — SODIUM CHLORIDE 9 MG/ML
1000 INJECTION, SOLUTION INTRAVENOUS
Refills: 0 | Status: DISCONTINUED | OUTPATIENT
Start: 2022-11-25 | End: 2022-12-05

## 2022-11-25 RX ORDER — ASCORBIC ACID 60 MG
1 TABLET,CHEWABLE ORAL
Qty: 0 | Refills: 0 | DISCHARGE

## 2022-11-25 RX ORDER — DEXTROSE 50 % IN WATER 50 %
25 SYRINGE (ML) INTRAVENOUS ONCE
Refills: 0 | Status: DISCONTINUED | OUTPATIENT
Start: 2022-11-25 | End: 2022-12-05

## 2022-11-25 RX ORDER — SERTRALINE 25 MG/1
50 TABLET, FILM COATED ORAL DAILY
Refills: 0 | Status: DISCONTINUED | OUTPATIENT
Start: 2022-11-26 | End: 2022-12-05

## 2022-11-25 RX ORDER — INSULIN LISPRO 100/ML
VIAL (ML) SUBCUTANEOUS AT BEDTIME
Refills: 0 | Status: DISCONTINUED | OUTPATIENT
Start: 2022-11-25 | End: 2022-12-05

## 2022-11-25 RX ORDER — PANTOPRAZOLE SODIUM 20 MG/1
8 TABLET, DELAYED RELEASE ORAL
Qty: 80 | Refills: 0 | Status: DISCONTINUED | OUTPATIENT
Start: 2022-11-25 | End: 2022-11-27

## 2022-11-25 RX ORDER — SODIUM CHLORIDE 9 MG/ML
1000 INJECTION INTRAMUSCULAR; INTRAVENOUS; SUBCUTANEOUS ONCE
Refills: 0 | Status: COMPLETED | OUTPATIENT
Start: 2022-11-25 | End: 2022-11-25

## 2022-11-25 RX ORDER — ACETAMINOPHEN 500 MG
2 TABLET ORAL
Qty: 0 | Refills: 0 | DISCHARGE

## 2022-11-25 RX ORDER — SODIUM CHLORIDE 9 MG/ML
1000 INJECTION INTRAMUSCULAR; INTRAVENOUS; SUBCUTANEOUS ONCE
Refills: 0 | Status: DISCONTINUED | OUTPATIENT
Start: 2022-11-25 | End: 2022-11-25

## 2022-11-25 RX ORDER — ATORVASTATIN CALCIUM 80 MG/1
1 TABLET, FILM COATED ORAL
Qty: 0 | Refills: 0 | DISCHARGE

## 2022-11-25 RX ORDER — SODIUM HYPOCHLORITE 0.125 %
0 SOLUTION, NON-ORAL MISCELLANEOUS
Qty: 0 | Refills: 0 | DISCHARGE

## 2022-11-25 RX ORDER — DEXTROSE 50 % IN WATER 50 %
15 SYRINGE (ML) INTRAVENOUS ONCE
Refills: 0 | Status: DISCONTINUED | OUTPATIENT
Start: 2022-11-25 | End: 2022-12-05

## 2022-11-25 RX ORDER — ONDANSETRON 8 MG/1
4 TABLET, FILM COATED ORAL EVERY 8 HOURS
Refills: 0 | Status: DISCONTINUED | OUTPATIENT
Start: 2022-11-25 | End: 2022-12-05

## 2022-11-25 RX ORDER — DEXTROSE 50 % IN WATER 50 %
12.5 SYRINGE (ML) INTRAVENOUS ONCE
Refills: 0 | Status: DISCONTINUED | OUTPATIENT
Start: 2022-11-25 | End: 2022-12-05

## 2022-11-25 RX ORDER — LEVOTHYROXINE SODIUM 125 MCG
1 TABLET ORAL
Qty: 0 | Refills: 0 | DISCHARGE

## 2022-11-25 RX ORDER — ATORVASTATIN CALCIUM 80 MG/1
20 TABLET, FILM COATED ORAL AT BEDTIME
Refills: 0 | Status: DISCONTINUED | OUTPATIENT
Start: 2022-11-26 | End: 2022-12-05

## 2022-11-25 RX ORDER — CEFTRIAXONE 500 MG/1
1000 INJECTION, POWDER, FOR SOLUTION INTRAMUSCULAR; INTRAVENOUS ONCE
Refills: 0 | Status: DISCONTINUED | OUTPATIENT
Start: 2022-11-25 | End: 2022-11-25

## 2022-11-25 RX ORDER — SIMETHICONE 80 MG/1
1 TABLET, CHEWABLE ORAL
Qty: 0 | Refills: 0 | DISCHARGE

## 2022-11-25 RX ORDER — CEFTRIAXONE 500 MG/1
1000 INJECTION, POWDER, FOR SOLUTION INTRAMUSCULAR; INTRAVENOUS ONCE
Refills: 0 | Status: COMPLETED | OUTPATIENT
Start: 2022-11-25 | End: 2022-11-25

## 2022-11-25 RX ORDER — GLUCAGON INJECTION, SOLUTION 0.5 MG/.1ML
1 INJECTION, SOLUTION SUBCUTANEOUS ONCE
Refills: 0 | Status: DISCONTINUED | OUTPATIENT
Start: 2022-11-25 | End: 2022-12-05

## 2022-11-25 RX ORDER — PANTOPRAZOLE SODIUM 20 MG/1
40 TABLET, DELAYED RELEASE ORAL ONCE
Refills: 0 | Status: COMPLETED | OUTPATIENT
Start: 2022-11-25 | End: 2022-11-25

## 2022-11-25 RX ORDER — MEROPENEM 1 G/30ML
1000 INJECTION INTRAVENOUS EVERY 12 HOURS
Refills: 0 | Status: COMPLETED | OUTPATIENT
Start: 2022-11-25 | End: 2022-12-02

## 2022-11-25 RX ADMIN — PANTOPRAZOLE SODIUM 40 MILLIGRAM(S): 20 TABLET, DELAYED RELEASE ORAL at 15:37

## 2022-11-25 RX ADMIN — SODIUM CHLORIDE 75 MILLILITER(S): 9 INJECTION INTRAMUSCULAR; INTRAVENOUS; SUBCUTANEOUS at 18:14

## 2022-11-25 RX ADMIN — SODIUM CHLORIDE 1000 MILLILITER(S): 9 INJECTION INTRAMUSCULAR; INTRAVENOUS; SUBCUTANEOUS at 15:37

## 2022-11-25 RX ADMIN — CEFTRIAXONE 1000 MILLIGRAM(S): 500 INJECTION, POWDER, FOR SOLUTION INTRAMUSCULAR; INTRAVENOUS at 15:37

## 2022-11-25 RX ADMIN — PANTOPRAZOLE SODIUM 10 MG/HR: 20 TABLET, DELAYED RELEASE ORAL at 18:14

## 2022-11-25 RX ADMIN — SODIUM CHLORIDE 1000 MILLILITER(S): 9 INJECTION INTRAMUSCULAR; INTRAVENOUS; SUBCUTANEOUS at 17:22

## 2022-11-25 NOTE — ED PROVIDER NOTE - PROGRESS NOTE DETAILS
Kia Ying:  Spoke to son over phone. Son states pt lives with him, is bed bound, has mild dementia, and has been having nausea, vomiting, and decreased PO intake over last several days culminating in large amount of coffee ground emesis today. Son reports her sugars were high today as well. Updated him on critical nature of his mother who is likely having a massive upper GI bleed given her hypotension, tachycardia, pale skin, and decreased responsiveness. Of note, no episodes of hematemesis or coffee ground emesis in ED. Pt's son states she has expressed her wishes of DNR, DNI and son states she would not want aggressive invasive medical interventions. ICU consulted. GI consulted. Dr. Sow with GI says likely not candidate for emergent endoscopy. Give 2 units of blood, reassess pt's vital signs and mental status. Will also give Protonix. Kia Ying:  [address], exp 01/31/2023. pt responded well to two units of PRBCs. ICU at bedside.

## 2022-11-25 NOTE — ED PROVIDER NOTE - OBJECTIVE STATEMENT
71 y/o female PMHx of HTN on ASA, HLD, Acute kidney failure, acute respiratory infection, DM type 2, Hypothyroid, MDD, syncope, DNR, DNI presents to ED BIBEMS for vomiting coffee ground emesis. Son on phone states pt lives with him, is bed bound, has mild dementia, and has been having nausea, vomiting, and decreased PO intake over last several days culminating in large amount of coffee ground emesis today. Son reports her sugars were high today as well. Pt in on ASA, no other blood thinners. Per son, pt does not want a breathing tube. Pt's son states she has expressed her wishes of DNR, DNI and son states she would not want aggressive invasive medical interventions.

## 2022-11-25 NOTE — ED PROVIDER NOTE - CARE PLAN
1 Principal Discharge DX:	Upper GI bleeding  Secondary Diagnosis:	Non-ST elevation MI (NSTEMI)  Secondary Diagnosis:	Acute UTI

## 2022-11-25 NOTE — ED PROVIDER NOTE - NEUROLOGICAL, MLM
Eyes opening, looking around room, intermittently mumbling but no coherent speech. Not able to follow commands. No focal deficits, no motor or sensory deficits.

## 2022-11-25 NOTE — PHARMACOTHERAPY INTERVENTION NOTE - COMMENTS
Medication history complete, reviewed medications with patient parent Jose David [phone] and confirmed with doctor [facility] hx.

## 2022-11-25 NOTE — PROGRESS NOTE ADULT - SUBJECTIVE AND OBJECTIVE BOX
asked to see patient by Dr. Ying    HPI:      71/F with PMHx of DM type 2, HTN, HLD, MDD, Hypothyroidism, Syncope,  sacral decubitus ulcer, UTIs, Anemia, sacral decubitus brought in by son for episode of coffee ground episode and brief episode  of lethargy. Patient not on blood thinners, had brief episode yesterday.  In ed hgb 8, got 2 units of blood has not been hypotensive last few hours  no melena in ED and no other episodes of bleeding  Patient son HCP at bedside, DNR,DNI, would not want endoscopy or aggressive measures  due to mothers dementia. now hemodynamically stable  in march 21 had hgb 7.7, ? chronic anemia, inc lactate now dec to 3.3, DANIEL bun 90, creatinine 1.9  now hyperglycemia, on glyburide at home    PMH as above     Fam Hx: does not remember (17 Feb 2021 17:28)  social hx. lives with son    Allergies:  Hytrin (Unknown)    REVIEW OF SYSTEMS:  cant obtain secondary to dementia         Height (cm): 162.6 (11-25 @ 14:51)  Weight (kg): 79.4 (11-25 @ 14:51)  BMI (kg/m2): 30 (11-25 @ 14:51)    ICU Vital Signs Last 24 Hrs  T(C): 35.3 (25 Nov 2022 15:46), Max: 35.7 (25 Nov 2022 14:54)  T(F): 95.5 (25 Nov 2022 15:46), Max: 96.3 (25 Nov 2022 14:54)  HR: 82 (25 Nov 2022 15:46) (82 - 117)  BP: 109/51 (25 Nov 2022 15:46) (82/66 - 144/119)  BP(mean): 67 (25 Nov 2022 15:46) (67 - 128)  ABP: --  ABP(mean): --  RR: 17 (25 Nov 2022 15:46) (17 - 23)  SpO2: 85% (25 Nov 2022 15:46) (85% - 96%)    O2 Parameters below as of 25 Nov 2022 15:46  Patient On (Oxygen Delivery Method): mask, nonrebreather  O2 Flow (L/min): 15    I&O's Summary    25 Nov 2022 07:01  -  25 Nov 2022 17:11  --------------------------------------------------------  IN: 655 mL / OUT: 0 mL / NET: 655 mL    Physical Exam  General frail pink  HEENT mucous membranes pink  neck supple  lungs breathing comfortable  cv rrr  abdomen soft, non tender  ext warm  skin sacral decub                          8.0    25.71 )-----------( 563      ( 25 Nov 2022 14:58 )             25.0       11-25    129<L>  |  92<L>  |  90<H>  ----------------------------<  391<H>  3.9   |  23  |  1.90<H>    Ca    11.1<H>      25 Nov 2022 14:58    TPro  6.7  /  Alb  2.4<L>  /  TBili  0.2  /  DBili  x   /  AST  12<L>  /  ALT  12  /  AlkPhos  92  11-25    DVT Prophylaxis:   held                                                              Advanced Directives: DNR/DNI no aggressive measures

## 2022-11-25 NOTE — ED PROVIDER NOTE - TOBACCO USE
Occupational therapy to evaluate and treat.  Ambulate with assistance. Ambulate with walker. Out of bed to chair.
Unknown if ever smoked

## 2022-11-25 NOTE — H&P ADULT - NSHPPHYSICALEXAM_GEN_ALL_CORE
Vital Signs Last 24 Hrs  T(C): 36.2 (25 Nov 2022 18:23), Max: 36.2 (25 Nov 2022 18:23)  T(F): 97.1 (25 Nov 2022 18:23), Max: 97.1 (25 Nov 2022 18:23)  HR: 83 (25 Nov 2022 18:05) (82 - 117)  BP: 115/72 (25 Nov 2022 18:05) (82/66 - 170/86)  BP(mean): 80 (25 Nov 2022 18:05) (67 - 128)  RR: 16 (25 Nov 2022 18:05) (16 - 23)  SpO2: 100% (25 Nov 2022 18:23) (85% - 100%)    Parameters below as of 25 Nov 2022 18:23  Patient On (Oxygen Delivery Method): nasal cannula  O2 Flow (L/min): 4

## 2022-11-25 NOTE — H&P ADULT - ASSESSMENT
#GI Bleeding  ~admit to Telemetry  ~serial CBCs  ~Type & Screen  ~patient is s/p 2units pRBC transfusion  ~cont. Pantoprazole gtt per protocol  ~NPO for now  ~f/u w/ GI consultation in the am    #Sepsis due to recurrent UTI  ~cont. IV hydration  ~as noted patient is s/p NS x 2L  ~cont. Meropenem 1g IVPB q12h  ~f/u w/ ID consultation    #Elevated Cardia Enzymes  ~DDx includes overal demand ischemia vs ACS  ~serial Leela  ~f/u proBNP  ~f/u w/ Cardiology in the am    #Acute Renal Failure  ~cont. IV hydration  ~f/u urine studies  ~f/u bladder scan  ~strict I/Os  ~f/u w/ Nephrology in the am    #Diabetes mellitus type 2  ~FS q6h while NPO  ~cont. ISS per protocol    #Goals of Care  ~Per son, pt does not want a breathing tube. Pt's son states she has expressed her wishes of DNR, DNI and son states she would not want aggressive invasive medical interventions.    #Vte ppx  ~SCDs in lieu of active GI bleeding   71 y/o F PMHx significant for Dementia (bed bound), Hypertension, Hyperlipidemia, Diabetes mellitus type 2, Major Depressive Disorder, Hypothyroidism, chronic diarrhea, sacral decubitus ulcer, recurrent MDRO UTIs, hx of Iron Deficiency Anemia, and Hyponatremia, DNR, DNI presents was BIBA from home, accompanied by her son, for further evaluation and management of nausea, and vomiting (coffee ground emesis) associated with increased lethargy. HPI obtained from patient's son, due to the patient's underlying dementia, who states the patient was increasingly lethargic, and has had decreased PO intake over the last several days. Of note the patient is on daily ASA as well. In the ED the patient CCM was consulted as the patient was noted to be hypotensive.   Labs => WBC 25.71, Hgb/Hct 8.0/25.0, MCV/MCH 76.2/24.4, , TnI 88.76 -> 142.68, LA 6.1 -> 3.3, Na 129, BUN/Cr 90/1.90, Glu 390, Ca 11.1, Alb 2.4, (+)UA, COVID-19 (+). In the ED the patient received Ceftriaxone 1g IVPB x 1, Pantoprazole 40mg IVP x 1, then started on a Pantoprazole gtt, NS x 2L, and pRBCs x 2.    #GI Bleeding  ~admit to Telemetry  ~serial CBCs  ~Type & Screen  ~patient is s/p 2units pRBC transfusion  ~cont. Pantoprazole gtt per protocol  ~NPO for now  ~f/u w/ GI consultation in the am    #Sepsis due to recurrent UTI  ~cont. IV hydration  ~as noted patient is s/p NS x 2L  ~cont. Meropenem 1g IVPB q12h  ~f/u w/ ID consultation    #Elevated Cardia Enzymes  ~DDx includes overal demand ischemia vs ACS  ~serial Leela  ~f/u proBNP  ~f/u w/ Cardiology in the am    #Acute Renal Failure  ~cont. IV hydration  ~f/u urine studies  ~f/u bladder scan  ~strict I/Os  ~f/u w/ Nephrology in the am    #Diabetes mellitus type 2  ~FS q6h while NPO  ~cont. ISS per protocol    #Goals of Care  ~Per son, pt does not want a breathing tube. Pt's son states she has expressed her wishes of DNR, DNI and son states she would not want aggressive invasive medical interventions.    #Vte ppx  ~SCDs in lieu of active GI bleeding   71 y/o F PMHx significant for Dementia (bed bound), Hypertension, Hyperlipidemia, Diabetes mellitus type 2, Major Depressive Disorder, Hypothyroidism, chronic diarrhea, sacral decubitus ulcer, recurrent MDRO UTIs, hx of Iron Deficiency Anemia, and Hyponatremia, DNR, DNI presents was BIBA from home, accompanied by her son, for further evaluation and management of nausea, and vomiting (coffee ground emesis) associated with increased lethargy. HPI obtained from patient's son, due to the patient's underlying dementia, who states the patient was increasingly lethargic, and has had decreased PO intake over the last several days. Of note the patient is on daily ASA as well. In the ED the patient CCM was consulted as the patient was noted to be hypotensive.   Labs => WBC 25.71, Hgb/Hct 8.0/25.0, MCV/MCH 76.2/24.4, , TnI 88.76 -> 142.68, LA 6.1 -> 3.3, Na 129, BUN/Cr 90/1.90, Glu 390, Ca 11.1, Alb 2.4, (+)UA, COVID-19 (+). In the ED the patient received Ceftriaxone 1g IVPB x 1, Pantoprazole 40mg IVP x 1, then started on a Pantoprazole gtt, NS x 2L, and pRBCs x 2.    #GI Bleeding  ~admit to Telemetry  ~serial CBCs  ~Type & Screen  ~patient is s/p 2units pRBC transfusion  ~cont. Pantoprazole gtt per protocol  ~NPO for now  ~f/u w/ GI consultation in the am    #Sepsis due to recurrent UTI  #Lactic acidosis  ~cont. IV hydration  ~as noted patient is s/p NS x 2L  ~cont. Meropenem 1g IVPB q12h  ~f/u w/ ID consultation  ~cont. to trend Lactate    #Elevated Cardiac Enzymes  ~DDx includes overal demand ischemia vs ACS  ~serial Leela  ~f/u proBNP  ~f/u w/ Cardiology in the am    #Acute Renal Failure  ~cont. IV hydration  ~f/u urine studies  ~f/u bladder scan  ~strict I/Os  ~f/u w/ Nephrology in the am    #Diabetes mellitus type 2  ~FS q6h while NPO  ~cont. ISS per protocol    #Acute COVID-19  ~maintain on airborne isolation  ~continue with O2 as needed via nasal cannula and up-titrate as needed. If on non-rebreather mask, start continuous oximetry monitoring.  ~cont. anti-pyretics w/ Acetaminophen 650 mg PO q4h PRN fever. Limit use of NSAIDs.  ~cont. HFA albuterol Q6 hour PRN via MDI. Would avoid nebulized preparations to limit risk of aerosol formation.  ~f/u am labs     #Goals of Care  ~Per son, pt does not want a breathing tube. Pt's son states she has expressed her wishes of DNR, DNI and son states she would not want aggressive invasive medical interventions.    #Vte ppx  ~SCDs in lieu of active GI bleeding

## 2022-11-25 NOTE — H&P ADULT - NSICDXPASTSURGICALHX_GEN_ALL_CORE_FT
PAST SURGICAL HISTORY:  H/O lumpectomy     H/O shoulder surgery     History of colon surgery     No significant past surgical history     No significant past surgical history      PAST SURGICAL HISTORY:  H/O lumpectomy     H/O shoulder surgery     History of colon surgery

## 2022-11-25 NOTE — ED PROVIDER NOTE - CLINICAL SUMMARY MEDICAL DECISION MAKING FREE TEXT BOX
Demented bed-bound female, DNR, DNI presents with upper GI bleed. ICU consulted. GI consulted. Dr. Sow with GI says likely not candidate for emergent endoscopy. Give 2 units of blood, reassess pt's vital signs and mental status. Will also give Protonix.

## 2022-11-25 NOTE — ED PROVIDER NOTE - NSICDXPASTMEDICALHX_GEN_ALL_CORE_FT
PAST MEDICAL HISTORY:  Acute kidney failure, unspecified     MARRY (acute respiratory infection)     Ascorbic acid deficiency     Changes in skin texture     Depression     Diabetes     Diabetes mellitus type 2    Disorder of thyroid, unspecified     Essential (primary) hypertension     Flatulence     High cholesterol     HTN (hypertension)     Hyperlipidemia     Hyperlipidemia, unspecified     Hypertension     Hypothyroid     Intraoperative hemorrhage and hematoma of a genitourinary system organ or structure complicating other procedure     Major depressive disorder, recurrent     MDD (major depressive disorder)     Pain, unspecified     Syncope and collapse     Type 2 diabetes mellitus without complication

## 2022-11-25 NOTE — ED ADULT NURSE NOTE - OBJECTIVE STATEMENT
Patient BIBEMS for complaint of vomiting blood. Patient with coffee ground emesis on her clothing at this time and brought immediately to trauma room. Patient received with IO to left shoulder, patient not able to answer questions at this time due to medical condition. Patient not talking at this time, blank stare, secretions from mouth. Non rebreather placed, cardiac monitor placed. Two IV's placed, one 18g to left AC and 20g to right AC. Patient brought in with garcia catheter. Patient hypotensive and hypothermic. MD Ying at bedside and aware. Code MTP called at 14:51. Two units of PRBC administered. Patient became restless in stretcher, two IV's removed and another #20 gauge placed and wrapped with gauze to right forearm. Luke hugger placed on patient.

## 2022-11-25 NOTE — H&P ADULT - NSHPSOURCEINFORD_GEN_ALL_CORE
Chart(s)/Patient/Physician/Provider
You can access the [facility] Patient Portal, offered by [facility], by registering with the following website: [email or web address]

## 2022-11-25 NOTE — H&P ADULT - HISTORY OF PRESENT ILLNESS
Labs => WBC 25.71, Hgb/Hct 8.0/25.0, MCV/MCH 76.2/24.4, , TnI 88.76 -> 142.68, LA 6.1 -> 3.3, Na 129, BUN/Cr 90/1.90, Glu 390, Ca 11.1, Alb 2.4, (+)UA, COVID-19 (+). In the ED the patient received Ceftriaxone 1g IVPB x 1, Pantoprazole 40mg IVP x 1, then started on a Pantoprazole gtt, NS x 2L, and pRBCs x 2. 73 y/o F PMHx significant for Dementia (bed bound), Hypertension, Hyperlipidemia, Diabetes mellitus type 2, Major Depressive Disorder, Hypothyroidism, chronic diarrhea, sacral decubitus ulcer, recurrent MDRO UTIs, hx of Iron Deficiency Anemia, and Hyponatremia, DNR, DNI presents was BIBA from home, accompanied by her son, for further evaluation and management of nausea, and vomiting (coffee ground emesis) associated with increased lethargy. HPI obtained from patient's son, due to the patient's underlying dementia, who states the patient was increasingly lethargic, and has had decreased PO intake over the last several days. Of note the patient is on daily ASA as well. In the ED the patient CCM was consulted as the patient was noted to be hypotensive.   Labs => WBC 25.71, Hgb/Hct 8.0/25.0, MCV/MCH 76.2/24.4, , TnI 88.76 -> 142.68, LA 6.1 -> 3.3, Na 129, BUN/Cr 90/1.90, Glu 390, Ca 11.1, Alb 2.4, (+)UA, COVID-19 (+). In the ED the patient received Ceftriaxone 1g IVPB x 1, Pantoprazole 40mg IVP x 1, then started on a Pantoprazole gtt, NS x 2L, and pRBCs x 2.

## 2022-11-25 NOTE — PROGRESS NOTE ADULT - ASSESSMENT
Patient with dementia, multiple medical problems DM, HTN, with hyperglycemia  with likely upper GI bleed. likely ulcer  bp stable in ed no further bleeding  family does not want aggressive measures, including endoscopy  lactate clearing    can be admitted to Medical floor  PPI drip  transfuse prn  inc wbc, glucose chronic garcia check u/a., hydation possible abx pending u/a  serial h/h  sliding scale for hyperglycemia Patient with dementia, multiple medical problems DM, HTN, with hyperglycemia  with likely upper GI bleed. likely ulcer  bp stable in ed no further bleeding, Patient now hemodynamically stable bleeding likely stopped  family does not want aggressive measures, including endoscopy  lactate clearing  DANIEL creatinine was normal in 2021, likely from dehydration, hydrate and monitor  can be admitted to Medical floor  PPI drip  transfuse prn  inc wbc, glucose chronic garcia check u/a., hydation possible abx pending u/a  serial h/h  sliding scale for hyperglycemia

## 2022-11-25 NOTE — H&P ADULT - NSICDXPASTMEDICALHX_GEN_ALL_CORE_FT
PAST MEDICAL HISTORY:  Acute kidney failure, unspecified     MARRY (acute respiratory infection)     Ascorbic acid deficiency     Changes in skin texture     Depression     Diabetes     Diabetes mellitus type 2    Disorder of thyroid, unspecified     Essential (primary) hypertension     Flatulence     High cholesterol     HTN (hypertension)     Hyperlipidemia     Hyperlipidemia, unspecified     Hypertension     Hypothyroid     Intraoperative hemorrhage and hematoma of a genitourinary system organ or structure complicating other procedure     Major depressive disorder, recurrent     MDD (major depressive disorder)     Pain, unspecified     Syncope and collapse     Type 2 diabetes mellitus without complication      PAST MEDICAL HISTORY:  Acute kidney failure, unspecified     MARRY (acute respiratory infection)     Ascorbic acid deficiency     Changes in skin texture     Essential (primary) hypertension     Flatulence     Hyperlipidemia     Hypothyroid     Intraoperative hemorrhage and hematoma of a genitourinary system organ or structure complicating other procedure     MDD (major depressive disorder)     Pain, unspecified     Syncope and collapse     Type 2 diabetes mellitus without complication

## 2022-11-25 NOTE — ED ADULT NURSE NOTE - NSIMPLEMENTINTERV_GEN_ALL_ED
Implemented All Fall with Harm Risk Interventions:  [place] to call system. Call bell, personal items and telephone within reach. Instruct patient to call for assistance. Room bathroom lighting operational. Non-slip footwear when patient is off stretcher. Physically safe environment: no spills, clutter or unnecessary equipment. Stretcher in lowest position, wheels locked, appropriate side rails in place. Provide visual cue, wrist band, yellow gown, etc. Monitor gait and stability. Monitor for mental status changes and reorient to person, place, and time. Review medications for side effects contributing to fall risk. Reinforce activity limits and safety measures with patient and family. Provide visual clues: red socks.

## 2022-11-26 LAB
A1C WITH ESTIMATED AVERAGE GLUCOSE RESULT: 7.2 % — HIGH (ref 4–5.6)
ALBUMIN SERPL ELPH-MCNC: 2 G/DL — LOW (ref 3.3–5)
ALP SERPL-CCNC: 81 U/L — SIGNIFICANT CHANGE UP (ref 40–120)
ALT FLD-CCNC: 15 U/L — SIGNIFICANT CHANGE UP (ref 12–78)
ANION GAP SERPL CALC-SCNC: 10 MMOL/L — SIGNIFICANT CHANGE UP (ref 5–17)
ANION GAP SERPL CALC-SCNC: 8 MMOL/L — SIGNIFICANT CHANGE UP (ref 5–17)
AST SERPL-CCNC: 20 U/L — SIGNIFICANT CHANGE UP (ref 15–37)
BILIRUB SERPL-MCNC: 0.3 MG/DL — SIGNIFICANT CHANGE UP (ref 0.2–1.2)
BUN SERPL-MCNC: 68 MG/DL — HIGH (ref 7–23)
BUN SERPL-MCNC: 71 MG/DL — HIGH (ref 7–23)
CALCIUM SERPL-MCNC: 9.8 MG/DL — SIGNIFICANT CHANGE UP (ref 8.5–10.1)
CALCIUM SERPL-MCNC: 9.9 MG/DL — SIGNIFICANT CHANGE UP (ref 8.5–10.1)
CHLORIDE SERPL-SCNC: 103 MMOL/L — SIGNIFICANT CHANGE UP (ref 96–108)
CHLORIDE SERPL-SCNC: 99 MMOL/L — SIGNIFICANT CHANGE UP (ref 96–108)
CO2 SERPL-SCNC: 23 MMOL/L — SIGNIFICANT CHANGE UP (ref 22–31)
CO2 SERPL-SCNC: 26 MMOL/L — SIGNIFICANT CHANGE UP (ref 22–31)
CREAT SERPL-MCNC: 1.71 MG/DL — HIGH (ref 0.5–1.3)
CREAT SERPL-MCNC: 1.76 MG/DL — HIGH (ref 0.5–1.3)
EGFR: 30 ML/MIN/1.73M2 — LOW
EGFR: 31 ML/MIN/1.73M2 — LOW
ESTIMATED AVERAGE GLUCOSE: 160 MG/DL — HIGH (ref 68–114)
GLUCOSE SERPL-MCNC: 249 MG/DL — HIGH (ref 70–99)
GLUCOSE SERPL-MCNC: 65 MG/DL — LOW (ref 70–99)
HCT VFR BLD CALC: 30.4 % — LOW (ref 34.5–45)
HCT VFR BLD CALC: 31.4 % — LOW (ref 34.5–45)
HGB BLD-MCNC: 10.2 G/DL — LOW (ref 11.5–15.5)
HGB BLD-MCNC: 10.4 G/DL — LOW (ref 11.5–15.5)
LACTATE SERPL-SCNC: 1.2 MMOL/L — SIGNIFICANT CHANGE UP (ref 0.7–2)
LACTATE SERPL-SCNC: 3.3 MMOL/L — HIGH (ref 0.7–2)
MAGNESIUM SERPL-MCNC: 1.7 MG/DL — SIGNIFICANT CHANGE UP (ref 1.6–2.6)
MCHC RBC-ENTMCNC: 25.6 PG — LOW (ref 27–34)
MCHC RBC-ENTMCNC: 26 PG — LOW (ref 27–34)
MCHC RBC-ENTMCNC: 33.1 GM/DL — SIGNIFICANT CHANGE UP (ref 32–36)
MCHC RBC-ENTMCNC: 33.6 GM/DL — SIGNIFICANT CHANGE UP (ref 32–36)
MCV RBC AUTO: 77.3 FL — LOW (ref 80–100)
MCV RBC AUTO: 77.4 FL — LOW (ref 80–100)
PLATELET # BLD AUTO: 361 K/UL — SIGNIFICANT CHANGE UP (ref 150–400)
PLATELET # BLD AUTO: 379 K/UL — SIGNIFICANT CHANGE UP (ref 150–400)
POTASSIUM SERPL-MCNC: 3.7 MMOL/L — SIGNIFICANT CHANGE UP (ref 3.5–5.3)
POTASSIUM SERPL-MCNC: 3.8 MMOL/L — SIGNIFICANT CHANGE UP (ref 3.5–5.3)
POTASSIUM SERPL-SCNC: 3.7 MMOL/L — SIGNIFICANT CHANGE UP (ref 3.5–5.3)
POTASSIUM SERPL-SCNC: 3.8 MMOL/L — SIGNIFICANT CHANGE UP (ref 3.5–5.3)
PROT SERPL-MCNC: 6.2 GM/DL — SIGNIFICANT CHANGE UP (ref 6–8.3)
RBC # BLD: 3.93 M/UL — SIGNIFICANT CHANGE UP (ref 3.8–5.2)
RBC # BLD: 4.06 M/UL — SIGNIFICANT CHANGE UP (ref 3.8–5.2)
RBC # FLD: 15.4 % — HIGH (ref 10.3–14.5)
RBC # FLD: 15.8 % — HIGH (ref 10.3–14.5)
SODIUM SERPL-SCNC: 133 MMOL/L — LOW (ref 135–145)
SODIUM SERPL-SCNC: 136 MMOL/L — SIGNIFICANT CHANGE UP (ref 135–145)
TROPONIN I, HIGH SENSITIVITY RESULT: 331.22 NG/L — HIGH
WBC # BLD: 22.66 K/UL — HIGH (ref 3.8–10.5)
WBC # BLD: 22.82 K/UL — HIGH (ref 3.8–10.5)
WBC # FLD AUTO: 22.66 K/UL — HIGH (ref 3.8–10.5)
WBC # FLD AUTO: 22.82 K/UL — HIGH (ref 3.8–10.5)

## 2022-11-26 PROCEDURE — 99233 SBSQ HOSP IP/OBS HIGH 50: CPT

## 2022-11-26 PROCEDURE — 93010 ELECTROCARDIOGRAM REPORT: CPT

## 2022-11-26 PROCEDURE — 76770 US EXAM ABDO BACK WALL COMP: CPT | Mod: 26

## 2022-11-26 RX ORDER — DEXTROSE 50 % IN WATER 50 %
15 SYRINGE (ML) INTRAVENOUS ONCE
Refills: 0 | Status: COMPLETED | OUTPATIENT
Start: 2022-11-26 | End: 2022-11-26

## 2022-11-26 RX ORDER — HALOPERIDOL DECANOATE 100 MG/ML
0.5 INJECTION INTRAMUSCULAR EVERY 6 HOURS
Refills: 0 | Status: DISCONTINUED | OUTPATIENT
Start: 2022-11-26 | End: 2022-12-05

## 2022-11-26 RX ORDER — GUAIFENESIN/DEXTROMETHORPHAN 600MG-30MG
10 TABLET, EXTENDED RELEASE 12 HR ORAL EVERY 4 HOURS
Refills: 0 | Status: DISCONTINUED | OUTPATIENT
Start: 2022-11-26 | End: 2022-12-05

## 2022-11-26 RX ORDER — ALBUTEROL 90 UG/1
2 AEROSOL, METERED ORAL EVERY 4 HOURS
Refills: 0 | Status: DISCONTINUED | OUTPATIENT
Start: 2022-11-26 | End: 2022-12-05

## 2022-11-26 RX ORDER — MAGNESIUM SULFATE 500 MG/ML
1 VIAL (ML) INJECTION ONCE
Refills: 0 | Status: COMPLETED | OUTPATIENT
Start: 2022-11-26 | End: 2022-11-26

## 2022-11-26 RX ADMIN — Medication 100 GRAM(S): at 02:37

## 2022-11-26 RX ADMIN — MEROPENEM 100 MILLIGRAM(S): 1 INJECTION INTRAVENOUS at 21:29

## 2022-11-26 RX ADMIN — PANTOPRAZOLE SODIUM 10 MG/HR: 20 TABLET, DELAYED RELEASE ORAL at 10:50

## 2022-11-26 RX ADMIN — Medication 1: at 16:25

## 2022-11-26 RX ADMIN — SERTRALINE 50 MILLIGRAM(S): 25 TABLET, FILM COATED ORAL at 10:50

## 2022-11-26 RX ADMIN — MEROPENEM 100 MILLIGRAM(S): 1 INJECTION INTRAVENOUS at 01:00

## 2022-11-26 RX ADMIN — Medication 15 GRAM(S): at 12:32

## 2022-11-26 RX ADMIN — HALOPERIDOL DECANOATE 0.5 MILLIGRAM(S): 100 INJECTION INTRAMUSCULAR at 02:47

## 2022-11-26 RX ADMIN — ATORVASTATIN CALCIUM 20 MILLIGRAM(S): 80 TABLET, FILM COATED ORAL at 21:28

## 2022-11-26 RX ADMIN — MEROPENEM 100 MILLIGRAM(S): 1 INJECTION INTRAVENOUS at 11:13

## 2022-11-26 NOTE — PROGRESS NOTE ADULT - SUBJECTIVE AND OBJECTIVE BOX
Reason for Admission: Nausea, Vomiting Blood  History of Present Illness:   71 y/o F PMHx significant for Dementia (bed bound), Hypertension, Hyperlipidemia, Diabetes mellitus type 2, Major Depressive Disorder, Hypothyroidism, chronic diarrhea, sacral decubitus ulcer, recurrent MDRO UTIs, hx of Iron Deficiency Anemia, and Hyponatremia, DNR, DNI presents was BIBA from home, accompanied by her son, for further evaluation and management of nausea, and vomiting (coffee ground emesis) associated with increased lethargy. HPI obtained from patient's son, due to the patient's underlying dementia, who states the patient was increasingly lethargic, and has had decreased PO intake over the last several days. Of note the patient is on daily ASA as well. In the ED the patient CCM was consulted as the patient was noted to be hypotensive.        REVIEW OF SYSTEMS:  General: NAD, hemodynamically stable, (-)  fever, (-) chills, (-) weakness  HEENT:  Eyes:  No visual loss, blurred vision, double vision or yellow sclerae. Ears, Nose, Throat:  No hearing loss, sneezing, congestion, runny nose or sore throat.  SKIN:  No rash or itching.  CARDIOVASCULAR:  No chest pain, chest pressure or chest discomfort. No palpitations or edema.  RESPIRATORY:  No shortness of breath, cough or sputum.  GASTROINTESTINAL:  No anorexia, nausea, vomiting or diarrhea. No abdominal pain or blood.  NEUROLOGICAL:  No headache, dizziness, syncope, paralysis, ataxia, numbness or tingling in the extremities. No change in bowel or bladder control.  MUSCULOSKELETAL:  No muscle, back pain, joint pain or stiffness.  HEMATOLOGIC:  No anemia, bleeding or bruising.  LYMPHATICS:  No enlarged nodes. No history of splenectomy.  ENDOCRINOLOGIC:  No reports of sweating, cold or heat intolerance. No polyuria or polydipsia.  ALLERGIES:  No history of asthma, hives, eczema or rhinitis.    Physical Exam:   GENERAL APPEARANCE:  NAD, hemodynamically stable  T(C): 36.8 (22 @ 08:21), Max: 37.1 (22 @ 22:40)  HR: 70 (22 @ 08:21) (70 - 117)  BP: 93/47 (22 @ 08:21) (86/61 - 170/86)  RR: 19 (22 @ 08:21) (16 - 23)  SpO2: 96% (22 @ 08:21) (85% - 100%)  Wt(kg): --  HEENT:  Head is normocephalic    Skin:  Warm and dry without any rash   NECK:  Supple without lymphadenopathy.   HEART:  Regular rate and rhythm. normal S1 and S2, No M/R/G  LUNGS:  Good ins/exp effort, no W/R/R/C  ABDOMEN:  Soft, nontender, nondistended with good bowel sounds heard  EXTREMITIES:  Without cyanosis, clubbing or edema.   NEUROLOGICAL:  Gross nonfocal       CBC Full  -  ( 2022 08:29 )  WBC Count : 22.82 K/uL  RBC Count : 3.93 M/uL  Hemoglobin : 10.2 g/dL  Hematocrit : 30.4 %  Platelet Count - Automated : 361 K/uL  Mean Cell Volume : 77.4 fl  Mean Cell Hemoglobin : 26.0 pg  Mean Cell Hemoglobin Concentration : 33.6 gm/dL  Auto Neutrophil # : x  Auto Lymphocyte # : x  Auto Monocyte # : x  Auto Eosinophil # : x  Auto Basophil # : x  Auto Neutrophil % : x  Auto Lymphocyte % : x  Auto Monocyte % : x  Auto Eosinophil % : x  Auto Basophil % : x    PT/INR - ( 2022 14:58 )   PT: 11.3 sec;   INR: 0.97 ratio         PTT - ( 2022 14:58 )  PTT:22.3 sec  Urinalysis Basic - ( 2022 17:19 )    Color: Yellow / Appearance: Clear / S.010 / pH: x  Gluc: x / Ketone: Trace  / Bili: Negative / Urobili: Negative   Blood: x / Protein: 100 / Nitrite: Positive   Leuk Esterase: Moderate / RBC: 25-50 /HPF / WBC >50   Sq Epi: x / Non Sq Epi: Few / Bacteria: TNTC          136  |  103  |  68<H>  ----------------------------<  65<L>  3.8   |  23  |  1.76<H>    Ca    9.8      2022 08:29  Mg     1.7         TPro  6.2  /  Alb  2.0<L>  /  TBili  0.3  /  DBili  x   /  AST  20  /  ALT  15  /  AlkPhos  81        # Acute blood loss anemia suspected upper GI Bleeding  - stable HH currently  - 2 peripheral bore IVs  - Active type and screen  - patient is s/p 2units pRBC transfusion  - cont. Pantoprazole gtt per protocol  - restart po diet  - GI following    # Sepsis due to recurrent UTI  # Lactic acidosis resolved  - Elevated WBC  - Lactic acidosis resolved  - continue with hydration  - cont. Meropenem 1g IVPB q12h    # Elevated Cardiac Enzymes  - DDx includes overal demand ischemia vs ACS  - serial Leela    #Acute Renal Failure  - cont. IV hydration  - f/u urine studies  - f/u bladder scan  - strict I/Os  - f/u w/ Nephrology in the am    #Diabetes mellitus type 2  - FS q6h while NPO  - cont. ISS per protocol    #Acute COVID-19  - maintain on airborne isolation  - continue with O2 as needed via nasal cannula and up-titrate as needed. If on non-rebreather mask, start continuous oximetry monitoring.  - cont. anti-pyretics w/ Acetaminophen 650 mg PO q4h PRN fever. Limit use of NSAIDs.  - cont. HFA albuterol Q6 hour PRN via MDI. Would avoid nebulized preparations to limit risk of aerosol formation.    # Goals of Care  - Per son, pt does not want a breathing tube. Pt's son states she has expressed her wishes of DNR, DNI and son states she would not want aggressive invasive medical interventions.    # Vte ppx  - SCDs in lieu of active GI bleeding         Reason for Admission: Nausea, Vomiting Blood  History of Present Illness:   71 y/o F PMHx significant for Dementia (bed bound), Hypertension, Hyperlipidemia, Diabetes mellitus type 2, Major Depressive Disorder, Hypothyroidism, chronic diarrhea, sacral decubitus ulcer, recurrent MDRO UTIs, hx of Iron Deficiency Anemia, and Hyponatremia, DNR, DNI presents was BIBA from home, accompanied by her son, for further evaluation and management of nausea, and vomiting (coffee ground emesis) associated with increased lethargy. HPI obtained from patient's son, due to the patient's underlying dementia, who states the patient was increasingly lethargic, and has had decreased PO intake over the last several days. Of note the patient is on daily ASA as well. In the ED the patient CCM was consulted as the patient was noted to be hypotensive.     Care discussed with patient's son -  very depressed /  lies down watching tv all day. son states that patient does not have a good quality of life. Labs are improving. Will get psych evaluation for depression /  dementia.      REVIEW OF SYSTEMS:  - poor historian    Physical Exam:   GENERAL APPEARANCE: Very deconditioned, frail, sick.   T(C): 36.8 (22 @ 08:21), Max: 37.1 (22 @ 22:40)  HR: 70 (22 @ 08:21) (70 - 117)  BP: 93/47 (22 @ 08:21) (86/61 - 170/86)  RR: 19 (22 @ 08:21) (16 - 23)  SpO2: 96% (22 @ 08:21) (85% - 100%)  HEENT:  temporal muscle atrophy  Skin:  sacral decub ulcer  NECK:  Supple without lymphadenopathy.   HEART:  Regular rate and rhythm. normal S1 and S2, No M/R/G  LUNGS:  Good ins/exp effort, no W/R/R/C  ABDOMEN:  Soft, nontender, nondistended with good bowel sounds heard  EXTREMITIES:  pt is contracted  NEUROLOGICAL:  Gross nonfocal     Labs:   CBC Full  -  ( 2022 08:29 )  WBC Count : 22.82 K/uL  RBC Count : 3.93 M/uL  Hemoglobin : 10.2 g/dL  Hematocrit : 30.4 %  Platelet Count - Automated : 361 K/uL    PT/INR - ( 2022 14:58 )   PT: 11.3 sec;   INR: 0.97 ratio      PTT - ( 2022 14:58 )  PTT:22.3 sec  Urinalysis Basic - ( 2022 17:19 )    Color: Yellow / Appearance: Clear / S.010 / pH: x  Gluc: x / Ketone: Trace  / Bili: Negative / Urobili: Negative   Blood: x / Protein: 100 / Nitrite: Positive   Leuk Esterase: Moderate / RBC: 25-50 /HPF / WBC >50   Sq Epi: x / Non Sq Epi: Few / Bacteria: TNTC    136  |  103  |  68<H>  ----------------------------<  65<L>  3.8   |  23  |  1.76<H>    Ca    9.8      2022 08:29  Mg     1.7     11-    TPro  6.2  /  Alb  2.0<L>  /  TBili  0.3  /  DBili  x   /  AST  20  /  ALT  15  /  AlkPhos  81        # Acute blood loss anemia suspected upper GI Bleeding  - stable HH currently  - 2 peripheral bore IVs  - Active type and screen  - patient is s/p 2units pRBC transfusion  - cont. Pantoprazole gtt per protocol  - restart po diet  - GI following    # Sepsis due to recurrent UTI  # Lactic acidosis resolved  - Elevated WBC  - Lactic acidosis resolved  - continue with hydration  - cont. Meropenem 1g IVPB q12h    # Elevated Cardiac Enzymes  - DDx includes overal demand ischemia vs ACS  - serial Leela    #Acute Renal Failure  - cont. IV hydration  - f/u urine studies  - f/u bladder scan  - strict I/Os  - f/u w/ Nephrology in the am    #Diabetes mellitus type 2  - FS q6h while NPO  - cont. ISS per protocol    #Acute COVID-19  - maintain on airborne isolation  - continue with O2 as needed via nasal cannula and up-titrate as needed. If on non-rebreather mask, start continuous oximetry monitoring.  - cont. anti-pyretics w/ Acetaminophen 650 mg PO q4h PRN fever. Limit use of NSAIDs.  - cont. HFA albuterol Q6 hour PRN via MDI. Would avoid nebulized preparations to limit risk of aerosol formation.    # Goals of Care  - Per son, pt does not want a breathing tube. Pt's son states she has expressed her wishes of DNR, DNI and son states she would not want aggressive invasive medical interventions.    # Vte ppx  - SCDs in lieu of active GI bleeding

## 2022-11-26 NOTE — CONSULT NOTE ADULT - ASSESSMENT
73 y/o F PMHx significant for Dementia (bed bound), Hypertension, Hyperlipidemia, Diabetes mellitus type 2, Major Depressive Disorder, Hypothyroidism, chronic diarrhea, sacral decubitus ulcer, recurrent MDRO UTIs, hx of Iron Deficiency Anemia, and Hyponatremia, DNR, DNI presents was BIBA from home, accompanied by her son, for further evaluation and management of nausea, and vomiting (coffee ground emesis) associated with increased lethargy.  Of note the patient is on daily ASA as well. In the ED the patient CCM was consulted as the patient was noted to be hypotensive. Labs => WBC 25.71, Hgb/Hct 8.0/25.0, MCV/MCH 76.2/24.4, , TnI 88.76 -> 142.68, LA 6.1 -> 3.3, Na 129, BUN/Cr 90/1.90, Glu 390, Ca 11.1, Alb 2.4, (+)UA, COVID-19 (+). In the ED the patient received Ceftriaxone 1g IVPB x 1, Pantoprazole 40mg IVP x 1, then started on a Pantoprazole gtt, NS x 2L, and pRBCs x 2    1. Sepsis. Hypotension. UTI. hx Recurrent MDR UTI. Covid-19 viral syndrome. Sacral decub ulcer. GI bleed.   - f/u urine cx, blood cx  - agree with IV merrem   - gi eval  - resp status stable, xray clear, CrCl ~30 hold off on antiviral therapy  - monitor temps  -tolerating abx well so far; no side effects noted  -reason for abx use and side effects reviewed with patient  - supportive care  - fu cbc  - isolation precautions    2. other issues - care per medicine

## 2022-11-26 NOTE — CONSULT NOTE ADULT - SUBJECTIVE AND OBJECTIVE BOX
Patient is a 72y old  Female who presents with a chief complaint of Nausea, Vomiting Blood (2022 21:07)    HPI:  73 y/o F PMHx significant for Dementia (bed bound), Hypertension, Hyperlipidemia, Diabetes mellitus type 2, Major Depressive Disorder, Hypothyroidism, chronic diarrhea, sacral decubitus ulcer, recurrent MDRO UTIs, hx of Iron Deficiency Anemia, and Hyponatremia, DNR, DNI presents was BIBA from home, accompanied by her son, for further evaluation and management of nausea, and vomiting (coffee ground emesis) associated with increased lethargy.  Of note the patient is on daily ASA as well. In the ED the patient CCM was consulted as the patient was noted to be hypotensive. Labs => WBC 25.71, Hgb/Hct 8.0/25.0, MCV/MCH 76.2/24.4, , TnI 88.76 -> 142.68, LA 6.1 -> 3.3, Na 129, BUN/Cr 90/1.90, Glu 390, Ca 11.1, Alb 2.4, (+)UA, COVID-19 (+). In the ED the patient received Ceftriaxone 1g IVPB x 1, Pantoprazole 40mg IVP x 1, then started on a Pantoprazole gtt, NS x 2L, and pRBCs x 2.      PMH: as above  PSH: as above  Meds: per reconciliation sheet, noted below  MEDICATIONS  (STANDING):  atorvastatin 20 milliGRAM(s) Oral at bedtime  dextrose 5%. 1000 milliLiter(s) (50 mL/Hr) IV Continuous <Continuous>  dextrose 5%. 1000 milliLiter(s) (100 mL/Hr) IV Continuous <Continuous>  dextrose 50% Injectable 25 Gram(s) IV Push once  dextrose 50% Injectable 12.5 Gram(s) IV Push once  dextrose 50% Injectable 25 Gram(s) IV Push once  glucagon  Injectable 1 milliGRAM(s) IntraMuscular once  insulin lispro (ADMELOG) corrective regimen sliding scale   SubCutaneous three times a day before meals  insulin lispro (ADMELOG) corrective regimen sliding scale   SubCutaneous at bedtime  levothyroxine 25 MICROGram(s) Oral daily  meropenem  IVPB 1000 milliGRAM(s) IV Intermittent every 12 hours  pantoprazole Infusion 8 mG/Hr (10 mL/Hr) IV Continuous <Continuous>  sertraline 50 milliGRAM(s) Oral daily  sodium chloride 0.9%. 1000 milliLiter(s) (75 mL/Hr) IV Continuous <Continuous>    Allergies    Hytrin (Unknown)    Intolerances      Social: no smoking, no alcohol, no illegal drugs; no recent travel, no exposure to TB  FAMILY HISTORY:  Family history unknown       no history of premature cardiovascular disease in first degree relatives    ROS: unable to obtain d/t medical condition    All other systems reviewed and are negative    Vital Signs Last 24 Hrs  T(C): 36.8 (2022 08:21), Max: 37.1 (2022 22:40)  T(F): 98.2 (2022 08:21), Max: 98.8 (2022 22:40)  HR: 70 (2022 08:21) (70 - 117)  BP: 93/47 (2022 08:21) (86/61 - 170/86)  BP(mean): 80 (2022 18:05) (67 - 128)  RR: 19 (2022 08:21) (16 - 23)  SpO2: 96% (2022 08:21) (85% - 100%)    Parameters below as of 2022 08:21  Patient On (Oxygen Delivery Method): nasal cannula  O2 Flow (L/min): 4    Daily     Daily     PE:  Constitutional: frail looking  HEENT: NC/AT, EOMI, PERRLA, conjunctivae clear; ears and nose atraumatic; pharynx benign  Neck: supple; thyroid not palpable  Back: no tenderness  Respiratory: respiratory effort normal; clear to auscultation  Cardiovascular: S1S2 regular, no murmurs  Abdomen: soft, not tender, not distended, positive BS; liver and spleen WNL  Genitourinary: no suprapubic tenderness + garcia  Lymphatic: no LN palpable  Musculoskeletal: no muscle tenderness, no joint swelling or tenderness  Extremities: no pedal edema  Neurological/ Psychiatric:   moving all extremities  Skin: no rashes; no palpable lesions + sacral decub ulcer     Labs: all available labs reviewed                        10.82 )-----------( 361      ( 2022 08:29 )             30.4     11-    136  |  103  |  68<H>  ----------------------------<  65<L>  3.8   |  23  |  1.76<H>    Ca    9.8      2022 08:29  Mg     1.7         TPro  6.2  /  Alb  2.0<L>  /  TBili  0.3  /  DBili  x   /  AST  20  /  ALT  15  /  AlkPhos  81       LIVER FUNCTIONS - ( 2022 08:29 )  Alb: 2.0 g/dL / Pro: 6.2 gm/dL / ALK PHOS: 81 U/L / ALT: 15 U/L / AST: 20 U/L / GGT: x           Urinalysis Basic - ( 2022 17:19 )    Color: Yellow / Appearance: Clear / S.010 / pH: x  Gluc: x / Ketone: Trace  / Bili: Negative / Urobili: Negative   Blood: x / Protein: 100 / Nitrite: Positive   Leuk Esterase: Moderate / RBC: 25-50 /HPF / WBC >50   Sq Epi: x / Non Sq Epi: Few / Bacteria: TNTC        Radiology: all available radiological tests reviewed  < from: Xray Chest 1 View-PORTABLE IMMEDIATE (22 @ 16:24) >  ACC: [id number] EXAM:  XR CHEST PORTABLE IMMED 1V                          PROCEDURE DATE:  2022          INTERPRETATION:  AP chest erect on 2022 at 4:11 PM.    Patient has sepsis.    Heart normal.    Left breast prosthesis noted.    Lungs remain clear.    There is a right PICC line and 2021 which is no longer   evident.    IMPRESSION: Clear lungs.    --- End of Report ---      < end of copied text >    Advanced directives addressed: full resuscitation

## 2022-11-26 NOTE — PATIENT PROFILE ADULT - FALL HARM RISK - HARM RISK INTERVENTIONS
Communicate Risk of Fall with Harm to all staff/Reinforce activity limits and safety measures with patient and family/Tailored Fall Risk Interventions/Use of alarms - bed, chair and/or voice tab/Unable to comprehend No

## 2022-11-26 NOTE — CONSULT NOTE ADULT - ASSESSMENT
71 yo female w hx of Dementia, HTN, HLD, DM, and MDD, presenting with poor po intake, anemia requiring PRBC and DANIEL on CKD      DANIEL on CKD Cr baseline 1.2 - 1.5   sec to ATN w hypotension but Cr slightly improved   hypotension in setting of volume depletion and possible UTI    monitor for recovery    keep BP > 110    continue IVF while NPO    trend WBC   no ace or arb    UTI - IV abx     check urine lytes and renal sono for baseline     Dr Sanchez et al to resume care Monday       71 yo female w hx of Dementia, HTN, HLD, DM, and MDD, presenting with poor po intake, anemia requiring PRBC and DANIEL on CKD      DANIEL on CKD Cr baseline 1.2 - 1.5   sec to ATN w hypotension but Cr slightly improved   hypotension in setting of volume depletion and possible UTI    monitor for recovery    keep BP > 110    continue IVF while NPO    trend WBC   no ace or arb    UTI - IV abx   bladder scan      check urine lytes and renal sono for baseline     Dr Sanchez et al to resume care Monday

## 2022-11-26 NOTE — CONSULT NOTE ADULT - SUBJECTIVE AND OBJECTIVE BOX
COVERING FOR DR Muñoz     71 y/o F PMHx significant for Dementia (bed bound), Hypertension, Hyperlipidemia, Diabetes mellitus type 2, Major Depressive Disorder, Hypothyroidism, chronic diarrhea, sacral decubitus ulcer, recurrent MDRO UTIs, hx of Iron Deficiency Anemia, and Hyponatremia, DNR, DNI presents was BIBA from home, accompanied by her son, for further evaluation and management of nausea, and vomiting (coffee ground emesis) associated with increased lethargy. HPI obtained from chart , due to the patient's underlying dementia, who states the patient was increasingly lethargic, and has had decreased PO intake over the last several days. Of note the patient is on daily ASA as well. In the ED the patient CCM was consulted as the patient was noted to be hypotensive.   Renal eval called for   Na 129, BUN/Cr 90/1.90, Ca 11.1, Alb 2.4,   (+)UA, COVID-19 (+). In the ED the patient received Ceftriaxone 1g IVPB x 1, Pantoprazole 40mg IVP x 1, then started on a Pantoprazole gtt, NS x 2L, and pRBCs x 2.  Seen by Dr Muñoz in past for DANIEL - Cr 1.3 -0 1.5 in past    today    pt is confused but alert      PAST MEDICAL & SURGICAL HISTORY:  Intraoperative hemorrhage and hematoma of a genitourinary system organ or structure complicating other procedure  Type 2 diabetes mellitus without complication  Essential (primary) hypertension  Syncope and collapse  Acute kidney failure, unspecified  yperlipidemia  MDD (major depressive disorder)  Hypothyroid      Home Medications:  amLODIPine 5 mg oral tablet: 1 tab(s) orally once a day (2022 23:55)  Aspirin Enteric Coated 81 mg oral delayed release tablet: 1 tab(s) orally once a day (2022 23:55)  atorvastatin 20 mg oral tablet: 1 tab(s) orally once a day (2022 23:55)  ferrous sulfate 325 mg (65 mg elemental iron) oral tablet: 1 tab(s) orally 2 times a day (2022 23:55)  glimepiride 4 mg oral tablet: 1 tab(s) orally 2 times a day (2022 23:55)  hydrALAZINE 25 mg oral tablet: 1 tab(s) orally 2 times a day (2022 23:55)  levothyroxine 25 mcg (0.025 mg) oral tablet: 1 tab(s) orally once a day (2022 23:55)  meclizine 25 mg oral tablet: 1 tab(s) orally 2 times a day, As Needed (2022 23:55)  Multiple Vitamins with Minerals oral tablet: 1 tab(s) orally once a day (2022 23:55)  PriLOSEC 40 mg oral delayed release capsule: 1 cap(s) orally once a day (2022 23:55)  sertraline 50 mg oral tablet: 1 tab(s) orally once a day (2022 23:55)  Tylenol 500 mg oral tablet: 2 tab(s) orally every 6 hours, As Needed (2022 23:55)  Vitamin C 1000 mg oral tablet: 1 tab(s) orally once a day (2022 23:55)        MEDICATIONS  (STANDING):  atorvastatin 20 milliGRAM(s) Oral at bedtime  dextrose 5%. 1000 milliLiter(s) (50 mL/Hr) IV Continuous <Continuous>  dextrose 5%. 1000 milliLiter(s) (100 mL/Hr) IV Continuous <Continuous>  dextrose 50% Injectable 25 Gram(s) IV Push once  dextrose 50% Injectable 12.5 Gram(s) IV Push once  dextrose 50% Injectable 25 Gram(s) IV Push once  glucagon  Injectable 1 milliGRAM(s) IntraMuscular once  insulin lispro (ADMELOG) corrective regimen sliding scale   SubCutaneous three times a day before meals  insulin lispro (ADMELOG) corrective regimen sliding scale   SubCutaneous at bedtime  levothyroxine 25 MICROGram(s) Oral daily  meropenem  IVPB 1000 milliGRAM(s) IV Intermittent every 12 hours  pantoprazole Infusion 8 mG/Hr (10 mL/Hr) IV Continuous <Continuous>  sertraline 50 milliGRAM(s) Oral daily  sodium chloride 0.9%. 1000 milliLiter(s) (75 mL/Hr) IV Continuous <Continuous>      Allergies    Hytrin (Unknown)    Intolerances        SOCIAL HISTORY:  Denies ETOh,Smoking,     FAMILY HISTORY:  Family history unknown        REVIEW OF SYSTEMS:  UTO due to MS  Vital Signs Last 24 Hrs  T(C): 36.8 (2022 08:21), Max: 37.1 (2022 22:40)  T(F): 98.2 (2022 08:21), Max: 98.8 (2022 22:40)  HR: 70 (2022 08:21) (70 - 117)  BP: 93/47 (2022 08:21) (86/61 - 170/86)  BP(mean): 80 (2022 18:05) (67 - 128)  RR: 19 (2022 08:21) (16 - 23)  SpO2: 96% (2022 08:21) (85% - 100%)    Parameters below as of 2022 08:21  Patient On (Oxygen Delivery Method): nasal cannula  O2 Flow (L/min): 4    I&O's Detail    2022 07:01  -  2022 07:00  --------------------------------------------------------  IN:    PRBCs (Packed Red Blood Cells): 655 mL  Total IN: 655 mL    OUT:  Total OUT: 0 mL    Total NET: 655 mL    PHYSICAL EXAM:    Constitutional: frail , confused   HEENT:  EOMI,  MM + dry , pale   Neck: No LAD, No JVD  Respiratory: CTAB  Cardiovascular: S1 and S2  Gastrointestinal: BS+, soft, NT/ND  Extremities: No peripheral edema  Neurological: alert   : No Davila  Skin: No rashes  Access: Not applicable    LABS:                                   10.2   22.82 )-----------( 361      ( 2022 08:29 )             30.4                         10.4   22.66 )-----------( 379      ( 2022 23:51 )             31.4         136    |  103    |  68     ----------------------------<  65        2022 08:29  3.8     |  23     |  1.76     133    |  99     |  71     ----------------------------<  249       2022 23:51  3.7     |  26     |  1.71     129    |  92     |  90     ----------------------------<  391       2022 14:58  3.9     |  23     |  1.90     Ca    9.8        2022 08:29  Ca    9.9        2022 23:51      Mg     1.7       2022 23:51    TPro  6.2    /  Alb  2.0    /  TBili  0.3    /        2022 08:29  DBili  x      /  AST  20     /  ALT  15     /  AlkPhos  81       TPro  6.7    /  Alb  2.4    /  TBili  0.2    /        2022 14:58  DBili  x      /  AST  12     /  ALT  12     /  AlkPhos  92           Urine Studies:  Urinalysis Basic - ( 2022 17:19 )    Color: Yellow / Appearance: Clear / S.010 / pH: x  Gluc: x / Ketone: Trace  / Bili: Negative / Urobili: Negative   Blood: x / Protein: 100 / Nitrite: Positive   Leuk Esterase: Moderate / RBC: 25-50 /HPF / WBC >50   Sq Epi: x / Non Sq Epi: Few / Bacteria: TNTC            RADIOLOGY & ADDITIONAL STUDIES:

## 2022-11-27 DIAGNOSIS — R33.9 RETENTION OF URINE, UNSPECIFIED: ICD-10-CM

## 2022-11-27 LAB
ANION GAP SERPL CALC-SCNC: 7 MMOL/L — SIGNIFICANT CHANGE UP (ref 5–17)
BUN SERPL-MCNC: 47 MG/DL — HIGH (ref 7–23)
CALCIUM SERPL-MCNC: 9.3 MG/DL — SIGNIFICANT CHANGE UP (ref 8.5–10.1)
CHLORIDE SERPL-SCNC: 103 MMOL/L — SIGNIFICANT CHANGE UP (ref 96–108)
CO2 SERPL-SCNC: 27 MMOL/L — SIGNIFICANT CHANGE UP (ref 22–31)
CREAT SERPL-MCNC: 1.54 MG/DL — HIGH (ref 0.5–1.3)
EGFR: 35 ML/MIN/1.73M2 — LOW
GLUCOSE SERPL-MCNC: 215 MG/DL — HIGH (ref 70–99)
HCT VFR BLD CALC: 31.2 % — LOW (ref 34.5–45)
HGB BLD-MCNC: 10 G/DL — LOW (ref 11.5–15.5)
MCHC RBC-ENTMCNC: 25.8 PG — LOW (ref 27–34)
MCHC RBC-ENTMCNC: 32.1 GM/DL — SIGNIFICANT CHANGE UP (ref 32–36)
MCV RBC AUTO: 80.4 FL — SIGNIFICANT CHANGE UP (ref 80–100)
PLATELET # BLD AUTO: 317 K/UL — SIGNIFICANT CHANGE UP (ref 150–400)
POTASSIUM SERPL-MCNC: 3.4 MMOL/L — LOW (ref 3.5–5.3)
POTASSIUM SERPL-SCNC: 3.4 MMOL/L — LOW (ref 3.5–5.3)
RBC # BLD: 3.88 M/UL — SIGNIFICANT CHANGE UP (ref 3.8–5.2)
RBC # FLD: 16.4 % — HIGH (ref 10.3–14.5)
SODIUM SERPL-SCNC: 137 MMOL/L — SIGNIFICANT CHANGE UP (ref 135–145)
WBC # BLD: 14.75 K/UL — HIGH (ref 3.8–10.5)
WBC # FLD AUTO: 14.75 K/UL — HIGH (ref 3.8–10.5)

## 2022-11-27 PROCEDURE — 99233 SBSQ HOSP IP/OBS HIGH 50: CPT

## 2022-11-27 PROCEDURE — 99222 1ST HOSP IP/OBS MODERATE 55: CPT

## 2022-11-27 RX ORDER — HALOPERIDOL DECANOATE 100 MG/ML
0.5 INJECTION INTRAMUSCULAR EVERY 6 HOURS
Refills: 0 | Status: DISCONTINUED | OUTPATIENT
Start: 2022-11-27 | End: 2022-12-05

## 2022-11-27 RX ORDER — PANTOPRAZOLE SODIUM 20 MG/1
40 TABLET, DELAYED RELEASE ORAL
Refills: 0 | Status: DISCONTINUED | OUTPATIENT
Start: 2022-11-27 | End: 2022-11-30

## 2022-11-27 RX ADMIN — Medication 25 MICROGRAM(S): at 05:03

## 2022-11-27 RX ADMIN — MEROPENEM 100 MILLIGRAM(S): 1 INJECTION INTRAVENOUS at 23:00

## 2022-11-27 RX ADMIN — Medication 1: at 14:45

## 2022-11-27 RX ADMIN — MEROPENEM 100 MILLIGRAM(S): 1 INJECTION INTRAVENOUS at 10:22

## 2022-11-27 RX ADMIN — ATORVASTATIN CALCIUM 20 MILLIGRAM(S): 80 TABLET, FILM COATED ORAL at 23:01

## 2022-11-27 RX ADMIN — SERTRALINE 50 MILLIGRAM(S): 25 TABLET, FILM COATED ORAL at 10:22

## 2022-11-27 RX ADMIN — PANTOPRAZOLE SODIUM 40 MILLIGRAM(S): 20 TABLET, DELAYED RELEASE ORAL at 23:01

## 2022-11-27 NOTE — BH CONSULTATION LIAISON ASSESSMENT NOTE - CURRENT MEDICATION
Check tsh   On synthroid
Controlled with current meds
On lipitor 20 mg od   Check labs
Uses tylenol  Check esr, crp, ra factor  Add tramadol bid prn   S/e d/w pt  pdmp ok
MEDICATIONS  (STANDING):  atorvastatin 20 milliGRAM(s) Oral at bedtime  dextrose 5%. 1000 milliLiter(s) (50 mL/Hr) IV Continuous <Continuous>  dextrose 5%. 1000 milliLiter(s) (100 mL/Hr) IV Continuous <Continuous>  dextrose 50% Injectable 25 Gram(s) IV Push once  dextrose 50% Injectable 12.5 Gram(s) IV Push once  dextrose 50% Injectable 25 Gram(s) IV Push once  glucagon  Injectable 1 milliGRAM(s) IntraMuscular once  insulin lispro (ADMELOG) corrective regimen sliding scale   SubCutaneous three times a day before meals  insulin lispro (ADMELOG) corrective regimen sliding scale   SubCutaneous at bedtime  levothyroxine 25 MICROGram(s) Oral daily  meropenem  IVPB 1000 milliGRAM(s) IV Intermittent every 12 hours  pantoprazole Infusion 8 mG/Hr (10 mL/Hr) IV Continuous <Continuous>  sertraline 50 milliGRAM(s) Oral daily  sodium chloride 0.9%. 1000 milliLiter(s) (75 mL/Hr) IV Continuous <Continuous>    MEDICATIONS  (PRN):  albuterol    90 MICROgram(s) HFA Inhaler 2 Puff(s) Inhalation every 4 hours PRN Shortness of Breath and/or Wheezing  dextrose Oral Gel 15 Gram(s) Oral once PRN Blood Glucose LESS THAN 70 milliGRAM(s)/deciliter  guaifenesin/dextromethorphan Oral Liquid 10 milliLiter(s) Oral every 4 hours PRN Cough  haloperidol    Injectable 0.5 milliGRAM(s) IntraMuscular every 6 hours PRN Agitation  ondansetron Injectable 4 milliGRAM(s) IV Push every 8 hours PRN Nausea and/or Vomiting

## 2022-11-27 NOTE — BH CONSULTATION LIAISON ASSESSMENT NOTE - NSBHCONSULTFOLLOWAFTERCARE_PSY_A_CORE FT
patient may be provided with outpt psychiatric referrals in her area prior to discharge   [facility] [phone]

## 2022-11-27 NOTE — BH CONSULTATION LIAISON ASSESSMENT NOTE - NSBHCHARTREVIEWLAB_PSY_A_CORE FT
10.2   22.82 )-----------( 361      ( 2022 08:29 )             30.4         136  |  103  |  68<H>  ----------------------------<  65<L>  3.8   |  23  |  1.76<H>    Ca    9.8      2022 08:29  Mg     1.7         TPro  6.2  /  Alb  2.0<L>  /  TBili  0.3  /  DBili  x   /  AST  20  /  ALT  15  /  AlkPhos  81      Urinalysis Basic - ( 2022 17:19 )    Color: Yellow / Appearance: Clear / S.010 / pH: x  Gluc: x / Ketone: Trace  / Bili: Negative / Urobili: Negative   Blood: x / Protein: 100 / Nitrite: Positive   Leuk Esterase: Moderate / RBC: 25-50 /HPF / WBC >50   Sq Epi: x / Non Sq Epi: Few / Bacteria: TNTC

## 2022-11-27 NOTE — BH CONSULTATION LIAISON ASSESSMENT NOTE - NSBHCHARTREVIEWVS_PSY_A_CORE FT
Vital Signs Last 24 Hrs  T(C): 36.7 (27 Nov 2022 09:15), Max: 36.9 (27 Nov 2022 01:28)  T(F): 98 (27 Nov 2022 09:15), Max: 98.4 (27 Nov 2022 01:28)  HR: 70 (27 Nov 2022 09:15) (70 - 75)  BP: 145/61 (27 Nov 2022 09:15) (111/81 - 145/61)  BP(mean): --  RR: 18 (27 Nov 2022 09:15) (18 - 19)  SpO2: 99% (27 Nov 2022 09:15) (99% - 100%)    Parameters below as of 27 Nov 2022 09:15  Patient On (Oxygen Delivery Method): nasal cannula  O2 Flow (L/min): 4

## 2022-11-27 NOTE — PROGRESS NOTE ADULT - ASSESSMENT
73 y/o F PMHx significant for Dementia (bed bound), Hypertension, Hyperlipidemia, Diabetes mellitus type 2, Major Depressive Disorder, Hypothyroidism, chronic diarrhea, sacral decubitus ulcer, recurrent MDRO UTIs, hx of Iron Deficiency Anemia, and Hyponatremia admitted for:     # Acute blood loss anemia suspected upper GI Bleeding  -Hemodynamically stable  - S/p 2 U PRBCs   - stable HH, monitor   - s/p Pantoprazole gtt change to IB BID   - tolerates po diet  - GI following, conservative management     # Sepsis due to recurrent UTI/ COVID  19   # Lactic acidosis resolved  - Elevated WBC, trending down   - Lactic acidosis resolved after PRBCs and IVF   - continue with gentle IV hydration   - UCX: > 100K GNR  - BCX neg   - cont. Meropenem 1g IVPB q12h  - ID recs appreciated     # Elevated Cardiac Enzymes,  likely Type II MI  in settings of Sepsis/hypovolemia/Anemia    - trend troponin  - No Chest pain  - Conservative management as per family   - not on AntiPlts in settings of acute GI bleed, will further D/w GI  - Statins   - ECHO    #Acute Renal Failure  - ikely ATN due to hypoperfusion in settings of sepsis and hypovolemia   - cont. IV hydration  - c/w Davila monitor s and os   - labs in am   - f/u w/ Nephrology    #  Acute hypoxic respiratory failure  likely 2/2 Acute Sepsis   now respiratpry status improved, on 4l NC, irate as tolerated  CXR on admission clear lungs, will repeat   Check BNP, ECHO  Less likely 2/2 COVID as not symptomatic no upper respiratory symptoms            # Acute COVID-19  - maintain on airborne isolation  - continue with O2 as needed via nasal cannula and titrate down  as tolerated    - cont. anti-pyretics w/ Acetaminophen 650 mg PO q4h PRN fever. Limit use of NSAIDs.  - cont. HFA albuterol Q6 hour PRN via MDI.   - will hold off on dexa  - No anti viral due to renal failure   - ID recs appreciated       #Diabetes mellitus type 2  - FS q6h while NPO  - cont. ISS per protocol    # Goals of Care  - Per son, pt does not want a breathing tube. Pt's son states she has expressed her wishes of DNR, DNI and son states she would not want aggressive invasive medical interventions.    # Vte ppx  - SCDs in lieu of active GI bleeding, reeval in am

## 2022-11-27 NOTE — CONSULT NOTE ADULT - SUBJECTIVE AND OBJECTIVE BOX
HPI:  73 y/o F PMHx significant for Dementia (bed bound), Hypertension, Hyperlipidemia, Diabetes mellitus type 2, Major Depressive Disorder, Hypothyroidism, chronic diarrhea,, hx of colon cancer, sacral decubitus ulcer, recurrent MDRO UTIs, hx of Iron Deficiency Anemia, and Hyponatremia who presented with nausea, coffee ground emesis and increased lethargy. Information gathered from charting as son not at bedside and patient is a poor historian. She has had decreased PO intake for several days but otherwise no abdominal pain or rectal bleeding.     Has been seen in 1/2021 by her GI Dr. Powell but son states no interventions planned.     In the ED, initially hypotensive which improved with normal saline, WBC 25 Hgb 8, MCV 76, BUN/Cr 90/1.9, COVID + and UA +. S/p transfusion with stable hgb 10. No further episodes of coffee ground emesis or melena.       PAST MEDICAL & SURGICAL HISTORY:  Intraoperative hemorrhage and hematoma of a genitourinary system organ or structure complicating other procedure      Type 2 diabetes mellitus without complication      Essential (primary) hypertension      Syncope and collapse      Acute kidney failure, unspecified      Pain, unspecified      Ascorbic acid deficiency      Flatulence      Changes in skin texture      Hyperlipidemia      MDD (major depressive disorder)      Hypothyroid      MARRY (acute respiratory infection)      H/O lumpectomy      H/O shoulder surgery      History of colon surgery          Home Medications:  amLODIPine 5 mg oral tablet: 1 tab(s) orally once a day (25 Nov 2022 23:55)  Aspirin Enteric Coated 81 mg oral delayed release tablet: 1 tab(s) orally once a day (25 Nov 2022 23:55)  atorvastatin 20 mg oral tablet: 1 tab(s) orally once a day (25 Nov 2022 23:55)  ferrous sulfate 325 mg (65 mg elemental iron) oral tablet: 1 tab(s) orally 2 times a day (25 Nov 2022 23:55)  glimepiride 4 mg oral tablet: 1 tab(s) orally 2 times a day (25 Nov 2022 23:55)  hydrALAZINE 25 mg oral tablet: 1 tab(s) orally 2 times a day (25 Nov 2022 23:55)  levothyroxine 25 mcg (0.025 mg) oral tablet: 1 tab(s) orally once a day (25 Nov 2022 23:55)  meclizine 25 mg oral tablet: 1 tab(s) orally 2 times a day, As Needed (25 Nov 2022 23:55)  Multiple Vitamins with Minerals oral tablet: 1 tab(s) orally once a day (25 Nov 2022 23:55)  PriLOSEC 40 mg oral delayed release capsule: 1 cap(s) orally once a day (25 Nov 2022 23:55)  sertraline 50 mg oral tablet: 1 tab(s) orally once a day (25 Nov 2022 23:55)  Tylenol 500 mg oral tablet: 2 tab(s) orally every 6 hours, As Needed (25 Nov 2022 23:55)  Vitamin C 1000 mg oral tablet: 1 tab(s) orally once a day (25 Nov 2022 23:55)      MEDICATIONS  (STANDING):  atorvastatin 20 milliGRAM(s) Oral at bedtime  dextrose 5%. 1000 milliLiter(s) (50 mL/Hr) IV Continuous <Continuous>  dextrose 5%. 1000 milliLiter(s) (100 mL/Hr) IV Continuous <Continuous>  dextrose 50% Injectable 25 Gram(s) IV Push once  dextrose 50% Injectable 12.5 Gram(s) IV Push once  dextrose 50% Injectable 25 Gram(s) IV Push once  glucagon  Injectable 1 milliGRAM(s) IntraMuscular once  insulin lispro (ADMELOG) corrective regimen sliding scale   SubCutaneous three times a day before meals  insulin lispro (ADMELOG) corrective regimen sliding scale   SubCutaneous at bedtime  levothyroxine 25 MICROGram(s) Oral daily  meropenem  IVPB 1000 milliGRAM(s) IV Intermittent every 12 hours  pantoprazole Infusion 8 mG/Hr (10 mL/Hr) IV Continuous <Continuous>  sertraline 50 milliGRAM(s) Oral daily  sodium chloride 0.9%. 1000 milliLiter(s) (75 mL/Hr) IV Continuous <Continuous>    MEDICATIONS  (PRN):  albuterol    90 MICROgram(s) HFA Inhaler 2 Puff(s) Inhalation every 4 hours PRN Shortness of Breath and/or Wheezing  dextrose Oral Gel 15 Gram(s) Oral once PRN Blood Glucose LESS THAN 70 milliGRAM(s)/deciliter  guaifenesin/dextromethorphan Oral Liquid 10 milliLiter(s) Oral every 4 hours PRN Cough  haloperidol     Tablet 0.5 milliGRAM(s) Oral every 6 hours PRN agitation  haloperidol    Injectable 0.5 milliGRAM(s) IntraMuscular every 6 hours PRN Agitation  ondansetron Injectable 4 milliGRAM(s) IV Push every 8 hours PRN Nausea and/or Vomiting      Allergies    Hytrin (Unknown)    Intolerances        SOCIAL HISTORY:    FAMILY HISTORY:  Family history unknown        ROS  As above  Otherwise unremarkable    Vital Signs Last 24 Hrs  T(C): 36.7 (27 Nov 2022 09:15), Max: 36.9 (27 Nov 2022 01:28)  T(F): 98 (27 Nov 2022 09:15), Max: 98.4 (27 Nov 2022 01:28)  HR: 70 (27 Nov 2022 09:15) (70 - 75)  BP: 145/61 (27 Nov 2022 09:15) (111/81 - 145/61)  BP(mean): --  RR: 18 (27 Nov 2022 09:15) (18 - 19)  SpO2: 99% (27 Nov 2022 09:15) (99% - 100%)    Parameters below as of 27 Nov 2022 09:15  Patient On (Oxygen Delivery Method): nasal cannula  O2 Flow (L/min): 4      Constitutional: NAD, frail appearing  Respiratory: CTAB  Cardiovascular: S1 and S2, RRR  Gastrointestinal: BS+, soft, NT/ND  Extremities: No peripheral edema  Psychiatric: Normal mood, normal affect  Skin: No rashes, +sacral decubitis ulcer    LABS:                        10.0   14.75 )-----------( 317      ( 27 Nov 2022 12:51 )             31.2     11-27    137  |  103  |  47<H>  ----------------------------<  215<H>  3.4<L>   |  27  |  1.54<H>    Ca    9.3      27 Nov 2022 12:51  Mg     1.7     11-25    TPro  6.2  /  Alb  2.0<L>  /  TBili  0.3  /  DBili  x   /  AST  20  /  ALT  15  /  AlkPhos  81  11-26      LIVER FUNCTIONS - ( 26 Nov 2022 08:29 )  Alb: 2.0 g/dL / Pro: 6.2 gm/dL / ALK PHOS: 81 U/L / ALT: 15 U/L / AST: 20 U/L / GGT: x             RADIOLOGY & ADDITIONAL STUDIES:

## 2022-11-27 NOTE — BH CONSULTATION LIAISON ASSESSMENT NOTE - HPI (INCLUDE ILLNESS QUALITY, SEVERITY, DURATION, TIMING, CONTEXT, MODIFYING FACTORS, ASSOCIATED SIGNS AND SYMPTOMS)
71 y/o F PMHx significant for Dementia (bed bound), Hypertension, Hyperlipidemia, Diabetes mellitus type 2, Major Depressive Disorder, Hypothyroidism, chronic diarrhea, sacral decubitus ulcer, recurrent MDRO UTIs, hx of Iron Deficiency Anemia, and Hyponatremia, DNR, DNI presents was BIBA from home, accompanied by her son, for further evaluation and management of nausea, and vomiting (coffee ground emesis) associated with increased lethargy. HPI obtained from patient's son, due to the patient's underlying dementia, who states the patient was increasingly lethargic, and has had decreased PO intake over the last several days. Psychiatry was consulted to assess for depression, dementia.    Patient seen and evaluated, awake and alert eating breakfast, unable to state where she is, states "we are right here", was given choices but still unable to state where she is, unable to state the date or current year.  She is unable to recall how long she has been here or for what medical conditions she is being treated for.  She denies feeling depressed, sad or hopeless.  Ruby SI/HI, is irritable to report wants writer to leave.  She does endorse living with family in [place] but has no wishes to elaborate more on her living situation.  She reports eating well and sleeping well.    Spoke with patient's primary RN who reports issues with being combative with care, swats at staff, tried to replace garcia last night and was uncooperative with it so could not be replaced, resistant with care.  Patient with baseline dementia.  Will take PO medications.  Reports is dismissive.  At baseline, bedbound.

## 2022-11-27 NOTE — PROGRESS NOTE ADULT - ASSESSMENT
73 y/o F PMHx significant for Dementia (bed bound), Hypertension, Hyperlipidemia, Diabetes mellitus type 2, Major Depressive Disorder, Hypothyroidism, chronic diarrhea, sacral decubitus ulcer, recurrent MDRO UTIs, hx of Iron Deficiency Anemia, and Hyponatremia, DNR, DNI presents was BIBA from home, accompanied by her son, for further evaluation and management of nausea, and vomiting (coffee ground emesis) associated with increased lethargy.  Of note the patient is on daily ASA as well. In the ED the patient CCM was consulted as the patient was noted to be hypotensive. Labs => WBC 25.71, Hgb/Hct 8.0/25.0, MCV/MCH 76.2/24.4, , TnI 88.76 -> 142.68, LA 6.1 -> 3.3, Na 129, BUN/Cr 90/1.90, Glu 390, Ca 11.1, Alb 2.4, (+)UA, COVID-19 (+). In the ED the patient received Ceftriaxone 1g IVPB x 1, Pantoprazole 40mg IVP x 1, then started on a Pantoprazole gtt, NS x 2L, and pRBCs x 2    1. Sepsis. Hypotension. UTI. hx Recurrent MDR UTI. Covid-19 viral syndrome. Sacral decub ulcer. GI bleed.   - slowly improving, HD stable   - urine cx growing gnrs f/u final cx, blood cx no growth   - on IV merrem [id number] #2-3   - resp status stable, xray clear, CrCl ~30 hold off on antiviral therapy  - monitor temps  - tolerating abx well so far; no side effects noted  - reason for abx use and side effects reviewed with patient  - gi eval  - supportive care  - fu cbc  - isolation precautions    2. other issues - care per medicine

## 2022-11-27 NOTE — PROGRESS NOTE ADULT - ASSESSMENT
73 yo female w hx of Dementia, HTN, HLD, DM, and MDD, presenting with poor po intake, anemia requiring PRBC and DANIEL on CKD      DANIEL on CKD Cr baseline 1.2 - 1.5   sec to ATN w hypotension but Cr slightly improved   hypotension in setting of volume depletion and possible UTI    monitor for recovery    keep BP > 110    trend WBC   no ace or arb    UTI - IV abx   renal uss - mild right hydro, bladder decompressed Davila     - Urology followup        Dr Sanchez et al to resume care Monday

## 2022-11-27 NOTE — CONSULT NOTE ADULT - SUBJECTIVE AND OBJECTIVE BOX
CHIEF COMPLAINT:Traumatic catheterization    HISTORY OF PRESENT ILLNESS:Dointg well    PAST MEDICAL & SURGICAL HISTORY:  Intraoperative hemorrhage and hematoma of a genitourinary system organ or structure complicating other procedure      Type 2 diabetes mellitus without complication      Essential (primary) hypertension      Syncope and collapse      Acute kidney failure, unspecified      Pain, unspecified      Ascorbic acid deficiency      Flatulence      Changes in skin texture      Hyperlipidemia      MDD (major depressive disorder)      Hypothyroid      MARRY (acute respiratory infection)      H/O lumpectomy      H/O shoulder surgery      History of colon surgery          REVIEW OF SYSTEMS:    CONSTITUTIONAL: No weakness, fevers or chills  EYES/ENT: No visual changes;  No vertigo or throat pain   NECK: No pain or stiffness  RESPIRATORY: No cough, wheezing, hemoptysis; No shortness of breath  CARDIOVASCULAR: No chest pain or palpitations  GASTROINTESTINAL: No abdominal or epigastric pain. No nausea, vomiting, or hematemesis; No diarrhea or constipation. No melena or hematochezia.  GENITOURINARY: No dysuria, frequency or hematuria  NEUROLOGICAL: No numbness or weakness  SKIN: No itching, burning, rashes, or lesions   All other review of systems is negative unless indicated above.    MEDICATIONS  (STANDING):  atorvastatin 20 milliGRAM(s) Oral at bedtime  dextrose 5%. 1000 milliLiter(s) (50 mL/Hr) IV Continuous <Continuous>  dextrose 5%. 1000 milliLiter(s) (100 mL/Hr) IV Continuous <Continuous>  dextrose 50% Injectable 25 Gram(s) IV Push once  dextrose 50% Injectable 12.5 Gram(s) IV Push once  dextrose 50% Injectable 25 Gram(s) IV Push once  glucagon  Injectable 1 milliGRAM(s) IntraMuscular once  insulin lispro (ADMELOG) corrective regimen sliding scale   SubCutaneous three times a day before meals  insulin lispro (ADMELOG) corrective regimen sliding scale   SubCutaneous at bedtime  levothyroxine 25 MICROGram(s) Oral daily  meropenem  IVPB 1000 milliGRAM(s) IV Intermittent every 12 hours  pantoprazole  Injectable 40 milliGRAM(s) IV Push two times a day  sertraline 50 milliGRAM(s) Oral daily  sodium chloride 0.9%. 1000 milliLiter(s) (75 mL/Hr) IV Continuous <Continuous>    MEDICATIONS  (PRN):  albuterol    90 MICROgram(s) HFA Inhaler 2 Puff(s) Inhalation every 4 hours PRN Shortness of Breath and/or Wheezing  dextrose Oral Gel 15 Gram(s) Oral once PRN Blood Glucose LESS THAN 70 milliGRAM(s)/deciliter  guaifenesin/dextromethorphan Oral Liquid 10 milliLiter(s) Oral every 4 hours PRN Cough  haloperidol     Tablet 0.5 milliGRAM(s) Oral every 6 hours PRN agitation  haloperidol    Injectable 0.5 milliGRAM(s) IntraMuscular every 6 hours PRN Agitation  ondansetron Injectable 4 milliGRAM(s) IV Push every 8 hours PRN Nausea and/or Vomiting      Allergies    Hytrin (Unknown)    Intolerances        SOCIAL HISTORY:    FAMILY HISTORY:  Family history unknown        Vital Signs Last 24 Hrs  T(C): 36.7 (2022 09:15), Max: 36.9 (2022 01:28)  T(F): 98 (2022 09:15), Max: 98.4 (2022 01:28)  HR: 70 (2022 09:15) (70 - 75)  BP: 145/61 (2022 09:15) (111/81 - 145/61)  BP(mean): --  RR: 18 (2022 09:15) (18 - 19)  SpO2: 99% (2022 09:15) (99% - 100%)    Parameters below as of 2022 09:15  Patient On (Oxygen Delivery Method): nasal cannula  O2 Flow (L/min): 4      PHYSICAL EXAM:    Constitutional: NAD, well-developed  HEENT: DAVEY, EOMI, Normal Hearing, MMM  Neck: No LAD, No JVD  Back: Normal spine flexure, No CVA tenderness  Respiratory: CTAB   Cardiovascular: S1 and S2, RRR, no M/G/R  Abd: BS+, soft, NT/ND, No CVAT  Extremities: No peripheral edema  Vascular: 2+ peripheral pulses  Neurological: A/O x 3, no focal deficits  Psychiatric: Normal mood, normal affect  Musculoskeletal: 5/5 strength b/l upper and lower extremities  Skin: No rashes    LABS:                        10.0   14.75 )-----------( 317      ( 2022 12:51 )             31.2         137  |  103  |  47<H>  ----------------------------<  215<H>  3.4<L>   |  27  |  1.54<H>    Ca    9.3      2022 12:51  Mg     1.7     -    TPro  6.2  /  Alb  2.0<L>  /  TBili  0.3  /  DBili  x   /  AST  20  /  ALT  15  /  AlkPhos  81  -      Urinalysis Basic - ( 2022 17:19 )    Color: Yellow / Appearance: Clear / S.010 / pH: x  Gluc: x / Ketone: Trace  / Bili: Negative / Urobili: Negative   Blood: x / Protein: 100 / Nitrite: Positive   Leuk Esterase: Moderate / RBC: 25-50 /HPF / WBC >50   Sq Epi: x / Non Sq Epi: Few / Bacteria: TNTC      Urine Culture:     RADIOLOGY & ADDITIONAL STUDIES:

## 2022-11-27 NOTE — CONSULT NOTE ADULT - ASSESSMENT
Impression  1. Anemia. Coffee ground emesis is non specific and hgb responded adequately to transfuse. No further episodes of nausea, vomiting or melena. Spoke to her HCP son Davis Mendoza who would like to treat conservatively. Has expressed that he QoL has diminished and comfort is his main concern.   2. UTI  3. COVID +    Recommendations  1. Monitor for overt bleeding and transfuse for hgb < 7  2. Switch to PPI 40 mg IVP BID and if remains stable can switch to oral.  3. Check iron studies and anemia panel  4. Diet as tolerated  5. Recommend palliative care consultation

## 2022-11-27 NOTE — BH CONSULTATION LIAISON ASSESSMENT NOTE - SUMMARY
71 y/o F PMHx significant for Dementia (bed bound), Hypertension, Hyperlipidemia, Diabetes mellitus type 2, Major Depressive Disorder, Hypothyroidism, chronic diarrhea, sacral decubitus ulcer, recurrent MDRO UTIs, hx of Iron Deficiency Anemia, and Hyponatremia, DNR, DNI presents was BIBA from home, accompanied by her son, for further evaluation and management of nausea, and vomiting (coffee ground emesis) associated with increased lethargy. HPI obtained from patient's son, due to the patient's underlying dementia, who states the patient was increasingly lethargic, and has had decreased PO intake over the last several days. Psychiatry was consulted to assess for depression, dementia.  Seen and evaluated, awake and alert, only knows her name, poor historian.  Denies significant mood sx, anxiety, states just wants to be left alone.  Denies SI/HI.  Per nursing staff, patient at times combative with nursing care, resistant to some treatment and tests such as blood work, is with memory impairment at baseline.  Ambrocio patient has endorsed feeling sad or depressed.  At this time, presentation likely delirium superimposed on dementia.  Would use Haldol prn for any acute agitation as needed.    PLAN  -PRN Haldol 0.5mg po/im prn q6 for agitation/severe agitation (monitor qtc <500ms )  -no standing medications

## 2022-11-27 NOTE — BH CONSULTATION LIAISON ASSESSMENT NOTE - NSBHCHARTREVIEWINVESTIGATE_PSY_A_CORE FT
< from: 12 Lead ECG (11.26.22 @ 08:02) >      Ventricular Rate 71 BPM    Atrial Rate 71 BPM    P-R Interval 312 ms    QRS Duration 110 ms    Q-T Interval 408 ms    QTC Calculation(Bazett) 443 ms    P Axis 62 degrees    R Axis -38 degrees    T Axis 173 degrees    Diagnosis Line Sinus rhythm with 1st degree A-V block  Left axis deviation  Minimal criteria for Lateral infarct , age undetermined  Poor precordial R wave progression; consider old anterior infarct  Nonspecific T wave abnormality  Abnormal ECG  Confirmed by YULIYA BARBOUR MD ([id number]) on 11/26/2022 10:53:57 AM    < end of copied text >

## 2022-11-27 NOTE — PROVIDER CONTACT NOTE (OTHER) - ACTION/TREATMENT ORDERED:
Patient refused new Davila to be placed. MD aware. No new orders at this time. Will continue to monitor.

## 2022-11-27 NOTE — BH CONSULTATION LIAISON ASSESSMENT NOTE - NSBHATTESTAPPBILLTIME_PSY_A_CORE
I attest my time as SUMAYA is greater than 50% of the total combined time spent on qualifying patient care activities. I have reviewed and verified the documentation.

## 2022-11-27 NOTE — BH CONSULTATION LIAISON ASSESSMENT NOTE - NSICDXPASTMEDICALHX_GEN_ALL_CORE_FT
PAST MEDICAL HISTORY:  Acute kidney failure, unspecified     MARRY (acute respiratory infection)     Ascorbic acid deficiency     Changes in skin texture     Essential (primary) hypertension     Flatulence     Hyperlipidemia     Hypothyroid     Intraoperative hemorrhage and hematoma of a genitourinary system organ or structure complicating other procedure     MDD (major depressive disorder)     Pain, unspecified     Syncope and collapse     Type 2 diabetes mellitus without complication

## 2022-11-27 NOTE — PROGRESS NOTE ADULT - SUBJECTIVE AND OBJECTIVE BOX
Date of service: 22 @ 13:50    pt seen and examined  ms better  feels better this am, eating  afebrile    ROS: no fever or chills; denies dizziness, no HA, no SOB or cough, no abdominal pain, no diarrhea or constipation;  no legs pain, no rashes    MEDICATIONS  (STANDING):  atorvastatin 20 milliGRAM(s) Oral at bedtime  dextrose 5%. 1000 milliLiter(s) (50 mL/Hr) IV Continuous <Continuous>  dextrose 5%. 1000 milliLiter(s) (100 mL/Hr) IV Continuous <Continuous>  dextrose 50% Injectable 25 Gram(s) IV Push once  dextrose 50% Injectable 12.5 Gram(s) IV Push once  dextrose 50% Injectable 25 Gram(s) IV Push once  glucagon  Injectable 1 milliGRAM(s) IntraMuscular once  insulin lispro (ADMELOG) corrective regimen sliding scale   SubCutaneous three times a day before meals  insulin lispro (ADMELOG) corrective regimen sliding scale   SubCutaneous at bedtime  levothyroxine 25 MICROGram(s) Oral daily  meropenem  IVPB 1000 milliGRAM(s) IV Intermittent every 12 hours  pantoprazole Infusion 8 mG/Hr (10 mL/Hr) IV Continuous <Continuous>  sertraline 50 milliGRAM(s) Oral daily  sodium chloride 0.9%. 1000 milliLiter(s) (75 mL/Hr) IV Continuous <Continuous>      Vital Signs Last 24 Hrs  T(C): 36.7 (2022 09:15), Max: 36.9 (2022 01:28)  T(F): 98 (2022 09:15), Max: 98.4 (2022 01:28)  HR: 70 (2022 09:15) (70 - 75)  BP: 145/61 (2022 09:15) (111/81 - 145/61)  BP(mean): --  RR: 18 (2022 09:15) (18 - 19)  SpO2: 99% (2022 09:15) (99% - 100%)    Parameters below as of 2022 09:15  Patient On (Oxygen Delivery Method): nasal cannula  O2 Flow (L/min): 4    PE:  Constitutional: frail looking  HEENT: NC/AT, EOMI, PERRLA, conjunctivae clear; ears and nose atraumatic; pharynx benign  Neck: supple; thyroid not palpable  Back: no tenderness  Respiratory: respiratory effort normal; clear to auscultation  Cardiovascular: S1S2 regular, no murmurs  Abdomen: soft, not tender, not distended, positive BS; liver and spleen WNL  Genitourinary: no suprapubic tenderness + garcia  Lymphatic: no LN palpable  Musculoskeletal: no muscle tenderness, no joint swelling or tenderness  Extremities: no pedal edema  Neurological/ Psychiatric:   moving all extremities  Skin: no rashes; no palpable lesions + sacral decub ulcer     Labs: all available labs reviewed                                   10.0   1475 )-----------( 317      ( 2022 12:51 )             31.2         137  |  103  |  47<H>  ----------------------------<  215<H>  3.4<L>   |  27  |  1.54<H>    Ca    9.3      2022 12:51  Mg     1.7         TPro  6.2  /  Alb  2.0<L>  /  TBili  0.3  /  DBili  x   /  AST  20  /  ALT  15  /  AlkPhos  81             UA  Color: Yellow / Appearance: Clear / S.010 / pH: x  Gluc: x / Ketone: Trace  / Bili: Negative / Urobili: Negative   Blood: x / Protein: 100 / Nitrite: Positive   Leuk Esterase: Moderate / RBC: 25-50 /HPF / WBC >50   Sq Epi: x / Non Sq Epi: Few / Bacteria: TNTC    Culture - Urine (22 @ 17:19)   Specimen Source: Clean Catch None   Culture Results:   >100,000 CFU/ml Gram Negative Rods Culture in progress   Culture - Blood (22 @ 16:42)   Specimen Source: .Blood None   Culture Results:   No growth to date.   Culture - Blood (22 @ 14:58)   Specimen Source: .Blood Blood-Peripheral   Culture Results:   No growth to date.     Radiology: all available radiological tests reviewed    ACC: [id number] EXAM:  XR CHEST PORTABLE IMMED 1V                          PROCEDURE DATE:  2022          INTERPRETATION:  AP chest erect on 2022 at 4:11 PM.    Patient has sepsis.    Heart normal.    Left breast prosthesis noted.    Lungs remain clear.    There is a right PICC line and 2021 which is no longer   evident.    IMPRESSION: Clear lungs.    --- End of Report ---      < end of copied text >    Advanced directives addressed: full resuscitation

## 2022-11-27 NOTE — PROGRESS NOTE ADULT - SUBJECTIVE AND OBJECTIVE BOX
COVERING FOR DR Muñoz     71 y/o F PMHx significant for Dementia (bed bound), Hypertension, Hyperlipidemia, Diabetes mellitus type 2, Major Depressive Disorder, Hypothyroidism, chronic diarrhea, sacral decubitus ulcer, recurrent MDRO UTIs, hx of Iron Deficiency Anemia, and Hyponatremia, DNR, DNI presents was BIBA from home, accompanied by her son, for further evaluation and management of nausea, and vomiting (coffee ground emesis) associated with increased lethargy. HPI obtained from chart , due to the patient's underlying dementia, who states the patient was increasingly lethargic, and has had decreased PO intake over the last several days. Of note the patient is on daily ASA as well. In the ED the patient CCM was consulted as the patient was noted to be hypotensive.   Renal eval called for   Na 129, BUN/Cr 90/1.90, Ca 11.1, Alb 2.4,   (+)UA, COVID-19 (+). In the ED the patient received Ceftriaxone 1g IVPB x 1, Pantoprazole 40mg IVP x 1, then started on a Pantoprazole gtt, NS x 2L, and pRBCs x 2.  Seen by Dr Muñoz in past for DANIEL - Cr 1.3 -0 1.5 in past    today    pt is confused but alert      PAST MEDICAL & SURGICAL HISTORY:  Intraoperative hemorrhage and hematoma of a genitourinary system organ or structure complicating other procedure  Type 2 diabetes mellitus without complication  Essential (primary) hypertension  Syncope and collapse  Acute kidney failure, unspecified  yperlipidemia  MDD (major depressive disorder)  Hypothyroid      Home Medications:  amLODIPine 5 mg oral tablet: 1 tab(s) orally once a day (2022 23:55)  Aspirin Enteric Coated 81 mg oral delayed release tablet: 1 tab(s) orally once a day (2022 23:55)  atorvastatin 20 mg oral tablet: 1 tab(s) orally once a day (2022 23:55)  ferrous sulfate 325 mg (65 mg elemental iron) oral tablet: 1 tab(s) orally 2 times a day (2022 23:55)  glimepiride 4 mg oral tablet: 1 tab(s) orally 2 times a day (2022 23:55)  hydrALAZINE 25 mg oral tablet: 1 tab(s) orally 2 times a day (2022 23:55)  levothyroxine 25 mcg (0.025 mg) oral tablet: 1 tab(s) orally once a day (2022 23:55)  meclizine 25 mg oral tablet: 1 tab(s) orally 2 times a day, As Needed (2022 23:55)  Multiple Vitamins with Minerals oral tablet: 1 tab(s) orally once a day (2022 23:55)  PriLOSEC 40 mg oral delayed release capsule: 1 cap(s) orally once a day (2022 23:55)  sertraline 50 mg oral tablet: 1 tab(s) orally once a day (2022 23:55)  Tylenol 500 mg oral tablet: 2 tab(s) orally every 6 hours, As Needed (2022 23:55)  Vitamin C 1000 mg oral tablet: 1 tab(s) orally once a day (2022 23:55)        MEDICATIONS  (STANDING):  atorvastatin 20 milliGRAM(s) Oral at bedtime  dextrose 5%. 1000 milliLiter(s) (50 mL/Hr) IV Continuous <Continuous>  dextrose 5%. 1000 milliLiter(s) (100 mL/Hr) IV Continuous <Continuous>  dextrose 50% Injectable 25 Gram(s) IV Push once  dextrose 50% Injectable 12.5 Gram(s) IV Push once  dextrose 50% Injectable 25 Gram(s) IV Push once  glucagon  Injectable 1 milliGRAM(s) IntraMuscular once  insulin lispro (ADMELOG) corrective regimen sliding scale   SubCutaneous three times a day before meals  insulin lispro (ADMELOG) corrective regimen sliding scale   SubCutaneous at bedtime  levothyroxine 25 MICROGram(s) Oral daily  meropenem  IVPB 1000 milliGRAM(s) IV Intermittent every 12 hours  pantoprazole Infusion 8 mG/Hr (10 mL/Hr) IV Continuous <Continuous>  sertraline 50 milliGRAM(s) Oral daily  sodium chloride 0.9%. 1000 milliLiter(s) (75 mL/Hr) IV Continuous <Continuous>      Allergies    Hytrin (Unknown)    Intolerances        SOCIAL HISTORY:  Denies ETOh,Smoking,     FAMILY HISTORY:  Family history unknown        REVIEW OF SYSTEMS:  UTO due to MS      Vital Signs Last 24 Hrs  T(C): 36.7 (2022 09:15), Max: 36.9 (2022 01:28)  T(F): 98 (2022 09:15), Max: 98.4 (2022 01:28)  HR: 70 (2022 09:15) (70 - 75)  BP: 145/61 (2022 09:15) (111/81 - 145/61)  BP(mean): --  RR: 18 (2022 09:15) (18 - 19)  SpO2: 99% (2022 09:15) (99% - 100%)    Parameters below as of 2022 09:15  Patient On (Oxygen Delivery Method): nasal cannula  O2 Flow (L/min): 4      Parameters below as of 2022 08:21  Patient On (Oxygen Delivery Method): nasal cannula  O2 Flow (L/min): 4    I&O's Detail    2022 07:01  -  2022 07:00  --------------------------------------------------------  IN:    PRBCs (Packed Red Blood Cells): 655 mL  Total IN: 655 mL    OUT:  Total OUT: 0 mL    Total NET: 655 mL    PHYSICAL EXAM:    Constitutional: frail , confused   HEENT:  EOMI,  MM + dry , pale   Neck: No LAD, No JVD  Respiratory: CTAB  Cardiovascular: S1 and S2  Gastrointestinal: BS+, soft, NT/ND  Extremities: No peripheral edema  Neurological: alert   : No Davila  Skin: No rashes  Access: Not applicable    LABS:                           137    |  103    |  47     ----------------------------<  215       2022 12:51  3.4     |  27     |  1.54     136    |  103    |  68     ----------------------------<  65        2022 08:29  3.8     |  23     |  1.76     133    |  99     |  71     ----------------------------<  249       2022 23:51  3.7     |  26     |  1.71     Ca    9.3        2022 12:51  Ca    9.8        2022 08:29      Mg     1.7       2022 23:51    TPro  6.2    /  Alb  2.0    /  TBili  0.3    /        2022 08:29  DBili  x      /  AST  20     /  ALT  15     /  AlkPhos  81       TPro  6.7    /  Alb  2.4    /  TBili  0.2    /        2022 14:58  DBili  x      /  AST  12     /  ALT  12     /  AlkPhos  92                   Urine Studies:  Urinalysis Basic - ( 2022 17:19 )    Color: Yellow / Appearance: Clear / S.010 / pH: x  Gluc: x / Ketone: Trace  / Bili: Negative / Urobili: Negative   Blood: x / Protein: 100 / Nitrite: Positive   Leuk Esterase: Moderate / RBC: 25-50 /HPF / WBC >50   Sq Epi: x / Non Sq Epi: Few / Bacteria: TNTC            RADIOLOGY & ADDITIONAL STUDIES:

## 2022-11-27 NOTE — PROGRESS NOTE ADULT - SUBJECTIVE AND OBJECTIVE BOX
CC: Nausea, Vomiting Blood (2022 15:02)    HPI:  73 y/o F PMHx significant for Dementia (bed bound), Hypertension, Hyperlipidemia, Diabetes mellitus type 2, Major Depressive Disorder, Hypothyroidism, chronic diarrhea, sacral decubitus ulcer, recurrent MDRO UTIs, hx of Iron Deficiency Anemia, and Hyponatremia, DNR, DNI presents was BIBA from home, accompanied by her son, for further evaluation and management of nausea, and vomiting (coffee ground emesis) associated with increased lethargy. HPI obtained from patient's son, due to the patient's underlying dementia, who states the patient was increasingly lethargic, and has had decreased PO intake over the last several days. Of note the patient is on daily ASA as well. In the ED the patient CCM was consulted as the patient was noted to be hypotensive.   Labs => WBC 25.71, Hgb/Hct 8.0/25.0, MCV/MCH 76.2/24.4, , TnI 88.76 -> 142.68, LA 6.1 -> 3.3, Na 129, BUN/Cr 90/1.90, Glu 390, Ca 11.1, Alb 2.4, (+)UA, COVID-19 (+). In the ED the patient received Ceftriaxone 1g IVPB x 1, Pantoprazole 40mg IVP x 1, then started on a Pantoprazole gtt, NS x 2L, and pRBCs x 2. (2022 21:07)    INTERVAL HPI/OVERNIGHT EVENTS:    Vital Signs Last 24 Hrs  T(C): 36.7 (2022 09:15), Max: 36.9 (2022 01:28)  T(F): 98 (2022 09:15), Max: 98.4 (2022 01:28)  HR: 70 (2022 09:15) (70 - 75)  BP: 145/61 (2022 09:15) (111/81 - 145/61)  RR: 18 (2022 09:15) (18 - 19)  SpO2: 99% (2022 09:15) (99% - 100%)    Parameters below as of 2022 09:15  Patient On (Oxygen Delivery Method): nasal cannula  O2 Flow (L/min): 4        REVIEW OF SYSTEMS:  All other review of systems is negative unless indicated above.        PHYSICAL EXAM:  General: Well developed; well nourished; in no acute distress  Eyes: PERRLA, EOMI; conjunctiva and sclera clear  Head: Normocephalic; atraumatic  ENMT: No nasal discharge; airway clear  Neck: Supple; non tender; no masses  Respiratory: No wheezes, rales or rhonchi  Cardiovascular: Regular rate and rhythm. S1 and S2 Normal; No murmurs, gallops or rubs  Gastrointestinal: Soft non-tender non-distended; Normal bowel sounds  Genitourinary: No  suprapubic  tenderness  Extremities: Normal range of motion, No clubbing, cyanosis or edema  Vascular: Peripheral pulses palpable 2+ bilaterally  Neurological: Alert and oriented x4  Skin: Warm and dry. No acute rash  Lymph Nodes: No acute cervical adenopathy  Musculoskeletal: Normal muscle tone, without deformities  Psychiatric: Cooperative and appropriate    LABS:                         10.2   22.82 )-----------( 361      ( 2022 08:29 )             30.4     2022 08:29    136    |  103    |  68     ----------------------------<  65     3.8     |  23     |  1.76     Ca    9.8        2022 08:29  Mg     1.7       2022 23:51    TPro  6.2    /  Alb  2.0    /  TBili  0.3    /  DBili  x      /  AST  20     /  ALT  15     /  AlkPhos  81     2022 08:29  PT/INR - ( 2022 14:58 )   PT: 11.3 sec;   INR: 0.97 ratio    PTT - ( 2022 14:58 )  PTT:22.3 sec      CAPILLARY BLOOD GLUCOSE  POCT Blood Glucose.: 143 mg/dL (2022 08:33)  POCT Blood Glucose.: 212 mg/dL (2022 20:57)  POCT Blood Glucose.: 160 mg/dL (2022 16:22)  POCT Blood Glucose.: 126 mg/dL (2022 13:42)  POCT Blood Glucose.: 78 mg/dL (2022 12:46)  POCT Blood Glucose.: 54 mg/dL (2022 12:08)    LIVER FUNCTIONS - ( 2022 08:29 )  Alb: 2.0 g/dL / Pro: 6.2 gm/dL / ALK PHOS: 81 U/L / ALT: 15 U/L / AST: 20 U/L / GGT: x           Urinalysis Basic - ( 2022 17:19 )    Color: Yellow / Appearance: Clear / S.010 / pH: x  Gluc: x / Ketone: Trace  / Bili: Negative / Urobili: Negative   Blood: x / Protein: 100 / Nitrite: Positive   Leuk Esterase: Moderate / RBC: 25-50 /HPF / WBC >50   Sq Epi: x / Non Sq Epi: Few / Bacteria: TNTC        MEDICATIONS  (STANDING):  atorvastatin 20 milliGRAM(s) Oral at bedtime  dextrose 5%. 1000 milliLiter(s) (50 mL/Hr) IV Continuous <Continuous>  dextrose 5%. 1000 milliLiter(s) (100 mL/Hr) IV Continuous <Continuous>  dextrose 50% Injectable 25 Gram(s) IV Push once  dextrose 50% Injectable 12.5 Gram(s) IV Push once  dextrose 50% Injectable 25 Gram(s) IV Push once  glucagon  Injectable 1 milliGRAM(s) IntraMuscular once  insulin lispro (ADMELOG) corrective regimen sliding scale   SubCutaneous three times a day before meals  insulin lispro (ADMELOG) corrective regimen sliding scale   SubCutaneous at bedtime  levothyroxine 25 MICROGram(s) Oral daily  meropenem  IVPB 1000 milliGRAM(s) IV Intermittent every 12 hours  pantoprazole Infusion 8 mG/Hr (10 mL/Hr) IV Continuous <Continuous>  sertraline 50 milliGRAM(s) Oral daily  sodium chloride 0.9%. 1000 milliLiter(s) (75 mL/Hr) IV Continuous <Continuous>    MEDICATIONS  (PRN):  albuterol    90 MICROgram(s) HFA Inhaler 2 Puff(s) Inhalation every 4 hours PRN Shortness of Breath and/or Wheezing  dextrose Oral Gel 15 Gram(s) Oral once PRN Blood Glucose LESS THAN 70 milliGRAM(s)/deciliter  guaifenesin/dextromethorphan Oral Liquid 10 milliLiter(s) Oral every 4 hours PRN Cough  haloperidol    Injectable 0.5 milliGRAM(s) IntraMuscular every 6 hours PRN Agitation  ondansetron Injectable 4 milliGRAM(s) IV Push every 8 hours PRN Nausea and/or Vomiting      RADIOLOGY & ADDITIONAL TESTS: CC: Nausea, Vomiting Blood (2022 15:02)    HPI:  73 y/o F PMHx significant for Dementia (bed bound), Hypertension, Hyperlipidemia, Diabetes mellitus type 2, Major Depressive Disorder, Hypothyroidism, chronic diarrhea, sacral decubitus ulcer, recurrent MDRO UTIs, hx of Iron Deficiency Anemia, and Hyponatremia, DNR, DNI presents was BIBA from home, accompanied by her son, for further evaluation and management of nausea, and vomiting (coffee ground emesis) associated with increased lethargy. HPI obtained from patient's son, due to the patient's underlying dementia, who states the patient was increasingly lethargic, and has had decreased PO intake over the last several days. Of note the patient is on daily ASA as well. In the ED the patient CCM was consulted as the patient was noted to be hypotensive.   Labs => WBC 25.71, Hgb/Hct 8.0/25.0, MCV/MCH 76.2/24.4, , TnI 88.76 -> 142.68, LA 6.1 -> 3.3, Na 129, BUN/Cr 90/1.90, Glu 390, Ca 11.1, Alb 2.4, (+)UA, COVID-19 (+). In the ED the patient received Ceftriaxone 1g IVPB x 1, Pantoprazole 40mg IVP x 1, then started on a Pantoprazole gtt, NS x 2L, and pRBCs x 2. (2022 21:07)    INTERVAL HPI /OVERNIGHT EVENTS: chart reviewed, Pt was seen and examined, pleasantly confused, reports no complains, unaware why in the hospital.  Denies CP or difficulty breathing      Vital Signs Last 24 Hrs  T(C): 36.7 (2022 09:15), Max: 36.9 (2022 01:28)  T(F): 98 (2022 09:15), Max: 98.4 (2022 01:28)  HR: 70 (2022 09:15) (70 - 75)  BP: 145/61 (2022 09:15) (111/81 - 145/61)  RR: 18 (2022 09:15) (18 - 19)  SpO2: 99% (2022 09:15) (99% - 100%)    Parameters below as of 2022 09:15  Patient On (Oxygen Delivery Method): nasal cannula  O2 Flow (L/min): 4        REVIEW OF SYSTEMS:  Unable to obtain due to dementia         PHYSICAL EXAM:  General: Well developed;  malnourished; in no acute distress  Eyes: EOMI; conjunctiva and sclera clear  Head: Normocephalic; atraumatic  ENMT: No nasal discharge; airway clear  Respiratory:  Decreased BS,  No wheezes, rales or rhonchi  Cardiovascular: Regular rate and rhythm. S1 and S2 Normal;   Gastrointestinal: Soft non-tender non-distended; Normal bowel sounds  Genitourinary: No  suprapubic  tenderness  Extremities: No  edema  Vascular: Peripheral pulses palpable 2+ bilaterally  Neurological: Alert and oriented x1, non focal   Skin: Warm and dry. No acute rash  Musculoskeletal: Normal muscle tone, without deformities  Psychiatric: Cooperative     LABS:                         10.2   22.82 )-----------( 361      ( 2022 08:29 )             30.4     2022 08:29    136    |  103    |  68     ----------------------------<  65     3.8     |  23     |  1.76     Ca    9.8        2022 08:29  Mg     1.7       2022 23:51    TPro  6.2    /  Alb  2.0    /  TBili  0.3    /  DBili  x      /  AST  20     /  ALT  15     /  AlkPhos  81     2022 08:29  PT/INR - ( 2022 14:58 )   PT: 11.3 sec;   INR: 0.97 ratio    PTT - ( 2022 14:58 )  PTT:22.3 sec      CAPILLARY BLOOD GLUCOSE  POCT Blood Glucose.: 143 mg/dL (2022 08:33)  POCT Blood Glucose.: 212 mg/dL (2022 20:57)  POCT Blood Glucose.: 160 mg/dL (2022 16:22)  POCT Blood Glucose.: 126 mg/dL (2022 13:42)  POCT Blood Glucose.: 78 mg/dL (2022 12:46)  POCT Blood Glucose.: 54 mg/dL (2022 12:08)    LIVER FUNCTIONS - ( 2022 08:29 )  Alb: 2.0 g/dL / Pro: 6.2 gm/dL / ALK PHOS: 81 U/L / ALT: 15 U/L / AST: 20 U/L / GGT: x           Urinalysis Basic - ( 2022 17:19 )    Color: Yellow / Appearance: Clear / S.010 / pH: x  Gluc: x / Ketone: Trace  / Bili: Negative / Urobili: Negative   Blood: x / Protein: 100 / Nitrite: Positive   Leuk Esterase: Moderate / RBC: 25-50 /HPF / WBC >50   Sq Epi: x / Non Sq Epi: Few / Bacteria: TNTC        MEDICATIONS  (STANDING):  atorvastatin 20 milliGRAM(s) Oral at bedtime  dextrose 5%. 1000 milliLiter(s) (50 mL/Hr) IV Continuous <Continuous>  dextrose 5%. 1000 milliLiter(s) (100 mL/Hr) IV Continuous <Continuous>  dextrose 50% Injectable 25 Gram(s) IV Push once  dextrose 50% Injectable 12.5 Gram(s) IV Push once  dextrose 50% Injectable 25 Gram(s) IV Push once  glucagon  Injectable 1 milliGRAM(s) IntraMuscular once  insulin lispro (ADMELOG) corrective regimen sliding scale   SubCutaneous three times a day before meals  insulin lispro (ADMELOG) corrective regimen sliding scale   SubCutaneous at bedtime  levothyroxine 25 MICROGram(s) Oral daily  meropenem  IVPB 1000 milliGRAM(s) IV Intermittent every 12 hours  pantoprazole Infusion 8 mG/Hr (10 mL/Hr) IV Continuous <Continuous>  sertraline 50 milliGRAM(s) Oral daily  sodium chloride 0.9%. 1000 milliLiter(s) (75 mL/Hr) IV Continuous <Continuous>    MEDICATIONS  (PRN):  albuterol    90 MICROgram(s) HFA Inhaler 2 Puff(s) Inhalation every 4 hours PRN Shortness of Breath and/or Wheezing  dextrose Oral Gel 15 Gram(s) Oral once PRN Blood Glucose LESS THAN 70 milliGRAM(s)/deciliter  guaifenesin/dextromethorphan Oral Liquid 10 milliLiter(s) Oral every 4 hours PRN Cough  haloperidol    Injectable 0.5 milliGRAM(s) IntraMuscular every 6 hours PRN Agitation  ondansetron Injectable 4 milliGRAM(s) IV Push every 8 hours PRN Nausea and/or Vomiting      RADIOLOGY & ADDITIONAL TESTS:

## 2022-11-27 NOTE — BH CONSULTATION LIAISON ASSESSMENT NOTE - RISK ASSESSMENT
Risk factors: acute/chronic medical issues, cognitive impairments, not receiving treatment, resistant to care in the hospital     Protective factors: no current SIIP/HIIP, denies mood sx, no active substance abuse, no psychosis, social supports, positive therapeutic relationship    Overall, pt is a low risk of harm to self/others and does not require psychiatric admission for safety and stabilization.

## 2022-11-28 LAB
ANION GAP SERPL CALC-SCNC: 6 MMOL/L — SIGNIFICANT CHANGE UP (ref 5–17)
BUN SERPL-MCNC: 33 MG/DL — HIGH (ref 7–23)
CALCIUM SERPL-MCNC: 8.7 MG/DL — SIGNIFICANT CHANGE UP (ref 8.5–10.1)
CHLORIDE SERPL-SCNC: 103 MMOL/L — SIGNIFICANT CHANGE UP (ref 96–108)
CO2 SERPL-SCNC: 28 MMOL/L — SIGNIFICANT CHANGE UP (ref 22–31)
CREAT SERPL-MCNC: 1.21 MG/DL — SIGNIFICANT CHANGE UP (ref 0.5–1.3)
D DIMER BLD IA.RAPID-MCNC: 1336 NG/ML DDU — HIGH
EGFR: 47 ML/MIN/1.73M2 — LOW
ERYTHROCYTE [SEDIMENTATION RATE] IN BLOOD: 76 MM/HR — HIGH (ref 0–20)
FERRITIN SERPL-MCNC: 207 NG/ML — HIGH (ref 15–150)
GLUCOSE SERPL-MCNC: 196 MG/DL — HIGH (ref 70–99)
HAPTOGLOB SERPL-MCNC: 365 MG/DL — HIGH (ref 34–200)
HCT VFR BLD CALC: 30.1 % — LOW (ref 34.5–45)
HGB BLD-MCNC: 9.8 G/DL — LOW (ref 11.5–15.5)
IRON SATN MFR SERPL: 13 UG/DL — LOW (ref 30–160)
IRON SATN MFR SERPL: 7 % — LOW (ref 14–50)
LDH SERPL L TO P-CCNC: 181 U/L — SIGNIFICANT CHANGE UP (ref 84–241)
MCHC RBC-ENTMCNC: 25.9 PG — LOW (ref 27–34)
MCHC RBC-ENTMCNC: 32.6 GM/DL — SIGNIFICANT CHANGE UP (ref 32–36)
MCV RBC AUTO: 79.4 FL — LOW (ref 80–100)
NT-PROBNP SERPL-SCNC: 3717 PG/ML — HIGH (ref 0–125)
PLATELET # BLD AUTO: 313 K/UL — SIGNIFICANT CHANGE UP (ref 150–400)
POTASSIUM SERPL-MCNC: 3.8 MMOL/L — SIGNIFICANT CHANGE UP (ref 3.5–5.3)
POTASSIUM SERPL-SCNC: 3.8 MMOL/L — SIGNIFICANT CHANGE UP (ref 3.5–5.3)
RBC # BLD: 3.79 M/UL — LOW (ref 3.8–5.2)
RBC # BLD: 3.79 M/UL — LOW (ref 3.8–5.2)
RBC # FLD: 16 % — HIGH (ref 10.3–14.5)
RETICS #: 94.4 K/UL — SIGNIFICANT CHANGE UP (ref 25–125)
RETICS/RBC NFR: 2.5 % — SIGNIFICANT CHANGE UP (ref 0.5–2.5)
SODIUM SERPL-SCNC: 137 MMOL/L — SIGNIFICANT CHANGE UP (ref 135–145)
TIBC SERPL-MCNC: 184 UG/DL — LOW (ref 220–430)
UIBC SERPL-MCNC: 171 UG/DL — SIGNIFICANT CHANGE UP (ref 110–370)
WBC # BLD: 12.02 K/UL — HIGH (ref 3.8–10.5)
WBC # FLD AUTO: 12.02 K/UL — HIGH (ref 3.8–10.5)

## 2022-11-28 PROCEDURE — 99497 ADVNCD CARE PLAN 30 MIN: CPT | Mod: 25

## 2022-11-28 PROCEDURE — 93306 TTE W/DOPPLER COMPLETE: CPT | Mod: 26

## 2022-11-28 PROCEDURE — 99233 SBSQ HOSP IP/OBS HIGH 50: CPT

## 2022-11-28 PROCEDURE — 99231 SBSQ HOSP IP/OBS SF/LOW 25: CPT

## 2022-11-28 PROCEDURE — 99223 1ST HOSP IP/OBS HIGH 75: CPT

## 2022-11-28 RX ORDER — LACTOBACILLUS ACIDOPHILUS 100MM CELL
1 CAPSULE ORAL DAILY
Refills: 0 | Status: DISCONTINUED | OUTPATIENT
Start: 2022-11-28 | End: 2022-12-05

## 2022-11-28 RX ORDER — HEPARIN SODIUM 5000 [USP'U]/ML
5000 INJECTION INTRAVENOUS; SUBCUTANEOUS EVERY 8 HOURS
Refills: 0 | Status: DISCONTINUED | OUTPATIENT
Start: 2022-11-28 | End: 2022-12-05

## 2022-11-28 RX ADMIN — Medication 25 MICROGRAM(S): at 06:37

## 2022-11-28 RX ADMIN — HALOPERIDOL DECANOATE 0.5 MILLIGRAM(S): 100 INJECTION INTRAMUSCULAR at 22:03

## 2022-11-28 RX ADMIN — PANTOPRAZOLE SODIUM 40 MILLIGRAM(S): 20 TABLET, DELAYED RELEASE ORAL at 11:23

## 2022-11-28 RX ADMIN — ATORVASTATIN CALCIUM 20 MILLIGRAM(S): 80 TABLET, FILM COATED ORAL at 22:37

## 2022-11-28 RX ADMIN — Medication 1: at 08:54

## 2022-11-28 RX ADMIN — HALOPERIDOL DECANOATE 0.5 MILLIGRAM(S): 100 INJECTION INTRAMUSCULAR at 14:52

## 2022-11-28 RX ADMIN — Medication 1 TABLET(S): at 22:37

## 2022-11-28 RX ADMIN — MEROPENEM 100 MILLIGRAM(S): 1 INJECTION INTRAVENOUS at 22:37

## 2022-11-28 RX ADMIN — HEPARIN SODIUM 5000 UNIT(S): 5000 INJECTION INTRAVENOUS; SUBCUTANEOUS at 22:08

## 2022-11-28 RX ADMIN — MEROPENEM 100 MILLIGRAM(S): 1 INJECTION INTRAVENOUS at 11:24

## 2022-11-28 RX ADMIN — PANTOPRAZOLE SODIUM 40 MILLIGRAM(S): 20 TABLET, DELAYED RELEASE ORAL at 22:37

## 2022-11-28 RX ADMIN — Medication 4: at 12:53

## 2022-11-28 RX ADMIN — SERTRALINE 50 MILLIGRAM(S): 25 TABLET, FILM COATED ORAL at 11:23

## 2022-11-28 NOTE — DIETITIAN INITIAL EVALUATION ADULT - ORAL INTAKE PTA/DIET HISTORY
Pt reports decreased appetite/po intake PTA x 1 week or so, pt poor historian. Pt reports decreased appetite/po intake PTA x 1 week or so d/t n/v, pt poor historian. Drinks Ensure 1-2/day. Pt reports decreased appetite/po intake PTA x 1 week or so d/t n/v, pt poor historian. Lives at home w/son. Drinks Ensure 1-2/day.

## 2022-11-28 NOTE — CHART NOTE - NSCHARTNOTEFT_GEN_A_CORE
HPI:  71 y/o F PMHx significant for Dementia (bed bound), Hypertension, Hyperlipidemia, Diabetes mellitus type 2, Major Depressive Disorder, Hypothyroidism, chronic diarrhea, sacral decubitus ulcer, recurrent MDRO UTIs, hx of Iron Deficiency Anemia, and Hyponatremia, DNR, DNI presents was BIBA from home, accompanied by her son, for further evaluation and management of nausea, and vomiting (coffee ground emesis) associated with increased lethargy. HPI obtained from patient's son, due to the patient's underlying dementia, who states the patient was increasingly lethargic, and has had decreased PO intake over the last several days. Of note the patient is on daily ASA as well. In the ED the patient CCM was consulted as the patient was noted to be hypotensive.   (25 Nov 2022 21:07)      PERTINENT PMH REVIEWED:  [ X ] YES [ ] NO           Primary Contact:   doc Yanez, health care agent, phone # [phone]    HCP [ X ] Surrogate [   ] Guardian [   ]    Mental Status: Pt. lacks capacity  Concerns of Depression [  ] -not identified.  Anxiety [   ] -not identified.  Baseline ADLs (prior to admission):  Independent [ ] moderately [ ] fully   Dependent   [ ] moderately [ X ]fully    Family Meeting attendees: GOC held with son, health care agent and Dr. Serrano 11/28.    Anticipated Grief: Patient[  ] Family [ X ]    Caregiver [place] Assessed: Yes [ X ] No [  ]    M[employer]on: Unknown.    Spiritual Concerns: Not identified,  available for support.    Goals of Care: Comfort [ X ] Rehabilitation [  ] Curative [  ] Life Prolonging [  ]    Previous Services: [facility] for wound care 3 x a week.    ADVANCE DIRECTIVES:    -Pt. lacks capacity  -HCP names Jose David as health care agent  -MOLST DNR/DNI    Anticipated D/C Plan: Return home with hospice                     Summary:  This SW spoke with son, Jose David, health care agent via telephone for follow up and support.  Palliative SW role explained.  Emotional support provided.  Pt. lacks capacity.  Pt. resides at home with son, son provides 24/7 assistance.  Pt. is bedbound.  Son reports [facility] came 3 x a week for wound care and provided a shower aide.  Son shared Pts journey with her illness over the last 2 years.  States his mom significantly declined 2 years ago shortly after his father passed away.  Sons feelings explored.  Support provided.    GOC held with Dr. Serrano earlier this date.  Referral made for hospice services at home.      Plan to return home with hospice at this time.  Emotional support provided.  Our team to continue to follow.

## 2022-11-28 NOTE — PROGRESS NOTE ADULT - ASSESSMENT
71 y/o F PMHx significant for Dementia (bed bound), Hypertension, Hyperlipidemia, Diabetes mellitus type 2, Major Depressive Disorder, Hypothyroidism, chronic diarrhea, sacral decubitus ulcer, recurrent MDRO UTIs, hx of Iron Deficiency Anemia, and Hyponatremia admitted for:     # Acute blood loss anemia suspected upper GI Bleeding  -Hemodynamically stable  - S/p 2 U PRBCs   - stable HH, monitor   - s/p Pantoprazole gtt change to IB BID   - tolerates po diet  - GI following, conservative management     # Sepsis due to recurrent UTI/ COVID  19   # Lactic acidosis resolved  - Elevated WBC, trending down   - Lactic acidosis resolved after PRBCs and IVF   - continue with gentle IV hydration   - UCX: > 100K GNR  - BCX neg   - cont. Meropenem 1g IVPB q12h  - ID recs appreciated     # Elevated Cardiac Enzymes,  likely Type II MI  in settings of Sepsis/hypovolemia/Anemia    - trend troponin  - No Chest pain  - Conservative management as per family   - not on AntiPlts in settings of acute GI bleed, will further D/w GI  - Statins   - ECHO    #Acute Renal Failure  - ikely ATN due to hypoperfusion in settings of sepsis and hypovolemia   - cont. IV hydration  - c/w Davila monitor s and os   - labs in am   - f/u w/ Nephrology    #  Acute hypoxic respiratory failure  likely 2/2 Acute Sepsis   now respiratpry status improved, on 4l NC, irate as tolerated  CXR on admission clear lungs, will repeat   Check BNP, ECHO  Less likely 2/2 COVID as not symptomatic no upper respiratory symptoms            # Acute COVID-19  - maintain on airborne isolation  - continue with O2 as needed via nasal cannula and titrate down  as tolerated    - cont. anti-pyretics w/ Acetaminophen 650 mg PO q4h PRN fever. Limit use of NSAIDs.  - cont. HFA albuterol Q6 hour PRN via MDI.   - will hold off on dexa  - No anti viral due to renal failure   - ID recs appreciated       #Diabetes mellitus type 2  - FS q6h while NPO  - cont. ISS per protocol    # Goals of Care  - Per son, pt does not want a breathing tube. Pt's son states she has expressed her wishes of DNR, DNI and son states she would not want aggressive invasive medical interventions.    # Vte ppx  - SCDs in lieu of active GI bleeding, reeval in am  73 y/o F PMHx significant for Dementia (bed bound), Hypertension, Hyperlipidemia, Diabetes mellitus type 2, Major Depressive Disorder, Hypothyroidism, chronic diarrhea, sacral decubitus ulcer, recurrent MDRO UTIs, hx of Iron Deficiency Anemia, and Hyponatremia admitted for:     # Acute blood loss anemia suspected upper GI Bleeding  -Hemodynamically stable  - S/p 2 U PRBCs   - stable HH, monitor   - s/p Pantoprazole gtt change to IV BID, if continues be stable, will switch to PO  - tolerates po diet  - GI following, conservative management     # Sepsis due to recurrent UTI/ COVID  19   # Lactic acidosis resolved  - Elevated WBC, trending down   - Lactic acidosis resolved after PRBCs and IVF   -D/c  IV hydration,  good oral intake   - UCX: > 100K GNR  - BCX neg   - cont. Meropenem 1g IVPB q12h  - ID recs appreciated     # Elevated Cardiac Enzymes,  likely Type II MI  in settings of Sepsis/hypovolemia/Anemia    - trend troponin  - No Chest pain  - Conservative management as per family   - not on AntiPlts in settings of acute GI bleed, will further D/w GI in am   - Statins   - ECHO: preserved EF, no RWMA, mild valvular Dz, mild PHTN     #Acute Renal Failure  - likely ATN due to hypoperfusion in settings of sepsis and hypovolemia   - d/c  IVF, oral hydration   - c/w Davila monitor s and os   - labs in am   -  Nephrology recs appreciated     #  Acute hypoxic respiratory failure  likely 2/2 Acute Sepsis   now respiratory status improved, on 4l NC,  titrate down   as tolerated  CXR on admission clear lungs, will repeat   BNP elevated  ECHO: preserved EF, diastolic dysFx, IVC normal   Less likely 2/2 COVID as not symptomatic no upper respiratory symptoms   IVF d/c, monitor, titrate off O2          # Acute COVID-19  - maintain on airborne isolation  - continue with O2 as needed via nasal cannula and titrate down  as tolerated    - cont. anti-pyretics w/ Acetaminophen 650 mg PO q4h PRN fever. Limit use of NSAIDs.  - cont. HFA albuterol Q6 hour PRN via MDI.   - will hold off on dexa  - No anti viral due to renal failure   - ID recs appreciated       #Diabetes mellitus type 2  - FS q6h while NPO  - cont. ISS per protocol    # Goals of Care  - DNR/DNI  Palliative eval     # Vte ppx  - H/h stable and no further emesis, start heparin SQ

## 2022-11-28 NOTE — DIETITIAN INITIAL EVALUATION ADULT - ENTER FROM (G/KG)
-- DO NOT REPLY / DO NOT REPLY ALL --  -- Message is from Engagement Center Operations (ECO) --    Referral Request  Name of Specialist: Tam Chen  Provider's specialty: Gastroenterology    Medical condition for referral:  consultation    Is this a NEW request?: yes      Referral ordered by: Shailesh Morocho      Insurance type:       Payor: [facility] MEDICARE ADVANTAGE / Plan:  BE /[id number] / Product Type:  MEDICARE ADVANTAGE      Preferred Delivery Method   Call when ready for pickup - phone number to notify: [phone]        Alternative phone number: none    Can a detailed message be left? Yes    Please give this turnaround time to the caller:   \"This message will be sent to [state Provider's full name]. The clinical team will return your call as soon as they review your message. Typically, it takes 3 business days to process referral requests.\"   1.5

## 2022-11-28 NOTE — ADVANCED PRACTICE NURSE CONSULT - RECOMMEDATIONS
·Cavilon (liquid skin barrier) to coccyx, gluteal and inner groin - daily.    ·Recommendation: ·Turn and Reposition Q2H  ·Offload sacral-coccygeal area with positioner pillow, alternate sides Q2H,   ·Incontinence care with repositioning Q2H  -MediHoney to sacral wound bed and cover with a small sacral foam change every three days or when soiled.   -FOR INCONTINENCE - Cleanse with NS, pat dry, paint with cavilon, apply TRIAD (Zinc-oxide barrier paste) BID and PRN for soiling: It is alright to leave a thin layer of cream on the skin after cleansing as this will not impede wound healing  -Continue turning/positioning patient from side-to-side q2h while in bed, q1h when/if OOB chair, or in accordance w/ pt's plan of care. Utilize pillows and/or z-turner fluidized positioner to assist w/ turning/positioning. When/if OOB chair, utilize pillows or air waffle chair cushion to offload pressure. If patient a fall risk ensure chair alarm is on and working. Apply Antiskid mat under chair alarm to prevent patient from sliding off chair.   -Continue to offload heels from bed surface with soft pillow under calfs or by applying offloading boots to BLEs. May use foam boots or Total CAIR boot (while in bed please remove the plastic support/when out of bed in chair place the plastic suppot back in)   -Continue utilizing one underpad underneath patient to contain incontinence episodes; change pad when saturated/soiled. For profuse incontinence use 1 Purple pad under patient accept and wick away moisture.   -Continue nutrition consult for optimal wound healing & nutritional status.   -Assess skin/wound qshift, report changes to primary provider.     Please measure wound weekly and day of discharge in EMR     Spoke with Bedside RN and RNs will care for wound. Any further assistance or recommendation please reconsult wound care RN

## 2022-11-28 NOTE — CONSULT NOTE ADULT - CONVERSATION DETAILS
detailed discussion with pt's son by phone today.  he is the HCP, his father, the primary HCP listed on our form is .     he is his mother's primary care giver as well.  We discussed Palliative Care team being a supportive team when a patient has ongoing illnesses.  We also discussed that it is not an end of life care service, but can help navigate symptoms and emotional support throughout their hospital stay here.    He reports that his mother has been bedbound for two years.  while she can direct her care, he reports that her vertigo keeps her from moving around.  last time she was in a chair she vomited due to vertigo and now refuses to get out of bed.  Quality of life is defined as watching her TV.  son reports that his mother is incontinent of bowel and bladder, using diapers and does not know when she moves her bowels.  he wants to focus on quality of life    Hospice was explained as well  as an end of life care philosophy.  When a disease cannot be cured, or family/patient decide the treatment burdens out weigh the risk and one choses to change focus of treatment from cure to quality/comfort.   he is agreeable to hospice care.      Advanced Directives Discussed and the following was decided: DNR/DNI, no feeding tube.  trial of ABx.  return to hospital is OK.

## 2022-11-28 NOTE — PROGRESS NOTE ADULT - ASSESSMENT
Imp:  Coffee ground emesis, resolved  Patient and family declining endoscopic eval    Rec:  Cont supportive care

## 2022-11-28 NOTE — PROGRESS NOTE ADULT - SUBJECTIVE AND OBJECTIVE BOX
Date of service: 22 @ 12:45    pt seen and examined  sitting up in bed  no resp distress  afebrile     ROS: no fever or chills; denies dizziness, no HA, no SOB or cough, no abdominal pain, no diarrhea or constipation;  no legs pain, no rashes      MEDICATIONS  (STANDING):  atorvastatin 20 milliGRAM(s) Oral at bedtime  dextrose 5%. 1000 milliLiter(s) (50 mL/Hr) IV Continuous <Continuous>  dextrose 5%. 1000 milliLiter(s) (100 mL/Hr) IV Continuous <Continuous>  dextrose 50% Injectable 25 Gram(s) IV Push once  dextrose 50% Injectable 12.5 Gram(s) IV Push once  dextrose 50% Injectable 25 Gram(s) IV Push once  glucagon  Injectable 1 milliGRAM(s) IntraMuscular once  insulin lispro (ADMELOG) corrective regimen sliding scale   SubCutaneous three times a day before meals  insulin lispro (ADMELOG) corrective regimen sliding scale   SubCutaneous at bedtime  levothyroxine 25 MICROGram(s) Oral daily  meropenem  IVPB 1000 milliGRAM(s) IV Intermittent every 12 hours  pantoprazole  Injectable 40 milliGRAM(s) IV Push two times a day  sertraline 50 milliGRAM(s) Oral daily  sodium chloride 0.9%. 1000 milliLiter(s) (75 mL/Hr) IV Continuous <Continuous>    Vital Signs Last 24 Hrs  T(C): 36.6 (2022 08:01), Max: 36.8 (2022 00:15)  T(F): 97.8 (2022 08:01), Max: 98.3 (2022 00:15)  HR: 72 (2022 08:01) (72 - 76)  BP: 139/91 (2022 08:01) (121/94 - 139/91)  BP(mean): --  RR: 18 (2022 08:01) (18 - 18)  SpO2: 99% (2022 08:01) (99% - 99%)    Parameters below as of 2022 08:01  Patient On (Oxygen Delivery Method): nasal cannula  O2 Flow (L/min): 4      PE:  Constitutional: frail looking  HEENT: NC/AT, EOMI, PERRLA, conjunctivae clear; ears and nose atraumatic; pharynx benign  Neck: supple; thyroid not palpable  Back: no tenderness  Respiratory: respiratory effort normal; clear to auscultation  Cardiovascular: S1S2 regular, no murmurs  Abdomen: soft, not tender, not distended, positive BS; liver and spleen WNL  Genitourinary: no suprapubic tenderness + garcia  Lymphatic: no LN palpable  Musculoskeletal: no muscle tenderness, no joint swelling or tenderness  Extremities: no pedal edema  Neurological/ Psychiatric:   moving all extremities  Skin: no rashes; no palpable lesions + sacral decub ulcer     Labs: all available labs reviewed                        9.8    12.02 )-----------( 313      ( 2022 09:14 )             30.1         137  |  103  |  33<H>  ----------------------------<  196<H>  3.8   |  28  |  1.21    Ca    8.7      2022 09:14      UA  Color: Yellow / Appearance: Clear / S.010 / pH: x  Gluc: x / Ketone: Trace  / Bili: Negative / Urobili: Negative   Blood: x / Protein: 100 / Nitrite: Positive   Leuk Esterase: Moderate / RBC: 25-50 /HPF / WBC >50   Sq Epi: x / Non Sq Epi: Few / Bacteria: TNTC    Culture - Urine (22 @ 17:19)   Specimen Source: Clean Catch None   Culture Results:   >100,000 CFU/ml Gram Negative Rods Culture in progress   Culture - Blood (22 @ 16:42)   Specimen Source: .Blood None   Culture Results:   No growth to date.   Culture - Blood (22 @ 14:58)   Specimen Source: .Blood Blood-Peripheral   Culture Results:   No growth to date.     Radiology: all available radiological tests reviewed    ACC: [id number] EXAM:  XR CHEST PORTABLE IMMED 1V                          PROCEDURE DATE:  2022          INTERPRETATION:  AP chest erect on 2022 at 4:11 PM.    Patient has sepsis.    Heart normal.    Left breast prosthesis noted.    Lungs remain clear.    There is a right PICC line and 2021 which is no longer   evident.    IMPRESSION: Clear lungs.    --- End of Report ---      < end of copied text >    Advanced directives addressed: full resuscitation

## 2022-11-28 NOTE — PROGRESS NOTE ADULT - ASSESSMENT
73 y/o F PMHx significant for Dementia (bed bound), Hypertension, Hyperlipidemia, Diabetes mellitus type 2, Major Depressive Disorder, Hypothyroidism, chronic diarrhea, sacral decubitus ulcer, recurrent MDRO UTIs, hx of Iron Deficiency Anemia, and Hyponatremia, DNR, DNI presents was BIBA from home, accompanied by her son, for further evaluation and management of nausea, and vomiting (coffee ground emesis) associated with increased lethargy.  Of note the patient is on daily ASA as well. In the ED the patient CCM was consulted as the patient was noted to be hypotensive. Labs => WBC 25.71, Hgb/Hct 8.0/25.0, MCV/MCH 76.2/24.4, , TnI 88.76 -> 142.68, LA 6.1 -> 3.3, Na 129, BUN/Cr 90/1.90, Glu 390, Ca 11.1, Alb 2.4, (+)UA, COVID-19 (+). In the ED the patient received Ceftriaxone 1g IVPB x 1, Pantoprazole 40mg IVP x 1, then started on a Pantoprazole gtt, NS x 2L, and pRBCs x 2    1. Sepsis. Hypotension. UTI. hx Recurrent MDR UTI. Covid-19 viral syndrome. Sacral decub ulcer. GI bleed.   - slowly improving, HD stable   - urine cx growing gnrs f/u final cx, blood cx no growth   - on IV merrem [id number] #3-4  - xray clear, resp status stable, does not require remdesivir, decadron   - monitor temps  - tolerating abx well so far; no side effects noted  - reason for abx use and side effects reviewed with patient  - gi eval noted   - supportive care  - fu cbc  - isolation precautions    2. other issues - care per medicine

## 2022-11-28 NOTE — DIETITIAN INITIAL EVALUATION ADULT - ADD RECOMMEND
1. Add Ensure Plus Hi Pro TID to help pt optimize ENN po and pro intake.   2. Consider adding thiamine 100 mg daily 2/2 poor PO intake/ malnutrition and MVI w/ minerals daily to ensure 100% RDA met   3. Monitor bowel movements,  consider implementing Banatrol TID in applesauce to bowel regimen to help improve GI fxn.  4. Confirm GOC, pending palliative consult.  5. RDN will continue to monitor PO intake, labs, hydration, and wt prn.  1. Add Ensure Plus Hi Pro TID to help pt optimize ENN po and pro intake.   2. Consider adding thiamine 100 mg daily 2/2 poor PO intake/ malnutrition and MVI w/ minerals daily to ensure 100% RDA met   3. Add Vit C 500 mg BID, add Zinc Sulfate 220 mg x 10 days to promote wound healing.   4. Monitor bowel movements,  consider implementing Banatrol TID in applesauce to bowel regimen to help improve GI fxn.  5. Confirm GOC, pending palliative consult.  6. RDN will continue to monitor PO intake, labs, hydration, and wt prn.

## 2022-11-28 NOTE — PROGRESS NOTE ADULT - SUBJECTIVE AND OBJECTIVE BOX
CC: Nausea, Vomiting Blood (2022 15:02)    HPI:  73 y/o F PMHx significant for Dementia (bed bound), Hypertension, Hyperlipidemia, Diabetes mellitus type 2, Major Depressive Disorder, Hypothyroidism, chronic diarrhea, sacral decubitus ulcer, recurrent MDRO UTIs, hx of Iron Deficiency Anemia, and Hyponatremia, DNR, DNI presents was BIBA from home, accompanied by her son, for further evaluation and management of nausea, and vomiting (coffee ground emesis) associated with increased lethargy. HPI obtained from patient's son, due to the patient's underlying dementia, who states the patient was increasingly lethargic, and has had decreased PO intake over the last several days. Of note the patient is on daily ASA as well. In the ED the patient CCM was consulted as the patient was noted to be hypotensive.   Labs => WBC 25.71, Hgb/Hct 8.0/25.0, MCV/MCH 76.2/24.4, , TnI 88.76 -> 142.68, LA 6.1 -> 3.3, Na 129, BUN/Cr 90/1.90, Glu 390, Ca 11.1, Alb 2.4, (+)UA, COVID-19 (+). In the ED the patient received Ceftriaxone 1g IVPB x 1, Pantoprazole 40mg IVP x 1, then started on a Pantoprazole gtt, NS x 2L, and pRBCs x 2. (2022 21:07)    INTERVAL HPI /OVERNIGHT EVENTS: chart reviewed, Pt was seen and examined, pleasantly confused, reports no complains, unaware why in the hospital.  Denies CP or difficulty breathing      Vital Signs Last 24 Hrs  T(C): 36.7 (2022 09:15), Max: 36.9 (2022 01:28)  T(F): 98 (2022 09:15), Max: 98.4 (2022 01:28)  HR: 70 (2022 09:15) (70 - 75)  BP: 145/61 (2022 09:15) (111/81 - 145/61)  RR: 18 (2022 09:15) (18 - 19)  SpO2: 99% (2022 09:15) (99% - 100%)    Parameters below as of 2022 09:15  Patient On (Oxygen Delivery Method): nasal cannula  O2 Flow (L/min): 4        REVIEW OF SYSTEMS:  Unable to obtain due to dementia         PHYSICAL EXAM:  General: Well developed;  malnourished; in no acute distress  Eyes: EOMI; conjunctiva and sclera clear  Head: Normocephalic; atraumatic  ENMT: No nasal discharge; airway clear  Respiratory:  Decreased BS,  No wheezes, rales or rhonchi  Cardiovascular: Regular rate and rhythm. S1 and S2 Normal;   Gastrointestinal: Soft non-tender non-distended; Normal bowel sounds  Genitourinary: No  suprapubic  tenderness  Extremities: No  edema  Vascular: Peripheral pulses palpable 2+ bilaterally  Neurological: Alert and oriented x1, non focal   Skin: Warm and dry. No acute rash  Musculoskeletal: Normal muscle tone, without deformities  Psychiatric: Cooperative     LABS:                         10.2   22.82 )-----------( 361      ( 2022 08:29 )             30.4     2022 08:29    136    |  103    |  68     ----------------------------<  65     3.8     |  23     |  1.76     Ca    9.8        2022 08:29  Mg     1.7       2022 23:51    TPro  6.2    /  Alb  2.0    /  TBili  0.3    /  DBili  x      /  AST  20     /  ALT  15     /  AlkPhos  81     2022 08:29  PT/INR - ( 2022 14:58 )   PT: 11.3 sec;   INR: 0.97 ratio    PTT - ( 2022 14:58 )  PTT:22.3 sec      CAPILLARY BLOOD GLUCOSE  POCT Blood Glucose.: 143 mg/dL (2022 08:33)  POCT Blood Glucose.: 212 mg/dL (2022 20:57)  POCT Blood Glucose.: 160 mg/dL (2022 16:22)  POCT Blood Glucose.: 126 mg/dL (2022 13:42)  POCT Blood Glucose.: 78 mg/dL (2022 12:46)  POCT Blood Glucose.: 54 mg/dL (2022 12:08)    LIVER FUNCTIONS - ( 2022 08:29 )  Alb: 2.0 g/dL / Pro: 6.2 gm/dL / ALK PHOS: 81 U/L / ALT: 15 U/L / AST: 20 U/L / GGT: x           Urinalysis Basic - ( 2022 17:19 )    Color: Yellow / Appearance: Clear / S.010 / pH: x  Gluc: x / Ketone: Trace  / Bili: Negative / Urobili: Negative   Blood: x / Protein: 100 / Nitrite: Positive   Leuk Esterase: Moderate / RBC: 25-50 /HPF / WBC >50   Sq Epi: x / Non Sq Epi: Few / Bacteria: TNTC        MEDICATIONS  (STANDING):  atorvastatin 20 milliGRAM(s) Oral at bedtime  dextrose 5%. 1000 milliLiter(s) (50 mL/Hr) IV Continuous <Continuous>  dextrose 5%. 1000 milliLiter(s) (100 mL/Hr) IV Continuous <Continuous>  dextrose 50% Injectable 25 Gram(s) IV Push once  dextrose 50% Injectable 12.5 Gram(s) IV Push once  dextrose 50% Injectable 25 Gram(s) IV Push once  glucagon  Injectable 1 milliGRAM(s) IntraMuscular once  insulin lispro (ADMELOG) corrective regimen sliding scale   SubCutaneous three times a day before meals  insulin lispro (ADMELOG) corrective regimen sliding scale   SubCutaneous at bedtime  levothyroxine 25 MICROGram(s) Oral daily  meropenem  IVPB 1000 milliGRAM(s) IV Intermittent every 12 hours  pantoprazole Infusion 8 mG/Hr (10 mL/Hr) IV Continuous <Continuous>  sertraline 50 milliGRAM(s) Oral daily  sodium chloride 0.9%. 1000 milliLiter(s) (75 mL/Hr) IV Continuous <Continuous>    MEDICATIONS  (PRN):  albuterol    90 MICROgram(s) HFA Inhaler 2 Puff(s) Inhalation every 4 hours PRN Shortness of Breath and/or Wheezing  dextrose Oral Gel 15 Gram(s) Oral once PRN Blood Glucose LESS THAN 70 milliGRAM(s)/deciliter  guaifenesin/dextromethorphan Oral Liquid 10 milliLiter(s) Oral every 4 hours PRN Cough  haloperidol    Injectable 0.5 milliGRAM(s) IntraMuscular every 6 hours PRN Agitation  ondansetron Injectable 4 milliGRAM(s) IV Push every 8 hours PRN Nausea and/or Vomiting      RADIOLOGY & ADDITIONAL TESTS: CC: Nausea, Vomiting Blood (2022 15:02)    HPI:  73 y/o F PMHx significant for Dementia (bed bound), Hypertension, Hyperlipidemia, Diabetes mellitus type 2, Major Depressive Disorder, Hypothyroidism, chronic diarrhea, sacral decubitus ulcer, recurrent MDRO UTIs, hx of Iron Deficiency Anemia, and Hyponatremia, DNR, DNI presents was BIBA from home, accompanied by her son, for further evaluation and management of nausea, and vomiting (coffee ground emesis) associated with increased lethargy. HPI obtained from patient's son, due to the patient's underlying dementia, who states the patient was increasingly lethargic, and has had decreased PO intake over the last several days. Of note the patient is on daily ASA as well. In the ED the patient CCM was consulted as the patient was noted to be hypotensive.   Labs => WBC 25.71, Hgb/Hct 8.0/25.0, MCV/MCH 76.2/24.4, , TnI 88.76 -> 142.68, LA 6.1 -> 3.3, Na 129, BUN/Cr 90/1.90, Glu 390, Ca 11.1, Alb 2.4, (+)UA, COVID-19 (+). In the ED the patient received Ceftriaxone 1g IVPB x 1, Pantoprazole 40mg IVP x 1, then started on a Pantoprazole gtt, NS x 2L, and pRBCs x 2. (2022 21:07)    INTERVAL HPI /OVERNIGHT EVENTS: Pt was seen and examined, pleasantly confused, reports no complains, denies pain, reports had lunch  (meal      Vital Signs Last 24 Hrs  T(C): 36.7 (2022 16:54), Max: 36.8 (2022 00:15)  T(F): 98.1 (2022 16:54), Max: 98.3 (2022 00:15)  HR: 78 (2022 16:54) (72 - 78)  BP: 133/89 (2022 16:54) (121/94 - 139/91)  RR: 18 (2022 16:54) (18 - 18)  SpO2: 98% (2022 16:54) (98% - 99%)    Parameters below as of 2022 16:54  Patient On (Oxygen Delivery Method): room air      REVIEW OF SYSTEMS:  Unable to obtain due to dementia         PHYSICAL EXAM:  General: Well developed;  malnourished; in no acute distress  Eyes: EOMI; conjunctiva and sclera clear  Head: Normocephalic; atraumatic  ENMT: No nasal discharge; airway clear  Respiratory:  Decreased BS,  No wheezes, rales or rhonchi  Cardiovascular: Regular rate and rhythm. S1 and S2 Normal;   Gastrointestinal: Soft non-tender non-distended; Normal bowel sounds  Genitourinary: No  suprapubic  tenderness, Davila in place   Extremities: No  edema  Vascular: Peripheral pulses palpable 2+ bilaterally  Neurological: Alert and oriented x1, non focal   Skin: Warm and dry. No acute rash  Musculoskeletal: Normal muscle tone, without deformities  Psychiatric: Cooperative     LABS:                         9.8    12.02 )-----------( 313      ( 2022 09:14 )             30.1     11-    137  |  103  |  33<H>  ----------------------------<  196<H>  3.8   |  28  |  1.21    Ca    8.7      2022 09:14                            10.2   22.82 )-----------( 361      ( 2022 08:29 )             30.4     2022 08:29    136    |  103    |  68     ----------------------------<  65     3.8     |  23     |  1.76     Ca    9.8        2022 08:29  Mg     1.7       2022 23:51    TPro  6.2    /  Alb  2.0    /  TBili  0.3    /  DBili  x      /  AST  20     /  ALT  15     /  AlkPhos  81     2022 08:29  PT/INR - ( 2022 14:58 )   PT: 11.3 sec;   INR: 0.97 ratio    PTT - ( 2022 14:58 )  PTT:22.3 sec      CAPILLARY BLOOD GLUCOSE  POCT Blood Glucose.: 108 mg/dL (2022 17:46)  POCT Blood Glucose.: 316 mg/dL (2022 12:42)  POCT Blood Glucose.: 190 mg/dL (2022 08:47)  POCT Blood Glucose.: 200 mg/dL (2022 23:17)    LIVER FUNCTIONS - ( 2022 08:29 )  Alb: 2.0 g/dL / Pro: 6.2 gm/dL / ALK PHOS: 81 U/L / ALT: 15 U/L / AST: 20 U/L / GGT: x           Urinalysis Basic - ( 2022 17:19 )    Color: Yellow / Appearance: Clear / S.010 / pH: x  Gluc: x / Ketone: Trace  / Bili: Negative / Urobili: Negative   Blood: x / Protein: 100 / Nitrite: Positive   Leuk Esterase: Moderate / RBC: 25-50 /HPF / WBC >50   Sq Epi: x / Non Sq Epi: Few / Bacteria: TNTC    Culture - Urine (22 @ 17:19)   Specimen Source: Clean Catch None   Culture Results:   >100,000 CFU/ml Gram Negative Rods Culture in progress     MEDICATIONS  (STANDING):  atorvastatin 20 milliGRAM(s) Oral at bedtime  dextrose 5%. 1000 milliLiter(s) (50 mL/Hr) IV Continuous <Continuous>  dextrose 5%. 1000 milliLiter(s) (100 mL/Hr) IV Continuous <Continuous>  dextrose 50% Injectable 25 Gram(s) IV Push once  dextrose 50% Injectable 12.5 Gram(s) IV Push once  dextrose 50% Injectable 25 Gram(s) IV Push once  glucagon  Injectable 1 milliGRAM(s) IntraMuscular once  heparin   Injectable 5000 Unit(s) SubCutaneous every 8 hours  insulin lispro (ADMELOG) corrective regimen sliding scale   SubCutaneous three times a day before meals  insulin lispro (ADMELOG) corrective regimen sliding scale   SubCutaneous at bedtime  levothyroxine 25 MICROGram(s) Oral daily  meropenem  IVPB 1000 milliGRAM(s) IV Intermittent every 12 hours  pantoprazole  Injectable 40 milliGRAM(s) IV Push two times a day  sertraline 50 milliGRAM(s) Oral daily    MEDICATIONS  (PRN):  albuterol    90 MICROgram(s) HFA Inhaler 2 Puff(s) Inhalation every 4 hours PRN Shortness of Breath and/or Wheezing  dextrose Oral Gel 15 Gram(s) Oral once PRN Blood Glucose LESS THAN 70 milliGRAM(s)/deciliter  guaifenesin/dextromethorphan Oral Liquid 10 milliLiter(s) Oral every 4 hours PRN Cough  haloperidol     Tablet 0.5 milliGRAM(s) Oral every 6 hours PRN agitation  haloperidol    Injectable 0.5 milliGRAM(s) IntraMuscular every 6 hours PRN Agitation  ondansetron Injectable 4 milliGRAM(s) IV Push every 8 hours PRN Nausea and/or Vomiting        RADIOLOGY & ADDITIONAL TESTS:

## 2022-11-28 NOTE — CONSULT NOTE ADULT - REASON FOR ADMISSION
Nausea, Vomiting Blood

## 2022-11-28 NOTE — PROGRESS NOTE ADULT - ASSESSMENT
Impression  1. Iron deficiency anemia. No overt bleeding. Discussed care with HCP Son who does not want invasive procedures and amenable to conservative management.  2. UTI  3. COVID +    Recommendations  1. Monitor for overt bleeding and transfuse for hgb < 7  2. Switch to PPI 40 mg PO daily if hemoglobin remains stable  3. Diet as tolerated  4. Recommend palliative care consultation

## 2022-11-28 NOTE — CONSULT NOTE ADULT - ASSESSMENT
Assessment:     73 y/o female with dementia admitted for UGIB.  family is electing conservative management    Process of Care  --Reviewed dx/treatment problems and alignment with Goals of Care    Physical Aspects of Care  --Pain - Patient denies at this time. c/w current management  --Bowel Regimen - Denies constipation. Risk for constipation d/t immobility. Suggest daily dulcolax as needed  --Dyspnea - No SOB at this time. Comfortable and in NAD  --Nausea Vomiting - denies  --Debility/Weakness/Sacral Decubitus - fall precautions, turn/position pre protocol.   --Dementia - supportive measures.  Haldol PRN.  please use sparingly  verbal redirection/reorientation before medications if confused   --Severe PCM - PO supplements per primary.  sertraline can be appetite stimulating  --Depression - sertraline  --UGIB - supportive measures, no intervention.  on protonix.     Psychological and Psychiatric Aspects of Care:   --Grief/Bereavement: emotional support provided  --Hx of psychiatric dx: depression/dementia  -Pastoral Care Available PRN     Social Aspects of Care  -SW involved     Cultural Aspects  -Primary Language: English    Goals of Care:  see above.     Prognosis: Death can occur at anytime, but if disease continues to progress naturally patient likely has <6 months.  She has Dementia FAST 6C wiht poor PPS (30%) and Severe Protein Calorie Malnutrition.     Ethical and Legal Aspects: NA  Capacity-    HCP/Surrogate:    Code Status-  MOLST-  Dispo Plan-    Discussed With:    Case coordinated with attending and SW and RN     Time Spent: 90 minutes including the care, coordination and counseling of this patient, 50% of which was spent coordinating and counseling.

## 2022-11-28 NOTE — ADVANCED PRACTICE NURSE CONSULT - ASSESSMENT
This is a 73 y/o Female and per MD H&P Note " PMHx significant for Dementia (bed bound), Hypertension, Hyperlipidemia, Diabetes mellitus type 2, Major Depressive Disorder, Hypothyroidism, chronic diarrhea, sacral decubitus ulcer, recurrent MDRO UTIs, hx of Iron Deficiency Anemia, and Hyponatremia, DNR, DNI presents was BIBA from home, accompanied by her son, for further evaluation and management of nausea, and vomiting (coffee ground emesis) associated with increased lethargy. HPI obtained from patient's son, due to the patient's underlying dementia, who states the patient was increasingly lethargic, and has had decreased PO intake over the last several days."    Assessed patient on 3 North:  Patient laying on [employer] Low Air Loss Mattress for pressure redistribution and with Total CAIR boots to feet.   Noted 1 Pad under patient to Wick away moisture   Patient is Alert, pleasant but confused when assessing patient. Spoke with RN as patient handed me her IV line after she pulled it out.     Assessment of Sacral Wound:  Noted that patient has a wound to sacrum that is present on admission. Upon assessment patient has a dimpled in skin over sacrum 11cm x 8cm and noted that the center of the dimpled area with 2cm x 2.8cm x 0.3cm which can be classified as a stage 3 pressure injury. However based on the healing and induration of skin, this patient may have a larger wound that is healing.  Wound bed is pink and clean no odor. Periwoudn with nonblachable erythema and noted firm tissue related to wound healing.  Recommend MediHoney to wound bed and cover with a sacral foam. May change every three day or prn if soiled.     Noted patient incontinent of stool. Cleansed patient and noted mild erythema to perirectal region . Barrier cream with every incontinent episode recommended.

## 2022-11-28 NOTE — DIETITIAN INITIAL EVALUATION ADULT - PERTINENT LABORATORY DATA
11-27    137  |  103  |  47<H>  ----------------------------<  215<H>  3.4<L>   |  27  |  1.54<H>    Ca    9.3      27 Nov 2022 12:51    POCT Blood Glucose.: 190 mg/dL (11-28-22 @ 08:47)  A1C with Estimated Average Glucose Result: 7.2 % (11-26-22 @ 08:29)

## 2022-11-28 NOTE — DIETITIAN INITIAL EVALUATION ADULT - PERTINENT MEDS FT
MEDICATIONS  (STANDING):  atorvastatin 20 milliGRAM(s) Oral at bedtime  dextrose 5%. 1000 milliLiter(s) (50 mL/Hr) IV Continuous <Continuous>  dextrose 5%. 1000 milliLiter(s) (100 mL/Hr) IV Continuous <Continuous>  dextrose 50% Injectable 25 Gram(s) IV Push once  dextrose 50% Injectable 12.5 Gram(s) IV Push once  dextrose 50% Injectable 25 Gram(s) IV Push once  glucagon  Injectable 1 milliGRAM(s) IntraMuscular once  insulin lispro (ADMELOG) corrective regimen sliding scale   SubCutaneous three times a day before meals  insulin lispro (ADMELOG) corrective regimen sliding scale   SubCutaneous at bedtime  levothyroxine 25 MICROGram(s) Oral daily  meropenem  IVPB 1000 milliGRAM(s) IV Intermittent every 12 hours  pantoprazole  Injectable 40 milliGRAM(s) IV Push two times a day  sertraline 50 milliGRAM(s) Oral daily  sodium chloride 0.9%. 1000 milliLiter(s) (75 mL/Hr) IV Continuous <Continuous>    MEDICATIONS  (PRN):  albuterol    90 MICROgram(s) HFA Inhaler 2 Puff(s) Inhalation every 4 hours PRN Shortness of Breath and/or Wheezing  dextrose Oral Gel 15 Gram(s) Oral once PRN Blood Glucose LESS THAN 70 milliGRAM(s)/deciliter  guaifenesin/dextromethorphan Oral Liquid 10 milliLiter(s) Oral every 4 hours PRN Cough  haloperidol     Tablet 0.5 milliGRAM(s) Oral every 6 hours PRN agitation  haloperidol    Injectable 0.5 milliGRAM(s) IntraMuscular every 6 hours PRN Agitation  ondansetron Injectable 4 milliGRAM(s) IV Push every 8 hours PRN Nausea and/or Vomiting

## 2022-11-28 NOTE — PROGRESS NOTE ADULT - SUBJECTIVE AND OBJECTIVE BOX
Patient is a 72y old  Female who presents with a chief complaint of Nausea, Vomiting Blood (27 Nov 2022 18:51)      Subective:  Feels good  No vomiting or bleeding    PAST MEDICAL & SURGICAL HISTORY:  Intraoperative hemorrhage and hematoma of a genitourinary system organ or structure complicating other procedure      Type 2 diabetes mellitus without complication      Essential (primary) hypertension      Syncope and collapse      Acute kidney failure, unspecified      Pain, unspecified      Ascorbic acid deficiency      Flatulence      Changes in skin texture      Hyperlipidemia      MDD (major depressive disorder)      Hypothyroid      MARRY (acute respiratory infection)      H/O lumpectomy      H/O shoulder surgery      History of colon surgery          MEDICATIONS  (STANDING):  atorvastatin 20 milliGRAM(s) Oral at bedtime  dextrose 5%. 1000 milliLiter(s) (50 mL/Hr) IV Continuous <Continuous>  dextrose 5%. 1000 milliLiter(s) (100 mL/Hr) IV Continuous <Continuous>  dextrose 50% Injectable 25 Gram(s) IV Push once  dextrose 50% Injectable 12.5 Gram(s) IV Push once  dextrose 50% Injectable 25 Gram(s) IV Push once  glucagon  Injectable 1 milliGRAM(s) IntraMuscular once  insulin lispro (ADMELOG) corrective regimen sliding scale   SubCutaneous three times a day before meals  insulin lispro (ADMELOG) corrective regimen sliding scale   SubCutaneous at bedtime  levothyroxine 25 MICROGram(s) Oral daily  meropenem  IVPB 1000 milliGRAM(s) IV Intermittent every 12 hours  pantoprazole  Injectable 40 milliGRAM(s) IV Push two times a day  sertraline 50 milliGRAM(s) Oral daily  sodium chloride 0.9%. 1000 milliLiter(s) (75 mL/Hr) IV Continuous <Continuous>    MEDICATIONS  (PRN):  albuterol    90 MICROgram(s) HFA Inhaler 2 Puff(s) Inhalation every 4 hours PRN Shortness of Breath and/or Wheezing  dextrose Oral Gel 15 Gram(s) Oral once PRN Blood Glucose LESS THAN 70 milliGRAM(s)/deciliter  guaifenesin/dextromethorphan Oral Liquid 10 milliLiter(s) Oral every 4 hours PRN Cough  haloperidol     Tablet 0.5 milliGRAM(s) Oral every 6 hours PRN agitation  haloperidol    Injectable 0.5 milliGRAM(s) IntraMuscular every 6 hours PRN Agitation  ondansetron Injectable 4 milliGRAM(s) IV Push every 8 hours PRN Nausea and/or Vomiting      REVIEW OF SYSTEMS:    RESPIRATORY: No shortness of breath  CARDIOVASCULAR: No chest pain  All other review of systems is negative unless indicated above.    Vital Signs Last 24 Hrs  T(C): 36.8 (28 Nov 2022 00:15), Max: 36.8 (28 Nov 2022 00:15)  T(F): 98.3 (28 Nov 2022 00:15), Max: 98.3 (28 Nov 2022 00:15)  HR: 76 (28 Nov 2022 00:15) (70 - 76)  BP: 121/94 (28 Nov 2022 00:15) (121/94 - 145/61)  BP(mean): --  RR: 18 (27 Nov 2022 09:15) (18 - 18)  SpO2: 99% (28 Nov 2022 00:15) (99% - 99%)    Parameters below as of 27 Nov 2022 09:15  Patient On (Oxygen Delivery Method): nasal cannula  O2 Flow (L/min): 4      PHYSICAL EXAM:    Constitutional: NAD  Respiratory: CTAB  Cardiovascular: S1 and S2,  Gastrointestinal: BS+, soft, NT/ND  Extremities: No peripheral edema  Psychiatric: Normal mood, normal affect    LABS:                        10.0   14.75 )-----------( 317      ( 27 Nov 2022 12:51 )             31.2     11-27    137  |  103  |  47<H>  ----------------------------<  215<H>  3.4<L>   |  27  |  1.54<H>    Ca    9.3      27 Nov 2022 12:51    TPro  6.2  /  Alb  2.0<L>  /  TBili  0.3  /  DBili  x   /  AST  20  /  ALT  15  /  AlkPhos  81  11-26      LIVER FUNCTIONS - ( 26 Nov 2022 08:29 )  Alb: 2.0 g/dL / Pro: 6.2 gm/dL / ALK PHOS: 81 U/L / ALT: 15 U/L / AST: 20 U/L / GGT: x             RADIOLOGY & ADDITIONAL STUDIES:

## 2022-11-28 NOTE — DIETITIAN INITIAL EVALUATION ADULT - OTHER INFO
71 y/o F PMHx significant for Dementia (bed bound), Hypertension, Hyperlipidemia, Diabetes mellitus type 2, Major Depressive Disorder, Hypothyroidism, chronic diarrhea, sacral decubitus ulcer, recurrent MDRO UTIs, hx of Iron Deficiency Anemia, and Hyponatremia, DNR, DNI presents was BIBA from home, accompanied by her son, for further evaluation and management of nausea, and vomiting (coffee ground emesis) associated with increased lethargy. Adm for GI hemorrhage. 71 y/o F PMHx significant for Dementia (bed bound), Hypertension, Hyperlipidemia, Diabetes mellitus type 2, Major Depressive Disorder, Hypothyroidism, chronic diarrhea, sacral decubitus ulcer, recurrent MDRO UTIs, hx of Iron Deficiency Anemia, and Hyponatremia, DNR, DNI presents was BIBA from home, accompanied by her son, for further evaluation and management of nausea, and vomiting (coffee ground emesis) associated with increased lethargy. Adm for GI hemorrhage.    Pt most likely meeting <50% ENN x 1 week or so, po intake none documented. Previous RD note 2/19/21: dx severe PCM, wt: 164#. IBW: 120#. RD obtained bedscale wt today: 136#. NFPE reveals moderate muscle/fat wasting. Will add suppls. See below recommendations.

## 2022-11-28 NOTE — PROGRESS NOTE ADULT - SUBJECTIVE AND OBJECTIVE BOX
Patient is a 72y old  Female who presents with a chief complaint of Nausea, Vomiting Blood (28 Nov 2022 07:39)      Subective: Patient seen and examined at bedside. No overnight events. Denies abdominal pain, nausea, or vomiting.       PAST MEDICAL & SURGICAL HISTORY:  Intraoperative hemorrhage and hematoma of a genitourinary system organ or structure complicating other procedure      Type 2 diabetes mellitus without complication      Essential (primary) hypertension      Syncope and collapse      Acute kidney failure, unspecified      Pain, unspecified      Ascorbic acid deficiency      Flatulence      Changes in skin texture      Hyperlipidemia      MDD (major depressive disorder)      Hypothyroid      MARRY (acute respiratory infection)      H/O lumpectomy      H/O shoulder surgery      History of colon surgery          MEDICATIONS  (STANDING):  atorvastatin 20 milliGRAM(s) Oral at bedtime  dextrose 5%. 1000 milliLiter(s) (50 mL/Hr) IV Continuous <Continuous>  dextrose 5%. 1000 milliLiter(s) (100 mL/Hr) IV Continuous <Continuous>  dextrose 50% Injectable 25 Gram(s) IV Push once  dextrose 50% Injectable 12.5 Gram(s) IV Push once  dextrose 50% Injectable 25 Gram(s) IV Push once  glucagon  Injectable 1 milliGRAM(s) IntraMuscular once  insulin lispro (ADMELOG) corrective regimen sliding scale   SubCutaneous three times a day before meals  insulin lispro (ADMELOG) corrective regimen sliding scale   SubCutaneous at bedtime  levothyroxine 25 MICROGram(s) Oral daily  meropenem  IVPB 1000 milliGRAM(s) IV Intermittent every 12 hours  pantoprazole  Injectable 40 milliGRAM(s) IV Push two times a day  sertraline 50 milliGRAM(s) Oral daily  sodium chloride 0.9%. 1000 milliLiter(s) (75 mL/Hr) IV Continuous <Continuous>    MEDICATIONS  (PRN):  albuterol    90 MICROgram(s) HFA Inhaler 2 Puff(s) Inhalation every 4 hours PRN Shortness of Breath and/or Wheezing  dextrose Oral Gel 15 Gram(s) Oral once PRN Blood Glucose LESS THAN 70 milliGRAM(s)/deciliter  guaifenesin/dextromethorphan Oral Liquid 10 milliLiter(s) Oral every 4 hours PRN Cough  haloperidol     Tablet 0.5 milliGRAM(s) Oral every 6 hours PRN agitation  haloperidol    Injectable 0.5 milliGRAM(s) IntraMuscular every 6 hours PRN Agitation  ondansetron Injectable 4 milliGRAM(s) IV Push every 8 hours PRN Nausea and/or Vomiting      REVIEW OF SYSTEMS:    RESPIRATORY: No shortness of breath  CARDIOVASCULAR: No chest pain  All other review of systems is negative unless indicated above.    Vital Signs Last 24 Hrs  T(C): 36.8 (28 Nov 2022 00:15), Max: 36.8 (28 Nov 2022 00:15)  T(F): 98.3 (28 Nov 2022 00:15), Max: 98.3 (28 Nov 2022 00:15)  HR: 76 (28 Nov 2022 00:15) (70 - 76)  BP: 121/94 (28 Nov 2022 00:15) (121/94 - 145/61)  BP(mean): --  RR: 18 (27 Nov 2022 09:15) (18 - 18)  SpO2: 99% (28 Nov 2022 00:15) (99% - 99%)    Parameters below as of 27 Nov 2022 09:15  Patient On (Oxygen Delivery Method): nasal cannula  O2 Flow (L/min): 4      PHYSICAL EXAM:    Constitutional: NAD, well-developed  Respiratory: CTAB  Cardiovascular: S1 and S2, RRR  Gastrointestinal: BS+, soft, NT/ND  Extremities: No peripheral edema  Psychiatric: Normal mood, normal affect    LABS:                        10.0   14.75 )-----------( 317      ( 27 Nov 2022 12:51 )             31.2     11-27    137  |  103  |  47<H>  ----------------------------<  215<H>  3.4<L>   |  27  |  1.54<H>    Ca    9.3      27 Nov 2022 12:51            RADIOLOGY & ADDITIONAL STUDIES:

## 2022-11-28 NOTE — PROGRESS NOTE ADULT - ASSESSMENT
73 yo female w hx of Dementia, HTN, HLD, DM, and MDD, presenting with poor po intake, anemia requiring PRBC and DANIEL on CKD      DANIEL on CKD Cr baseline 1.2 - 1.5   sec to ATN w hypotension but Cr slightly improved   hypotension in setting of volume depletion and possible UTI    monitor for recovery    keep BP > 110    trend WBC   no ace or arb    UTI - IV abx   renal uss - mild right hydro, bladder decompressed Davila     - Urology followup        11/28 SY  --DANIEL/CKD : improved and stable.  d/c IVF for now and monitor on po intake.  --Follow up calcium level.  --BP stable post volume repletion.  --UTI : continue Meropenem.  -- : monitor with Davila.

## 2022-11-28 NOTE — PROGRESS NOTE ADULT - SUBJECTIVE AND OBJECTIVE BOX
NEPHROLOGY INTERVAL HPI/OVERNIGHT EVENTS:    Date of Service: 22 @ 17:18    --Alert and responsive. Oriented to person.  No SOB.    HPI :   73 y/o F PMHx significant for Dementia (bed bound), Hypertension, Hyperlipidemia, Diabetes mellitus type 2, Major Depressive Disorder, Hypothyroidism, chronic diarrhea, sacral decubitus ulcer, recurrent MDRO UTIs, hx of Iron Deficiency Anemia, and Hyponatremia, DNR, DNI presents was BIBA from home, accompanied by her son, for further evaluation and management of nausea, and vomiting (coffee ground emesis) associated with increased lethargy. HPI obtained from chart , due to the patient's underlying dementia, who states the patient was increasingly lethargic, and has had decreased PO intake over the last several days. Of note the patient is on daily ASA as well. In the ED the patient CCM was consulted as the patient was noted to be hypotensive.   Renal eval called for   Na 129, BUN/Cr 90/1.90, Ca 11.1, Alb 2.4,   (+)UA, COVID-19 (+). In the ED the patient received Ceftriaxone 1g IVPB x 1, Pantoprazole 40mg IVP x 1, then started on a Pantoprazole gtt, NS x 2L, and pRBCs x 2.  Seen by Dr Asencio et al in past for DANIEL - Cr 1.3 -0 1.5 in past    today    pt is confused but alert      PAST MEDICAL & SURGICAL HISTORY:  Intraoperative hemorrhage and hematoma of a genitourinary system organ or structure complicating other procedure  Type 2 diabetes mellitus without complication  Essential (primary) hypertension  Syncope and collapse  Acute kidney failure, unspecified  MDD (major depressive disorder)  Hypothyroid      MEDICATIONS  (STANDING):  atorvastatin 20 milliGRAM(s) Oral at bedtime  dextrose 5%. 1000 milliLiter(s) (50 mL/Hr) IV Continuous <Continuous>  dextrose 5%. 1000 milliLiter(s) (100 mL/Hr) IV Continuous <Continuous>  dextrose 50% Injectable 25 Gram(s) IV Push once  dextrose 50% Injectable 12.5 Gram(s) IV Push once  dextrose 50% Injectable 25 Gram(s) IV Push once  glucagon  Injectable 1 milliGRAM(s) IntraMuscular once  heparin   Injectable 5000 Unit(s) SubCutaneous every 8 hours  insulin lispro (ADMELOG) corrective regimen sliding scale   SubCutaneous three times a day before meals  insulin lispro (ADMELOG) corrective regimen sliding scale   SubCutaneous at bedtime  levothyroxine 25 MICROGram(s) Oral daily  meropenem  IVPB 1000 milliGRAM(s) IV Intermittent every 12 hours  pantoprazole  Injectable 40 milliGRAM(s) IV Push two times a day  sertraline 50 milliGRAM(s) Oral daily  sodium chloride 0.9%. 1000 milliLiter(s) (75 mL/Hr) IV Continuous <Continuous>    MEDICATIONS  (PRN):  albuterol    90 MICROgram(s) HFA Inhaler 2 Puff(s) Inhalation every 4 hours PRN Shortness of Breath and/or Wheezing  dextrose Oral Gel 15 Gram(s) Oral once PRN Blood Glucose LESS THAN 70 milliGRAM(s)/deciliter  guaifenesin/dextromethorphan Oral Liquid 10 milliLiter(s) Oral every 4 hours PRN Cough  haloperidol     Tablet 0.5 milliGRAM(s) Oral every 6 hours PRN agitation  haloperidol    Injectable 0.5 milliGRAM(s) IntraMuscular every 6 hours PRN Agitation  ondansetron Injectable 4 milliGRAM(s) IV Push every 8 hours PRN Nausea and/or Vomiting    Vital Signs Last 24 Hrs  T(C): 36.7 (2022 16:54), Max: 36.8 (2022 00:15)  T(F): 98.1 (2022 16:54), Max: 98.3 (2022 00:15)  HR: 78 (2022 16:54) (72 - 78)  BP: 133/89 (2022 16:54) (121/94 - 139/91)  BP(mean): --  RR: 18 (2022 16:54) (18 - 18)  SpO2: 98% (2022 16:54) (98% - 99%)    Parameters below as of 2022 16:54  Patient On (Oxygen Delivery Method): room air      Daily     Daily Weight in k.4 (2022 06:04)     @ 07:01  -   @ 07:00  --------------------------------------------------------  IN: 0 mL / OUT: 1600 mL / NET: -1600 mL    PHYSICAL EXAM:  GENERAL: No distress  CHEST/LUNG: clear to aus  HEART: S1S2 RRR  ABDOMEN: soft  EXTREMITIES: no edema  SKIN:     LABS:                        9.8    12.02 )-----------( 313      ( 2022 09:14 )             30.1     11-    137  |  103  |  33<H>  ----------------------------<  196<H>  3.8   |  28  |  1.21    Ca    8.7      2022 09:14                  RADIOLOGY & ADDITIONAL TESTS:

## 2022-11-28 NOTE — CONSULT NOTE ADULT - SUBJECTIVE AND OBJECTIVE BOX
HPI:   Per Notes:       seen at bedside, pt is awake, alert.  she is trying to use the hospital phone.  she is disoriented and difficult to redirect, although she is calm.  denies any pain or shortness of breath.  not aware that she is in a hospital  phone call with her son, Jose David Mendoza Jr (Jose David Naylor, pt's , the HCP is ).  see below for details of this discussion.     PAIN: ( )Yes   (xx)No  DYSPNEA: ( ) Yes  (xx) No    PAST MEDICAL & SURGICAL HISTORY:  Intraoperative hemorrhage and hematoma of a genitourinary system organ or structure complicating other procedure  Type 2 diabetes mellitus without complication  Essential (primary) hypertension  Syncope and collapse  Acute kidney failure, unspecified  Pain, unspecified  Ascorbic acid deficiency  Flatulence  Changes in skin texture  Hyperlipidemia  MDD (major depressive disorder)  Hypothyroid  MARRY (acute respiratory infection)  H/O lumpectomy  H/O shoulder surgery  History of colon surgery          SOCIAL HX:    Hx opiate tolerance ( )YES  ( )NO    Baseline ADLs  (Prior to Admission)  ( ) Independent   ( )Dependent     ( ) With Assistance    FAMILY HISTORY:  Family history unknown        Review of Systems:    - CONSTITUTIONAL: Denies weakness and fatigue   - HEENT: Denies changes in vision and hearing.   - RESPIRATORY: Denies SOB, cough and/or respiratory secretions   - CV: Denies palpitations and CP. Denies edema   - GI: Denies abdominal pain, constipation, nausea, vomiting and diarrhea.   - : Denies dysuria and urinary frequency.   - MSK: Denies myalgia and joint pain.   - SKIN: Denies rash and pruritus.   - NEUROLOGICAL: Denies headache and syncope.   - PSYCHIATRIC: Denies anxiety and depression.     All other systems reviewed and negative  Unable to obtain/Limited due to: dementia      PHYSICAL EXAM:    Vital Signs Last 24 Hrs  T(C): 36.6 (2022 08:01), Max: 36.8 (2022 00:15)  T(F): 97.8 (2022 08:01), Max: 98.3 (2022 00:15)  HR: 72 (2022 08:01) (72 - 76)  BP: 139/91 (2022 08:01) (121/94 - 139/91)  BP(mean): --  RR: 18 (2022 08:01) (18 - 18)  SpO2: 99% (2022 08:01) (99% - 99%)    Parameters below as of 2022 08:01  Patient On (Oxygen Delivery Method): nasal cannula  O2 Flow (L/min): 4    Daily     Daily Weight in k.4 (2022 06:04)    PPSV2:  30 %  FAST: 6E    - GENERAL: Alert but confused. No acute distress.   - EYES: EOMI. Anicteric.   - HENT: Moist mucous membranes.   - LUNGS: Clear to auscultation bilaterally. No accessory muscle use. Speaking in complete sentences.   - CARDIOVASCULAR: Regular rate and rhythm. No murmur. No JVD. No edema.   - ABDOMEN: Soft, non-tender and non-distended. No palpable masses. Normal bowel sounds   - EXTREMITIES: No edema. Non-tender.    - SKIN: Warm, no rashes or lesions easily visible.   - NEUROLOGIC: No focal neurological deficits.    - PSYCHIATRIC: Cooperative. Appropriate mood and affect.      LABS:                        9.8    12.02 )-----------( 313      ( 2022 09:14 )             30.1         137  |  103  |  33<H>  ----------------------------<  196<H>  3.8   |  28  |  1.21    Ca    8.7      2022 09:14        Albumin: Albumin, Serum: 2.0 g/dL ( @ 08:29)      Allergies    Hytrin (Unknown)    Intolerances      MEDICATIONS  (STANDING):  atorvastatin 20 milliGRAM(s) Oral at bedtime  dextrose 5%. 1000 milliLiter(s) (50 mL/Hr) IV Continuous <Continuous>  dextrose 5%. 1000 milliLiter(s) (100 mL/Hr) IV Continuous <Continuous>  dextrose 50% Injectable 25 Gram(s) IV Push once  dextrose 50% Injectable 12.5 Gram(s) IV Push once  dextrose 50% Injectable 25 Gram(s) IV Push once  glucagon  Injectable 1 milliGRAM(s) IntraMuscular once  insulin lispro (ADMELOG) corrective regimen sliding scale   SubCutaneous three times a day before meals  insulin lispro (ADMELOG) corrective regimen sliding scale   SubCutaneous at bedtime  levothyroxine 25 MICROGram(s) Oral daily  meropenem  IVPB 1000 milliGRAM(s) IV Intermittent every 12 hours  pantoprazole  Injectable 40 milliGRAM(s) IV Push two times a day  sertraline 50 milliGRAM(s) Oral daily  sodium chloride 0.9%. 1000 milliLiter(s) (75 mL/Hr) IV Continuous <Continuous>    MEDICATIONS  (PRN):  albuterol    90 MICROgram(s) HFA Inhaler 2 Puff(s) Inhalation every 4 hours PRN Shortness of Breath and/or Wheezing  dextrose Oral Gel 15 Gram(s) Oral once PRN Blood Glucose LESS THAN 70 milliGRAM(s)/deciliter  guaifenesin/dextromethorphan Oral Liquid 10 milliLiter(s) Oral every 4 hours PRN Cough  haloperidol     Tablet 0.5 milliGRAM(s) Oral every 6 hours PRN agitation  haloperidol    Injectable 0.5 milliGRAM(s) IntraMuscular every 6 hours PRN Agitation  ondansetron Injectable 4 milliGRAM(s) IV Push every 8 hours PRN Nausea and/or Vomiting      RADIOLOGY/ADDITIONAL STUDIES: HPI:   Per Notes: 71 y/o F PMHx significant for Dementia (bed bound), Hypertension, Hyperlipidemia, Diabetes mellitus type 2, Major Depressive Disorder, Hypothyroidism, chronic diarrhea, sacral decubitus ulcer, recurrent MDRO UTIs, hx of Iron Deficiency Anemia, and Hyponatremia, DNR, DNI presents was BIBA from home, accompanied by her son, for further evaluation and management of nausea, and vomiting (coffee ground emesis) associated with increased lethargy. HPI obtained from patient's son, due to the patient's underlying dementia, who states the patient was increasingly lethargic, and has had decreased PO intake over the last several days. Of note the patient is on daily ASA as well. In the ED the patient CCM was consulted as the patient was noted to be hypotensive.   Pt ahs since been stabilized.  family are declining endoscopic intervention.  Palliative called to assist with GOC.    seen at bedside, pt is awake, alert.  she is trying to use the hospital phone.  she is disoriented and difficult to redirect, although she is calm.  denies any pain or shortness of breath.  not aware that she is in a hospital  phone call with her son, Jose David Mendoza Jr (Jose David , pt's , the HCP is ).  see below for details of this discussion.     PAIN: ( )Yes   (xx)No  DYSPNEA: ( ) Yes  (xx) No    PAST MEDICAL & SURGICAL HISTORY:  Intraoperative hemorrhage and hematoma of a genitourinary system organ or structure complicating other procedure  Type 2 diabetes mellitus without complication  Essential (primary) hypertension  Syncope and collapse  Acute kidney failure, unspecified  Pain, unspecified  Ascorbic acid deficiency  Flatulence  Changes in skin texture  Hyperlipidemia  MDD (major depressive disorder)  Hypothyroid  MARRY (acute respiratory infection)  H/O lumpectomy  H/O shoulder surgery  History of colon surgery          SOCIAL HX:    Hx opiate tolerance ( )YES  ( )NO    Baseline ADLs  (Prior to Admission)  ( ) Independent   ( )Dependent     ( ) With Assistance    FAMILY HISTORY:  Family history unknown        Review of Systems:    - CONSTITUTIONAL: Denies weakness and fatigue   - HEENT: Denies changes in vision and hearing.   - RESPIRATORY: Denies SOB, cough and/or respiratory secretions   - CV: Denies palpitations and CP. Denies edema   - GI: Denies abdominal pain, constipation, nausea, vomiting and diarrhea.   - : Denies dysuria and urinary frequency.   - MSK: Denies myalgia and joint pain.   - SKIN: Denies rash and pruritus.   - NEUROLOGICAL: Denies headache and syncope.   - PSYCHIATRIC: Denies anxiety and depression.     All other systems reviewed and negative  Unable to obtain/Limited due to: dementia      PHYSICAL EXAM:    Vital Signs Last 24 Hrs  T(C): 36.6 (2022 08:01), Max: 36.8 (2022 00:15)  T(F): 97.8 (2022 08:), Max: 98.3 (2022 00:15)  HR: 72 (:) (72 - 76)  BP: 139/91 (:) (121/94 - 139/91)  BP(mean): --  RR: 18 (:) (18 - 18)  SpO2: 99% (:) (99% - 99%)    Parameters below as of 2022 08:  Patient On (Oxygen Delivery Method): nasal cannula  O2 Flow (L/min): 4    Daily     Daily Weight in k.4 (2022 06:04)    PPSV2:  30 %  FAST: 6E    - GENERAL: Alert but confused. No acute distress.   - EYES: EOMI. Anicteric.   - HENT: Moist mucous membranes.   - LUNGS: Clear to auscultation bilaterally. No accessory muscle use. Speaking in complete sentences.   - CARDIOVASCULAR: Regular rate and rhythm. No murmur. No JVD. No edema.   - ABDOMEN: Soft, non-tender and non-distended. No palpable masses. Normal bowel sounds   - EXTREMITIES: No edema. Non-tender.    - SKIN: Warm, no rashes or lesions easily visible.   - NEUROLOGIC: No focal neurological deficits.    - PSYCHIATRIC: Cooperative. Appropriate mood and affect.      LABS:                        9.8    12.02 )-----------( 313      ( 2022 09:14 )             30.1         137  |  103  |  33<H>  ----------------------------<  196<H>  3.8   |  28  |  1.21    Ca    8.7      2022 09:14        Albumin: Albumin, Serum: 2.0 g/dL ( @ 08:29)      Allergies    Hytrin (Unknown)    Intolerances      MEDICATIONS  (STANDING):  atorvastatin 20 milliGRAM(s) Oral at bedtime  dextrose 5%. 1000 milliLiter(s) (50 mL/Hr) IV Continuous <Continuous>  dextrose 5%. 1000 milliLiter(s) (100 mL/Hr) IV Continuous <Continuous>  dextrose 50% Injectable 25 Gram(s) IV Push once  dextrose 50% Injectable 12.5 Gram(s) IV Push once  dextrose 50% Injectable 25 Gram(s) IV Push once  glucagon  Injectable 1 milliGRAM(s) IntraMuscular once  insulin lispro (ADMELOG) corrective regimen sliding scale   SubCutaneous three times a day before meals  insulin lispro (ADMELOG) corrective regimen sliding scale   SubCutaneous at bedtime  levothyroxine 25 MICROGram(s) Oral daily  meropenem  IVPB 1000 milliGRAM(s) IV Intermittent every 12 hours  pantoprazole  Injectable 40 milliGRAM(s) IV Push two times a day  sertraline 50 milliGRAM(s) Oral daily  sodium chloride 0.9%. 1000 milliLiter(s) (75 mL/Hr) IV Continuous <Continuous>    MEDICATIONS  (PRN):  albuterol    90 MICROgram(s) HFA Inhaler 2 Puff(s) Inhalation every 4 hours PRN Shortness of Breath and/or Wheezing  dextrose Oral Gel 15 Gram(s) Oral once PRN Blood Glucose LESS THAN 70 milliGRAM(s)/deciliter  guaifenesin/dextromethorphan Oral Liquid 10 milliLiter(s) Oral every 4 hours PRN Cough  haloperidol     Tablet 0.5 milliGRAM(s) Oral every 6 hours PRN agitation  haloperidol    Injectable 0.5 milliGRAM(s) IntraMuscular every 6 hours PRN Agitation  ondansetron Injectable 4 milliGRAM(s) IV Push every 8 hours PRN Nausea and/or Vomiting      RADIOLOGY/ADDITIONAL STUDIES:

## 2022-11-28 NOTE — CONSULT NOTE ADULT - CONSULT REASON
Traumatic catheterization
DANIEL
nausea, coffee ground emesis
sepsis  hypotension  gi bleed  covid  UTI
Goals of Care

## 2022-11-29 DIAGNOSIS — L89.90 PRESSURE ULCER OF UNSPECIFIED SITE, UNSPECIFIED STAGE: ICD-10-CM

## 2022-11-29 DIAGNOSIS — F03.90 UNSPECIFIED DEMENTIA WITHOUT BEHAVIORAL DISTURBANCE: ICD-10-CM

## 2022-11-29 LAB
ALBUMIN SERPL ELPH-MCNC: 2 G/DL — LOW (ref 3.3–5)
ANION GAP SERPL CALC-SCNC: 5 MMOL/L — SIGNIFICANT CHANGE UP (ref 5–17)
BUN SERPL-MCNC: 32 MG/DL — HIGH (ref 7–23)
CALCIUM SERPL-MCNC: 9.3 MG/DL — SIGNIFICANT CHANGE UP (ref 8.5–10.1)
CHLORIDE SERPL-SCNC: 105 MMOL/L — SIGNIFICANT CHANGE UP (ref 96–108)
CO2 SERPL-SCNC: 26 MMOL/L — SIGNIFICANT CHANGE UP (ref 22–31)
CREAT SERPL-MCNC: 1.31 MG/DL — HIGH (ref 0.5–1.3)
CULTURE RESULTS: SIGNIFICANT CHANGE UP
EGFR: 43 ML/MIN/1.73M2 — LOW
GLUCOSE SERPL-MCNC: 133 MG/DL — HIGH (ref 70–99)
HCT VFR BLD CALC: 32.2 % — LOW (ref 34.5–45)
HGB BLD-MCNC: 10.3 G/DL — LOW (ref 11.5–15.5)
MCHC RBC-ENTMCNC: 25.6 PG — LOW (ref 27–34)
MCHC RBC-ENTMCNC: 32 GM/DL — SIGNIFICANT CHANGE UP (ref 32–36)
MCV RBC AUTO: 79.9 FL — LOW (ref 80–100)
PHOSPHATE SERPL-MCNC: 2.3 MG/DL — LOW (ref 2.5–4.5)
PLATELET # BLD AUTO: 356 K/UL — SIGNIFICANT CHANGE UP (ref 150–400)
POTASSIUM SERPL-MCNC: 4 MMOL/L — SIGNIFICANT CHANGE UP (ref 3.5–5.3)
POTASSIUM SERPL-SCNC: 4 MMOL/L — SIGNIFICANT CHANGE UP (ref 3.5–5.3)
RBC # BLD: 4.03 M/UL — SIGNIFICANT CHANGE UP (ref 3.8–5.2)
RBC # FLD: 16.3 % — HIGH (ref 10.3–14.5)
SODIUM SERPL-SCNC: 136 MMOL/L — SIGNIFICANT CHANGE UP (ref 135–145)
SPECIMEN SOURCE: SIGNIFICANT CHANGE UP
WBC # BLD: 9.19 K/UL — SIGNIFICANT CHANGE UP (ref 3.8–10.5)
WBC # FLD AUTO: 9.19 K/UL — SIGNIFICANT CHANGE UP (ref 3.8–10.5)

## 2022-11-29 PROCEDURE — 99232 SBSQ HOSP IP/OBS MODERATE 35: CPT

## 2022-11-29 PROCEDURE — 71045 X-RAY EXAM CHEST 1 VIEW: CPT | Mod: 26

## 2022-11-29 RX ADMIN — HEPARIN SODIUM 5000 UNIT(S): 5000 INJECTION INTRAVENOUS; SUBCUTANEOUS at 21:39

## 2022-11-29 RX ADMIN — HEPARIN SODIUM 5000 UNIT(S): 5000 INJECTION INTRAVENOUS; SUBCUTANEOUS at 16:53

## 2022-11-29 RX ADMIN — HEPARIN SODIUM 5000 UNIT(S): 5000 INJECTION INTRAVENOUS; SUBCUTANEOUS at 05:09

## 2022-11-29 RX ADMIN — HALOPERIDOL DECANOATE 0.5 MILLIGRAM(S): 100 INJECTION INTRAMUSCULAR at 05:10

## 2022-11-29 RX ADMIN — PANTOPRAZOLE SODIUM 40 MILLIGRAM(S): 20 TABLET, DELAYED RELEASE ORAL at 21:38

## 2022-11-29 RX ADMIN — Medication 1: at 21:46

## 2022-11-29 RX ADMIN — ATORVASTATIN CALCIUM 20 MILLIGRAM(S): 80 TABLET, FILM COATED ORAL at 21:39

## 2022-11-29 RX ADMIN — Medication 1 TABLET(S): at 16:52

## 2022-11-29 RX ADMIN — MEROPENEM 100 MILLIGRAM(S): 1 INJECTION INTRAVENOUS at 16:52

## 2022-11-29 RX ADMIN — MEROPENEM 100 MILLIGRAM(S): 1 INJECTION INTRAVENOUS at 21:38

## 2022-11-29 RX ADMIN — SERTRALINE 50 MILLIGRAM(S): 25 TABLET, FILM COATED ORAL at 16:53

## 2022-11-29 RX ADMIN — Medication 25 MICROGRAM(S): at 05:10

## 2022-11-29 RX ADMIN — PANTOPRAZOLE SODIUM 40 MILLIGRAM(S): 20 TABLET, DELAYED RELEASE ORAL at 16:52

## 2022-11-29 NOTE — PROGRESS NOTE ADULT - SUBJECTIVE AND OBJECTIVE BOX
CC: Nausea, Vomiting Blood (26 Nov 2022 15:02)    HPI:  71 y/o F PMHx significant for Dementia (bed bound), Hypertension, Hyperlipidemia, Diabetes mellitus type 2, Major Depressive Disorder, Hypothyroidism, chronic diarrhea, sacral decubitus ulcer, recurrent MDRO UTIs, hx of Iron Deficiency Anemia, and Hyponatremia, DNR, DNI presents was BIBA from home, accompanied by her son, for further evaluation and management of nausea, and vomiting (coffee ground emesis) associated with increased lethargy. HPI obtained from patient's son, due to the patient's underlying dementia, who states the patient was increasingly lethargic, and has had decreased PO intake over the last several days. Of note the patient is on daily ASA as well. In the ED the patient CCM was consulted as the patient was noted to be hypotensive.   Labs => WBC 25.71, Hgb/Hct 8.0/25.0, MCV/MCH 76.2/24.4, , TnI 88.76 -> 142.68, LA 6.1 -> 3.3, Na 129, BUN/Cr 90/1.90, Glu 390, Ca 11.1, Alb 2.4, (+)UA, COVID-19 (+). In the ED the patient received Ceftriaxone 1g IVPB x 1, Pantoprazole 40mg IVP x 1, then started on a Pantoprazole gtt, NS x 2L, and pRBCs x 2. (25 Nov 2022 21:07)    INTERVAL HPI /OVERNIGHT EVENTS: Pt was seen and examined, pleasantly confused, reports no complains, denies pain, reports had lunch  (meal    11/29/22: Confused, denies any complaints.     Vital Signs Last 24 Hrs  T(C): 36.4 (29 Nov 2022 08:34), Max: 36.8 (28 Nov 2022 22:16)  T(F): 97.5 (29 Nov 2022 08:34), Max: 98.3 (29 Nov 2022 01:12)  HR: 75 (29 Nov 2022 08:34) (71 - 78)  BP: 135/57 (29 Nov 2022 08:34) (129/85 - 149/92)  BP(mean): --  RR: 16 (29 Nov 2022 08:34) (16 - 18)  SpO2: 100% (29 Nov 2022 08:34) (96% - 100%)    Parameters below as of 29 Nov 2022 08:34  Patient On (Oxygen Delivery Method): room air        PHYSICAL EXAM:  General: Well developed;  malnourished; in no acute distress  Eyes: EOMI; conjunctiva and sclera clear  Head: Normocephalic; atraumatic  ENMT: No nasal discharge; airway clear  Respiratory:  Decreased BS,  No wheezes, rales or rhonchi  Cardiovascular: Regular rate and rhythm. S1 and S2 Normal;   Gastrointestinal: Soft non-tender non-distended; Normal bowel sounds  Genitourinary: No  suprapubic  tenderness, Davila in place   Extremities: No  edema  Vascular: Peripheral pulses palpable 2+ bilaterally  Neurological: Alert and oriented x1, non focal   Skin: Warm and dry. No acute rash  Musculoskeletal: Normal muscle tone, without deformities  Psychiatric: Cooperative       LABS:                                       10.3   9.19  )-----------( 356      ( 29 Nov 2022 08:12 )             32.2     29 Nov 2022 08:12    136    |  105    |  32     ----------------------------<  133    4.0     |  26     |  1.31     Ca    9.3        29 Nov 2022 08:12  Phos  2.3       29 Nov 2022 08:12    TPro  x      /  Alb  2.0    /  TBili  x      /  DBili  x      /  AST  x      /  ALT  x      /  AlkPhos  x      29 Nov 2022 08:12    LIVER FUNCTIONS - ( 29 Nov 2022 08:12 )  Alb: 2.0 g/dL / Pro: x     / ALK PHOS: x     / ALT: x     / AST: x     / GGT: x             CAPILLARY BLOOD GLUCOSE      POCT Blood Glucose.: 135 mg/dL (29 Nov 2022 11:11)  POCT Blood Glucose.: 130 mg/dL (29 Nov 2022 08:30)  POCT Blood Glucose.: 112 mg/dL (28 Nov 2022 22:07)  POCT Blood Glucose.: 108 mg/dL (28 Nov 2022 17:46)        MEDICATIONS  (STANDING):  atorvastatin 20 milliGRAM(s) Oral at bedtime  dextrose 5%. 1000 milliLiter(s) (50 mL/Hr) IV Continuous <Continuous>  dextrose 5%. 1000 milliLiter(s) (100 mL/Hr) IV Continuous <Continuous>  dextrose 50% Injectable 25 Gram(s) IV Push once  dextrose 50% Injectable 12.5 Gram(s) IV Push once  dextrose 50% Injectable 25 Gram(s) IV Push once  glucagon  Injectable 1 milliGRAM(s) IntraMuscular once  heparin   Injectable 5000 Unit(s) SubCutaneous every 8 hours  insulin lispro (ADMELOG) corrective regimen sliding scale   SubCutaneous three times a day before meals  insulin lispro (ADMELOG) corrective regimen sliding scale   SubCutaneous at bedtime  levothyroxine 25 MICROGram(s) Oral daily  meropenem  IVPB 1000 milliGRAM(s) IV Intermittent every 12 hours  pantoprazole  Injectable 40 milliGRAM(s) IV Push two times a day  sertraline 50 milliGRAM(s) Oral daily    MEDICATIONS  (PRN):  albuterol    90 MICROgram(s) HFA Inhaler 2 Puff(s) Inhalation every 4 hours PRN Shortness of Breath and/or Wheezing  dextrose Oral Gel 15 Gram(s) Oral once PRN Blood Glucose LESS THAN 70 milliGRAM(s)/deciliter  guaifenesin/dextromethorphan Oral Liquid 10 milliLiter(s) Oral every 4 hours PRN Cough  haloperidol     Tablet 0.5 milliGRAM(s) Oral every 6 hours PRN agitation  haloperidol    Injectable 0.5 milliGRAM(s) IntraMuscular every 6 hours PRN Agitation  ondansetron Injectable 4 milliGRAM(s) IV Push every 8 hours PRN Nausea and/or Vomiting        RADIOLOGY & ADDITIONAL TESTS:

## 2022-11-29 NOTE — PHYSICAL THERAPY INITIAL EVALUATION ADULT - PERTINENT HX OF CURRENT PROBLEM, REHAB EVAL
71 y/o F PMHx significant for Dementia (bed bound), Hypertension, Hyperlipidemia, Diabetes mellitus type 2, Major Depressive Disorder, Hypothyroidism, chronic diarrhea, sacral decubitus ulcer, recurrent MDRO UTIs, hx of Iron Deficiency Anemia, and Hyponatremia, DNR, DNI presents was BIBA from home, accompanied by her son, for further evaluation and management of nausea, and vomiting (coffee ground emesis) associated with increased lethargy. HPI obtained from patient's son, due to the patient's underlying dementia, who states the patient was increasingly lethargic, and has had decreased PO intake over the last several days. Of note the patient is on daily ASA as well. In the ED the patient CCM was consulted as the patient was noted to be hypotensive.

## 2022-11-29 NOTE — PROGRESS NOTE ADULT - ASSESSMENT
71 y/o F PMHx significant for Dementia (bed bound), Hypertension, Hyperlipidemia, Diabetes mellitus type 2, Major Depressive Disorder, Hypothyroidism, chronic diarrhea, sacral decubitus ulcer, recurrent MDRO UTIs, hx of Iron Deficiency Anemia, and Hyponatremia admitted for:     # Acute blood loss anemia suspected upper GI Bleeding  - S/p 2 U PRBCs   - stable HH, monitor   - s/p Pantoprazole gtt change to IV BID  - tolerates po diet  - GI following, conservative management     # Sepsis due to recurrent UTI/ COVID  19   # Lactic acidosis resolved  - Elevated WBC, trending down   - Lactic acidosis resolved after PRBCs and IVF   - UCX: > 100K GNR  - BCX neg   - cont. Meropenem 1g IVPB q12h  - ID recs appreciated     # Elevated Cardiac Enzymes,  likely Type II MI  in settings of Sepsis/hypovolemia/Anemia    - trend troponin  - No Chest pain  - Conservative management as per family   - not on AntiPlts in settings of acute GI bleed, will further D/w GI in am   - Statins   - ECHO: preserved EF, no RWMA, mild valvular Dz, mild PHTN     #Acute Renal Failure  - likely ATN due to hypoperfusion in settings of sepsis and hypovolemia   - d/c  IVF, oral hydration   - c/w Davila monitor s and os   - labs in am   -  Nephrology recs appreciated     #  Acute hypoxic respiratory failure  likely 2/2 Acute Sepsis   now respiratory status improved, on 4l NC,  titrate down   as tolerated  CXR on admission clear lungs, will repeat   BNP elevated  ECHO: preserved EF, diastolic dysFx, IVC normal   Less likely 2/2 COVID as not symptomatic no upper respiratory symptoms   IVF d/c, monitor, titrate off O2          # Acute COVID-19  - maintain on airborne isolation  - continue with O2 as needed via nasal cannula and titrate down  as tolerated    - cont. anti-pyretics w/ Acetaminophen 650 mg PO q4h PRN fever. Limit use of NSAIDs.  - cont. HFA albuterol Q6 hour PRN via MDI.   - will hold off on dexa  - No anti viral due to renal failure   - ID recs appreciated       #Diabetes mellitus type 2  - FS q6h while NPO  - cont. ISS per protocol    # Goals of Care  - DNR/DNI  Palliative eval     # Vte ppx  - H/h stable and no further emesis, start heparin SQ

## 2022-11-29 NOTE — PHYSICAL THERAPY INITIAL EVALUATION ADULT - RANGE OF MOTION EXAMINATION, REHAB EVAL
BLE contracted in hip and knee flexion (roughly 90/90) and ankle PF ~15 deg./bilateral upper extremity ROM was WFL (within functional limits)

## 2022-11-29 NOTE — PROGRESS NOTE ADULT - ASSESSMENT
Assessment:     71 y/o female with dementia admitted for UGIB.  family is electing conservative management    Process of Care  --Reviewed dx/treatment problems and alignment with Goals of Care    Physical Aspects of Care  --Pain - Patient denies at this time. c/w current management  --Bowel Regimen - Risk for constipation d/t immobility. Suggest daily dulcolax as needed  --Dyspnea - No SOB at this time. Comfortable and in NAD  --Nausea Vomiting - denies  --Debility/Weakness/Sacral Decubitus - fall precautions, turn/position per protocol.   --Dementia - supportive measures.  Haldol PRN.  please use sparingly  verbal redirection/reorientation before medications if confused   --Severe PCM - PO supplements per primary.  sertraline can be appetite stimulating.  encourage PO  --Depression - sertraline  --UGIB - supportive measures, no intervention.  on protonix.     Psychological and Psychiatric Aspects of Care:   --Grief/Bereavement: emotional support provided  --Hx of psychiatric dx: depression/dementia  -Pastoral Care Available PRN     Social Aspects of Care  -SW involved     Cultural Aspects  -Primary Language: English    Goals of Care:  DNR/DNI, no feeding tube.  trial of ABx.  return to hospital is OK.  family would like home hospice services.     Prognosis: Death can occur at anytime, but if disease continues to progress naturally patient likely has <6 months.  She has Dementia FAST 6C wiht poor PPS (30%) and Severe Protein Calorie Malnutrition.     Ethical and Legal Aspects: NA  Capacity- pt does not have capacity    HCP/Surrogate:  Jose David Mendoza Jr -- [phone]    Code Status- DNR/DNI  MOLST- in chart  Dispo Plan- home hospice

## 2022-11-29 NOTE — PROGRESS NOTE ADULT - SUBJECTIVE AND OBJECTIVE BOX
Subjective:  seen at bedside this morning, pt is resting in bed.  she is arousable, but not cooperative.  will answer some yes/no questions, but will not engage more then that.  does not make eye contact.   denies pain, sob.    no overnight events on chart review      Review of Systems:  Unable to obtain/Limited due to: dementia/not cooperative      PHYSICAL EXAM:    Vital Signs Last 24 Hrs  T(C): 36.4 (29 Nov 2022 08:34), Max: 36.8 (28 Nov 2022 22:16)  T(F): 97.5 (29 Nov 2022 08:34), Max: 98.3 (29 Nov 2022 01:12)  HR: 75 (29 Nov 2022 08:34) (71 - 78)  BP: 135/57 (29 Nov 2022 08:34) (129/85 - 149/92)  BP(mean): --  RR: 16 (29 Nov 2022 08:34) (16 - 18)  SpO2: 100% (29 Nov 2022 08:34) (96% - 100%)    Parameters below as of 29 Nov 2022 08:34  Patient On (Oxygen Delivery Method): room air      General:  - GENERAL: Alert not oriented. No acute distress.   - EYES: EOMI. Anicteric.   - HENT: Moist mucous membranes.   - LUNGS: Clear to auscultation bilaterally. No accessory muscle use.    - CARDIOVASCULAR: Regular rate and rhythm. No murmur. No JVD. No edema.   - ABDOMEN: Soft, non-tender and non-distended. No palpable masses. Normal bowel sounds   - EXTREMITIES: No edema. Non-tender.    - SKIN: Warm, no rashes or lesions on visible skin.   - PSYCHIATRIC: Not Cooperative.      LABS:                        10.3   9.19  )-----------( 356      ( 29 Nov 2022 08:12 )             32.2     11-29    136  |  105  |  32<H>  ----------------------------<  133<H>  4.0   |  26  |  1.31<H>    Ca    9.3      29 Nov 2022 08:12  Phos  2.3     11-29    TPro  x   /  Alb  2.0<L>  /  TBili  x   /  DBili  x   /  AST  x   /  ALT  x   /  AlkPhos  x   11-29      Albumin: Albumin, Serum: 2.0 g/dL (11-29 @ 08:12)      Allergies    Hytrin (Unknown)    Intolerances      MEDICATIONS  (STANDING):  atorvastatin 20 milliGRAM(s) Oral at bedtime  dextrose 5%. 1000 milliLiter(s) (50 mL/Hr) IV Continuous <Continuous>  dextrose 5%. 1000 milliLiter(s) (100 mL/Hr) IV Continuous <Continuous>  dextrose 50% Injectable 25 Gram(s) IV Push once  dextrose 50% Injectable 12.5 Gram(s) IV Push once  dextrose 50% Injectable 25 Gram(s) IV Push once  glucagon  Injectable 1 milliGRAM(s) IntraMuscular once  heparin   Injectable 5000 Unit(s) SubCutaneous every 8 hours  insulin lispro (ADMELOG) corrective regimen sliding scale   SubCutaneous three times a day before meals  insulin lispro (ADMELOG) corrective regimen sliding scale   SubCutaneous at bedtime  lactobacillus acidophilus 1 Tablet(s) Oral daily  levothyroxine 25 MICROGram(s) Oral daily  meropenem  IVPB 1000 milliGRAM(s) IV Intermittent every 12 hours  pantoprazole  Injectable 40 milliGRAM(s) IV Push two times a day  sertraline 50 milliGRAM(s) Oral daily    MEDICATIONS  (PRN):  albuterol    90 MICROgram(s) HFA Inhaler 2 Puff(s) Inhalation every 4 hours PRN Shortness of Breath and/or Wheezing  dextrose Oral Gel 15 Gram(s) Oral once PRN Blood Glucose LESS THAN 70 milliGRAM(s)/deciliter  guaifenesin/dextromethorphan Oral Liquid 10 milliLiter(s) Oral every 4 hours PRN Cough  haloperidol     Tablet 0.5 milliGRAM(s) Oral every 6 hours PRN agitation  haloperidol    Injectable 0.5 milliGRAM(s) IntraMuscular every 6 hours PRN Agitation  ondansetron Injectable 4 milliGRAM(s) IV Push every 8 hours PRN Nausea and/or Vomiting      RADIOLOGY:

## 2022-11-30 LAB
CULTURE RESULTS: SIGNIFICANT CHANGE UP
CULTURE RESULTS: SIGNIFICANT CHANGE UP
SPECIMEN SOURCE: SIGNIFICANT CHANGE UP
SPECIMEN SOURCE: SIGNIFICANT CHANGE UP

## 2022-11-30 PROCEDURE — 99231 SBSQ HOSP IP/OBS SF/LOW 25: CPT

## 2022-11-30 PROCEDURE — 99232 SBSQ HOSP IP/OBS MODERATE 35: CPT

## 2022-11-30 RX ORDER — PANTOPRAZOLE SODIUM 20 MG/1
40 TABLET, DELAYED RELEASE ORAL
Refills: 0 | Status: DISCONTINUED | OUTPATIENT
Start: 2022-12-01 | End: 2022-12-05

## 2022-11-30 RX ADMIN — Medication 25 MICROGRAM(S): at 05:26

## 2022-11-30 RX ADMIN — Medication 1 TABLET(S): at 10:58

## 2022-11-30 RX ADMIN — HEPARIN SODIUM 5000 UNIT(S): 5000 INJECTION INTRAVENOUS; SUBCUTANEOUS at 05:26

## 2022-11-30 RX ADMIN — PANTOPRAZOLE SODIUM 40 MILLIGRAM(S): 20 TABLET, DELAYED RELEASE ORAL at 10:58

## 2022-11-30 RX ADMIN — HEPARIN SODIUM 5000 UNIT(S): 5000 INJECTION INTRAVENOUS; SUBCUTANEOUS at 22:53

## 2022-11-30 RX ADMIN — Medication 1: at 22:54

## 2022-11-30 RX ADMIN — MEROPENEM 100 MILLIGRAM(S): 1 INJECTION INTRAVENOUS at 10:58

## 2022-11-30 RX ADMIN — Medication 4: at 16:53

## 2022-11-30 RX ADMIN — SERTRALINE 50 MILLIGRAM(S): 25 TABLET, FILM COATED ORAL at 10:58

## 2022-11-30 RX ADMIN — Medication 4: at 11:58

## 2022-11-30 RX ADMIN — HEPARIN SODIUM 5000 UNIT(S): 5000 INJECTION INTRAVENOUS; SUBCUTANEOUS at 14:00

## 2022-11-30 RX ADMIN — MEROPENEM 100 MILLIGRAM(S): 1 INJECTION INTRAVENOUS at 22:53

## 2022-11-30 NOTE — PROGRESS NOTE ADULT - SUBJECTIVE AND OBJECTIVE BOX
CC: Nausea, Vomiting Blood (26 Nov 2022 15:02)    HPI:  73 y/o F PMHx significant for Dementia (bed bound), Hypertension, Hyperlipidemia, Diabetes mellitus type 2, Major Depressive Disorder, Hypothyroidism, chronic diarrhea, sacral decubitus ulcer, recurrent MDRO UTIs, hx of Iron Deficiency Anemia, and Hyponatremia, DNR, DNI presents was BIBA from home, accompanied by her son, for further evaluation and management of nausea, and vomiting (coffee ground emesis) associated with increased lethargy. HPI obtained from patient's son, due to the patient's underlying dementia, who states the patient was increasingly lethargic, and has had decreased PO intake over the last several days. Of note the patient is on daily ASA as well. In the ED the patient CCM was consulted as the patient was noted to be hypotensive.   Labs => WBC 25.71, Hgb/Hct 8.0/25.0, MCV/MCH 76.2/24.4, , TnI 88.76 -> 142.68, LA 6.1 -> 3.3, Na 129, BUN/Cr 90/1.90, Glu 390, Ca 11.1, Alb 2.4, (+)UA, COVID-19 (+). In the ED the patient received Ceftriaxone 1g IVPB x 1, Pantoprazole 40mg IVP x 1, then started on a Pantoprazole gtt, NS x 2L, and pRBCs x 2. (25 Nov 2022 21:07)    INTERVAL HPI /OVERNIGHT EVENTS: Pt was seen and examined, pleasantly confused, reports no complains, denies pain, reports had lunch  (meal    11/29/22: Confused, denies any complaints.   11/30/22: No new issues.       Vital Signs Last 24 Hrs  T(C): --  T(F): --  HR: --  BP: --  BP(mean): --  RR: --  SpO2: --            PHYSICAL EXAM:  General: Well developed;  malnourished; in no acute distress  Eyes: EOMI; conjunctiva and sclera clear  Head: Normocephalic; atraumatic  ENMT: No nasal discharge; airway clear  Respiratory:  Decreased BS,  No wheezes, rales or rhonchi  Cardiovascular: Regular rate and rhythm. S1 and S2 Normal;   Gastrointestinal: Soft non-tender non-distended; Normal bowel sounds  Genitourinary: No  suprapubic  tenderness, Davila in place   Extremities: No  edema  Vascular: Peripheral pulses palpable 2+ bilaterally  Neurological: Alert and oriented x1, non focal   Skin: Warm and dry. No acute rash  Musculoskeletal: Normal muscle tone, without deformities  Psychiatric: Cooperative         LABS:                                       10.3   9.19  )-----------( 356      ( 29 Nov 2022 08:12 )             32.2     29 Nov 2022 08:12    136    |  105    |  32     ----------------------------<  133    4.0     |  26     |  1.31     Ca    9.3        29 Nov 2022 08:12  Phos  2.3       29 Nov 2022 08:12    TPro  x      /  Alb  2.0    /  TBili  x      /  DBili  x      /  AST  x      /  ALT  x      /  AlkPhos  x      29 Nov 2022 08:12    LIVER FUNCTIONS - ( 29 Nov 2022 08:12 )  Alb: 2.0 g/dL / Pro: x     / ALK PHOS: x     / ALT: x     / AST: x     / GGT: x             CAPILLARY BLOOD GLUCOSE      POCT Blood Glucose.: 135 mg/dL (29 Nov 2022 11:11)  POCT Blood Glucose.: 130 mg/dL (29 Nov 2022 08:30)  POCT Blood Glucose.: 112 mg/dL (28 Nov 2022 22:07)  POCT Blood Glucose.: 108 mg/dL (28 Nov 2022 17:46)        MEDICATIONS  (STANDING):  atorvastatin 20 milliGRAM(s) Oral at bedtime  dextrose 5%. 1000 milliLiter(s) (50 mL/Hr) IV Continuous <Continuous>  dextrose 5%. 1000 milliLiter(s) (100 mL/Hr) IV Continuous <Continuous>  dextrose 50% Injectable 25 Gram(s) IV Push once  dextrose 50% Injectable 12.5 Gram(s) IV Push once  dextrose 50% Injectable 25 Gram(s) IV Push once  glucagon  Injectable 1 milliGRAM(s) IntraMuscular once  heparin   Injectable 5000 Unit(s) SubCutaneous every 8 hours  insulin lispro (ADMELOG) corrective regimen sliding scale   SubCutaneous three times a day before meals  insulin lispro (ADMELOG) corrective regimen sliding scale   SubCutaneous at bedtime  levothyroxine 25 MICROGram(s) Oral daily  meropenem  IVPB 1000 milliGRAM(s) IV Intermittent every 12 hours  pantoprazole  Injectable 40 milliGRAM(s) IV Push two times a day  sertraline 50 milliGRAM(s) Oral daily    MEDICATIONS  (PRN):  albuterol    90 MICROgram(s) HFA Inhaler 2 Puff(s) Inhalation every 4 hours PRN Shortness of Breath and/or Wheezing  dextrose Oral Gel 15 Gram(s) Oral once PRN Blood Glucose LESS THAN 70 milliGRAM(s)/deciliter  guaifenesin/dextromethorphan Oral Liquid 10 milliLiter(s) Oral every 4 hours PRN Cough  haloperidol     Tablet 0.5 milliGRAM(s) Oral every 6 hours PRN agitation  haloperidol    Injectable 0.5 milliGRAM(s) IntraMuscular every 6 hours PRN Agitation  ondansetron Injectable 4 milliGRAM(s) IV Push every 8 hours PRN Nausea and/or Vomiting        RADIOLOGY & ADDITIONAL TESTS:

## 2022-11-30 NOTE — PROGRESS NOTE ADULT - ASSESSMENT
Assessment:     73 y/o female with dementia admitted for UGIB.  family is electing conservative management    Process of Care  --Reviewed dx/treatment problems and alignment with Goals of Care    Physical Aspects of Care  --Pain - Patient denies at this time. c/w current management  --Bowel Regimen - Risk for constipation d/t immobility. Suggest daily dulcolax as needed  --Dyspnea - No SOB at this time. Comfortable and in NAD  --Nausea Vomiting - denies  --Debility/Weakness/Sacral Decubitus - fall precautions, turn/position per protocol.   --Dementia - supportive measures.  Haldol PRN.  please use sparingly  verbal redirection/reorientation before medications if confused   --Severe PCM - PO supplements per primary.  sertraline can be appetite stimulating.  encourage PO  --Depression - sertraline  --UGIB - supportive measures, no intervention.  on protonix.     Psychological and Psychiatric Aspects of Care:   --Grief/Bereavement: emotional support provided  --Hx of psychiatric dx: depression/dementia  -Pastoral Care Available PRN     Social Aspects of Care  -SW involved     Cultural Aspects  -Primary Language: English    Goals of Care:  DNR/DNI, no feeding tube.  trial of ABx.  return to hospital is OK.  family would like home hospice services.     Prognosis: Death can occur at anytime, but if disease continues to progress naturally patient likely has <6 months.  She has Dementia FAST 6C wiht poor PPS (30%) and Severe Protein Calorie Malnutrition.     Ethical and Legal Aspects: NA  Capacity- pt does not have capacity    HCP/Surrogate:  Jose David Mendoza Jr -- [phone]    Code Status- DNR/DNI  MOLST- in chart  Dispo Plan- home hospice

## 2022-11-30 NOTE — PROGRESS NOTE ADULT - ASSESSMENT
71 y/o F PMHx significant for Dementia (bed bound), Hypertension, Hyperlipidemia, Diabetes mellitus type 2, Major Depressive Disorder, Hypothyroidism, chronic diarrhea, sacral decubitus ulcer, recurrent MDRO UTIs, hx of Iron Deficiency Anemia, and Hyponatremia, DNR, DNI presents was BIBA from home, accompanied by her son, for further evaluation and management of nausea, and vomiting (coffee ground emesis) associated with increased lethargy.  Of note the patient is on daily ASA as well. In the ED the patient CCM was consulted as the patient was noted to be hypotensive. Labs => WBC 25.71, Hgb/Hct 8.0/25.0, MCV/MCH 76.2/24.4, , TnI 88.76 -> 142.68, LA 6.1 -> 3.3, Na 129, BUN/Cr 90/1.90, Glu 390, Ca 11.1, Alb 2.4, (+)UA, COVID-19 (+). In the ED the patient received Ceftriaxone 1g IVPB x 1, Pantoprazole 40mg IVP x 1, then started on a Pantoprazole gtt, NS x 2L, and pRBCs x 2    1. Sepsis. Hypotension. UTI. hx Recurrent MDR UTI. Covid-19 viral syndrome. Sacral decub ulcer. GI bleed.   - slowly improving, HD stable   - urine cx growing gnrs f/u final cx, blood cx no growth   - on IV merrem [id number] #[id number], complete 7 days and stop  - xray clear, resp status stable, does not require remdesivir, decadron   - monitor temps  - tolerating abx well so far; no side effects noted  - reason for abx use and side effects reviewed with patient  - gi eval noted   - supportive care  - fu cbc  - isolation precautions    2. other issues - care per medicine

## 2022-11-30 NOTE — PROGRESS NOTE ADULT - SUBJECTIVE AND OBJECTIVE BOX
Subjective:  seen at bedside, pt is awake and alert.  no complaints of pain or sob.  she is interactive today.   per maci notes, plan is to d/c home on hospice care as soon as friday.     Review of Systems:    - CONSTITUTIONAL: Denies fever and chills. Denies weakness and fatigue   - HEENT: Denies changes in vision and hearing.   - RESPIRATORY: Denies SOB, cough and/or respiratory secretions   - CV: Denies palpitations and CP. Denies edema   - GI: Denies abdominal pain, constipation, nausea, vomiting and diarrhea.   - : Denies dysuria and urinary frequency.   - MSK: Denies myalgia and joint pain.   - SKIN: Denies rash and pruritus.   - NEUROLOGICAL: Denies headache and syncope.   - PSYCHIATRIC: Denies anxiety and depression.    Unable to obtain/Limited due to: dementia        PHYSICAL EXAM:    Vital Signs Last 24 Hrs  T(C): --  T(F): --  HR: --  BP: --  BP(mean): --  RR: --  SpO2: --      General:  - GENERAL: Alert, consfused. No acute distress.   - EYES: EOMI. Anicteric.   - HENT: Moist mucous membranes.   - LUNGS: Clear to auscultation bilaterally. No accessory muscle use. Speaking in complete sentences.   - CARDIOVASCULAR: Regular rate and rhythm. No murmur. No JVD. No edema.   - ABDOMEN: Soft, non-tender and non-distended. No palpable masses. Normal bowel sounds   - EXTREMITIES: No edema. Non-tender.    - SKIN: Warm, no rashes or lesions on visible skin.   - NEUROLOGIC: No focal neurological deficits.    - PSYCHIATRIC: Calm, Cooperative.       LABS:                        10.3   9.19  )-----------( 356      ( 29 Nov 2022 08:12 )             32.2     11-29    136  |  105  |  32<H>  ----------------------------<  133<H>  4.0   |  26  |  1.31<H>    Ca    9.3      29 Nov 2022 08:12  Phos  2.3     11-29    TPro  x   /  Alb  2.0<L>  /  TBili  x   /  DBili  x   /  AST  x   /  ALT  x   /  AlkPhos  x   11-29      Albumin: Albumin, Serum: 2.0 g/dL (11-29 @ 08:12)      Allergies    Hytrin (Unknown)    Intolerances      MEDICATIONS  (STANDING):  atorvastatin 20 milliGRAM(s) Oral at bedtime  dextrose 5%. 1000 milliLiter(s) (50 mL/Hr) IV Continuous <Continuous>  dextrose 5%. 1000 milliLiter(s) (100 mL/Hr) IV Continuous <Continuous>  dextrose 50% Injectable 25 Gram(s) IV Push once  dextrose 50% Injectable 12.5 Gram(s) IV Push once  dextrose 50% Injectable 25 Gram(s) IV Push once  glucagon  Injectable 1 milliGRAM(s) IntraMuscular once  heparin   Injectable 5000 Unit(s) SubCutaneous every 8 hours  insulin lispro (ADMELOG) corrective regimen sliding scale   SubCutaneous three times a day before meals  insulin lispro (ADMELOG) corrective regimen sliding scale   SubCutaneous at bedtime  lactobacillus acidophilus 1 Tablet(s) Oral daily  levothyroxine 25 MICROGram(s) Oral daily  meropenem  IVPB 1000 milliGRAM(s) IV Intermittent every 12 hours  sertraline 50 milliGRAM(s) Oral daily    MEDICATIONS  (PRN):  albuterol    90 MICROgram(s) HFA Inhaler 2 Puff(s) Inhalation every 4 hours PRN Shortness of Breath and/or Wheezing  dextrose Oral Gel 15 Gram(s) Oral once PRN Blood Glucose LESS THAN 70 milliGRAM(s)/deciliter  guaifenesin/dextromethorphan Oral Liquid 10 milliLiter(s) Oral every 4 hours PRN Cough  haloperidol     Tablet 0.5 milliGRAM(s) Oral every 6 hours PRN agitation  haloperidol    Injectable 0.5 milliGRAM(s) IntraMuscular every 6 hours PRN Agitation  ondansetron Injectable 4 milliGRAM(s) IV Push every 8 hours PRN Nausea and/or Vomiting      RADIOLOGY:

## 2022-11-30 NOTE — PROGRESS NOTE ADULT - ASSESSMENT
73 yo female w hx of Dementia, HTN, HLD, DM, and MDD, presenting with poor po intake, anemia requiring PRBC and DANIEL on CKD      DANIEL on CKD Cr baseline 1.2 - 1.5   sec to ATN w hypotension but Cr slightly improved   hypotension in setting of volume depletion and possible UTI    monitor for recovery    keep BP > 110    trend WBC   no ace or arb    UTI - IV abx   renal uss - mild right hydro, bladder decompressed Davila     - Urology followup        11/28 SY  --DANIEL/CKD : improved and stable.  d/c IVF for now and monitor on po intake.  --Follow up calcium level.  --BP stable post volume repletion.  --UTI : continue Meropenem.  -- : monitor with Davila.      71 yo female w hx of Dementia, HTN, HLD, DM, and MDD, presenting with poor po intake, anemia requiring PRBC and DANIEL on CKD      DANIEL on CKD Cr baseline 1.2 - 1.5   sec to ATN w hypotension but Cr slightly improved   hypotension in setting of volume depletion and possible UTI    monitor for recovery    keep BP > 110    trend WBC   no ace or arb    UTI - IV abx   renal uss - mild right hydro, bladder decompressed Garcia     - Urology followup        11/28 SY  --DANIEL/CKD : improved and stable.  d/c IVF for now and monitor on po intake.  --Follow up calcium level.  --BP stable post volume repletion.  --UTI : continue Meropenem.  -- : monitor with Garcia.    11/30 SY  --DANIEL/CKD : improved and stable.  follow up repeat creat and Calcium level.  --Fluid status stable.  -- : continue garcia drainage.  --UTI :continue Meropenem.  --GOC with Palliative medicine team.

## 2022-11-30 NOTE — PROGRESS NOTE ADULT - SUBJECTIVE AND OBJECTIVE BOX
Date of service: 22 @ 10:08    pt seen and examined  sitting up in bed  no resp distress  comfortable, no complaints   afebrile     ROS: no fever or chills; denies dizziness, no HA, no SOB or cough, no abdominal pain, no diarrhea or constipation;  no legs pain, no rashes    MEDICATIONS  (STANDING):  atorvastatin 20 milliGRAM(s) Oral at bedtime  dextrose 5%. 1000 milliLiter(s) (50 mL/Hr) IV Continuous <Continuous>  dextrose 5%. 1000 milliLiter(s) (100 mL/Hr) IV Continuous <Continuous>  dextrose 50% Injectable 25 Gram(s) IV Push once  dextrose 50% Injectable 12.5 Gram(s) IV Push once  dextrose 50% Injectable 25 Gram(s) IV Push once  glucagon  Injectable 1 milliGRAM(s) IntraMuscular once  heparin   Injectable 5000 Unit(s) SubCutaneous every 8 hours  insulin lispro (ADMELOG) corrective regimen sliding scale   SubCutaneous three times a day before meals  insulin lispro (ADMELOG) corrective regimen sliding scale   SubCutaneous at bedtime  lactobacillus acidophilus 1 Tablet(s) Oral daily  levothyroxine 25 MICROGram(s) Oral daily  meropenem  IVPB 1000 milliGRAM(s) IV Intermittent every 12 hours  pantoprazole  Injectable 40 milliGRAM(s) IV Push two times a day  sertraline 50 milliGRAM(s) Oral daily      Vital Signs Last 24 Hrs  T(F): 97.5  HR: 75  BP: 135/57  RR: 16  SpO2: 100%RA      PE:  Constitutional: frail looking  HEENT: NC/AT, EOMI, PERRLA, conjunctivae clear; ears and nose atraumatic; pharynx benign  Neck: supple; thyroid not palpable  Back: no tenderness  Respiratory: respiratory effort normal; clear to auscultation  Cardiovascular: S1S2 regular, no murmurs  Abdomen: soft, not tender, not distended, positive BS; liver and spleen WNL  Genitourinary: no suprapubic tenderness + garcia  Lymphatic: no LN palpable  Musculoskeletal: no muscle tenderness, no joint swelling or tenderness  Extremities: no pedal edema  Neurological/ Psychiatric:   moving all extremities  Skin: no rashes; no palpable lesions + sacral decub ulcer     Labs: all available labs reviewed                                   10.3   9.19  )-----------( 356      ( 2022 08:12 )             32.2         136  |  105  |  32<H>  ----------------------------<  133<H>  4.0   |  26  |  1.31<H>    Ca    9.3      2022 08:12  Phos  2.3         TPro  x   /  Alb  2.0<L>  /  TBili  x   /  DBili  x   /  AST  x   /  ALT  x   /  AlkPhos  x           UA  Color: Yellow / Appearance: Clear / S.010 / pH: x  Gluc: x / Ketone: Trace  / Bili: Negative / Urobili: Negative   Blood: x / Protein: 100 / Nitrite: Positive   Leuk Esterase: Moderate / RBC: 25-50 /HPF / WBC >50   Sq Epi: x / Non Sq Epi: Few / Bacteria: TNTC    Culture - Urine (22 @ 17:19)   Specimen Source: Clean Catch None   Culture Results:   >=3 organisms. Probable collection contamination.   Culture in progress   Culture - Blood (22 @ 16:42)   Specimen Source: .Blood None   Culture Results:   No growth to date.   Culture - Blood (22 @ 14:58)   Specimen Source: .Blood Blood-Peripheral   Culture Results:   No growth to date.     Radiology: all available radiological tests reviewed    ACC: [id number] EXAM:  XR CHEST PORTABLE IMMED 1V                          PROCEDURE DATE:  2022          INTERPRETATION:  AP chest erect on 2022 at 4:11 PM.    Patient has sepsis.    Heart normal.    Left breast prosthesis noted.    Lungs remain clear.    There is a right PICC line and 2021 which is no longer   evident.    IMPRESSION: Clear lungs.    --- End of Report ---      < end of copied text >    Advanced directives addressed: full resuscitation

## 2022-11-30 NOTE — PROGRESS NOTE ADULT - SUBJECTIVE AND OBJECTIVE BOX
NEPHROLOGY INTERVAL HPI/OVERNIGHT EVENTS:    Date of Service: 11-30-22 @ 17:58    11/30--  11/28--Alert and responsive. Oriented to person.  No SOB.    HPI :   71 y/o F PMHx significant for Dementia (bed bound), Hypertension, Hyperlipidemia, Diabetes mellitus type 2, Major Depressive Disorder, Hypothyroidism, chronic diarrhea, sacral decubitus ulcer, recurrent MDRO UTIs, hx of Iron Deficiency Anemia, and Hyponatremia, DNR, DNI presents was BIBA from home, accompanied by her son, for further evaluation and management of nausea, and vomiting (coffee ground emesis) associated with increased lethargy. HPI obtained from chart , due to the patient's underlying dementia, who states the patient was increasingly lethargic, and has had decreased PO intake over the last several days. Of note the patient is on daily ASA as well. In the ED the patient CCM was consulted as the patient was noted to be hypotensive.   Renal eval called for   Na 129, BUN/Cr 90/1.90, Ca 11.1, Alb 2.4,   (+)UA, COVID-19 (+). In the ED the patient received Ceftriaxone 1g IVPB x 1, Pantoprazole 40mg IVP x 1, then started on a Pantoprazole gtt, NS x 2L, and pRBCs x 2.  Seen by Dr Asencio et al in past for DANIEL - Cr 1.3 -0 1.5 in past    today    pt is confused but alert      PAST MEDICAL & SURGICAL HISTORY:  Intraoperative hemorrhage and hematoma of a genitourinary system organ or structure complicating other procedure  Type 2 diabetes mellitus without complication  Essential (primary) hypertension  Syncope and collapse  Acute kidney failure, unspecified  MDD (major depressive disorder)  Hypothyroid        MEDICATIONS  (STANDING):  atorvastatin 20 milliGRAM(s) Oral at bedtime  dextrose 5%. 1000 milliLiter(s) (50 mL/Hr) IV Continuous <Continuous>  dextrose 5%. 1000 milliLiter(s) (100 mL/Hr) IV Continuous <Continuous>  dextrose 50% Injectable 25 Gram(s) IV Push once  dextrose 50% Injectable 12.5 Gram(s) IV Push once  dextrose 50% Injectable 25 Gram(s) IV Push once  glucagon  Injectable 1 milliGRAM(s) IntraMuscular once  heparin   Injectable 5000 Unit(s) SubCutaneous every 8 hours  insulin lispro (ADMELOG) corrective regimen sliding scale   SubCutaneous three times a day before meals  insulin lispro (ADMELOG) corrective regimen sliding scale   SubCutaneous at bedtime  lactobacillus acidophilus 1 Tablet(s) Oral daily  levothyroxine 25 MICROGram(s) Oral daily  meropenem  IVPB 1000 milliGRAM(s) IV Intermittent every 12 hours  sertraline 50 milliGRAM(s) Oral daily    MEDICATIONS  (PRN):  albuterol    90 MICROgram(s) HFA Inhaler 2 Puff(s) Inhalation every 4 hours PRN Shortness of Breath and/or Wheezing  dextrose Oral Gel 15 Gram(s) Oral once PRN Blood Glucose LESS THAN 70 milliGRAM(s)/deciliter  guaifenesin/dextromethorphan Oral Liquid 10 milliLiter(s) Oral every 4 hours PRN Cough  haloperidol     Tablet 0.5 milliGRAM(s) Oral every 6 hours PRN agitation  haloperidol    Injectable 0.5 milliGRAM(s) IntraMuscular every 6 hours PRN Agitation  ondansetron Injectable 4 milliGRAM(s) IV Push every 8 hours PRN Nausea and/or Vomiting    Vital Signs Last 24 Hrs  T(C): 37.1 (30 Nov 2022 17:30), Max: 37.1 (30 Nov 2022 17:30)  T(F): 98.7 (30 Nov 2022 17:30), Max: 98.7 (30 Nov 2022 17:30)  HR: 81 (30 Nov 2022 17:30) (81 - 81)  BP: 103/55 (30 Nov 2022 17:30) (103/55 - 103/55)  BP(mean): --  RR: 18 (30 Nov 2022 17:30) (18 - 18)  SpO2: 96% (30 Nov 2022 17:30) (96% - 96%)    Parameters below as of 30 Nov 2022 17:30  Patient On (Oxygen Delivery Method): room air      Daily     Daily     11-29 @ 07:01  -  11-30 @ 07:00  --------------------------------------------------------  IN: 0 mL / OUT: 560 mL / NET: -560 mL        PHYSICAL EXAM:  GENERAL:   CHEST/LUNG:   HEART:   ABDOMEN:   EXTREMITIES:   SKIN:     LABS:                        10.3   9.19  )-----------( 356      ( 29 Nov 2022 08:12 )             32.2     11-29    136  |  105  |  32<H>  ----------------------------<  133<H>  4.0   |  26  |  1.31<H>    Ca    9.3      29 Nov 2022 08:12  Phos  2.3     11-29    TPro  x   /  Alb  2.0<L>  /  TBili  x   /  DBili  x   /  AST  x   /  ALT  x   /  AlkPhos  x   11-29                RADIOLOGY & ADDITIONAL TESTS:   NEPHROLOGY INTERVAL HPI/OVERNIGHT EVENTS:    Date of Service: 11-30-22 @ 17:58    11/30--comfortable. No distress.  unclear po intake.  11/28--Alert and responsive. Oriented to person.  No SOB.    HPI :   71 y/o F PMHx significant for Dementia (bed bound), Hypertension, Hyperlipidemia, Diabetes mellitus type 2, Major Depressive Disorder, Hypothyroidism, chronic diarrhea, sacral decubitus ulcer, recurrent MDRO UTIs, hx of Iron Deficiency Anemia, and Hyponatremia, DNR, DNI presents was BIBA from home, accompanied by her son, for further evaluation and management of nausea, and vomiting (coffee ground emesis) associated with increased lethargy. HPI obtained from chart , due to the patient's underlying dementia, who states the patient was increasingly lethargic, and has had decreased PO intake over the last several days. Of note the patient is on daily ASA as well. In the ED the patient CCM was consulted as the patient was noted to be hypotensive.   Renal eval called for   Na 129, BUN/Cr 90/1.90, Ca 11.1, Alb 2.4,   (+)UA, COVID-19 (+). In the ED the patient received Ceftriaxone 1g IVPB x 1, Pantoprazole 40mg IVP x 1, then started on a Pantoprazole gtt, NS x 2L, and pRBCs x 2.  Seen by Dr Asencio et al in past for DANIEL - Cr 1.3 -0 1.5 in past    today    pt is confused but alert      PAST MEDICAL & SURGICAL HISTORY:  Intraoperative hemorrhage and hematoma of a genitourinary system organ or structure complicating other procedure  Type 2 diabetes mellitus without complication  Essential (primary) hypertension  Syncope and collapse  Acute kidney failure, unspecified  MDD (major depressive disorder)  Hypothyroid    MEDICATIONS  (STANDING):  atorvastatin 20 milliGRAM(s) Oral at bedtime  dextrose 5%. 1000 milliLiter(s) (50 mL/Hr) IV Continuous <Continuous>  dextrose 5%. 1000 milliLiter(s) (100 mL/Hr) IV Continuous <Continuous>  dextrose 50% Injectable 25 Gram(s) IV Push once  dextrose 50% Injectable 12.5 Gram(s) IV Push once  dextrose 50% Injectable 25 Gram(s) IV Push once  glucagon  Injectable 1 milliGRAM(s) IntraMuscular once  heparin   Injectable 5000 Unit(s) SubCutaneous every 8 hours  insulin lispro (ADMELOG) corrective regimen sliding scale   SubCutaneous three times a day before meals  insulin lispro (ADMELOG) corrective regimen sliding scale   SubCutaneous at bedtime  lactobacillus acidophilus 1 Tablet(s) Oral daily  levothyroxine 25 MICROGram(s) Oral daily  meropenem  IVPB 1000 milliGRAM(s) IV Intermittent every 12 hours  sertraline 50 milliGRAM(s) Oral daily    MEDICATIONS  (PRN):  albuterol    90 MICROgram(s) HFA Inhaler 2 Puff(s) Inhalation every 4 hours PRN Shortness of Breath and/or Wheezing  dextrose Oral Gel 15 Gram(s) Oral once PRN Blood Glucose LESS THAN 70 milliGRAM(s)/deciliter  guaifenesin/dextromethorphan Oral Liquid 10 milliLiter(s) Oral every 4 hours PRN Cough  haloperidol     Tablet 0.5 milliGRAM(s) Oral every 6 hours PRN agitation  haloperidol    Injectable 0.5 milliGRAM(s) IntraMuscular every 6 hours PRN Agitation  ondansetron Injectable 4 milliGRAM(s) IV Push every 8 hours PRN Nausea and/or Vomiting    Vital Signs Last 24 Hrs  T(C): 37.1 (30 Nov 2022 17:30), Max: 37.1 (30 Nov 2022 17:30)  T(F): 98.7 (30 Nov 2022 17:30), Max: 98.7 (30 Nov 2022 17:30)  HR: 81 (30 Nov 2022 17:30) (81 - 81)  BP: 103/55 (30 Nov 2022 17:30) (103/55 - 103/55)  BP(mean): --  RR: 18 (30 Nov 2022 17:30) (18 - 18)  SpO2: 96% (30 Nov 2022 17:30) (96% - 96%)    Parameters below as of 30 Nov 2022 17:30  Patient On (Oxygen Delivery Method): room air    11-29 @ 07:01  -  11-30 @ 07:00  --------------------------------------------------------  IN: 0 mL / OUT: 560 mL / NET: -560 mL    PHYSICAL EXAM:  GENERAL: no distress  CHEST/LUNG: Fair air entry  HEART: S1S2 RRR  ABDOMEN: soft  EXTREMITIES: no edema  SKIN:     LABS: no labs today.                        10.3   9.19  )-----------( 356      ( 29 Nov 2022 08:12 )             32.2     11-29    136  |  105  |  32<H>  ----------------------------<  133<H>  4.0   |  26  |  1.31<H>    Ca    9.3      29 Nov 2022 08:12  Phos  2.3     11-29    TPro  x   /  Alb  2.0<L>  /  TBili  x   /  DBili  x   /  AST  x   /  ALT  x   /  AlkPhos  x   11-29                RADIOLOGY & ADDITIONAL TESTS:

## 2022-11-30 NOTE — PROGRESS NOTE ADULT - ASSESSMENT
71 y/o F PMHx significant for Dementia (bed bound), Hypertension, Hyperlipidemia, Diabetes mellitus type 2, Major Depressive Disorder, Hypothyroidism, chronic diarrhea, sacral decubitus ulcer, recurrent MDRO UTIs, hx of Iron Deficiency Anemia, and Hyponatremia admitted for:     # Acute blood loss anemia suspected upper GI Bleeding  - S/p 2 U PRBCs   - stable HH, monitor   - s/p Pantoprazole gtt, change to po  - tolerates po diet  - GI following, conservative management     # Sepsis due to recurrent UTI/ COVID  19   # Lactic acidosis resolved  - Elevated WBC, trending down   - Lactic acidosis resolved after PRBCs and IVF   - UCX: > 100K GNR  - BCX neg   - cont. Meropenem 1g IVPB q12h as per ID  - ID recs appreciated     # Elevated Cardiac Enzymes,  likely Type II MI  in settings of Sepsis/hypovolemia/Anemia    - trend troponin  - No Chest pain  - Conservative management as per family   - not on AntiPlts in settings of acute GI bleed, will further D/w GI in am   - Statins   - ECHO: preserved EF, no RWMA, mild valvular Dz, mild PHTN     #Acute Renal Failure  - likely ATN due to hypoperfusion in settings of sepsis and hypovolemia   - d/c  IVF, oral hydration   - c/w Davila monitor s and os   - labs in am   -  Nephrology recs appreciated     #  Acute hypoxic respiratory failure  likely 2/2 Acute Sepsis   now respiratory status improved, on 4l NC,  titrate down   as tolerated  CXR on admission clear lungs, will repeat   BNP elevated  ECHO: preserved EF, diastolic dysFx, IVC normal   Less likely 2/2 COVID as not symptomatic no upper respiratory symptoms   IVF d/c, monitor, titrate off O2          # Acute COVID-19  - maintain on airborne isolation  - continue with O2 as needed via nasal cannula and titrate down  as tolerated    - cont. anti-pyretics w/ Acetaminophen 650 mg PO q4h PRN fever. Limit use of NSAIDs.  - cont. HFA albuterol Q6 hour PRN via MDI.   - will hold off on dexa  - No anti viral due to renal failure   - ID recs appreciated       #Diabetes mellitus type 2  - FS q6h while NPO  - cont. ISS per protocol    # Goals of Care  - DNR/DNI  Palliative eval     # Vte ppx      Possible home on home hospice on Friday

## 2022-12-01 LAB
ALBUMIN SERPL ELPH-MCNC: 1.9 G/DL — LOW (ref 3.3–5)
ANION GAP SERPL CALC-SCNC: 7 MMOL/L — SIGNIFICANT CHANGE UP (ref 5–17)
BUN SERPL-MCNC: 33 MG/DL — HIGH (ref 7–23)
CALCIUM SERPL-MCNC: 8.9 MG/DL — SIGNIFICANT CHANGE UP (ref 8.5–10.1)
CHLORIDE SERPL-SCNC: 105 MMOL/L — SIGNIFICANT CHANGE UP (ref 96–108)
CO2 SERPL-SCNC: 25 MMOL/L — SIGNIFICANT CHANGE UP (ref 22–31)
CREAT SERPL-MCNC: 1.46 MG/DL — HIGH (ref 0.5–1.3)
EGFR: 38 ML/MIN/1.73M2 — LOW
GLUCOSE SERPL-MCNC: 159 MG/DL — HIGH (ref 70–99)
HCT VFR BLD CALC: 28.8 % — LOW (ref 34.5–45)
HGB BLD-MCNC: 9.1 G/DL — LOW (ref 11.5–15.5)
MCHC RBC-ENTMCNC: 25.5 PG — LOW (ref 27–34)
MCHC RBC-ENTMCNC: 31.6 GM/DL — LOW (ref 32–36)
MCV RBC AUTO: 80.7 FL — SIGNIFICANT CHANGE UP (ref 80–100)
PHOSPHATE SERPL-MCNC: 2.1 MG/DL — LOW (ref 2.5–4.5)
PLATELET # BLD AUTO: 338 K/UL — SIGNIFICANT CHANGE UP (ref 150–400)
POTASSIUM SERPL-MCNC: 4.2 MMOL/L — SIGNIFICANT CHANGE UP (ref 3.5–5.3)
POTASSIUM SERPL-SCNC: 4.2 MMOL/L — SIGNIFICANT CHANGE UP (ref 3.5–5.3)
RBC # BLD: 3.57 M/UL — LOW (ref 3.8–5.2)
RBC # FLD: 16.4 % — HIGH (ref 10.3–14.5)
SODIUM SERPL-SCNC: 137 MMOL/L — SIGNIFICANT CHANGE UP (ref 135–145)
WBC # BLD: 9.28 K/UL — SIGNIFICANT CHANGE UP (ref 3.8–10.5)
WBC # FLD AUTO: 9.28 K/UL — SIGNIFICANT CHANGE UP (ref 3.8–10.5)

## 2022-12-01 PROCEDURE — 99232 SBSQ HOSP IP/OBS MODERATE 35: CPT

## 2022-12-01 PROCEDURE — 99231 SBSQ HOSP IP/OBS SF/LOW 25: CPT

## 2022-12-01 RX ADMIN — HEPARIN SODIUM 5000 UNIT(S): 5000 INJECTION INTRAVENOUS; SUBCUTANEOUS at 21:35

## 2022-12-01 RX ADMIN — Medication 1: at 07:49

## 2022-12-01 RX ADMIN — SERTRALINE 50 MILLIGRAM(S): 25 TABLET, FILM COATED ORAL at 11:00

## 2022-12-01 RX ADMIN — MEROPENEM 100 MILLIGRAM(S): 1 INJECTION INTRAVENOUS at 21:36

## 2022-12-01 RX ADMIN — Medication 1: at 21:34

## 2022-12-01 RX ADMIN — PANTOPRAZOLE SODIUM 40 MILLIGRAM(S): 20 TABLET, DELAYED RELEASE ORAL at 06:11

## 2022-12-01 RX ADMIN — HEPARIN SODIUM 5000 UNIT(S): 5000 INJECTION INTRAVENOUS; SUBCUTANEOUS at 06:10

## 2022-12-01 RX ADMIN — MEROPENEM 100 MILLIGRAM(S): 1 INJECTION INTRAVENOUS at 11:04

## 2022-12-01 RX ADMIN — Medication 2: at 12:41

## 2022-12-01 RX ADMIN — Medication 25 MICROGRAM(S): at 06:10

## 2022-12-01 RX ADMIN — HEPARIN SODIUM 5000 UNIT(S): 5000 INJECTION INTRAVENOUS; SUBCUTANEOUS at 15:33

## 2022-12-01 RX ADMIN — Medication 1: at 17:50

## 2022-12-01 RX ADMIN — Medication 1 TABLET(S): at 11:00

## 2022-12-01 RX ADMIN — ATORVASTATIN CALCIUM 20 MILLIGRAM(S): 80 TABLET, FILM COATED ORAL at 21:33

## 2022-12-01 NOTE — PROGRESS NOTE ADULT - SUBJECTIVE AND OBJECTIVE BOX
Date of service: 22 @ 11:35    pt seen and examined  sitting up in bed  no complaints   no overnight events     ROS: no fever or chills; denies dizziness, no HA, no SOB or cough, no abdominal pain, no diarrhea or constipation;  no legs pain, no rashes      MEDICATIONS  (STANDING):  atorvastatin 20 milliGRAM(s) Oral at bedtime  dextrose 5%. 1000 milliLiter(s) (50 mL/Hr) IV Continuous <Continuous>  dextrose 5%. 1000 milliLiter(s) (100 mL/Hr) IV Continuous <Continuous>  dextrose 50% Injectable 25 Gram(s) IV Push once  dextrose 50% Injectable 12.5 Gram(s) IV Push once  dextrose 50% Injectable 25 Gram(s) IV Push once  glucagon  Injectable 1 milliGRAM(s) IntraMuscular once  heparin   Injectable 5000 Unit(s) SubCutaneous every 8 hours  insulin lispro (ADMELOG) corrective regimen sliding scale   SubCutaneous three times a day before meals  insulin lispro (ADMELOG) corrective regimen sliding scale   SubCutaneous at bedtime  lactobacillus acidophilus 1 Tablet(s) Oral daily  levothyroxine 25 MICROGram(s) Oral daily  meropenem  IVPB 1000 milliGRAM(s) IV Intermittent every 12 hours  pantoprazole    Tablet 40 milliGRAM(s) Oral before breakfast  sertraline 50 milliGRAM(s) Oral daily    Vital Signs Last 24 Hrs  T(C): 36.9 (2022 23:05), Max: 37.1 (2022 17:30)  T(F): 98.5 (2022 23:05), Max: 98.7 (2022 17:30)  HR: 85 (2022 23:05) (81 - 85)  BP: 121/53 (2022 23:05) (103/55 - 121/53)  BP(mean): --  RR: 18 (2022 23:05) (18 - 18)  SpO2: 100% (2022 23:05) (96% - 100%)    Parameters below as of 2022 23:05  Patient On (Oxygen Delivery Method): room air    PE:  Constitutional: frail looking  HEENT: NC/AT, EOMI, PERRLA, conjunctivae clear; ears and nose atraumatic; pharynx benign  Neck: supple; thyroid not palpable  Back: no tenderness  Respiratory: respiratory effort normal; clear to auscultation  Cardiovascular: S1S2 regular, no murmurs  Abdomen: soft, not tender, not distended, positive BS; liver and spleen WNL  Genitourinary: no suprapubic tenderness + garcia  Lymphatic: no LN palpable  Musculoskeletal: no muscle tenderness, no joint swelling or tenderness  Extremities: no pedal edema  Neurological/ Psychiatric:   moving all extremities  Skin: no rashes; no palpable lesions + sacral decub ulcer     Labs: all available labs reviewed                                              9.1    9.28  )-----------( 338      ( 01 Dec 2022 08:54 )             28.8         137  |  105  |  33<H>  ----------------------------<  159<H>  4.2   |  25  |  1.46<H>    Ca    8.9      01 Dec 2022 08:54  Phos  2.1         TPro  x   /  Alb  1.9<L>  /  TBili  x   /  DBili  x   /  AST  x   /  ALT  x   /  AlkPhos  x              UA  Color: Yellow / Appearance: Clear / S.010 / pH: x  Gluc: x / Ketone: Trace  / Bili: Negative / Urobili: Negative   Blood: x / Protein: 100 / Nitrite: Positive   Leuk Esterase: Moderate / RBC: 25-50 /HPF / WBC >50   Sq Epi: x / Non Sq Epi: Few / Bacteria: TNTC    Culture - Urine (22 @ 17:19)   Specimen Source: Clean Catch None   Culture Results:   >=3 organisms. Probable collection contamination.   Culture in progress   Culture - Blood (22 @ 16:42)   Specimen Source: .Blood None   Culture Results:   No growth to date.   Culture - Blood (22 @ 14:58)   Specimen Source: .Blood Blood-Peripheral   Culture Results:   No growth to date.     Radiology: all available radiological tests reviewed    ACC: [id number] EXAM:  XR CHEST PORTABLE IMMED 1V                          PROCEDURE DATE:  2022          INTERPRETATION:  AP chest erect on 2022 at 4:11 PM.    Patient has sepsis.    Heart normal.    Left breast prosthesis noted.    Lungs remain clear.    There is a right PICC line and 2021 which is no longer   evident.    IMPRESSION: Clear lungs.    --- End of Report ---      < end of copied text >    Advanced directives addressed: full resuscitation

## 2022-12-01 NOTE — PROGRESS NOTE ADULT - ASSESSMENT
73 y/o F PMHx significant for Dementia (bed bound), Hypertension, Hyperlipidemia, Diabetes mellitus type 2, Major Depressive Disorder, Hypothyroidism, chronic diarrhea, sacral decubitus ulcer, recurrent MDRO UTIs, hx of Iron Deficiency Anemia, and Hyponatremia, DNR, DNI presents was BIBA from home, accompanied by her son, for further evaluation and management of nausea, and vomiting (coffee ground emesis) associated with increased lethargy.  Of note the patient is on daily ASA as well. In the ED the patient CCM was consulted as the patient was noted to be hypotensive. Labs => WBC 25.71, Hgb/Hct 8.0/25.0, MCV/MCH 76.2/24.4, , TnI 88.76 -> 142.68, LA 6.1 -> 3.3, Na 129, BUN/Cr 90/1.90, Glu 390, Ca 11.1, Alb 2.4, (+)UA, COVID-19 (+). In the ED the patient received Ceftriaxone 1g IVPB x 1, Pantoprazole 40mg IVP x 1, then started on a Pantoprazole gtt, NS x 2L, and pRBCs x 2    1. Sepsis. Hypotension. UTI. hx Recurrent MDR UTI. Covid-19 viral syndrome. Sacral decub ulcer. GI bleed.   - slowly improving, HD stable   - urine cx contaminated, blood cx no growth   - on IV merrem [id number] #[id number], complete 7 days and stop  - xray clear, resp status stable, does not require remdesivir, decadron   - monitor temps  - tolerating abx well so far; no side effects noted  - reason for abx use and side effects reviewed with patient  - gi eval noted   - supportive care  - fu cbc  - isolation precautions    2. other issues - care per medicine

## 2022-12-01 NOTE — PROGRESS NOTE ADULT - SUBJECTIVE AND OBJECTIVE BOX
CC: Nausea, Vomiting Blood (26 Nov 2022 15:02)    HPI:  71 y/o F PMHx significant for Dementia (bed bound), Hypertension, Hyperlipidemia, Diabetes mellitus type 2, Major Depressive Disorder, Hypothyroidism, chronic diarrhea, sacral decubitus ulcer, recurrent MDRO UTIs, hx of Iron Deficiency Anemia, and Hyponatremia, DNR, DNI presents was BIBA from home, accompanied by her son, for further evaluation and management of nausea, and vomiting (coffee ground emesis) associated with increased lethargy. HPI obtained from patient's son, due to the patient's underlying dementia, who states the patient was increasingly lethargic, and has had decreased PO intake over the last several days. Of note the patient is on daily ASA as well. In the ED the patient CCM was consulted as the patient was noted to be hypotensive.   Labs => WBC 25.71, Hgb/Hct 8.0/25.0, MCV/MCH 76.2/24.4, , TnI 88.76 -> 142.68, LA 6.1 -> 3.3, Na 129, BUN/Cr 90/1.90, Glu 390, Ca 11.1, Alb 2.4, (+)UA, COVID-19 (+). In the ED the patient received Ceftriaxone 1g IVPB x 1, Pantoprazole 40mg IVP x 1, then started on a Pantoprazole gtt, NS x 2L, and pRBCs x 2. (25 Nov 2022 21:07)    INTERVAL HPI /OVERNIGHT EVENTS: Pt was seen and examined, pleasantly confused, reports no complains, denies pain, reports had lunch  (meal    11/29/22: Confused, denies any complaints.   11/30/22: No new issues.   12/01/22: Lying ini bed without any distress.       Vital Signs Last 24 Hrs  T(C): 36.9 (30 Nov 2022 23:05), Max: 37.1 (30 Nov 2022 17:30)  T(F): 98.5 (30 Nov 2022 23:05), Max: 98.7 (30 Nov 2022 17:30)  HR: 85 (30 Nov 2022 23:05) (81 - 85)  BP: 121/53 (30 Nov 2022 23:05) (103/55 - 121/53)  BP(mean): --  RR: 18 (30 Nov 2022 23:05) (18 - 18)  SpO2: 100% (30 Nov 2022 23:05) (96% - 100%)    Parameters below as of 30 Nov 2022 23:05  Patient On (Oxygen Delivery Method): room air            PHYSICAL EXAM:  General: Well developed;  malnourished; in no acute distress  Eyes: EOMI; conjunctiva and sclera clear  Head: Normocephalic; atraumatic  ENMT: No nasal discharge; airway clear  Respiratory:  Decreased BS,  No wheezes, rales or rhonchi  Cardiovascular: Regular rate and rhythm. S1 and S2 Normal;   Gastrointestinal: Soft non-tender non-distended; Normal bowel sounds  Genitourinary: No  suprapubic  tenderness, Davila in place   Extremities: No  edema  Vascular: Peripheral pulses palpable 2+ bilaterally  Neurological: Alert and oriented x1, non focal   Skin: Warm and dry. No acute rash  Musculoskeletal: Normal muscle tone, without deformities  Psychiatric: Cooperative         LABS:                                       10.3   9.19  )-----------( 356      ( 29 Nov 2022 08:12 )             32.2     29 Nov 2022 08:12    136    |  105    |  32     ----------------------------<  133    4.0     |  26     |  1.31     Ca    9.3        29 Nov 2022 08:12  Phos  2.3       29 Nov 2022 08:12    TPro  x      /  Alb  2.0    /  TBili  x      /  DBili  x      /  AST  x      /  ALT  x      /  AlkPhos  x      29 Nov 2022 08:12    LIVER FUNCTIONS - ( 29 Nov 2022 08:12 )  Alb: 2.0 g/dL / Pro: x     / ALK PHOS: x     / ALT: x     / AST: x     / GGT: x             CAPILLARY BLOOD GLUCOSE      POCT Blood Glucose.: 135 mg/dL (29 Nov 2022 11:11)  POCT Blood Glucose.: 130 mg/dL (29 Nov 2022 08:30)  POCT Blood Glucose.: 112 mg/dL (28 Nov 2022 22:07)  POCT Blood Glucose.: 108 mg/dL (28 Nov 2022 17:46)        MEDICATIONS  (STANDING):  atorvastatin 20 milliGRAM(s) Oral at bedtime  dextrose 5%. 1000 milliLiter(s) (50 mL/Hr) IV Continuous <Continuous>  dextrose 5%. 1000 milliLiter(s) (100 mL/Hr) IV Continuous <Continuous>  dextrose 50% Injectable 25 Gram(s) IV Push once  dextrose 50% Injectable 12.5 Gram(s) IV Push once  dextrose 50% Injectable 25 Gram(s) IV Push once  glucagon  Injectable 1 milliGRAM(s) IntraMuscular once  heparin   Injectable 5000 Unit(s) SubCutaneous every 8 hours  insulin lispro (ADMELOG) corrective regimen sliding scale   SubCutaneous three times a day before meals  insulin lispro (ADMELOG) corrective regimen sliding scale   SubCutaneous at bedtime  levothyroxine 25 MICROGram(s) Oral daily  meropenem  IVPB 1000 milliGRAM(s) IV Intermittent every 12 hours  pantoprazole  Injectable 40 milliGRAM(s) IV Push two times a day  sertraline 50 milliGRAM(s) Oral daily    MEDICATIONS  (PRN):  albuterol    90 MICROgram(s) HFA Inhaler 2 Puff(s) Inhalation every 4 hours PRN Shortness of Breath and/or Wheezing  dextrose Oral Gel 15 Gram(s) Oral once PRN Blood Glucose LESS THAN 70 milliGRAM(s)/deciliter  guaifenesin/dextromethorphan Oral Liquid 10 milliLiter(s) Oral every 4 hours PRN Cough  haloperidol     Tablet 0.5 milliGRAM(s) Oral every 6 hours PRN agitation  haloperidol    Injectable 0.5 milliGRAM(s) IntraMuscular every 6 hours PRN Agitation  ondansetron Injectable 4 milliGRAM(s) IV Push every 8 hours PRN Nausea and/or Vomiting        RADIOLOGY & ADDITIONAL TESTS:

## 2022-12-01 NOTE — PROGRESS NOTE ADULT - SUBJECTIVE AND OBJECTIVE BOX
Subjective:  seen at bedside this AM, pt is awake and alert.  pleasant, but much less conversive/interative then yesterday  no complaints  no overnight events noted.     Review of Systems:    - CONSTITUTIONAL: Denies fever and chills.   - RESPIRATORY: Denies SOB, cough and/or respiratory secretions   - CV: Denies palpitations and CP.   - GI: Denies abdominal pain, constipation, nausea, vomiting and diarrhea.   - : Denies dysuria and urinary frequency.   - MSK: Denies myalgia and joint pain.   - SKIN: Denies rash and pruritus.   - NEUROLOGICAL: Denies headache and syncope.   - PSYCHIATRIC: Denies anxiety and depression    Unable to obtain/Limited due to: dementia        PHYSICAL EXAM:    Vital Signs Last 24 Hrs  T(C): 36.9 (2022 23:05), Max: 37.1 (2022 17:30)  T(F): 98.5 (:05), Max: 98.7 (2022 17:30)  HR: 85 (:05) (81 - 85)  BP: 121/53 (2022 23:05) (103/55 - 121/53)  BP(mean): --  RR: 18 (:05) (18 - 18)  SpO2: 100% (2022 23:05) (96% - 100%)    Parameters below as of 2022 23:05  Patient On (Oxygen Delivery Method): room air      Daily     Daily Weight in k.4 (01 Dec 2022 04:53)      General:  - GENERAL: Alert and oriented x 1. No acute distress.   - EYES: EOMI. Anicteric.   - HENT: Moist mucous membranes.   - LUNGS: Clear to auscultation bilaterally. No accessory muscle use. Speaking in complete sentences.   - CARDIOVASCULAR: Regular rate and rhythm. No murmur. No JVD. No edema.   - ABDOMEN: Soft, non-tender and non-distended. No palpable masses. Normal bowel sounds   - EXTREMITIES: No edema. Non-tender.    - SKIN: Warm, no rashes or lesions on visible skin.   - NEUROLOGIC: No focal neurological deficits.    - PSYCHIATRIC: Cooperative (less so then previous days)      LABS:                        9.1    9.28  )-----------( 338      ( 01 Dec 2022 08:54 )             28.8         137  |  105  |  33<H>  ----------------------------<  159<H>  4.2   |  25  |  1.46<H>    Ca    8.9      01 Dec 2022 08:54  Phos  2.1         TPro  x   /  Alb  1.9<L>  /  TBili  x   /  DBili  x   /  AST  x   /  ALT  x   /  AlkPhos  x         Albumin: Albumin, Serum: 1.9 g/dL ( @ 08:54)      Allergies    Hytrin (Unknown)    Intolerances      MEDICATIONS  (STANDING):  atorvastatin 20 milliGRAM(s) Oral at bedtime  dextrose 5%. 1000 milliLiter(s) (50 mL/Hr) IV Continuous <Continuous>  dextrose 5%. 1000 milliLiter(s) (100 mL/Hr) IV Continuous <Continuous>  dextrose 50% Injectable 25 Gram(s) IV Push once  dextrose 50% Injectable 12.5 Gram(s) IV Push once  dextrose 50% Injectable 25 Gram(s) IV Push once  glucagon  Injectable 1 milliGRAM(s) IntraMuscular once  heparin   Injectable 5000 Unit(s) SubCutaneous every 8 hours  insulin lispro (ADMELOG) corrective regimen sliding scale   SubCutaneous three times a day before meals  insulin lispro (ADMELOG) corrective regimen sliding scale   SubCutaneous at bedtime  lactobacillus acidophilus 1 Tablet(s) Oral daily  levothyroxine 25 MICROGram(s) Oral daily  meropenem  IVPB 1000 milliGRAM(s) IV Intermittent every 12 hours  pantoprazole    Tablet 40 milliGRAM(s) Oral before breakfast  sertraline 50 milliGRAM(s) Oral daily    MEDICATIONS  (PRN):  albuterol    90 MICROgram(s) HFA Inhaler 2 Puff(s) Inhalation every 4 hours PRN Shortness of Breath and/or Wheezing  dextrose Oral Gel 15 Gram(s) Oral once PRN Blood Glucose LESS THAN 70 milliGRAM(s)/deciliter  guaifenesin/dextromethorphan Oral Liquid 10 milliLiter(s) Oral every 4 hours PRN Cough  haloperidol     Tablet 0.5 milliGRAM(s) Oral every 6 hours PRN agitation  haloperidol    Injectable 0.5 milliGRAM(s) IntraMuscular every 6 hours PRN Agitation  ondansetron Injectable 4 milliGRAM(s) IV Push every 8 hours PRN Nausea and/or Vomiting      RADIOLOGY:

## 2022-12-01 NOTE — PROGRESS NOTE ADULT - ASSESSMENT
71 yo female w hx of Dementia, HTN, HLD, DM, and MDD, presenting with poor po intake, anemia requiring PRBC and DANIEL on CKD      DANIEL on CKD Cr baseline 1.2 - 1.5   sec to ATN w hypotension but Cr slightly improved   hypotension in setting of volume depletion and possible UTI    monitor for recovery    keep BP > 110    trend WBC   no ace or arb    UTI - IV abx   renal uss - mild right hydro, bladder decompressed Garcia     - Urology followup        11/28 SY  --DANIEL/CKD : improved and stable.  d/c IVF for now and monitor on po intake.  --Follow up calcium level.  --BP stable post volume repletion.  --UTI : continue Meropenem.  -- : monitor with Garcia.    11/30 SY  --DANIEL/CKD : improved and stable.  follow up repeat creat and Calcium level.  --Fluid status stable.  -- : continue garcia drainage.  --UTI :continue Meropenem.  --GOC with Palliative medicine team.    12/1  Creat stable/ variation due to hydration status  Pt for home hospice  Labs may be dcd, will sign off  Please reconsult as needed

## 2022-12-01 NOTE — PROGRESS NOTE ADULT - ASSESSMENT
73 y/o F PMHx significant for Dementia (bed bound), Hypertension, Hyperlipidemia, Diabetes mellitus type 2, Major Depressive Disorder, Hypothyroidism, chronic diarrhea, sacral decubitus ulcer, recurrent MDRO UTIs, hx of Iron Deficiency Anemia, and Hyponatremia admitted for:     # Acute blood loss anemia suspected upper GI Bleeding  - S/p 2 U PRBCs   - stable HH, monitor   - s/p Pantoprazole gtt, change to po  - tolerates po diet  - GI following, conservative management     # Sepsis due to recurrent UTI/ COVID  19   # Lactic acidosis resolved  - Elevated WBC, trending down   - Lactic acidosis resolved after PRBCs and IVF   - UCX: > 100K GNR  - BCX neg   - cont. Meropenem 1g IVPB q12h as per ID  - ID recs appreciated     # Elevated Cardiac Enzymes,  likely Type II MI  in settings of Sepsis/hypovolemia/Anemia    - trend troponin  - No Chest pain  - Conservative management as per family   - not on AntiPlts in settings of acute GI bleed, will further D/w GI in am   - Statins   - ECHO: preserved EF, no RWMA, mild valvular Dz, mild PHTN     #Acute Renal Failure  - likely ATN due to hypoperfusion in settings of sepsis and hypovolemia   - d/c  IVF, oral hydration   - c/w Davila monitor s and os   - labs in am   -  Nephrology recs appreciated     #  Acute hypoxic respiratory failure  likely 2/2 Acute Sepsis   now respiratory status improved, on 4l NC,  titrate down   as tolerated  CXR on admission clear lungs, will repeat   BNP elevated  ECHO: preserved EF, diastolic dysFx, IVC normal   Less likely 2/2 COVID as not symptomatic no upper respiratory symptoms   IVF d/c, monitor, titrate off O2          # Acute COVID-19  - maintain on airborne isolation  - continue with O2 as needed via nasal cannula and titrate down  as tolerated    - cont. anti-pyretics w/ Acetaminophen 650 mg PO q4h PRN fever. Limit use of NSAIDs.  - cont. HFA albuterol Q6 hour PRN via MDI.   - will hold off on dexa  - No anti viral due to renal failure   - ID recs appreciated       #Diabetes mellitus type 2  - FS q6h while NPO  - cont. ISS per protocol    # Goals of Care  - DNR/DNI  Palliative eval     # Vte ppx      Possible home on home hospice tomorrow

## 2022-12-01 NOTE — PROGRESS NOTE ADULT - ASSESSMENT
Assessment:     71 y/o female with dementia admitted for UGIB.  family is electing conservative management    Process of Care  --Reviewed dx/treatment problems and alignment with Goals of Care    Physical Aspects of Care  --Pain - Patient denies at this time. c/w current management  --Bowel Regimen - Risk for constipation d/t immobility. Suggest daily dulcolax as needed  --Dyspnea - No SOB at this time. Comfortable and in NAD  --Nausea Vomiting - denies  --Debility/Weakness/Sacral Decubitus - fall precautions, turn/position per protocol.   --Dementia - supportive measures.  Haldol PRN.  please use sparingly  verbal redirection/reorientation before medications if confused   --Severe PCM - PO supplements per primary.  sertraline can be appetite stimulating.  encourage PO  --Depression - sertraline  --UGIB - supportive measures, no intervention.  on protonix.     Psychological and Psychiatric Aspects of Care:   --Grief/Bereavement: emotional support provided  --Hx of psychiatric dx: depression/dementia  -Pastoral Care Available PRN     Social Aspects of Care  -SW involved     Cultural Aspects  -Primary Language: English    Goals of Care:  DNR/DNI, no feeding tube.  trial of ABx.  return to hospital is OK.  family would like home hospice services.     Prognosis: Death can occur at anytime, but if disease continues to progress naturally patient likely has <6 months.  She has Dementia FAST 6C wiht poor PPS (30%) and Severe Protein Calorie Malnutrition.     Ethical and Legal Aspects: NA  Capacity- pt does not have capacity    HCP/Surrogate:  Jose David Mendoza Jr -- [phone]    Code Status- DNR/DNI  MOLST- in chart  Dispo Plan- home hospice    I see no role for further laboratory testing.  d/c planning to home hospice as per CM/SW

## 2022-12-01 NOTE — PROGRESS NOTE ADULT - SUBJECTIVE AND OBJECTIVE BOX
NEPHROLOGY INTERVAL HPI/OVERNIGHT EVENTS:    Date of Service: 11-30-22 @ 17:58  12/1- in bed, confusion, states she does not drink water but as per nursing she drinks water, ensure and all her other fluids  11/30--comfortable. No distress.  unclear po intake.  11/28--Alert and responsive. Oriented to person.  No SOB.    HPI :   71 y/o F PMHx significant for Dementia (bed bound), Hypertension, Hyperlipidemia, Diabetes mellitus type 2, Major Depressive Disorder, Hypothyroidism, chronic diarrhea, sacral decubitus ulcer, recurrent MDRO UTIs, hx of Iron Deficiency Anemia, and Hyponatremia, DNR, DNI presents was BIBA from home, accompanied by her son, for further evaluation and management of nausea, and vomiting (coffee ground emesis) associated with increased lethargy. HPI obtained from chart , due to the patient's underlying dementia, who states the patient was increasingly lethargic, and has had decreased PO intake over the last several days. Of note the patient is on daily ASA as well. In the ED the patient CCM was consulted as the patient was noted to be hypotensive.   Renal eval called for   Na 129, BUN/Cr 90/1.90, Ca 11.1, Alb 2.4,   (+)UA, COVID-19 (+). In the ED the patient received Ceftriaxone 1g IVPB x 1, Pantoprazole 40mg IVP x 1, then started on a Pantoprazole gtt, NS x 2L, and pRBCs x 2.  Seen by Dr Shelton in past for DANIEL - Cr 1.3 -0 1.5 in past    today    pt is confused but alert      PAST MEDICAL & SURGICAL HISTORY:  Intraoperative hemorrhage and hematoma of a genitourinary system organ or structure complicating other procedure  Type 2 diabetes mellitus without complication  Essential (primary) hypertension  Syncope and collapse  Acute kidney failure, unspecified  MDD (major depressive disorder)  Hypothyroid    MEDICATIONS  (STANDING):  atorvastatin 20 milliGRAM(s) Oral at bedtime  dextrose 5%. 1000 milliLiter(s) (50 mL/Hr) IV Continuous <Continuous>  dextrose 5%. 1000 milliLiter(s) (100 mL/Hr) IV Continuous <Continuous>  dextrose 50% Injectable 25 Gram(s) IV Push once  dextrose 50% Injectable 12.5 Gram(s) IV Push once  dextrose 50% Injectable 25 Gram(s) IV Push once  glucagon  Injectable 1 milliGRAM(s) IntraMuscular once  heparin   Injectable 5000 Unit(s) SubCutaneous every 8 hours  insulin lispro (ADMELOG) corrective regimen sliding scale   SubCutaneous three times a day before meals  insulin lispro (ADMELOG) corrective regimen sliding scale   SubCutaneous at bedtime  lactobacillus acidophilus 1 Tablet(s) Oral daily  levothyroxine 25 MICROGram(s) Oral daily  meropenem  IVPB 1000 milliGRAM(s) IV Intermittent every 12 hours  sertraline 50 milliGRAM(s) Oral daily    MEDICATIONS  (PRN):  albuterol    90 MICROgram(s) HFA Inhaler 2 Puff(s) Inhalation every 4 hours PRN Shortness of Breath and/or Wheezing  dextrose Oral Gel 15 Gram(s) Oral once PRN Blood Glucose LESS THAN 70 milliGRAM(s)/deciliter  guaifenesin/dextromethorphan Oral Liquid 10 milliLiter(s) Oral every 4 hours PRN Cough  haloperidol     Tablet 0.5 milliGRAM(s) Oral every 6 hours PRN agitation  haloperidol    Injectable 0.5 milliGRAM(s) IntraMuscular every 6 hours PRN Agitation  ondansetron Injectable 4 milliGRAM(s) IV Push every 8 hours PRN Nausea and/or Vomiting    Vital Signs Last 24 Hrs  T(C): 37.2 (01 Dec 2022 15:30), Max: 37.2 (01 Dec 2022 15:30)  T(F): 98.9 (01 Dec 2022 15:30), Max: 98.9 (01 Dec 2022 15:30)  HR: 70 (01 Dec 2022 15:30) (70 - 85)  BP: 124/60 (01 Dec 2022 15:30) (121/53 - 124/60)  BP(mean): --  RR: 17 (01 Dec 2022 15:30) (17 - 18)  SpO2: 98% (01 Dec 2022 15:30) (98% - 100%)    Parameters below as of 01 Dec 2022 15:30  Patient On (Oxygen Delivery Method): room air      PHYSICAL EXAM:  GENERAL: no distress  CHEST/LUNG: Fair air entry  HEART: S1S2 RRR  ABDOMEN: soft  EXTREMITIES: no edema  SKIN:     LABS: no labs today.                          9.1    9.28  )-----------( 338      ( 01 Dec 2022 08:54 )             28.8                           10.3   9.19  )-----------( 356      ( 29 Nov 2022 08:12 )             32.2       137    |  105    |  33     ----------------------------<  159       01 Dec 2022 08:54  4.2     |  25     |  1.46     136    |  105    |  32     ----------------------------<  133       29 Nov 2022 08:12  4.0     |  26     |  1.31     137    |  103    |  33     ----------------------------<  196       28 Nov 2022 09:14  3.8     |  28     |  1.21     Ca    8.9        01 Dec 2022 08:54  Ca    9.3        29 Nov 2022 08:12  Ca    8.7        28 Nov 2022 09:14    Phos  2.1       01 Dec 2022 08:54  Phos  2.3       29 Nov 2022 08:12          11-29    136  |  105  |  32<H>  ----------------------------<  133<H>  4.0   |  26  |  1.31<H>    Ca    9.3      29 Nov 2022 08:12  Phos  2.3     11-29    TPro  x   /  Alb  2.0<L>  /  TBili  x   /  DBili  x   /  AST  x   /  ALT  x   /  AlkPhos  x   11-29                RADIOLOGY & ADDITIONAL TESTS:

## 2022-12-02 ENCOUNTER — TRANSCRIPTION ENCOUNTER (OUTPATIENT)
Age: 72
End: 2022-12-02

## 2022-12-02 PROCEDURE — 99231 SBSQ HOSP IP/OBS SF/LOW 25: CPT

## 2022-12-02 RX ORDER — FERROUS SULFATE 325(65) MG
1 TABLET ORAL
Qty: 0 | Refills: 0 | DISCHARGE

## 2022-12-02 RX ORDER — HYDRALAZINE HCL 50 MG
1 TABLET ORAL
Qty: 0 | Refills: 0 | DISCHARGE

## 2022-12-02 RX ORDER — GLIMEPIRIDE 1 MG
1 TABLET ORAL
Qty: 0 | Refills: 0 | DISCHARGE

## 2022-12-02 RX ORDER — ASCORBIC ACID 60 MG
1 TABLET,CHEWABLE ORAL
Qty: 0 | Refills: 0 | DISCHARGE

## 2022-12-02 RX ORDER — MULTIVIT-MIN/FERROUS GLUCONATE 9 MG/15 ML
1 LIQUID (ML) ORAL
Qty: 0 | Refills: 0 | DISCHARGE

## 2022-12-02 RX ORDER — MECLIZINE HCL 12.5 MG
1 TABLET ORAL
Qty: 0 | Refills: 0 | DISCHARGE

## 2022-12-02 RX ORDER — ASPIRIN/CALCIUM CARB/MAGNESIUM 324 MG
1 TABLET ORAL
Qty: 0 | Refills: 0 | DISCHARGE

## 2022-12-02 RX ADMIN — PANTOPRAZOLE SODIUM 40 MILLIGRAM(S): 20 TABLET, DELAYED RELEASE ORAL at 06:00

## 2022-12-02 RX ADMIN — Medication 1: at 08:00

## 2022-12-02 RX ADMIN — Medication 1 TABLET(S): at 10:45

## 2022-12-02 RX ADMIN — ATORVASTATIN CALCIUM 20 MILLIGRAM(S): 80 TABLET, FILM COATED ORAL at 22:54

## 2022-12-02 RX ADMIN — HEPARIN SODIUM 5000 UNIT(S): 5000 INJECTION INTRAVENOUS; SUBCUTANEOUS at 17:21

## 2022-12-02 RX ADMIN — HEPARIN SODIUM 5000 UNIT(S): 5000 INJECTION INTRAVENOUS; SUBCUTANEOUS at 06:00

## 2022-12-02 RX ADMIN — Medication 1: at 11:25

## 2022-12-02 RX ADMIN — MEROPENEM 100 MILLIGRAM(S): 1 INJECTION INTRAVENOUS at 10:46

## 2022-12-02 RX ADMIN — MEROPENEM 100 MILLIGRAM(S): 1 INJECTION INTRAVENOUS at 22:55

## 2022-12-02 RX ADMIN — SERTRALINE 50 MILLIGRAM(S): 25 TABLET, FILM COATED ORAL at 10:45

## 2022-12-02 RX ADMIN — Medication 25 MICROGRAM(S): at 06:00

## 2022-12-02 RX ADMIN — HEPARIN SODIUM 5000 UNIT(S): 5000 INJECTION INTRAVENOUS; SUBCUTANEOUS at 22:54

## 2022-12-02 NOTE — PROGRESS NOTE ADULT - ASSESSMENT
Assessment:     71 y/o female with dementia admitted for UGIB.  family is electing conservative management    Process of Care  --Reviewed dx/treatment problems and alignment with Goals of Care    Physical Aspects of Care  --Pain - Patient denies at this time. c/w current management  --Bowel Regimen - Risk for constipation d/t immobility. Suggest daily dulcolax as needed  --Dyspnea - No SOB at this time. Comfortable and in NAD  --Nausea Vomiting - denies  --Debility/Weakness/Sacral Decubitus - fall precautions, turn/position per protocol.   --Dementia - supportive measures.  Haldol PRN.  please use sparingly  verbal redirection/reorientation before medications if confused   --Severe PCM - PO supplements per primary.  sertraline can be appetite stimulating.  encourage PO  --Depression - sertraline  --UGIB - supportive measures, no intervention.  on protonix.     Psychological and Psychiatric Aspects of Care:   --Grief/Bereavement: emotional support provided  --Hx of psychiatric dx: depression/dementia  -Pastoral Care Available PRN     Social Aspects of Care  -SW involved     Cultural Aspects  -Primary Language: English    Goals of Care:  DNR/DNI, no feeding tube.  trial of ABx.  return to hospital is OK.  family would like home hospice services.     Prognosis: Death can occur at anytime, but if disease continues to progress naturally patient likely has <6 months.  She has Dementia FAST 6C wiht poor PPS (30%) and Severe Protein Calorie Malnutrition.     Ethical and Legal Aspects: NA  Capacity- pt does not have capacity    HCP/Surrogate:  Jose David Mendoza Jr -- [phone]    Code Status- DNR/DNI  MOLST- in chart  Dispo Plan- home hospice    I see no role for further laboratory testing.  d/c planning to home hospice as per CM/SW

## 2022-12-02 NOTE — DISCHARGE NOTE PROVIDER - NSDCCPCAREPLAN_GEN_ALL_CORE_FT
PRINCIPAL DISCHARGE DIAGNOSIS  Diagnosis: Upper GI bleeding  Assessment and Plan of Treatment: Follow up with PMD      SECONDARY DISCHARGE DIAGNOSES  Diagnosis: Non-ST elevation MI (NSTEMI)  Assessment and Plan of Treatment:     Diagnosis: Acute UTI  Assessment and Plan of Treatment:

## 2022-12-02 NOTE — DISCHARGE NOTE PROVIDER - HOSPITAL COURSE
71 y/o F PMHx significant for Dementia (bed bound), Hypertension, Hyperlipidemia, Diabetes mellitus type 2, Major Depressive Disorder, Hypothyroidism, chronic diarrhea, sacral decubitus ulcer, recurrent MDRO UTIs, hx of Iron Deficiency Anemia, and Hyponatremia, DNR, DNI presents was BIBA from home, accompanied by her son, for further evaluation and management of nausea, and vomiting (coffee ground emesis) associated with increased lethargy. HPI obtained from patient's son, due to the patient's underlying dementia, who states the patient was increasingly lethargic, and has had decreased PO intake over the last several days. Of note the patient is on daily ASA as well. In the ED the patient CCM was consulted as the patient was noted to be hypotensive.   Labs => WBC 25.71, Hgb/Hct 8.0/25.0, MCV/MCH 76.2/24.4, , TnI 88.76 -> 142.68, LA 6.1 -> 3.3, Na 129, BUN/Cr 90/1.90, Glu 390, Ca 11.1, Alb 2.4, (+)UA, COVID-19 (+). In the ED the patient received Ceftriaxone 1g IVPB x 1, Pantoprazole 40mg IVP x 1, then started on a Pantoprazole gtt, NS x 2L, and pRBCs x 2. (25 Nov 2022 21:07)        PHYSICAL EXAM:  General: Well developed;  malnourished; in no acute distress  Eyes: EOMI; conjunctiva and sclera clear  Head: Normocephalic; atraumatic  ENMT: No nasal discharge; airway clear  Respiratory:  Decreased BS,  No wheezes, rales or rhonchi  Cardiovascular: Regular rate and rhythm. S1 and S2 Normal;   Gastrointestinal: Soft non-tender non-distended; Normal bowel sounds  Genitourinary: No  suprapubic  tenderness, Davila in place   Extremities: No  edema  Vascular: Peripheral pulses palpable 2+ bilaterally  Neurological: Alert and oriented x1, non focal         Assessment and Plan:   · Assessment    71 y/o F PMHx significant for Dementia (bed bound), Hypertension, Hyperlipidemia, Diabetes mellitus type 2, Major Depressive Disorder, Hypothyroidism, chronic diarrhea, sacral decubitus ulcer, recurrent MDRO UTIs, hx of Iron Deficiency Anemia, and Hyponatremia admitted for:     # Acute blood loss anemia suspected upper GI Bleeding  - S/p 2 U PRBCs   - tolerates po diet  - GI following, conservative management     # Sepsis due to recurrent UTI/ COVID  19   # Lactic acidosis resolved  - Elevated WBC, trending down   - Lactic acidosis resolved after PRBCs and IVF   - UCX: > 100K GNR  - BCX neg   - Completed Meropenem 1g IVPB q12h as per ID  - ID recs appreciated     # Elevated Cardiac Enzymes,  likely Type II MI  in settings of Sepsis/hypovolemia/Anemia    - trend troponin  - No Chest pain  - Conservative management as per family   - ECHO: preserved EF, no RWMA, mild valvular Dz, mild PHTN     #Acute Renal Failure  - likely ATN due to hypoperfusion in settings of sepsis and hypovolemia   - c/w Davila monitor s and os     #  Acute hypoxic respiratory failure  likely 2/2 Acute Sepsis   now respiratory status improved, on 4l NC,  titrate down   as tolerated    # Acute COVID-19  Stable  #Diabetes mellitus type 2  - FS q6h while NPO  - cont. ISS per protocol    # Goals of Care  - DNR/DNI      Home today spent more than 30 min to prepare the discharge        Nutritional Assessment:  · Nutritional Assessment  This patient has been assessed with a concern for Malnutrition and has been determined to have a diagnosis/diagnoses of Severe protein-calorie malnutrition.    This patient is being managed with:   Diet Regular-  Entered: Nov 26 2022 12:12PM

## 2022-12-02 NOTE — PROGRESS NOTE ADULT - SUBJECTIVE AND OBJECTIVE BOX
Subjective:  seen at bedside this AM, pt is awake and alert.  Remains less responsive/interactive.   no complaints offered  no overnight events noted on chart review.   pending d/c to home hospice    Review of Systems:    - CONSTITUTIONAL: Denies fever and chills.   - RESPIRATORY: Denies SOB, cough and/or respiratory secretions   - CV: Denies palpitations and CP.   - GI: Denies abdominal pain, constipation, nausea, vomiting and diarrhea.   - : Denies dysuria and urinary frequency.   - MSK: Denies myalgia and joint pain.   - SKIN: Denies rash and pruritus.   - NEUROLOGICAL: Denies headache and syncope.   - PSYCHIATRIC: Denies anxiety and depression    Unable to obtain/Limited due to: dementia        PHYSICAL EXAM:    Vital Signs Last 24 Hrs  T(C): 36.6 (02 Dec 2022 09:39), Max: 37.2 (01 Dec 2022 15:30)  T(F): 97.9 (02 Dec 2022 09:39), Max: 98.9 (01 Dec 2022 15:30)  HR: 75 (02 Dec 2022 09:39) (70 - 75)  BP: 151/50 (02 Dec 2022 09:39) (124/60 - 151/50)  BP(mean): --  ABP: --  ABP(mean): --  RR: 17 (02 Dec 2022 09:39) (17 - 17)  SpO2: 97% (02 Dec 2022 09:39) (97% - 98%)    O2 Parameters below as of 02 Dec 2022 09:39  Patient On (Oxygen Delivery Method): room air      Daily     Daily Weight in k.4 (01 Dec 2022 04:53)      General:  - GENERAL: Alert and oriented x 1. No acute distress.   - EYES: EOMI. Anicteric.   - HENT: Moist mucous membranes.   - LUNGS: Clear to auscultation bilaterally. No accessory muscle use. Speaking in complete sentences.   - CARDIOVASCULAR: Regular rate and rhythm. No murmur. No JVD. No edema.   - ABDOMEN: Soft, non-tender and non-distended. No palpable masses. Normal bowel sounds   - EXTREMITIES: No edema. Non-tender.    - SKIN: Warm, no rashes or lesions on visible skin.   - NEUROLOGIC: No focal neurological deficits.    - PSYCHIATRIC: Cooperative (less so then previous days)      LABS:                        9.1    9.28  )-----------( 338      ( 01 Dec 2022 08:54 )             28.8         137  |  105  |  33<H>  ----------------------------<  159<H>  4.2   |  25  |  1.46<H>    Ca    8.9      01 Dec 2022 08:54  Phos  2.1         TPro  x   /  Alb  1.9<L>  /  TBili  x   /  DBili  x   /  AST  x   /  ALT  x   /  AlkPhos  x         Albumin: Albumin, Serum: 1.9 g/dL ( @ 08:54)      Allergies    Hytrin (Unknown)    Intolerances      MEDICATIONS  (STANDING):  atorvastatin 20 milliGRAM(s) Oral at bedtime  dextrose 5%. 1000 milliLiter(s) (50 mL/Hr) IV Continuous <Continuous>  dextrose 5%. 1000 milliLiter(s) (100 mL/Hr) IV Continuous <Continuous>  dextrose 50% Injectable 25 Gram(s) IV Push once  dextrose 50% Injectable 12.5 Gram(s) IV Push once  dextrose 50% Injectable 25 Gram(s) IV Push once  glucagon  Injectable 1 milliGRAM(s) IntraMuscular once  heparin   Injectable 5000 Unit(s) SubCutaneous every 8 hours  insulin lispro (ADMELOG) corrective regimen sliding scale   SubCutaneous three times a day before meals  insulin lispro (ADMELOG) corrective regimen sliding scale   SubCutaneous at bedtime  lactobacillus acidophilus 1 Tablet(s) Oral daily  levothyroxine 25 MICROGram(s) Oral daily  meropenem  IVPB 1000 milliGRAM(s) IV Intermittent every 12 hours  pantoprazole    Tablet 40 milliGRAM(s) Oral before breakfast  sertraline 50 milliGRAM(s) Oral daily    MEDICATIONS  (PRN):  albuterol    90 MICROgram(s) HFA Inhaler 2 Puff(s) Inhalation every 4 hours PRN Shortness of Breath and/or Wheezing  dextrose Oral Gel 15 Gram(s) Oral once PRN Blood Glucose LESS THAN 70 milliGRAM(s)/deciliter  guaifenesin/dextromethorphan Oral Liquid 10 milliLiter(s) Oral every 4 hours PRN Cough  haloperidol     Tablet 0.5 milliGRAM(s) Oral every 6 hours PRN agitation  haloperidol    Injectable 0.5 milliGRAM(s) IntraMuscular every 6 hours PRN Agitation  ondansetron Injectable 4 milliGRAM(s) IV Push every 8 hours PRN Nausea and/or Vomiting      RADIOLOGY:

## 2022-12-02 NOTE — DISCHARGE NOTE PROVIDER - NSDCMRMEDTOKEN_GEN_ALL_CORE_FT
amLODIPine 5 mg oral tablet: 1 tab(s) orally once a day  atorvastatin 20 mg oral tablet: 1 tab(s) orally once a day  levothyroxine 25 mcg (0.025 mg) oral tablet: 1 tab(s) orally once a day  PriLOSEC 40 mg oral delayed release capsule: 1 cap(s) orally once a day  sertraline 50 mg oral tablet: 1 tab(s) orally once a day  Tylenol 500 mg oral tablet: 2 tab(s) orally every 6 hours, As Needed

## 2022-12-02 NOTE — DISCHARGE NOTE NURSING/CASE MANAGEMENT/SOCIAL WORK - PATIENT PORTAL LINK FT
You can access the FollowMyHealth Patient Portal offered by [facility] by registering at the following website: [email or web address]. By joining [facility]’s FollowMyHealth portal, you will also be able to view your health information using other applications (apps) compatible with our system.

## 2022-12-02 NOTE — DISCHARGE NOTE NURSING/CASE MANAGEMENT/SOCIAL WORK - NSDCPEFALRISK_GEN_ALL_CORE
For information on Fall & Injury Prevention, visit: [email or web address] OR  [email or web address] OR  https://www.cdc.gov/steadi/patient.html

## 2022-12-03 PROCEDURE — 99239 HOSP IP/OBS DSCHRG MGMT >30: CPT

## 2022-12-03 RX ADMIN — PANTOPRAZOLE SODIUM 40 MILLIGRAM(S): 20 TABLET, DELAYED RELEASE ORAL at 06:23

## 2022-12-03 RX ADMIN — HEPARIN SODIUM 5000 UNIT(S): 5000 INJECTION INTRAVENOUS; SUBCUTANEOUS at 21:43

## 2022-12-03 RX ADMIN — HEPARIN SODIUM 5000 UNIT(S): 5000 INJECTION INTRAVENOUS; SUBCUTANEOUS at 06:26

## 2022-12-03 RX ADMIN — Medication 1 TABLET(S): at 09:12

## 2022-12-03 RX ADMIN — SERTRALINE 50 MILLIGRAM(S): 25 TABLET, FILM COATED ORAL at 09:12

## 2022-12-03 RX ADMIN — Medication 25 MICROGRAM(S): at 06:38

## 2022-12-03 RX ADMIN — HEPARIN SODIUM 5000 UNIT(S): 5000 INJECTION INTRAVENOUS; SUBCUTANEOUS at 14:54

## 2022-12-03 RX ADMIN — ATORVASTATIN CALCIUM 20 MILLIGRAM(S): 80 TABLET, FILM COATED ORAL at 21:39

## 2022-12-03 RX ADMIN — Medication 2: at 12:07

## 2022-12-03 RX ADMIN — Medication 2: at 17:26

## 2022-12-03 NOTE — PROGRESS NOTE ADULT - SUBJECTIVE AND OBJECTIVE BOX
CC: nausea, and vomiting  HPI:  71 y/o F PMHx significant for Dementia (bed bound), Hypertension, Hyperlipidemia, Diabetes mellitus type 2, Major Depressive Disorder, Hypothyroidism, chronic diarrhea, sacral decubitus ulcer, recurrent MDRO UTIs, hx of Iron Deficiency Anemia, and Hyponatremia, DNR, DNI presents was BIBA from home, accompanied by her son, for further evaluation and management of nausea, and vomiting (coffee ground emesis) associated with increased lethargy. HPI obtained from patient's son, due to the patient's underlying dementia, who states the patient was increasingly lethargic, and has had decreased PO intake over the last several days. Of note the patient is on daily ASA as well. In the ED the patient CCM was consulted as the patient was noted to be hypotensive.   Labs => WBC 25.71, Hgb/Hct 8.0/25.0, MCV/MCH 76.2/24.4, , TnI 88.76 -> 142.68, LA 6.1 -> 3.3, Na 129, BUN/Cr 90/1.90, Glu 390, Ca 11.1, Alb 2.4, (+)UA, COVID-19 (+). In the ED the patient received Ceftriaxone 1g IVPB x 1, Pantoprazole 40mg IVP x 1, then started on a Pantoprazole gtt, NS x 2L, and pRBCs x 2. (25 Nov 2022 21:07)    s:  12/3: No o/n events.  Lying in bed, comfortable.      REVIEW OF SYSTEMS: All other review of systems is negative unless indicated above.      Vital Signs Last 24 Hrs  T(C): 36.5 (02 Dec 2022 22:42), Max: 37.1 (02 Dec 2022 16:25)  T(F): 97.7 (02 Dec 2022 22:42), Max: 98.8 (02 Dec 2022 16:25)  HR: 67 (02 Dec 2022 22:42) (67 - 72)  BP: 143/43 (02 Dec 2022 22:42) (119/53 - 143/43)  BP(mean): --  RR: 16 (02 Dec 2022 22:42) (16 - 18)  SpO2: 96% (02 Dec 2022 22:42) (96% - 100%)    Parameters below as of 02 Dec 2022 22:42  Patient On (Oxygen Delivery Method): room air        PHYSICAL EXAM:  General: Well developed;  elderly, malnuourished   Eyes: EOMI; conjunctiva and sclera clear  Head: Normocephalic; atraumatic  ENMT: No nasal discharge; airway clear  Respiratory:  Decreased BS,  No wheezes, rales or rhonchi  Cardiovascular: Regular rate and rhythm. S1 and S2 Normal;   Gastrointestinal: Soft non-tender non-distended; Normal bowel sounds  Genitourinary: No  suprapubic  tenderness, Davila in place   Extremities: No  edema  Vascular: Peripheral pulses palpable 2+ bilaterally  Neurological: Alert and oriented x1, non focal         Assessment and Plan:  71 y/o F PMHx significant for Dementia (bed bound), Hypertension, Hyperlipidemia, Diabetes mellitus type 2, Major Depressive Disorder, Hypothyroidism, chronic diarrhea, sacral decubitus ulcer, recurrent MDRO UTIs, hx of Iron Deficiency Anemia, and Hyponatremia admitted for:     # Acute blood loss anemia suspected upper GI Bleeding:  - S/p 2 U PRBCs and IVFs  - H+H stable  -no overt signs or symptoms of bleeding at this time  - tolerates po diet  - GI following, conservative management     # Sepsis due to recurrent UTI/ COVID  19: RESOLVED  - leukocytosis and sepsis and lactic acidosis resolved  - UCX: > 100K GNR  - BCX neg   - Completed Meropenem 1g IVPB q12h as per ID      # Elevated Cardiac Enzymes,  likely Type II MI  in settings of Sepsis/hypovolemia/Anemia    - trend troponin  - No Chest pain  - Conservative management as per family   - ECHO: preserved EF, no RWMA, mild valvular Dz, mild PHTN     #Acute Renal Failure  - likely ATN due to hypoperfusion in settings of sepsis and hypovolemia   - c/w Davila monitor s and os       #  Acute hypoxic respiratory failure  likely 2/2 Acute Sepsis   now respiratory status improved, on 4l NC,  titrate down   as tolerated      Dementia / Severe protein calorie malnutrition:  -supportive care    #Diabetes mellitus type 2  - FS q6h while NPO  - cont. ISS per protocol    # Goals of Care  - DNR/DNI    dispo:  d.c home with home care, aides.

## 2022-12-04 RX ADMIN — HEPARIN SODIUM 5000 UNIT(S): 5000 INJECTION INTRAVENOUS; SUBCUTANEOUS at 06:18

## 2022-12-04 RX ADMIN — SERTRALINE 50 MILLIGRAM(S): 25 TABLET, FILM COATED ORAL at 10:46

## 2022-12-04 RX ADMIN — Medication 25 MICROGRAM(S): at 06:19

## 2022-12-04 RX ADMIN — HEPARIN SODIUM 5000 UNIT(S): 5000 INJECTION INTRAVENOUS; SUBCUTANEOUS at 13:54

## 2022-12-04 RX ADMIN — Medication 1 TABLET(S): at 10:26

## 2022-12-04 RX ADMIN — ATORVASTATIN CALCIUM 20 MILLIGRAM(S): 80 TABLET, FILM COATED ORAL at 22:30

## 2022-12-04 RX ADMIN — Medication 3: at 16:58

## 2022-12-04 RX ADMIN — HEPARIN SODIUM 5000 UNIT(S): 5000 INJECTION INTRAVENOUS; SUBCUTANEOUS at 22:29

## 2022-12-04 RX ADMIN — Medication 1: at 10:26

## 2022-12-04 RX ADMIN — PANTOPRAZOLE SODIUM 40 MILLIGRAM(S): 20 TABLET, DELAYED RELEASE ORAL at 06:19

## 2022-12-04 NOTE — PROGRESS NOTE ADULT - NUTRITIONAL ASSESSMENT
This patient has been assessed with a concern for Malnutrition and has been determined to have a diagnosis/diagnoses of Severe protein-calorie malnutrition.    This patient is being managed with:   Diet Regular-  Entered: Nov 26 2022 12:12PM    

## 2022-12-04 NOTE — PROGRESS NOTE ADULT - SUBJECTIVE AND OBJECTIVE BOX
CC: nausea, and vomiting  HPI:  71 y/o F PMHx significant for Dementia (bed bound), Hypertension, Hyperlipidemia, Diabetes mellitus type 2, Major Depressive Disorder, Hypothyroidism, chronic diarrhea, sacral decubitus ulcer, recurrent MDRO UTIs, hx of Iron Deficiency Anemia, and Hyponatremia, DNR, DNI presents was BIBA from home, accompanied by her son, for further evaluation and management of nausea, and vomiting (coffee ground emesis) associated with increased lethargy. HPI obtained from patient's son, due to the patient's underlying dementia, who states the patient was increasingly lethargic, and has had decreased PO intake over the last several days. Of note the patient is on daily ASA as well. In the ED the patient CCM was consulted as the patient was noted to be hypotensive.   Labs => WBC 25.71, Hgb/Hct 8.0/25.0, MCV/MCH 76.2/24.4, , TnI 88.76 -> 142.68, LA 6.1 -> 3.3, Na 129, BUN/Cr 90/1.90, Glu 390, Ca 11.1, Alb 2.4, (+)UA, COVID-19 (+). In the ED the patient received Ceftriaxone 1g IVPB x 1, Pantoprazole 40mg IVP x 1, then started on a Pantoprazole gtt, NS x 2L, and pRBCs x 2. (25 Nov 2022 21:07)    s:  12/3: No o/n events.  Lying in bed, comfortable.    12/4:  No new events, comfortable. Status quo.    REVIEW OF SYSTEMS: All other review of systems is negative unless indicated above.    Vital Signs Last 24 Hrs  T(C): 36.9 (04 Dec 2022 10:22), Max: 37.2 (03 Dec 2022 17:05)  T(F): 98.5 (04 Dec 2022 10:22), Max: 99 (03 Dec 2022 17:05)  HR: 85 (04 Dec 2022 10:22) (80 - 85)  BP: 135/40 (04 Dec 2022 10:22) (126/80 - 135/40)  BP(mean): --  RR: 18 (04 Dec 2022 10:22) (17 - 18)  SpO2: 97% (04 Dec 2022 10:22) (95% - 98%)    Parameters below as of 04 Dec 2022 10:22  Patient On (Oxygen Delivery Method): room air            PHYSICAL EXAM:  General: Well developed;  elderly, malnourished   Eyes: EOMI; conjunctiva and sclera clear  Head: Normocephalic; atraumatic  ENMT: No nasal discharge; airway clear  Respiratory:  Decreased BS,  No wheezes, rales or rhonchi  Cardiovascular: Regular rate and rhythm. S1 and S2 Normal;   Gastrointestinal: Soft non-tender non-distended; Normal bowel sounds  Genitourinary: No  suprapubic  tenderness, Davila in place   Extremities: No  edema  Vascular: Peripheral pulses palpable 2+ bilaterally  Neurological: Alert and oriented x1, non focal         MEDICATIONS  (STANDING):  atorvastatin 20 milliGRAM(s) Oral at bedtime  dextrose 5%. 1000 milliLiter(s) (100 mL/Hr) IV Continuous <Continuous>  dextrose 5%. 1000 milliLiter(s) (50 mL/Hr) IV Continuous <Continuous>  dextrose 50% Injectable 25 Gram(s) IV Push once  dextrose 50% Injectable 12.5 Gram(s) IV Push once  dextrose 50% Injectable 25 Gram(s) IV Push once  glucagon  Injectable 1 milliGRAM(s) IntraMuscular once  heparin   Injectable 5000 Unit(s) SubCutaneous every 8 hours  insulin lispro (ADMELOG) corrective regimen sliding scale   SubCutaneous at bedtime  insulin lispro (ADMELOG) corrective regimen sliding scale   SubCutaneous three times a day before meals  lactobacillus acidophilus 1 Tablet(s) Oral daily  levothyroxine 25 MICROGram(s) Oral daily  pantoprazole    Tablet 40 milliGRAM(s) Oral before breakfast  sertraline 50 milliGRAM(s) Oral daily    MEDICATIONS  (PRN):  albuterol    90 MICROgram(s) HFA Inhaler 2 Puff(s) Inhalation every 4 hours PRN Shortness of Breath and/or Wheezing  dextrose Oral Gel 15 Gram(s) Oral once PRN Blood Glucose LESS THAN 70 milliGRAM(s)/deciliter  guaifenesin/dextromethorphan Oral Liquid 10 milliLiter(s) Oral every 4 hours PRN Cough  haloperidol     Tablet 0.5 milliGRAM(s) Oral every 6 hours PRN agitation  haloperidol    Injectable 0.5 milliGRAM(s) IntraMuscular every 6 hours PRN Agitation  ondansetron Injectable 4 milliGRAM(s) IV Push every 8 hours PRN Nausea and/or Vomiting            CAPILLARY BLOOD GLUCOSE      POCT Blood Glucose.: 153 mg/dL (04 Dec 2022 10:19)  POCT Blood Glucose.: 103 mg/dL (03 Dec 2022 21:36)  POCT Blood Glucose.: 206 mg/dL (03 Dec 2022 17:25)            Assessment and Plan:  71 y/o F PMHx significant for Dementia (bed bound), Hypertension, Hyperlipidemia, Diabetes mellitus type 2, Major Depressive Disorder, Hypothyroidism, chronic diarrhea, sacral decubitus ulcer, recurrent MDRO UTIs, hx of Iron Deficiency Anemia, and Hyponatremia admitted for:     # Acute blood loss anemia suspected upper GI Bleeding: STABLE  - S/p 2 U PRBCs and IVFs  - H+H stable  -no overt signs or symptoms of bleeding at this time  - tolerates po diet  - GI following, conservative management     # Sepsis due to recurrent UTI/ COVID  19: RESOLVED  - leukocytosis and sepsis and lactic acidosis resolved  - UCX: > 100K GNR  - BCX neg   - Completed Meropenem 1g IVPB q12h as per ID      # Elevated Cardiac Enzymes,  likely Type II MI  in settings of Sepsis/hypovolemia/Anemia    - trend troponin  - No Chest pain  - Conservative management as per family   - ECHO: preserved EF, no RWMA, mild valvular Dz, mild PHTN     #Acute Renal Failure  - likely ATN due to hypoperfusion in settings of sepsis and hypovolemia   - c/w Davila monitor s and os       #  Acute hypoxic respiratory failure  likely 2/2 Acute Sepsis   now respiratory status improved, on 4l NC,  titrate down   as tolerated      Dementia / Severe protein calorie malnutrition:  -supportive care    #Diabetes mellitus type 2  - FS q6h while NPO  - cont. ISS per protocol    # Goals of Care  - DNR/DNI    dispo:  d.c home with home care, aides.                Assessment and Plan:   Nutritional Assessment:  · Nutritional Assessment	This patient has been assessed with a concern for Malnutrition and has been determined to have a diagnosis/diagnoses of Severe protein-calorie malnutrition.    This patient is being managed with:   Diet Regular-  Entered: Nov 26 2022 12:12PM

## 2022-12-05 VITALS
SYSTOLIC BLOOD PRESSURE: 102 MMHG | TEMPERATURE: 98 F | HEART RATE: 71 BPM | OXYGEN SATURATION: 95 % | DIASTOLIC BLOOD PRESSURE: 77 MMHG | RESPIRATION RATE: 18 BRPM

## 2022-12-05 PROCEDURE — 99232 SBSQ HOSP IP/OBS MODERATE 35: CPT

## 2022-12-05 RX ADMIN — Medication 1: at 08:08

## 2022-12-05 RX ADMIN — Medication 25 MICROGRAM(S): at 06:31

## 2022-12-05 RX ADMIN — Medication 1 TABLET(S): at 11:00

## 2022-12-05 RX ADMIN — PANTOPRAZOLE SODIUM 40 MILLIGRAM(S): 20 TABLET, DELAYED RELEASE ORAL at 06:31

## 2022-12-05 RX ADMIN — HEPARIN SODIUM 5000 UNIT(S): 5000 INJECTION INTRAVENOUS; SUBCUTANEOUS at 06:31

## 2022-12-05 RX ADMIN — SERTRALINE 50 MILLIGRAM(S): 25 TABLET, FILM COATED ORAL at 11:00

## 2022-12-05 NOTE — PROGRESS NOTE ADULT - REASON FOR ADMISSION
Nausea, Vomiting Blood
coffee Grounds
Nausea, Vomiting Blood

## 2022-12-05 NOTE — PROGRESS NOTE ADULT - PROVIDER SPECIALTY LIST ADULT
Hospitalist
Infectious Disease
Critical Care
Gastroenterology
Infectious Disease
Nephrology
Nephrology
Palliative Care
Gastroenterology
Hospitalist
Infectious Disease
Nephrology
Hospitalist
Infectious Disease
Nephrology
Hospitalist
Palliative Care

## 2022-12-05 NOTE — PROGRESS NOTE ADULT - ASSESSMENT
Assessment:     71 y/o female with dementia admitted for UGIB.  family is electing conservative management    Process of Care  --Reviewed dx/treatment problems and alignment with Goals of Care    Physical Aspects of Care  --Pain - Patient denies at this time. c/w current management  --Bowel Regimen - Risk for constipation d/t immobility. Suggest daily dulcolax as needed  --Dyspnea - No SOB at this time. Comfortable and in NAD  --Nausea Vomiting - denies  --Debility/Weakness/Sacral Decubitus - fall precautions, turn/position per protocol.   --Dementia - supportive measures.  Haldol PRN.  please use sparingly  verbal redirection/reorientation before medications if confused   --Severe PCM - PO supplements per primary.  sertraline can be appetite stimulating.  encourage PO  --Depression - sertraline  --UGIB - supportive measures, no intervention.  on protonix.     Psychological and Psychiatric Aspects of Care:   --Grief/Bereavement: emotional support provided  --Hx of psychiatric dx: depression/dementia  -Pastoral Care Available PRN     Social Aspects of Care  -SW involved     Cultural Aspects  -Primary Language: English    Goals of Care:  DNR/DNI, no feeding tube.  trial of ABx.  return to hospital is OK.  Family have chosen to pursue home care followed by home hospice services     Prognosis: Death can occur at anytime, but if disease continues to progress naturally patient likely has <6 months.  She has Dementia FAST 6C wiht poor PPS (30%) and Severe Protein Calorie Malnutrition.      Ethical and Legal Aspects: NA  Capacity- pt does not have capacity    HCP/Surrogate:  Jose David Mendoza Jr -- [phone]    Code Status- DNR/DNI  MOLST- in chart  Dispo Plan- home with homecare    d/w SW/CM    Symptoms are controlled and GOC are defined.   Palliative Care will sign off  Please reconsult with additional questions or changes in pt's condition.

## 2022-12-05 NOTE — PROGRESS NOTE ADULT - SUBJECTIVE AND OBJECTIVE BOX
Subjective:  seen at bedside this AM, pt is resting in bed.  she is arousable and will answer yes/no questions.  she is not in any distress  no complaints offered  no overnight events noted on chart review.   pending d/c with homecare.     Review of Systems:    - CONSTITUTIONAL: Denies fever and chills.   - RESPIRATORY: Denies SOB, cough    - CV: Denies palpitations and CP.   - GI: Denies abdominal pain, constipation, nausea, vomiting and diarrhea.   - : Denies dysuria and urinary frequency.   - MSK: Denies myalgia and joint pain.   - SKIN: Denies rash and pruritus.   - NEUROLOGICAL: Denies headache and syncope.   - PSYCHIATRIC: Denies anxiety     Unable to obtain/Limited due to: dementia        PHYSICAL EXAM:    Vital Signs Last 24 Hrs  T(C): 36.6 (02 Dec 2022 09:39), Max: 37.2 (01 Dec 2022 15:30)  T(F): 97.9 (02 Dec 2022 09:39), Max: 98.9 (01 Dec 2022 15:30)  HR: 75 (02 Dec 2022 09:39) (70 - 75)  BP: 151/50 (02 Dec 2022 09:39) (124/60 - 151/50)  BP(mean): --  ABP: --  ABP(mean): --  RR: 17 (02 Dec 2022 09:39) (17 - 17)  SpO2: 97% (02 Dec 2022 09:39) (97% - 98%)    O2 Parameters below as of 02 Dec 2022 09:39  Patient On (Oxygen Delivery Method): room air      Daily     Daily Weight in k.4 (01 Dec 2022 04:53)      General:  - GENERAL: Arousable. No acute distress.   - EYES: EOMI. Anicteric.   - HENT: Moist mucous membranes.   - LUNGS: Clear to auscultation bilaterally. No accessory muscle use.   - CARDIOVASCULAR: Regular rate and rhythm. No murmur. No JVD. No edema.   - ABDOMEN: Soft, non-tender and non-distended. No palpable masses. Normal bowel sounds   - EXTREMITIES: No edema. Non-tender.    - SKIN: Warm, no rashes or lesions on visible skin.   - NEUROLOGIC: No focal neurological deficits.    - PSYCHIATRIC: Not Cooperative       LABS:                        9.1    9.28  )-----------( 338      ( 01 Dec 2022 08:54 )             28.8         137  |  105  |  33<H>  ----------------------------<  159<H>  4.2   |  25  |  1.46<H>    Ca    8.9      01 Dec 2022 08:54  Phos  2.1         TPro  x   /  Alb  1.9<L>  /  TBili  x   /  DBili  x   /  AST  x   /  ALT  x   /  AlkPhos  x         Albumin: Albumin, Serum: 1.9 g/dL ( @ 08:54)      Allergies    Hytrin (Unknown)    Intolerances      MEDICATIONS  (STANDING):  atorvastatin 20 milliGRAM(s) Oral at bedtime  dextrose 5%. 1000 milliLiter(s) (50 mL/Hr) IV Continuous <Continuous>  dextrose 5%. 1000 milliLiter(s) (100 mL/Hr) IV Continuous <Continuous>  dextrose 50% Injectable 25 Gram(s) IV Push once  dextrose 50% Injectable 12.5 Gram(s) IV Push once  dextrose 50% Injectable 25 Gram(s) IV Push once  glucagon  Injectable 1 milliGRAM(s) IntraMuscular once  heparin   Injectable 5000 Unit(s) SubCutaneous every 8 hours  insulin lispro (ADMELOG) corrective regimen sliding scale   SubCutaneous three times a day before meals  insulin lispro (ADMELOG) corrective regimen sliding scale   SubCutaneous at bedtime  lactobacillus acidophilus 1 Tablet(s) Oral daily  levothyroxine 25 MICROGram(s) Oral daily  meropenem  IVPB 1000 milliGRAM(s) IV Intermittent every 12 hours  pantoprazole    Tablet 40 milliGRAM(s) Oral before breakfast  sertraline 50 milliGRAM(s) Oral daily    MEDICATIONS  (PRN):  albuterol    90 MICROgram(s) HFA Inhaler 2 Puff(s) Inhalation every 4 hours PRN Shortness of Breath and/or Wheezing  dextrose Oral Gel 15 Gram(s) Oral once PRN Blood Glucose LESS THAN 70 milliGRAM(s)/deciliter  guaifenesin/dextromethorphan Oral Liquid 10 milliLiter(s) Oral every 4 hours PRN Cough  haloperidol     Tablet 0.5 milliGRAM(s) Oral every 6 hours PRN agitation  haloperidol    Injectable 0.5 milliGRAM(s) IntraMuscular every 6 hours PRN Agitation  ondansetron Injectable 4 milliGRAM(s) IV Push every 8 hours PRN Nausea and/or Vomiting      RADIOLOGY:

## 2022-12-09 DIAGNOSIS — E78.5 HYPERLIPIDEMIA, UNSPECIFIED: ICD-10-CM

## 2022-12-09 DIAGNOSIS — E11.22 TYPE 2 DIABETES MELLITUS WITH DIABETIC CHRONIC KIDNEY DISEASE: ICD-10-CM

## 2022-12-09 DIAGNOSIS — E11.65 TYPE 2 DIABETES MELLITUS WITH HYPERGLYCEMIA: ICD-10-CM

## 2022-12-09 DIAGNOSIS — J96.01 ACUTE RESPIRATORY FAILURE WITH HYPOXIA: ICD-10-CM

## 2022-12-09 DIAGNOSIS — A41.9 SEPSIS, UNSPECIFIED ORGANISM: ICD-10-CM

## 2022-12-09 DIAGNOSIS — D50.9 IRON DEFICIENCY ANEMIA, UNSPECIFIED: ICD-10-CM

## 2022-12-09 DIAGNOSIS — Z74.01 BED CONFINEMENT STATUS: ICD-10-CM

## 2022-12-09 DIAGNOSIS — I21.A1 MYOCARDIAL INFARCTION TYPE 2: ICD-10-CM

## 2022-12-09 DIAGNOSIS — Z79.890 HORMONE REPLACEMENT THERAPY: ICD-10-CM

## 2022-12-09 DIAGNOSIS — R33.9 RETENTION OF URINE, UNSPECIFIED: ICD-10-CM

## 2022-12-09 DIAGNOSIS — Z88.8 ALLERGY STATUS TO OTHER DRUGS, MEDICAMENTS AND BIOLOGICAL SUBSTANCES STATUS: ICD-10-CM

## 2022-12-09 DIAGNOSIS — N39.0 URINARY TRACT INFECTION, SITE NOT SPECIFIED: ICD-10-CM

## 2022-12-09 DIAGNOSIS — Z79.84 LONG TERM (CURRENT) USE OF ORAL HYPOGLYCEMIC DRUGS: ICD-10-CM

## 2022-12-09 DIAGNOSIS — N18.9 CHRONIC KIDNEY DISEASE, UNSPECIFIED: ICD-10-CM

## 2022-12-09 DIAGNOSIS — L89.153 PRESSURE ULCER OF SACRAL REGION, STAGE 3: ICD-10-CM

## 2022-12-09 DIAGNOSIS — I12.9 HYPERTENSIVE CHRONIC KIDNEY DISEASE WITH STAGE 1 THROUGH STAGE 4 CHRONIC KIDNEY DISEASE, OR UNSPECIFIED CHRONIC KIDNEY DISEASE: ICD-10-CM

## 2022-12-09 DIAGNOSIS — N17.0 ACUTE KIDNEY FAILURE WITH TUBULAR NECROSIS: ICD-10-CM

## 2022-12-09 DIAGNOSIS — F33.9 MAJOR DEPRESSIVE DISORDER, RECURRENT, UNSPECIFIED: ICD-10-CM

## 2022-12-09 DIAGNOSIS — F03.90 UNSPECIFIED DEMENTIA WITHOUT BEHAVIORAL DISTURBANCE: ICD-10-CM

## 2022-12-09 DIAGNOSIS — E86.0 DEHYDRATION: ICD-10-CM

## 2022-12-09 DIAGNOSIS — D62 ACUTE POSTHEMORRHAGIC ANEMIA: ICD-10-CM

## 2022-12-09 DIAGNOSIS — Z79.82 LONG TERM (CURRENT) USE OF ASPIRIN: ICD-10-CM

## 2022-12-09 DIAGNOSIS — E03.9 HYPOTHYROIDISM, UNSPECIFIED: ICD-10-CM

## 2022-12-09 DIAGNOSIS — U07.1 COVID-19: ICD-10-CM

## 2022-12-09 DIAGNOSIS — K92.2 GASTROINTESTINAL HEMORRHAGE, UNSPECIFIED: ICD-10-CM

## 2022-12-09 DIAGNOSIS — E43 UNSPECIFIED SEVERE PROTEIN-CALORIE MALNUTRITION: ICD-10-CM

## 2022-12-09 DIAGNOSIS — E87.20 ACIDOSIS, UNSPECIFIED: ICD-10-CM

## 2022-12-09 DIAGNOSIS — Z66 DO NOT RESUSCITATE: ICD-10-CM

## 2023-10-10 NOTE — ED ADULT NURSE NOTE - PRIMARY CARE PROVIDER
When Outside In The Sun, Do You...: rarely burns, mostly tans
Additional History: Cleanse with gentle wash. Remove with hot towel. Massage oil to face. Dermaplane. Cool 4x4. Signature hydrafacial with sensitive peel. Growth factor. Renewal crème. Smart tone. \\n
Kyleia

## 2023-10-27 NOTE — PROVIDER CONTACT NOTE (OTHER) - SITUATION
Answering service is aware of consult spoke to Anh
Patient Davila was found to be leaking. Attempted to change Davila, but patient refused.
Detail Level: Generalized

## 2023-11-20 NOTE — BH CONSULTATION LIAISON ASSESSMENT NOTE - NS ED BHA HOMICIDALITY PRESENT AGGRESSION PROPERTY LIFETIME
Detail Level: Detailed Size Of Lesion: 2 X Size Of Lesion In Cm (Optional): 1.3 Incorporate Mauc In Note: Yes None known

## 2024-04-04 NOTE — ED ADULT NURSE NOTE - ISOLATION INDICATION AIRBORNE
Airway    Performed by: Payal Heck MD  Authorized by: Payal Heck MD    Final Airway Type:  Endotracheal airway  Final Endotracheal Airway*:  IZABEL tube  ETT Size (mm)*:  7.0  Cuff*:  Regular  Technique Used for Successful ETT Placement:  Video laryngoscopy  Devices/Methods Used in Placement*:  Nasal ETT  Intubation Procedure*:  ETCO2, Preoxygenation, Atraumatic and Dentition Unchanged  Insertion Site:  Right naris  Blade Type*:  Video Laryngoscope  Blade Size*:  3  Measured from*:  Nares  Placement Verified by: auscultation, capnometry and equal breath sounds    Glottic View*:  1 - full view of glottis  Attempts*:  1  Ventilation Between Attempts:  2 hand mask   Patient Identified, Procedure confirmed, Emergency equipment available and Safety protocols followed  Urgency:  Elective  Difficult Airway: No    Indications for Airway Management:  Anesthesia  Spontaneous Ventilation: present    Sedation Level:  Anesthetized  MILS Maintained Throughout: No    Mask Difficulty Assessment:  1 - vent by mask  Start Time: 4/4/2024 2:38 PM   B nares sprayed with Afrin X2 in each nostril pre-induction.  Right nares dilated with 7.0 nasal trumpet during mask ventilation.  Atraumatic nasal intubation with glidescope      
Other Specify

## 2025-03-11 NOTE — ED ADULT NURSE NOTE - ED CARDIAC RATE
Patient: calling for medication refill.    amphetamine-dextroamphetamine (Adderall) 30 MG tablet     Medication(s) set up as pending orders from medication list.  Preferred pharmacy set up and verified.    Patient told to check with pharmacy after 48 hours.  Patient was advised they would be notified only if there is a problem concerning the refill.        Patient has 4 pills left.    bradycardic

## 2025-07-11 NOTE — PATIENT PROFILE ADULT - HARM RISK FACTORS
IV and telemetry removed.  Discharge instruction in packet.  Tried to call nursing home to give report with no answer.  Waiting on squad for .  Pt has no complaints and is stable.   yes